# Patient Record
Sex: MALE | Race: WHITE | Employment: UNEMPLOYED | URBAN - NONMETROPOLITAN AREA
[De-identification: names, ages, dates, MRNs, and addresses within clinical notes are randomized per-mention and may not be internally consistent; named-entity substitution may affect disease eponyms.]

---

## 2019-05-23 ENCOUNTER — OFFICE VISIT (OUTPATIENT)
Dept: FAMILY MEDICINE CLINIC | Age: 74
End: 2019-05-23
Payer: COMMERCIAL

## 2019-05-23 VITALS
HEART RATE: 66 BPM | OXYGEN SATURATION: 98 % | HEIGHT: 70 IN | BODY MASS INDEX: 24.77 KG/M2 | TEMPERATURE: 97.9 F | DIASTOLIC BLOOD PRESSURE: 70 MMHG | WEIGHT: 173 LBS | SYSTOLIC BLOOD PRESSURE: 140 MMHG

## 2019-05-23 DIAGNOSIS — M06.4 INFLAMMATORY POLYARTHROPATHY (HCC): Primary | ICD-10-CM

## 2019-05-23 DIAGNOSIS — E11.9 TYPE 2 DIABETES MELLITUS WITHOUT COMPLICATION, WITHOUT LONG-TERM CURRENT USE OF INSULIN (HCC): ICD-10-CM

## 2019-05-23 DIAGNOSIS — E78.5 HYPERLIPIDEMIA, UNSPECIFIED HYPERLIPIDEMIA TYPE: ICD-10-CM

## 2019-05-23 DIAGNOSIS — I10 ESSENTIAL HYPERTENSION: ICD-10-CM

## 2019-05-23 DIAGNOSIS — C61 PROSTATE CA (HCC): ICD-10-CM

## 2019-05-23 PROCEDURE — 99214 OFFICE O/P EST MOD 30 MIN: CPT | Performed by: INTERNAL MEDICINE

## 2019-05-23 RX ORDER — BIMATOPROST 0.01 %
1 DROPS OPHTHALMIC (EYE) NIGHTLY
COMMUNITY
Start: 2019-05-18

## 2019-05-23 RX ORDER — CHLORTHALIDONE 25 MG/1
25 TABLET ORAL DAILY
COMMUNITY
Start: 2019-05-13 | End: 2019-05-23 | Stop reason: SDUPTHER

## 2019-05-23 RX ORDER — CHLORTHALIDONE 25 MG/1
25 TABLET ORAL DAILY
Qty: 90 TABLET | Refills: 1 | Status: SHIPPED | OUTPATIENT
Start: 2019-05-23 | End: 2019-11-07 | Stop reason: SDUPTHER

## 2019-05-23 RX ORDER — OMEPRAZOLE 20 MG/1
TABLET, DELAYED RELEASE ORAL
COMMUNITY
End: 2020-05-06 | Stop reason: ALTCHOICE

## 2019-05-23 RX ORDER — LISINOPRIL 40 MG/1
40 TABLET ORAL DAILY
Refills: 1 | COMMUNITY
Start: 2019-03-27 | End: 2019-09-19 | Stop reason: SDUPTHER

## 2019-05-23 RX ORDER — FOLIC ACID 1 MG/1
1 TABLET ORAL DAILY
Refills: 12 | COMMUNITY
Start: 2019-04-25

## 2019-05-23 RX ORDER — ASPIRIN 81 MG/1
81 TABLET ORAL DAILY
COMMUNITY

## 2019-05-23 RX ORDER — VITAMIN E 268 MG
400 CAPSULE ORAL DAILY
COMMUNITY

## 2019-05-23 RX ORDER — PREDNISONE 10 MG/1
5 TABLET ORAL DAILY
Refills: 1 | COMMUNITY
Start: 2019-05-04 | End: 2019-11-07 | Stop reason: ALTCHOICE

## 2019-05-23 RX ORDER — FLUTICASONE PROPIONATE 50 MCG
2 SPRAY, SUSPENSION (ML) NASAL DAILY
Qty: 1 BOTTLE | Refills: 5 | Status: SHIPPED
Start: 2019-05-23 | End: 2020-05-06 | Stop reason: SDUPTHER

## 2019-05-23 RX ORDER — PRAVASTATIN SODIUM 80 MG/1
80 TABLET ORAL DAILY
COMMUNITY
Start: 2019-02-25 | End: 2019-09-19 | Stop reason: SDUPTHER

## 2019-05-23 RX ORDER — BRIMONIDINE TARTRATE/TIMOLOL 0.2%-0.5%
DROPS OPHTHALMIC (EYE)
Refills: 6 | Status: ON HOLD | COMMUNITY
Start: 2019-04-10 | End: 2020-10-11 | Stop reason: ALTCHOICE

## 2019-05-23 RX ORDER — ALLOPURINOL 300 MG/1
300 TABLET ORAL DAILY
Refills: 1 | COMMUNITY
Start: 2019-04-30 | End: 2019-09-19 | Stop reason: SDUPTHER

## 2019-05-23 RX ORDER — METHOTREXATE 2.5 MG/1
15 TABLET ORAL WEEKLY
Refills: 2 | COMMUNITY
Start: 2019-04-25 | End: 2019-06-21 | Stop reason: SDUPTHER

## 2019-05-23 ASSESSMENT — PATIENT HEALTH QUESTIONNAIRE - PHQ9
1. LITTLE INTEREST OR PLEASURE IN DOING THINGS: 0
SUM OF ALL RESPONSES TO PHQ9 QUESTIONS 1 & 2: 0
2. FEELING DOWN, DEPRESSED OR HOPELESS: 0
SUM OF ALL RESPONSES TO PHQ QUESTIONS 1-9: 0
SUM OF ALL RESPONSES TO PHQ QUESTIONS 1-9: 0

## 2019-05-24 ASSESSMENT — ENCOUNTER SYMPTOMS
DIARRHEA: 0
RHINORRHEA: 0
SHORTNESS OF BREATH: 0
SINUS PAIN: 0
WHEEZING: 0
BLOOD IN STOOL: 0
BACK PAIN: 0
CONSTIPATION: 0
NAUSEA: 0
ABDOMINAL PAIN: 0
VOMITING: 0
COUGH: 0

## 2019-05-24 NOTE — PROGRESS NOTES
[facility]     19  Steve Gordon : 1945 Sex: male  Age: 74 y.o. Chief Complaint   Patient presents with    Hypertension    Hyperlipidemia   Multiple medical problems follow-up    HPI: Patient presents today accompanied by his wife for follow-up visit issues. 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 Markedly elevated sedimentation rate. Joint effusion which was drained. All his symptoms have improved on prednisone and methotrexate. He is up-to-date with ophthalmology related to his diabetes. States his sugars have been a little bit on the higher side with prednisone blood pressures have been good. No further weight loss. Is actually up 5 pounds currently. Review of Systems   Constitutional: Negative for activity change, appetite change, chills, diaphoresis, fatigue, fever and unexpected weight change. HENT: Negative for congestion, ear pain, hearing loss, postnasal drip, rhinorrhea and sinus pain. Respiratory: Negative for cough, shortness of breath and wheezing. Cardiovascular: Negative for chest pain, palpitations and leg swelling. Gastrointestinal: Negative for abdominal pain, blood in stool, constipation, diarrhea, nausea and vomiting. Endocrine: Negative. Genitourinary: Negative for difficulty urinating, dysuria, frequency, hematuria and urgency. Musculoskeletal: Negative for arthralgias, back pain, gait problem and myalgias. Skin: Negative. Allergic/Immunologic: Negative for environmental allergies and immunocompromised state. Neurological: Negative for dizziness, weakness, light-headedness, numbness and headaches. Hematological: Negative. Psychiatric/Behavioral: Negative for sleep disturbance. The patient is not nervous/anxious. REST OF PERTINENT ROS GONE OVER AND WAS NEGATIVE.    PMH:  Problem List: Essential hypertension  Osteoarthritis  Malignant tumor of Relationships    Social connections:     Talks on phone: Not on file     Gets together: Not on file     Attends Yazidism service: Not on file     Active member of club or organization: Not on file     Attends meetings of clubs or organizations: Not on file     Relationship status: Not on file    Intimate partner violence:     Fear of current or ex partner: Not on file     Emotionally abused: Not on file     Physically abused: Not on file     Forced sexual activity: Not on file   Other Topics Concern    Not on file   Social History Narrative    Not on file       Vitals:    05/23/19 0846 05/23/19 0856   BP: (!) 140/68 (!) 140/70   Site:  Left Upper Arm   Pulse: 66    Temp: 97.9 °F (36.6 °C)    TempSrc: Temporal    SpO2: 98%    Weight: 173 lb (78.5 kg)    Height: 5' 10\" (1.778 m)        Physical Exam  Exam:  Const: Appears well developed and well nourished. No signs of acute distress present. ENMT: . (External Ears) Tympanic membranes are intact. External nose WNL. Moistness and  normal color of the nasal mucosae. Nasal septum. (Septum) . (Teeth) Gums appear healthy. Posterior pharynx shows no exudate, irritation or redness. Neck: Supple and symmetric. Palpation reveals no adenopathy. No masses appreciated. Thyroid exhibits no nodule or thyromegaly. No JVD. Carotids: 2+ and equal bilaterally, without  bruits. Resp: No rales, rhonchi or wheezes appreciated over the lungs bilaterally. CV: Rate is regular. Rhythm is regular. S1 is normal. S2 is normal. No gallop or rubs. No  heart murmur appreciated. Extremities: No clubbing, cyanosis or edema. Abdomen: Bowel sounds are normoactive. Palpation reveals softness, with no distension,  organomegaly or tenderness. No abdominal masses palpable. No palpable hepatosplenomegaly  He does have a large diastasis rectus. Musculo: Walks with a normal gait. Upper Extremities: Full ROM bilaterally. Lower Extremities:  Full ROM bilaterally.   Neurological: Grossly intact without focal deficits noted. Assessment and Plan:  Naida Seth was seen today for hypertension and hyperlipidemia. Diagnoses and all orders for this visit:    Inflammatory polyarthropathy ([address]  -     fluticasone (FLONASE) 50 MCG/ACT nasal spray; 2 sprays by Each Nostril route daily    Essential hypertension  -     chlorthalidone (HYGROTON) 25 MG tablet; Take 1 tablet by mouth daily    Hyperlipidemia, unspecified hyperlipidemia type  -     Lipid Panel; Future    Prostate CA (HCC)  -     mupirocin (BACTROBAN) 2 % ointment; Apply 3 times daily.  -     PSA, DIAGNOSTIC; Future    Type 2 diabetes mellitus without complication, without long-term current use of insulin (HCC)  -     Hemoglobin A1C; Future  -     Microalbumin / Creatinine Urine Ratio; Future    Plan: Blood work to be done with Rheumatology labs. To include hemoglobin A1c and urine microalbumin to creatinine ratio lipids and PSA. He declines prostate exam. I did also give him fluticasone nasal spray for eustachian tube dysfunction type symptoms continue to monitor blood pressure blood sugar closely. We will watch weight. Follow with rheumatology and ophthalmology. Advised problems in the interim. I'll see him back in 3 months and when necessary. Return in about 4 months (around 9/23/2019). Seen By:  Magda Ferris MD      *Document was created using voice recognition software. Note was reviewed however may contain grammatical errors.

## 2019-06-17 ENCOUNTER — TELEPHONE (OUTPATIENT)
Dept: PRIMARY CARE CLINIC | Age: 74
End: 2019-06-17

## 2019-06-17 DIAGNOSIS — M06.4 INFLAMMATORY POLYARTHROPATHY (HCC): Primary | ICD-10-CM

## 2019-06-19 LAB
ALBUMIN SERPL-MCNC: 3.8 G/DL
ALP BLD-CCNC: 52 U/L
ALT SERPL-CCNC: 13 U/L
ANION GAP SERPL CALCULATED.3IONS-SCNC: 1.2 MMOL/L
AST SERPL-CCNC: 17 U/L
BASOPHILS ABSOLUTE: NORMAL /ΜL
BASOPHILS RELATIVE PERCENT: NORMAL %
BILIRUB SERPL-MCNC: 1.2 MG/DL (ref 0.1–1.4)
BUN BLDV-MCNC: 34 MG/DL
CALCIUM SERPL-MCNC: 8.9 MG/DL
CHLORIDE BLD-SCNC: 102 MMOL/L
CO2: 24 MMOL/L
CREAT SERPL-MCNC: 1.4 MG/DL
EOSINOPHILS ABSOLUTE: NORMAL /ΜL
EOSINOPHILS RELATIVE PERCENT: NORMAL %
GFR CALCULATED: 49
GLUCOSE BLD-MCNC: 220 MG/DL
HCT VFR BLD CALC: NORMAL % (ref 41–53)
HEMOGLOBIN: NORMAL G/DL (ref 13.5–17.5)
LYMPHOCYTES ABSOLUTE: NORMAL /ΜL
LYMPHOCYTES RELATIVE PERCENT: NORMAL %
MCH RBC QN AUTO: NORMAL PG
MCHC RBC AUTO-ENTMCNC: NORMAL G/DL
MCV RBC AUTO: NORMAL FL
MONOCYTES ABSOLUTE: NORMAL /ΜL
MONOCYTES RELATIVE PERCENT: NORMAL %
NEUTROPHILS ABSOLUTE: NORMAL /ΜL
NEUTROPHILS RELATIVE PERCENT: NORMAL %
PDW BLD-RTO: NORMAL %
PLATELET # BLD: NORMAL K/ΜL
PMV BLD AUTO: NORMAL FL
POTASSIUM SERPL-SCNC: 3.8 MMOL/L
RBC # BLD: NORMAL 10^6/ΜL
SODIUM BLD-SCNC: 136 MMOL/L
TOTAL PROTEIN: 7
WBC # BLD: NORMAL 10^3/ML

## 2019-06-21 ENCOUNTER — TELEPHONE (OUTPATIENT)
Dept: RHEUMATOLOGY | Age: 74
End: 2019-06-21

## 2019-06-24 ENCOUNTER — OFFICE VISIT (OUTPATIENT)
Dept: RHEUMATOLOGY | Age: 74
End: 2019-06-24
Payer: COMMERCIAL

## 2019-06-24 VITALS
HEIGHT: 68 IN | OXYGEN SATURATION: 99 % | HEART RATE: 95 BPM | DIASTOLIC BLOOD PRESSURE: 72 MMHG | SYSTOLIC BLOOD PRESSURE: 142 MMHG | BODY MASS INDEX: 26.89 KG/M2 | TEMPERATURE: 98.3 F | WEIGHT: 177.4 LBS

## 2019-06-24 DIAGNOSIS — M06.4 INFLAMMATORY POLYARTHROPATHY (HCC): ICD-10-CM

## 2019-06-24 DIAGNOSIS — M06.4 INFLAMMATORY POLYARTHROPATHY (HCC): Primary | ICD-10-CM

## 2019-06-24 PROCEDURE — 99214 OFFICE O/P EST MOD 30 MIN: CPT | Performed by: INTERNAL MEDICINE

## 2019-06-24 ASSESSMENT — ENCOUNTER SYMPTOMS
VOMITING: 0
SHORTNESS OF BREATH: 0
COLOR CHANGE: 0
SORE THROAT: 0
EYE DISCHARGE: 0
EYE ITCHING: 0
EYE REDNESS: 0
ABDOMINAL DISTENTION: 0
BLOOD IN STOOL: 0
SINUS PRESSURE: 0
COUGH: 0
ABDOMINAL PAIN: 0
PHOTOPHOBIA: 0
SINUS PAIN: 0
EYE PAIN: 0
DIARRHEA: 0
CHEST TIGHTNESS: 0
WHEEZING: 0
CONSTIPATION: 0
TROUBLE SWALLOWING: 0

## 2019-06-24 NOTE — PROGRESS NOTES
fatigue, fever and unexpected weight change. HENT: Negative for congestion, ear discharge, ear pain, hearing loss, mouth sores, nosebleeds, sinus pressure, sinus pain, sore throat and trouble swallowing. Eyes: Negative for photophobia, pain, discharge, redness and itching. Respiratory: Negative for cough, chest tightness, shortness of breath and wheezing. Cardiovascular: Negative for chest pain, palpitations and leg swelling. Gastrointestinal: Negative for abdominal distention, abdominal pain, blood in stool, constipation, diarrhea and vomiting. Endocrine: Negative for cold intolerance, heat intolerance, polydipsia, polyphagia and polyuria. Genitourinary: Negative for dysuria, flank pain, genital sores and hematuria. Musculoskeletal: Negative for arthralgias, myalgias and neck pain. Skin: Negative for color change, pallor, rash and wound. Allergic/Immunologic: Negative for environmental allergies and food allergies. Neurological: Negative for dizziness, seizures, syncope, weakness, light-headedness, numbness and headaches. Hematological: Negative for adenopathy. Does not bruise/bleed easily. Psychiatric/Behavioral: Negative for confusion. The patient is not nervous/anxious.            Current Outpatient Medications:     methotrexate (RHEUMATREX) 2.5 MG chemo tablet, Take 4 tabs once weekly, Disp: 4 tablet, Rfl: 0    aspirin 81 MG EC tablet, Take by mouth, Disp: , Rfl:     blood glucose test strips (ONE TOUCH ULTRA TEST) strip, , Disp: , Rfl:     vitamin E 100 units capsule, Take by mouth, Disp: , Rfl:     allopurinol (ZYLOPRIM) 300 MG tablet, 300 mg daily , Disp: , Rfl: 1    LUMIGAN 0.01 % SOLN ophthalmic drops, , Disp: , Rfl:     COMBIGAN 0.2-0.5 % ophthalmic solution, , Disp: , Rfl: 6    vitamin D (CHOLECALCIFEROL) 1000 UNIT TABS tablet, Take by mouth, Disp: , Rfl:     folic acid (FOLVITE) 1 MG tablet, Take 1 mg by mouth daily , Disp: , Rfl: 12    lisinopril (PRINIVIL;ZESTRIL) 40 MG tablet, Take 40 mg by mouth daily , Disp: , Rfl: 1    metFORMIN (GLUCOPHAGE) 500 MG tablet, Take 500 mg by mouth three times daily , Disp: , Rfl:     methotrexate (RHEUMATREX) 2.5 MG chemo tablet, Take 15 mg by mouth once a week , Disp: , Rfl: 2    omeprazole (PRILOSEC OTC) 20 MG tablet, Take by mouth, Disp: , Rfl:     pravastatin (PRAVACHOL) 80 MG tablet, Take 80 mg by mouth daily , Disp: , Rfl:     predniSONE (DELTASONE) 10 MG tablet, , Disp: , Rfl: 1    chlorthalidone (HYGROTON) 25 MG tablet, Take 1 tablet by mouth daily, Disp: 90 tablet, Rfl: 1    fluticasone (FLONASE) 50 MCG/ACT nasal spray, 2 sprays by Each Nostril route daily, Disp: 1 Bottle, Rfl: 5  No Known Allergies    HEALTH MAINTENANCE:       Past Medical History:   Diagnosis Date    Essential hypertension 5/23/2019    Hyperlipidemia 5/23/2019    [facility] 5/23/2019    Prostate CA ([facility] 5/23/2019    Type 2 diabetes mellitus without complication, without long-term current use of insulin ([facility] 5/23/2019     No family history on file. No past surgical history on file.    Social History     Socioeconomic History    Marital status:      Spouse name: Not on file    Number of children: Not on file    Years of education: Not on file    Highest education level: Not on file   Occupational History     Employer: RETIRED   Social Needs    Financial resource strain: Not on file    Food insecurity:     Worry: Not on file     Inability: Not on file    Transportation needs:     Medical: Not on file     Non-medical: Not on file   Tobacco Use    Smoking status: Never Smoker    Smokeless tobacco: Never Used   Substance and Sexual Activity    Alcohol use: Not Currently    Drug use: Never    Sexual activity: Yes     Partners: Female   Lifestyle    Physical activity:     Days per week: Not on file     Minutes per session: Not on file    Stress: Not on file   Relationships    Social connections:     Talks on phone: Not on file     Gets together: Not on file     Attends Amish service: Not on file     Active member of club or organization: Not on file     Attends meetings of clubs or organizations: Not on file     Relationship status: Not on file    Intimate partner violence:     Fear of current or ex partner: Not on file     Emotionally abused: Not on file     Physically abused: Not on file     Forced sexual activity: Not on file   Other Topics Concern    Not on file   Social History Narrative    Not on file      Social History     Social History Narrative    Not on file        Vitals:    06/24/19 0926 06/24/19 0931   BP: (!) 144/70 (!) 142/72   Site: Right Upper Arm Left Upper Arm   Position: Sitting Sitting   Pulse: 95    Temp: 98.3 °F (36.8 °C)    TempSrc: Temporal    SpO2: 99%    Weight: 177 lb 6.4 oz (80.5 kg)    Height: 5' 8\" (1.727 m)         Physical Exam   Musculoskeletal:   No active synovitis         Mimi Ellis was seen today for joint pain and joint swelling. 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 Significant resolution of pain swelling and stiffness after prednisone taper. Able to tolerate Methotrexate with out side effects. Reports significant improvement in joints pain. No active synovitis. 1- Taper Prednisone to 2.5 mg and continue it for 4 weeks . 2- Continue 6 tabs of MTX   3- Repeat labs in 4 weeks   Return in about 6 weeks (around 8/5/2019). Giles Gipson MD     *This document was created using voice recognition software. Note was reviewed, however may contain grammatical errors.

## 2019-06-26 RX ORDER — METHOTREXATE 2.5 MG/1
15 TABLET ORAL WEEKLY
Qty: 24 TABLET | Refills: 2 | Status: SHIPPED | OUTPATIENT
Start: 2019-06-26 | End: 2019-06-27

## 2019-07-19 LAB
ALBUMIN SERPL-MCNC: NORMAL G/DL
ALP BLD-CCNC: NORMAL U/L
ALT SERPL-CCNC: NORMAL U/L
ANION GAP SERPL CALCULATED.3IONS-SCNC: NORMAL MMOL/L
AST SERPL-CCNC: NORMAL U/L
BASOPHILS ABSOLUTE: NORMAL /ΜL
BASOPHILS RELATIVE PERCENT: NORMAL %
BILIRUB SERPL-MCNC: NORMAL MG/DL (ref 0.1–1.4)
BUN BLDV-MCNC: 23 MG/DL
CALCIUM SERPL-MCNC: NORMAL MG/DL
CHLORIDE BLD-SCNC: NORMAL MMOL/L
CHOLESTEROL, TOTAL: 155 MG/DL
CHOLESTEROL/HDL RATIO: 2.2
CO2: NORMAL MMOL/L
CREAT SERPL-MCNC: 1.3 MG/DL
CREATININE, URINE: 200.6
EOSINOPHILS ABSOLUTE: NORMAL /ΜL
EOSINOPHILS RELATIVE PERCENT: NORMAL %
GFR CALCULATED: NORMAL
GLUCOSE BLD-MCNC: 142 MG/DL
HCT VFR BLD CALC: NORMAL % (ref 41–53)
HDLC SERPL-MCNC: 43 MG/DL (ref 35–70)
HEMOGLOBIN: NORMAL G/DL (ref 13.5–17.5)
LDL CHOLESTEROL CALCULATED: 93 MG/DL (ref 0–160)
LYMPHOCYTES ABSOLUTE: NORMAL /ΜL
LYMPHOCYTES RELATIVE PERCENT: NORMAL %
MCH RBC QN AUTO: NORMAL PG
MCHC RBC AUTO-ENTMCNC: NORMAL G/DL
MCV RBC AUTO: NORMAL FL
MICROALBUMIN/CREAT 24H UR: 594.2 MG/G{CREAT}
MICROALBUMIN/CREAT UR-RTO: 296.2
MONOCYTES ABSOLUTE: NORMAL /ΜL
MONOCYTES RELATIVE PERCENT: NORMAL %
NEUTROPHILS ABSOLUTE: NORMAL /ΜL
NEUTROPHILS RELATIVE PERCENT: NORMAL %
PLATELET # BLD: NORMAL K/ΜL
PMV BLD AUTO: NORMAL FL
POTASSIUM SERPL-SCNC: 3.7 MMOL/L
RBC # BLD: NORMAL 10^6/ΜL
SODIUM BLD-SCNC: NORMAL MMOL/L
TOTAL PROTEIN: NORMAL
TRIGL SERPL-MCNC: 163 MG/DL
VLDLC SERPL CALC-MCNC: NORMAL MG/DL
WBC # BLD: NORMAL 10^3/ML

## 2019-07-25 DIAGNOSIS — M06.4 INFLAMMATORY POLYARTHROPATHY (HCC): ICD-10-CM

## 2019-08-05 ENCOUNTER — OFFICE VISIT (OUTPATIENT)
Dept: RHEUMATOLOGY | Age: 74
End: 2019-08-05
Payer: COMMERCIAL

## 2019-08-05 VITALS
OXYGEN SATURATION: 93 % | DIASTOLIC BLOOD PRESSURE: 72 MMHG | BODY MASS INDEX: 27.34 KG/M2 | HEIGHT: 68 IN | WEIGHT: 180.4 LBS | SYSTOLIC BLOOD PRESSURE: 146 MMHG | TEMPERATURE: 98.2 F | HEART RATE: 99 BPM

## 2019-08-05 DIAGNOSIS — M06.4 INFLAMMATORY POLYARTHROPATHY (HCC): Primary | ICD-10-CM

## 2019-08-05 PROCEDURE — 99214 OFFICE O/P EST MOD 30 MIN: CPT | Performed by: INTERNAL MEDICINE

## 2019-08-05 ASSESSMENT — ENCOUNTER SYMPTOMS
ABDOMINAL PAIN: 0
EYE ITCHING: 0
COUGH: 0
CONSTIPATION: 0
COLOR CHANGE: 0
ABDOMINAL DISTENTION: 0
CHEST TIGHTNESS: 0
VOMITING: 0
TROUBLE SWALLOWING: 0
EYE PAIN: 0
SINUS PRESSURE: 0
BLOOD IN STOOL: 0
SORE THROAT: 0
EYE REDNESS: 0
SHORTNESS OF BREATH: 0
EYE DISCHARGE: 0
WHEEZING: 0
PHOTOPHOBIA: 0
DIARRHEA: 0
SINUS PAIN: 0

## 2019-08-05 NOTE — PROGRESS NOTES
19    Name: Emily Levi  : 1945  Age: 66 y.o. Sex: male    Patient is seen at the request of Jonn Haywood MD    Patient presents for a rheumatology consultation regarding     Chief Complaint   Patient presents with    Joint Pain    Joint Swelling       1- Migratory pain affecting shoulders , MCP, left knee, last week he has swelling of right 3rd MCP, symptoms improves its own, takes Tylenol arthritis, took Naproxen for knee pain after right knee effusion however lately he cannot continue it due to low GFR, reports morning stiffness which lasts whole day, he states pain stays all day along , this pattern of joint pain has been going since many years however lately he has been experiencing swelling in joints, denies history tick bite,denies silver scaly patches, denies recent travel history , denies IBD, deoesnot know about family history of autoimmune  disease as he was adopted,  2- Right knee Effusion after Thanksgiving , sudden onset, synovial fluid was drained by Dr Enio Hurley which was consistent  with inflammation but no infection or crystals,  3- Gout -podagra , well controlled since he is on Allopurinol, last flare was couple of years ago  4- Pink eye left eye , intermittent many years, follows with Dr Lori Holman  5- Rash Intergluteal region , recent flare one week ago  6- dry scaly patch over left foot    19:  Doing well . Reports improvement in joint pain. Started 6 tabs of MTX this week. Compliant with 5 mg of Prednisone. Recent labs showed improvement of ESR. Now it is 39. RR is 0-20. Rest of labs are in acceptable range for Methotrexate use. 19 :  Went off prednisone completely in first 2 weeks of July , noticed recurrence of red eye worse in left , he resumed 2.5 mg of prednisone and it has helped. Labs from 19 showed normal LFT , GFR ,CRP wnl , ESR elevated at 47. He has not increased MTX to 6 tabs yet due to cost issue.          Vitals:    19 0825 19 LUMIGAN 0.01 % SOLN ophthalmic drops, , Disp: , Rfl:     COMBIGAN 0.2-0.5 % ophthalmic solution, , Disp: , Rfl: 6    vitamin D (CHOLECALCIFEROL) 1000 UNIT TABS tablet, Take by mouth, Disp: , Rfl:     folic acid (FOLVITE) 1 MG tablet, Take 1 mg by mouth daily , Disp: , Rfl: 12    lisinopril (PRINIVIL;ZESTRIL) 40 MG tablet, Take 40 mg by mouth daily , Disp: , Rfl: 1    metFORMIN (GLUCOPHAGE) 500 MG tablet, Take 500 mg by mouth three times daily , Disp: , Rfl:     omeprazole (PRILOSEC OTC) 20 MG tablet, Take by mouth, Disp: , Rfl:     pravastatin (PRAVACHOL) 80 MG tablet, Take 80 mg by mouth daily , Disp: , Rfl:     predniSONE (DELTASONE) 10 MG tablet, , Disp: , Rfl: 1    chlorthalidone (HYGROTON) 25 MG tablet, Take 1 tablet by mouth daily, Disp: 90 tablet, Rfl: 1    fluticasone (FLONASE) 50 MCG/ACT nasal spray, 2 sprays by Each Nostril route daily, Disp: 1 Bottle, Rfl: 5  No Known Allergies    HEALTH MAINTENANCE:       Past Medical History:   Diagnosis Date    Essential hypertension 5/23/2019    Hyperlipidemia 5/23/2019    Infl[facility] 5/23/2019    Prostate CA ([facility] 5/23/2019    Type 2 diabetes mellitus without complication, without long-term current use of insulin [facility] 5/23/2019     No family history on file. No past surgical history on file.    Social History     Socioeconomic History    Marital status:      Spouse name: Not on file    Number of children: Not on file    Years of education: Not on file    Highest education level: Not on file   Occupational History     Employer: RETIRED   Social Needs    Financial resource strain: Not on file    Food insecurity:     Worry: Not on file     Inability: Not on file    Transportation needs:     Medical: Not on file     Non-medical: Not on file   Tobacco Use    Smoking status: Never Smoker    Smokeless tobacco: Never Used   Substance and Sexual Activity    Alcohol use: Not Currently    Drug use: Never    Sexual activity: Yes     Partners: Female   Lifestyle    Physical activity:     Days per week: Not on file     Minutes per session: Not on file    Stress: Not on file   Relationships    Social connections:     Talks on phone: Not on file     Gets together: Not on file     Attends Islam service: Not on file     Active member of club or organization: Not on file     Attends meetings of clubs or organizations: Not on file     Relationship status: Not on file    Intimate partner violence:     Fear of current or ex partner: Not on file     Emotionally abused: Not on file     Physically abused: Not on file     Forced sexual activity: Not on file   Other Topics Concern    Not on file   Social History Narrative    Not on file      Social History     Social History Narrative    Not on file        Vitals:    08/05/19 0825 08/05/19 0829   BP: (!) 160/76 (!) 146/72   Site: Left Upper Arm Left Upper Arm   Position: Sitting Sitting   Pulse: 99    Temp: 98.2 °F (36.8 °C)    TempSrc: Temporal    SpO2: 93%    Weight: 180 lb 6.4 oz (81.8 kg)    Height: 5' 8\" (1.727 m)         Physical Exam   Constitutional: He is oriented to person, place, and time. He appears well-developed and well-nourished. HENT:   Head: Normocephalic. Right Ear: External ear normal.   Left Ear: External ear normal.   Mouth/Throat: Oropharynx is clear and moist.   Eyes: Pupils are equal, round, and reactive to light. Conjunctivae and EOM are normal.   Neck: Normal range of motion. No thyromegaly present. Cardiovascular: Normal rate, regular rhythm and intact distal pulses. Pulmonary/Chest: Effort normal and breath sounds normal. He has no wheezes. He has no rales. He exhibits no tenderness. Abdominal: Soft. Bowel sounds are normal. He exhibits no distension and no mass. There is no tenderness. There is no rebound and no guarding. Musculoskeletal: Normal range of motion.    Chronic synovial thickening affecting most of MCP , PIP with out active

## 2019-09-19 ENCOUNTER — OFFICE VISIT (OUTPATIENT)
Dept: FAMILY MEDICINE CLINIC | Age: 74
End: 2019-09-19
Payer: COMMERCIAL

## 2019-09-19 VITALS
BODY MASS INDEX: 27.22 KG/M2 | WEIGHT: 179 LBS | DIASTOLIC BLOOD PRESSURE: 78 MMHG | SYSTOLIC BLOOD PRESSURE: 144 MMHG | OXYGEN SATURATION: 95 % | HEART RATE: 86 BPM

## 2019-09-19 DIAGNOSIS — C61 PROSTATE CA (HCC): ICD-10-CM

## 2019-09-19 DIAGNOSIS — I10 ESSENTIAL HYPERTENSION: ICD-10-CM

## 2019-09-19 DIAGNOSIS — E78.5 HYPERLIPIDEMIA, UNSPECIFIED HYPERLIPIDEMIA TYPE: ICD-10-CM

## 2019-09-19 DIAGNOSIS — E11.9 TYPE 2 DIABETES MELLITUS WITHOUT COMPLICATION, WITHOUT LONG-TERM CURRENT USE OF INSULIN (HCC): ICD-10-CM

## 2019-09-19 DIAGNOSIS — M06.4 INFLAMMATORY POLYARTHROPATHY (HCC): Primary | ICD-10-CM

## 2019-09-19 PROCEDURE — 99214 OFFICE O/P EST MOD 30 MIN: CPT | Performed by: INTERNAL MEDICINE

## 2019-09-19 RX ORDER — PRAVASTATIN SODIUM 80 MG/1
80 TABLET ORAL DAILY
Qty: 90 TABLET | Refills: 1 | Status: SHIPPED | OUTPATIENT
Start: 2019-09-19 | End: 2019-11-07 | Stop reason: SDUPTHER

## 2019-09-19 RX ORDER — ALLOPURINOL 300 MG/1
300 TABLET ORAL DAILY
Qty: 90 TABLET | Refills: 1 | Status: SHIPPED | OUTPATIENT
Start: 2019-09-19 | End: 2019-11-07 | Stop reason: SDUPTHER

## 2019-09-19 RX ORDER — LISINOPRIL 40 MG/1
40 TABLET ORAL DAILY
Qty: 90 TABLET | Refills: 1 | Status: SHIPPED | OUTPATIENT
Start: 2019-09-19 | End: 2019-11-07 | Stop reason: SDUPTHER

## 2019-09-22 NOTE — PROGRESS NOTES
[facility]     19  Marisa Alberto : 1945 Sex: male  Age: 66 y.o. Chief Complaint   Patient presents with    Hypertension       HPI  Presents today for follow-up visit on his multiple medical problems. He is accompanied by his wife. He had been following with rheumatology for inflammatory polyarthropathy probable psoriatic arthritis. His rheumatologist has left the area and he has not chosen another one yet. I told him he is going to need to do so for the management of this at his methotrexate. Currently on 15 mg of methotrexate weekly. Goal is to get him up to 18 mg weekly. Still on low-dose prednisone at 5 mg daily. Goal is to get him off the prednisone. Arthritis appears to be under good control at this time. Tells me blood pressures have been in good range. Blood sugars have been somewhat fluctuant on the prednisone. Review of Systems     Constitutional: Negative for activity change, appetite change, chills, diaphoresis, fatigue, fever and unexpected weight change. HENT: Negative for congestion, ear pain, hearing loss, postnasal drip, rhinorrhea and sinus pain. Respiratory: Negative for cough, shortness of breath and wheezing. Cardiovascular: Negative for chest pain, palpitations and leg swelling. Gastrointestinal: Negative for abdominal pain, blood in stool, constipation, diarrhea, nausea and vomiting. Endocrine: Negative. Genitourinary: Negative for difficulty urinating, dysuria, frequency, hematuria and urgency. Musculoskeletal: Negative for arthralgias (controlled with current regimen) back pain, gait problem and myalgias. Skin: Negative. Allergic/Immunologic: Negative for environmental allergies   Neurological: Negative for dizziness, weakness, light-headedness, numbness and headaches. Hematological: Negative. Psychiatric/Behavioral: Negative for sleep disturbance. The patient is not nervous/anxious.     REST OF PERTINENT ROS GONE resource strain: Not on file    Food insecurity:     Worry: Not on file     Inability: Not on file    Transportation needs:     Medical: Not on file     Non-medical: Not on file   Tobacco Use    Smoking status: Never Smoker    Smokeless tobacco: Never Used   Substance and Sexual Activity    Alcohol use: Not Currently    Drug use: Never    Sexual activity: Yes     Partners: Female   Lifestyle    Physical activity:     Days per week: Not on file     Minutes per session: Not on file    Stress: Not on file   Relationships    Social connections:     Talks on phone: Not on file     Gets together: Not on file     Attends Alevism service: Not on file     Active member of club or organization: Not on file     Attends meetings of clubs or organizations: Not on file     Relationship status: Not on file    Intimate partner violence:     Fear of current or ex partner: Not on file     Emotionally abused: Not on file     Physically abused: Not on file     Forced sexual activity: Not on file   Other Topics Concern    Not on file   Social History Narrative    Not on file       Vitals:    09/19/19 0901   BP: (!) 144/78   Pulse: 86   SpO2: 95%   Weight: 179 lb (81.2 kg)       Physical Exam    Const: Appears well developed and well nourished. No signs of acute distress present. ENMT: . (External Ears) Tympanic membranes are intact. External nose WNL. Moistness and  normal color of the nasal mucosae. Nasal septum. (Septum) . (Teeth) Gums appear healthy. Posterior pharynx shows no exudate, irritation or redness. Neck: Supple and symmetric. Palpation reveals no adenopathy. No masses appreciated. Thyroid exhibits no nodule or thyromegaly. No JVD. Carotids: 2+ and equal bilaterally, without  bruits. Resp: No rales, rhonchi or wheezes appreciated over the lungs bilaterally. CV: Rate is regular. Rhythm is regular. S1 is normal. S2 is normal. No gallop or rubs. No  heart murmur appreciated.  Extremities: No clubbing,

## 2019-10-08 ENCOUNTER — OFFICE VISIT (OUTPATIENT)
Dept: FAMILY MEDICINE CLINIC | Age: 74
End: 2019-10-08
Payer: COMMERCIAL

## 2019-10-08 VITALS
SYSTOLIC BLOOD PRESSURE: 152 MMHG | WEIGHT: 177 LBS | TEMPERATURE: 97.8 F | HEIGHT: 68 IN | DIASTOLIC BLOOD PRESSURE: 90 MMHG | HEART RATE: 91 BPM | BODY MASS INDEX: 26.83 KG/M2 | OXYGEN SATURATION: 96 %

## 2019-10-08 DIAGNOSIS — R21 RASH: Primary | ICD-10-CM

## 2019-10-08 PROCEDURE — 99213 OFFICE O/P EST LOW 20 MIN: CPT | Performed by: PHYSICIAN ASSISTANT

## 2019-10-08 RX ORDER — PREDNISONE 20 MG/1
TABLET ORAL
Qty: 18 TABLET | Refills: 0 | Status: SHIPPED | OUTPATIENT
Start: 2019-10-08 | End: 2019-11-07 | Stop reason: ALTCHOICE

## 2019-10-08 ASSESSMENT — ENCOUNTER SYMPTOMS
SHORTNESS OF BREATH: 0
SORE THROAT: 0
PHOTOPHOBIA: 0
ABDOMINAL PAIN: 0
COUGH: 0
NAUSEA: 0
BACK PAIN: 0
DIARRHEA: 0
VOMITING: 0

## 2019-11-01 LAB
A/G RATIO: 1.2 RATIO (ref 1.1–2.2)
ALBUMIN SERPL-MCNC: 4 G/DL (ref 3.4–4.8)
ALP BLD-CCNC: 61 U/L (ref 42–121)
ALT SERPL-CCNC: 10 U/L (ref 10–63)
ANION GAP SERPL CALCULATED.3IONS-SCNC: 9 MEQ/L (ref 3–11)
AST SERPL-CCNC: 16 U/L (ref 10–41)
BASOPHILS ABSOLUTE: 0.1 K/UL (ref 0–0.2)
BASOPHILS RELATIVE PERCENT: 0.9 % (ref 0–1.5)
BILIRUB SERPL-MCNC: 1.1 MG/DL (ref 0.3–1.5)
BUN BLDV-MCNC: 19 MG/DL (ref 6–20)
C-REACTIVE PROTEIN: 39.2 MG/L
CALCIUM SERPL-MCNC: 8.4 MG/DL (ref 8.5–10.5)
CHLORIDE BLD-SCNC: 101 MEQ/L (ref 98–107)
CO2: 27 MEQ/L (ref 21–31)
CREAT SERPL-MCNC: 1.3 MG/DL (ref 0.6–1.3)
CREATININE URINE: 219.5 MG/DL (ref 22–328)
DIAGNOSTIC PSA: 0.01 NG/ML (ref 0–4)
DIFFERENTIAL, MANUAL: NO
EOSINOPHILS ABSOLUTE: 0.2 K/UL (ref 0–0.33)
EOSINOPHILS RELATIVE PERCENT: 2.7 % (ref 0–3)
ESTIMATED AVERAGE GLUCOSE: 148 MG/DL
GFR AFRICAN AMERICAN: 65 ML/MIN
GFR NON-AFRICAN AMERICAN: 54 ML/MIN
GLOBULIN: 3.4 G/DL (ref 1.9–3.9)
GLUCOSE BLD-MCNC: 164 MG/DL (ref 70–99)
HBA1C MFR BLD: 6.8 % (ref 4–6)
HCT VFR BLD CALC: 36.4 % (ref 41–50)
HEMOGLOBIN: 12.2 G/DL (ref 13.5–16.5)
LYMPHOCYTES # BLD: 14.1 % (ref 24–44)
LYMPHOCYTES ABSOLUTE: 1.2 K/UL (ref 1.1–4.8)
MCH RBC QN AUTO: 35.2 PG (ref 28–34)
MCHC RBC AUTO-ENTMCNC: 33.4 G/DL (ref 33–37)
MCV RBC AUTO: 105.5 FL (ref 80–100)
MICROALBUMIN UR-MCNC: 609.2 UG/ML
MICROALBUMIN/CREAT UR-RTO: 277.5 MG/G
MONOCYTES ABSOLUTE: 0.9 K/UL (ref 0.2–0.7)
MONOCYTES RELATIVE PERCENT: 10.8 % (ref 3.4–9)
NEUTROPHILS ABSOLUTE: 5.8 K/UL (ref 1.83–8.7)
PDW BLD-RTO: 15.4 % (ref 10.9–14.3)
PLATELET # BLD: 309 K/UL (ref 150–450)
PMV BLD AUTO: 8 FL (ref 7.4–10.4)
POTASSIUM SERPL-SCNC: 3.7 MEQ/L (ref 3.6–5)
RBC # BLD: 3.46 M/UL (ref 4.5–5.5)
SEDIMENTATION RATE, ERYTHROCYTE: 89 MM/HR (ref 0–20)
SEGMENTED NEUTROPHILS RELATIVE PERCENT: 71.5 % (ref 40–74)
SODIUM BLD-SCNC: 137 MEQ/L (ref 135–145)
TOTAL PROTEIN: 7.4 G/DL (ref 5.9–7.8)
URATE: 5.9 MG/DL (ref 4.4–7.6)
WBC # BLD: 8.2 K/UL (ref 4.5–11)

## 2019-11-02 ENCOUNTER — TELEPHONE (OUTPATIENT)
Dept: FAMILY MEDICINE CLINIC | Age: 74
End: 2019-11-02

## 2019-11-07 ENCOUNTER — OFFICE VISIT (OUTPATIENT)
Dept: FAMILY MEDICINE CLINIC | Age: 74
End: 2019-11-07
Payer: COMMERCIAL

## 2019-11-07 VITALS
DIASTOLIC BLOOD PRESSURE: 74 MMHG | HEART RATE: 110 BPM | BODY MASS INDEX: 26.91 KG/M2 | OXYGEN SATURATION: 97 % | WEIGHT: 177 LBS | SYSTOLIC BLOOD PRESSURE: 152 MMHG

## 2019-11-07 DIAGNOSIS — I10 ESSENTIAL HYPERTENSION: ICD-10-CM

## 2019-11-07 DIAGNOSIS — M06.4 INFLAMMATORY POLYARTHROPATHY (HCC): ICD-10-CM

## 2019-11-07 DIAGNOSIS — E11.21 DIABETIC NEPHROPATHY WITH PROTEINURIA (HCC): ICD-10-CM

## 2019-11-07 DIAGNOSIS — E78.5 HYPERLIPIDEMIA, UNSPECIFIED HYPERLIPIDEMIA TYPE: Primary | ICD-10-CM

## 2019-11-07 DIAGNOSIS — C61 PROSTATE CA (HCC): ICD-10-CM

## 2019-11-07 PROCEDURE — 99214 OFFICE O/P EST MOD 30 MIN: CPT | Performed by: INTERNAL MEDICINE

## 2019-11-07 RX ORDER — VIT C/B6/B5/MAGNESIUM/HERB 173 50-5-6-5MG
CAPSULE ORAL DAILY
COMMUNITY
End: 2020-05-06 | Stop reason: ALTCHOICE

## 2019-11-07 RX ORDER — AMOXICILLIN 500 MG
CAPSULE ORAL DAILY
COMMUNITY
End: 2020-11-30 | Stop reason: ALTCHOICE

## 2019-11-08 RX ORDER — CHLORTHALIDONE 25 MG/1
25 TABLET ORAL DAILY
Qty: 90 TABLET | Refills: 1 | Status: SHIPPED
Start: 2019-11-08 | End: 2020-05-06 | Stop reason: SDUPTHER

## 2019-11-08 RX ORDER — LISINOPRIL 40 MG/1
40 TABLET ORAL DAILY
Qty: 90 TABLET | Refills: 1 | Status: SHIPPED
Start: 2019-11-08 | End: 2020-05-06 | Stop reason: SDUPTHER

## 2019-11-08 RX ORDER — PRAVASTATIN SODIUM 80 MG/1
80 TABLET ORAL DAILY
Qty: 90 TABLET | Refills: 1 | Status: SHIPPED | OUTPATIENT
Start: 2019-11-08 | End: 2020-01-02 | Stop reason: SDUPTHER

## 2019-11-08 RX ORDER — ALLOPURINOL 300 MG/1
300 TABLET ORAL DAILY
Qty: 90 TABLET | Refills: 1 | Status: SHIPPED
Start: 2019-11-08 | End: 2020-05-06 | Stop reason: SDUPTHER

## 2019-11-10 PROBLEM — E11.21 DIABETIC NEPHROPATHY WITH PROTEINURIA (HCC): Status: ACTIVE | Noted: 2019-11-10

## 2019-11-10 PROBLEM — E11.21 TYPE 2 DIABETES MELLITUS WITH DIABETIC NEPHROPATHY (HCC): Status: ACTIVE | Noted: 2019-05-23

## 2019-11-10 PROBLEM — E11.21 TYPE 2 DIABETES MELLITUS WITH DIABETIC NEPHROPATHY (HCC): Status: RESOLVED | Noted: 2019-05-23 | Resolved: 2019-11-10

## 2020-01-02 ENCOUNTER — OFFICE VISIT (OUTPATIENT)
Dept: FAMILY MEDICINE CLINIC | Age: 75
End: 2020-01-02
Payer: MEDICARE

## 2020-01-02 VITALS
SYSTOLIC BLOOD PRESSURE: 148 MMHG | BODY MASS INDEX: 26.46 KG/M2 | HEART RATE: 100 BPM | WEIGHT: 174 LBS | DIASTOLIC BLOOD PRESSURE: 72 MMHG | OXYGEN SATURATION: 97 %

## 2020-01-02 PROCEDURE — 99214 OFFICE O/P EST MOD 30 MIN: CPT | Performed by: INTERNAL MEDICINE

## 2020-01-02 RX ORDER — PRAVASTATIN SODIUM 80 MG/1
80 TABLET ORAL DAILY
Qty: 90 TABLET | Refills: 1 | Status: SHIPPED | OUTPATIENT
Start: 2020-01-02 | End: 2020-05-06 | Stop reason: SDUPTHER

## 2020-01-02 NOTE — PROGRESS NOTES
[facility]     20  Tanya Duran : 1945 Sex: male  Age: 66 y.o. Chief Complaint   Patient presents with    Hypertension       HPI    Patient presents today for follow-up visit on his multiple medical problems. He is accompanied by his wife today. He states he is been doing very well not having any further joint discomfort that he is complained of previously I reviewed my last note he had seen rheumatology and diagnosed as inflammatory polyarthropathy and had been on prednisone and methotrexate which he had stopped in the past.  He states he is feeling better since off the medication. His last CRP and sed rate were both elevated and I told him I would need to repeat these numbers again. Also describes some intermittent mild right sided abdominal discomfort that he states feels similar to what he had prior to having his gallbladder out. States it can be after meals or at any time. States the last 2 or 3 minutes and resolves. States also compress on the area and it helps. States blood sugar has been a little bit fluctuant 130-150 range. Blood pressure at home is been good. It was elevated here in the office today. I did review his labs from last visit including his PSA. It was 0.1. He does have remote history of prostate cancer.     Review of Systems     PMH:  Problem List: Essential hypertension  Osteoarthritis  Malignant tumor of prostate  Gout  Type 2 diabetes mellitus  Benign essential hypertension  Hyperlipidemia  Health Maintenance:  Colonoscopy - (2014)  Colonoscopy - (2017)  Colonoscopy Screening - (2014)  Colonoscopy Screening - (2017)  Influenza Vaccination - (2016)  Pneumonia Vaccination - (2016)  Colonoscopy - 08-dr miguel,,-due 20  Psa Test - dr jiménez-we will do, ,, -0.1  Rectal Exam - dr Vince Schenider, declined  Prostate Exam - dr Vince Schneider, declined  Ercp - 3/11  Hemmocult Cards - hemocult-neg x 3--,-neg x 3  EGD - 8/14,1/17  MRCP - 1/17  Fit test - 4/19-neg  Medical Problems:  Hypertension  Hyperlipidemia - intolerant of statins and zetia  Gout, Prostate Cancer, hx left knee arthroscopy, Type 2 Diabetes, Diverticulosis, Osteoarthritis, Colon  Polyps, ventral hernia, Hiatal Hernia, Pancreatitis  Cholelithiasis - cholecystitis/choledocholithiasis-lap pratibha  Ercp  Carotid Artery Stenosis - declines further fu  Pulmonary Emphysema - Noted on CT chest  Pulmonary nodules - Followed on CT-done  Abnormal vascular study - Refuses vascular consult  Laser surgery 3/26/14, Dr. Master Solitario for glaucoma  Saw rheumatology in 2019 diagnosis inflammatory polyarthropathy-took self off of methotrexate and prednisone. Refuses further evaluation. FH:  unknown--adopted  Father:  . (Hx)  Mother:  . (Hx)  SH:  . (Marital) retire   Personal Habits: Cigarette Use: Former Cigarette Smoker. Alcohol: , Has consumed alcohol in the past - 12 years ago   [employer].            Current Outpatient Medications:     metFORMIN (GLUCOPHAGE) 500 MG tablet, 1 po q am, 2 po q pm Hold until pt is ready, Disp: 270 tablet, Rfl: 1    pravastatin (PRAVACHOL) 80 MG tablet, Take 1 tablet by mouth daily Hold until pt is ready, Disp: 90 tablet, Rfl: 1    lisinopril (PRINIVIL;ZESTRIL) 40 MG tablet, Take 1 tablet by mouth daily Hold until pt is ready, Disp: 90 tablet, Rfl: 1    chlorthalidone (HYGROTON) 25 MG tablet, Take 1 tablet by mouth daily Hold until pt is ready, Disp: 90 tablet, Rfl: 1    allopurinol (ZYLOPRIM) 300 MG tablet, Take 1 tablet by mouth daily Hold until pt is ready, Disp: 90 tablet, Rfl: 1    folic acid (FOLVITE) 1 MG tablet, Take 1 mg by mouth daily , Disp: , Rfl: 12    Omega-3 Fatty Acids (FISH OIL) 1200 MG CAPS, Take by mouth daily, Disp: , Rfl:     Turmeric 500 MG CAPS, Take by mouth daily, Disp: , Rfl:     aspirin 81 MG EC tablet, Take by mouth, Disp: , Rfl:     blood glucose test strips (ONE TOUCH ULTRA TEST) strip, , Disp: , Rfl:     vitamin E 100 units capsule, Take by mouth, Disp: , Rfl:     LUMIGAN 0.01 % SOLN ophthalmic drops, , Disp: , Rfl:     COMBIGAN 0.2-0.5 % ophthalmic solution, , Disp: , Rfl: 6    vitamin D (CHOLECALCIFEROL) 1000 UNIT TABS tablet, Take by mouth, Disp: , Rfl:     omeprazole (PRILOSEC OTC) 20 MG tablet, Take by mouth, Disp: , Rfl:     fluticasone (FLONASE) 50 MCG/ACT nasal spray, 2 sprays by Each Nostril route daily, Disp: 1 Bottle, Rfl: 5  Allergies   Allergen Reactions    Methotrexate Derivatives      Caused Blisters       Past Medical History:   Diagnosis Date    Carotid artery stenosis     Cholecystitis     Choledocholithiasis     Cholelithiasis     Colon polyps     Essential hypertension 5/23/2019    Gout     Hiatal hernia     History of laparoscopic cholecystectomy     Hyperlipidemia 5/23/2019    Inflammatory polyarthropathy ([address] 5/23/2019    Malignant tumor of prostate ([address]     Osteoarthritis     Pancreatitis     Prostate CA ([address] 5/23/2019    Pulmonary emphysema (HCC)     Pulmonary nodules     Type 2 diabetes mellitus without complication, without long-term current use of insulin ([address] 5/23/2019    Ventral hernia      No past surgical history on file. No family history on file.   Social History     Socioeconomic History    Marital status:      Spouse name: Not on file    Number of children: Not on file    Years of education: Not on file    Highest education level: Not on file   Occupational History     Employer: RETIRED   Social Needs    Financial resource strain: Not on file    Food insecurity:     Worry: Not on file     Inability: Not on file    Transportation needs:     Medical: Not on file     Non-medical: Not on file   Tobacco Use    Smoking status: Never Smoker    Smokeless tobacco: Never Used   Substance and Sexual Activity    Alcohol use: Not Currently    Drug use: Never    Sexual activity: Yes     Partners: Female Lifestyle    Physical activity:     Days per week: Not on file     Minutes per session: Not on file    Stress: Not on file   Relationships    Social connections:     Talks on phone: Not on file     Gets together: Not on file     Attends Restorationism service: Not on file     Active member of club or organization: Not on file     Attends meetings of clubs or organizations: Not on file     Relationship status: Not on file    Intimate partner violence:     Fear of current or ex partner: Not on file     Emotionally abused: Not on file     Physically abused: Not on file     Forced sexual activity: Not on file   Other Topics Concern    Not on file   Social History Narrative    Not on file       Vitals:    01/02/20 1051   BP: (!) 148/72   Pulse: 100   SpO2: 97%   Weight: 174 lb (78.9 kg)       Physical Exam    Const: Appears well developed and well nourished. No signs of acute distress present. ENMT: . (External Ears) Tympanic membranes are intact. External nose WNL. Moistness and  normal color of the nasal mucosae. Nasal septum. (Septum) . (Teeth) Gums appear healthy. Posterior pharynx shows no exudate, irritation or redness. Neck: Supple and symmetric. Palpation reveals no adenopathy. No masses appreciated. Thyroid exhibits no nodule or thyromegaly. No JVD. Carotids: 2+ and equal bilaterally, without  bruits. Resp: No rales, rhonchi or wheezes appreciated over the lungs bilaterally. CV: Rate is regular. Rhythm is regular. S1 is normal. S2 is normal. No gallop or rubs. No  heart murmur appreciated. Extremities: No clubbing, cyanosis or edema. Abdomen: Bowel sounds are normoactive. Palpation reveals softness, with no distension,  organomegaly or tenderness. No abdominal masses palpable. No palpable hepatosplenomegaly  He does have a large diastasis rectus. Musculo: Walks with a normal gait. Upper Extremities: Full ROM bilaterally. Lower Extremities:  Full ROM bilaterally.   Neurological: Grossly intact without

## 2020-01-30 ENCOUNTER — TELEPHONE (OUTPATIENT)
Dept: FAMILY MEDICINE CLINIC | Age: 75
End: 2020-01-30

## 2020-04-01 ENCOUNTER — TELEPHONE (OUTPATIENT)
Dept: FAMILY MEDICINE CLINIC | Age: 75
End: 2020-04-01

## 2020-04-01 RX ORDER — COLCHICINE 0.6 MG/1
TABLET ORAL
Qty: 4 TABLET | Refills: 0 | Status: SHIPPED
Start: 2020-04-01 | End: 2020-05-06 | Stop reason: ALTCHOICE

## 2020-05-01 LAB
A/G RATIO: 1.2 RATIO (ref 1.1–2.2)
ALBUMIN SERPL-MCNC: 4.2 G/DL (ref 3.4–4.8)
ALP BLD-CCNC: 63 U/L (ref 42–121)
ALT SERPL-CCNC: 11 U/L (ref 10–63)
AMYLASE: 125 U/L (ref 27–131)
ANION GAP SERPL CALCULATED.3IONS-SCNC: 9 MEQ/L (ref 3–11)
AST SERPL-CCNC: 13 U/L (ref 10–41)
BASOPHILS ABSOLUTE: 0.1 K/UL (ref 0–0.2)
BASOPHILS RELATIVE PERCENT: 0.7 % (ref 0–1.5)
BILIRUB SERPL-MCNC: 1.2 MG/DL (ref 0.3–1.5)
BUN BLDV-MCNC: 28 MG/DL (ref 6–20)
C-REACTIVE PROTEIN: 43.5 MG/L
CALCIUM SERPL-MCNC: 8.8 MG/DL (ref 8.5–10.5)
CHLORIDE BLD-SCNC: 102 MEQ/L (ref 98–107)
CHOLESTEROL: 144 MG/DL (ref 140–200)
CO2: 25 MEQ/L (ref 21–31)
CREAT SERPL-MCNC: 1.3 MG/DL (ref 0.6–1.3)
CREATININE + EGFR PANEL: 65 ML/MIN
CREATININE URINE: 324.7 MG/DL (ref 22–328)
DIFFERENTIAL, MANUAL: NO
EOSINOPHILS ABSOLUTE: 0.3 K/UL (ref 0–0.33)
EOSINOPHILS RELATIVE PERCENT: 2.6 % (ref 0–3)
ESTIMATED AVERAGE GLUCOSE: 146 MG/DL
GFR NON-AFRICAN AMERICAN: 54 ML/MIN
GLOBULIN: 3.6 G/DL (ref 1.9–3.9)
GLUCOSE BLD-MCNC: 174 MG/DL (ref 70–99)
HBA1C MFR BLD: 6.7 % (ref 4–6)
HCT VFR BLD CALC: 36 % (ref 41–50)
HDLC SERPL-MCNC: 44 MG/DL (ref 29–71)
HEMOGLOBIN: 11.8 G/DL (ref 13.5–16.5)
LDL CHOLESTEROL: 74 MG/DL
LDL/HDL RATIO: 1.7 RATIO
LYMPHOCYTES ABSOLUTE: 1.2 K/UL (ref 1.1–4.8)
LYMPHOCYTES RELATIVE PERCENT: 10.3 % (ref 24–44)
MCH RBC QN AUTO: 33.7 PG (ref 28–34)
MCHC RBC AUTO-ENTMCNC: 32.8 G/DL (ref 33–37)
MCV RBC AUTO: 102.6 FL (ref 80–100)
MICROALBUMIN UR-MCNC: 756.9 UG/ML
MICROALBUMIN/CREAT UR-RTO: 233.1 MG/G
MONOCYTES ABSOLUTE: 1.2 K/UL (ref 0.2–0.7)
MONOCYTES RELATIVE PERCENT: 10.4 % (ref 3.4–9)
NEUTROPHILS ABSOLUTE: 8.9 K/UL (ref 1.83–8.7)
PDW BLD-RTO: 17 % (ref 10.9–14.3)
PLATELET # BLD: 294 K/UL (ref 150–450)
PMV BLD AUTO: 8.6 FL (ref 7.4–10.4)
POTASSIUM SERPL-SCNC: 4.1 MEQ/L (ref 3.6–5)
RBC # BLD: 3.51 M/UL (ref 4.5–5.5)
SEDIMENTATION RATE, ERYTHROCYTE: 79 MM/HR (ref 0–20)
SEGMENTED NEUTROPHILS RELATIVE PERCENT: 76 % (ref 40–74)
SODIUM BLD-SCNC: 136 MEQ/L (ref 135–145)
TOTAL PROTEIN: 7.8 G/DL (ref 5.9–7.8)
TRIGL SERPL-MCNC: 125 MG/DL (ref 41–189)
WBC # BLD: 11.8 K/UL (ref 4.5–11)

## 2020-05-06 ENCOUNTER — OFFICE VISIT (OUTPATIENT)
Dept: PRIMARY CARE CLINIC | Age: 75
End: 2020-05-06
Payer: MEDICARE

## 2020-05-06 VITALS
DIASTOLIC BLOOD PRESSURE: 56 MMHG | WEIGHT: 168.8 LBS | HEART RATE: 91 BPM | OXYGEN SATURATION: 97 % | SYSTOLIC BLOOD PRESSURE: 122 MMHG | BODY MASS INDEX: 25.58 KG/M2 | TEMPERATURE: 98 F | HEIGHT: 68 IN

## 2020-05-06 PROCEDURE — 99214 OFFICE O/P EST MOD 30 MIN: CPT | Performed by: INTERNAL MEDICINE

## 2020-05-06 RX ORDER — ALLOPURINOL 300 MG/1
300 TABLET ORAL DAILY
Qty: 90 TABLET | Refills: 1 | Status: SHIPPED | OUTPATIENT
Start: 2020-05-06 | End: 2020-09-02 | Stop reason: SDUPTHER

## 2020-05-06 RX ORDER — CHLORTHALIDONE 25 MG/1
25 TABLET ORAL DAILY
Qty: 90 TABLET | Refills: 1 | Status: SHIPPED | OUTPATIENT
Start: 2020-05-06 | End: 2020-09-02 | Stop reason: SDUPTHER

## 2020-05-06 RX ORDER — PRAVASTATIN SODIUM 80 MG/1
80 TABLET ORAL DAILY
Qty: 90 TABLET | Refills: 1 | Status: SHIPPED | OUTPATIENT
Start: 2020-05-06 | End: 2020-09-02 | Stop reason: SDUPTHER

## 2020-05-06 RX ORDER — FLUTICASONE PROPIONATE 50 MCG
2 SPRAY, SUSPENSION (ML) NASAL DAILY
Qty: 1 BOTTLE | Refills: 5 | Status: SHIPPED | OUTPATIENT
Start: 2020-05-06 | End: 2020-09-02 | Stop reason: SDUPTHER

## 2020-05-06 RX ORDER — LISINOPRIL 40 MG/1
40 TABLET ORAL DAILY
Qty: 90 TABLET | Refills: 1 | Status: SHIPPED | OUTPATIENT
Start: 2020-05-06 | End: 2020-09-02 | Stop reason: SDUPTHER

## 2020-05-06 ASSESSMENT — PATIENT HEALTH QUESTIONNAIRE - PHQ9
SUM OF ALL RESPONSES TO PHQ QUESTIONS 1-9: 0
SUM OF ALL RESPONSES TO PHQ QUESTIONS 1-9: 0
2. FEELING DOWN, DEPRESSED OR HOPELESS: 0
SUM OF ALL RESPONSES TO PHQ9 QUESTIONS 1 & 2: 0
1. LITTLE INTEREST OR PLEASURE IN DOING THINGS: 0

## 2020-05-07 NOTE — PROGRESS NOTES
[facility]     20  Cris Shepard : 1945 Sex: male  Age: 66 y.o. Chief Complaint   Patient presents with    Hyperlipidemia    Hypertension       HPI  Today for follow-up visit on his multiple medical problems. He is accompanied by his wife. I reviewed my last note. I told him to call me if his abdominal discomfort persisted and he never did. He is still complaining of intermittent right-sided abdominal pain starting low in the abdomen and going up to high and into the right chest.  This is very intermittent nothing specific seems to bring it on or make it better. Last for short period of time when it does come on. States it feels like it did before he had his gallbladder taken out couple years ago. Review his most recent labs and of significance is noted anemia as well as continuing elevation of his inflammatory markers/sedimentation rate/CRP I told him I cannot explain this. He did have elevated markers for some time now and actually saw rheumatology last year with an inflammatory polyarthropathy diagnosed and started on prednisone and subsequent methotrexate both of which she stopped and states he is feeling much better at this point. He is not complaining of any joint or muscle pain. He denies any other significant review of systems findings outside of the abdominal discomfort. [place] fever or chills denies unintentional weight loss. He is down about 6 pounds however from last visit. I told him I was going to repeat some labs and urine as well as get CAT scan of the abdomen and pelvis and chest x-ray to work-up this abdominal discomfort and increased inflammatory markers. He is due for colonoscopy with Dr. Anjana Spencer but this is been postponed because of COVID-19. They are going to call and attempt to reschedule now. Review of Systems   Constitutional: Negative for activity change, appetite change, chills, diaphoresis, fatigue, fever and unexpected weight change.    HENT: declines further fu  Pulmonary Emphysema - Noted on CT chest  Pulmonary nodules - Followed on CT-done  Abnormal vascular study - Refuses vascular consult  Laser surgery 3/26/14, Dr. Debbie Patterson for glaucoma  Saw rheumatology in 2019 diagnosis inflammatory polyarthropathy-took self off of methotrexate and prednisone. Refuses further evaluation. FH:  unknown--adopted  Father:  . (Hx)  Mother:  . (Hx)  SH:  . (Marital) retire   Personal Habits: Cigarette Use: Former Cigarette Smoker. Alcohol: , Has consumed alcohol in the past - 12 years ago         Current Outpatient Medications:     fluticasone (FLONASE) 50 MCG/ACT nasal spray, 2 sprays by Each Nostril route daily, Disp: 1 Bottle, Rfl: 5    metFORMIN (GLUCOPHAGE) 500 MG tablet, 1 po q am, 2 po q pm Hold until pt is ready, Disp: 270 tablet, Rfl: 1    lisinopril (PRINIVIL;ZESTRIL) 40 MG tablet, Take 1 tablet by mouth daily Hold until pt is ready, Disp: 90 tablet, Rfl: 1    chlorthalidone (HYGROTON) 25 MG tablet, Take 1 tablet by mouth daily Hold until pt is ready, Disp: 90 tablet, Rfl: 1    pravastatin (PRAVACHOL) 80 MG tablet, Take 1 tablet by mouth daily Hold until pt is ready, Disp: 90 tablet, Rfl: 1    allopurinol (ZYLOPRIM) 300 MG tablet, Take 1 tablet by mouth daily Hold until pt is ready, Disp: 90 tablet, Rfl: 1    Omega-3 Fatty Acids (FISH OIL) 1200 MG CAPS, Take by mouth daily, Disp: , Rfl:     aspirin 81 MG EC tablet, Take by mouth, Disp: , Rfl:     blood glucose test strips (ONE TOUCH ULTRA TEST) strip, , Disp: , Rfl:     vitamin E 100 units capsule, Take by mouth, Disp: , Rfl:     LUMIGAN 0.01 % SOLN ophthalmic drops, , Disp: , Rfl:     COMBIGAN 0.2-0.5 % ophthalmic solution, , Disp: , Rfl: 6    vitamin D (CHOLECALCIFEROL) 1000 UNIT TABS tablet, Take by mouth, Disp: , Rfl:     folic acid (FOLVITE) 1 MG tablet, Take 1 mg by mouth daily , Disp: , Rfl: 12  Allergies   Allergen Reactions    Methotrexate Derivatives      Caused Blisters complication, without long-term current use of insulin (HCC)  -     metFORMIN (GLUCOPHAGE) 500 MG tablet; 1 po q am, 2 po q pm Hold until pt is ready  -     Microalbumin / Creatinine Urine Ratio; Future    Essential hypertension  -     lisinopril (PRINIVIL;ZESTRIL) 40 MG tablet; Take 1 tablet by mouth daily Hold until pt is ready  -     chlorthalidone (HYGROTON) 25 MG tablet; Take 1 tablet by mouth daily Hold until pt is ready    Hyperlipidemia, unspecified hyperlipidemia type  -     pravastatin (PRAVACHOL) 80 MG tablet; Take 1 tablet by mouth daily Hold until pt is ready    Chest pain, unspecified type  -     XR CHEST STANDARD (2 VW); Future    Elevated sed rate  -     C-REACTIVE PROTEIN; Future  -     SEDIMENTATION RATE; Future  -     Urinalysis; Future    Anemia, unspecified type  -     CBC Auto Differential; Future    Other orders  -     allopurinol (ZYLOPRIM) 300 MG tablet; Take 1 tablet by mouth daily Hold until pt is ready      Plan: CT abdomen and pelvis, chest x-ray, labs and urine to be done at [facility] in the next couple weeks. I will follow-up in 2 months and as needed. They will call GI and reschedule his colonoscopy. I told him I was uncertain as to the reason his inflammatory markers remain elevated but again he demonstrates no symptoms related to this diagnosis given to him last year by rheumatology of inflammatory polyarthropathy at this time. Return in about 2 months (around 7/6/2020). Seen By:  Ida Goldstein MD      *Document was created using voice recognition software. Note was reviewed however may contain grammatical errors.

## 2020-05-18 ENCOUNTER — TELEPHONE (OUTPATIENT)
Dept: FAMILY MEDICINE CLINIC | Age: 75
End: 2020-05-18

## 2020-05-18 LAB
APPEARANCE, BODY FLUID: CLEAR
BASOPHILS ABSOLUTE: 0.1 K/UL (ref 0–0.2)
BASOPHILS RELATIVE PERCENT: 1.2 % (ref 0–1.5)
BILIRUBIN URINE: NEGATIVE
C-REACTIVE PROTEIN: 12.8 MG/L
COLOR, URINE: YELLOW
CREATININE URINE: 208.3 MG/DL (ref 22–328)
DIFFERENTIAL, MANUAL: NO
EOSINOPHILS ABSOLUTE: 0.4 K/UL (ref 0–0.33)
EOSINOPHILS RELATIVE PERCENT: 4.4 % (ref 0–3)
ERYTHROCYTES URINE: NORMAL /HPF
GLUCOSE URINE: NEGATIVE MG/DL
HCT VFR BLD CALC: 33.8 % (ref 41–50)
HEMOGLOBIN: 11.2 G/DL (ref 13.5–16.5)
HYALINE CASTS: NORMAL /HPF
KETONES, URINE: NEGATIVE MG/DL
LYMPHOCYTES ABSOLUTE: 1.2 K/UL (ref 1.1–4.8)
LYMPHOCYTES RELATIVE PERCENT: 14.3 % (ref 24–44)
MCH RBC QN AUTO: 33.8 PG (ref 28–34)
MCHC RBC AUTO-ENTMCNC: 33 G/DL (ref 33–37)
MCV RBC AUTO: 102.5 FL (ref 80–100)
MICROALBUMIN UR-MCNC: 352.3 UG/ML
MICROALBUMIN/CREAT UR-RTO: 169.1 MG/G
MICROSCOPIC URINE: YES
MONOCYTES ABSOLUTE: 0.9 K/UL (ref 0.2–0.7)
MONOCYTES RELATIVE PERCENT: 10.4 % (ref 3.4–9)
NEUTROPHILS ABSOLUTE: 5.9 K/UL (ref 1.83–8.7)
NITRATE, UA: NEGATIVE
PDW BLD-RTO: 17 % (ref 10.9–14.3)
PH UA: 5 (ref 4.6–8)
PLATELET # BLD: 367 K/UL (ref 150–450)
PMV BLD AUTO: 8 FL (ref 7.4–10.4)
PROTEIN UA: 30 MG/DL
RBC # BLD: 3.3 M/UL (ref 4.5–5.5)
RBC URINE: NEGATIVE
SEDIMENTATION RATE, ERYTHROCYTE: 86 MM/HR (ref 0–20)
SEGMENTED NEUTROPHILS RELATIVE PERCENT: 69.7 % (ref 40–74)
SPECIFIC GRAVITY, URINE: 1.02 (ref 1–1.03)
TRANSITIONAL EPITHELIAL: NORMAL /HPF
UROBILINOGEN, URINE: 2 MG/DL
WBC # BLD: 8.4 K/UL (ref 4.5–11)
WBC COUNT, UR AUTO: NORMAL /HPF
WBC URINE: NEGATIVE

## 2020-06-12 ENCOUNTER — OFFICE VISIT (OUTPATIENT)
Dept: FAMILY MEDICINE CLINIC | Age: 75
End: 2020-06-12
Payer: MEDICARE

## 2020-06-12 VITALS
HEIGHT: 68 IN | SYSTOLIC BLOOD PRESSURE: 144 MMHG | HEART RATE: 104 BPM | DIASTOLIC BLOOD PRESSURE: 72 MMHG | BODY MASS INDEX: 25.34 KG/M2 | TEMPERATURE: 97.8 F | OXYGEN SATURATION: 98 % | WEIGHT: 167.2 LBS

## 2020-06-12 PROCEDURE — 99215 OFFICE O/P EST HI 40 MIN: CPT | Performed by: INTERNAL MEDICINE

## 2020-06-12 PROCEDURE — 93000 ELECTROCARDIOGRAM COMPLETE: CPT | Performed by: INTERNAL MEDICINE

## 2020-06-14 NOTE — PROGRESS NOTES
ULTRA TEST) strip, , Disp: , Rfl:     vitamin E 100 units capsule, Take by mouth, Disp: , Rfl:     LUMIGAN 0.01 % SOLN ophthalmic drops, , Disp: , Rfl:     COMBIGAN 0.2-0.5 % ophthalmic solution, , Disp: , Rfl: 6    vitamin D (CHOLECALCIFEROL) 1000 UNIT TABS tablet, Take by mouth, Disp: , Rfl:     folic acid (FOLVITE) 1 MG tablet, Take 1 mg by mouth daily , Disp: , Rfl: 12  Allergies   Allergen Reactions    Methotrexate Derivatives      Caused Blisters       Past Medical History:   Diagnosis Date    Carotid artery stenosis     Cholecystitis     Choledocholithiasis     Cholelithiasis     Colon polyps     Essential hypertension 5/23/2019    Gout     Hiatal hernia     History of laparoscopic cholecystectomy     Hyperlipidemia 5/23/2019    Inflammatory polyarthropathy ([address] 5/23/2019    Malignant tumor of prostate ([address]     Osteoarthritis     Pancreatitis     [facility] CA [facility] 5/23/2019    Pulmonary emphysema (HCC)     Pulmonary nodules     Type 2 diabetes mellitus without complication, without long-term current use of insulin ([facility] 5/23/2019    Ventral hernia      No past surgical history on file. No family history on file.   Social History     Socioeconomic History    Marital status:      Spouse name: Not on file    Number of children: Not on file    Years of education: Not on file    Highest education level: Not on file   Occupational History     Employer: RETIRED   Social Needs    Financial resource strain: Not on file    Food insecurity     Worry: Not on file     Inability: Not on file   [employer] needs     Medical: Not on file     Non-medical: Not on file   Tobacco Use    Smoking status: Never Smoker    Smokeless tobacco: Never Used   Substance and Sexual Activity    Alcohol use: Not Currently    Drug use: Never    Sexual activity: Yes     Partners: Female   Lifestyle    Physical activity     Days per week: Not on file     Minutes per session: Not on file    Stress: Not on file   Relationships    Social connections     Talks on phone: Not on file     Gets together: Not on file     Attends Synagogue service: Not on file     Active member of club or organization: Not on file     Attends meetings of clubs or organizations: Not on file     Relationship status: Not on file    Intimate partner violence     Fear of current or ex partner: Not on file     Emotionally abused: Not on file     Physically abused: Not on file     Forced sexual activity: Not on file   Other Topics Concern    Not on file   Social History Narrative    Not on file       Vitals:    06/12/20 1407   BP: (!) 144/72   Pulse: 104   Temp: 97.8 °F (36.6 °C)   TempSrc: Temporal   SpO2: 98%   Weight: 167 lb 3.2 oz (75.8 kg)   Height: 5' 8\" (1.727 m)       Physical Exam    Const: Appears well developed and well nourished. No signs of acute distress present. ENMT: . (External Ears) Tympanic membranes are intact. External nose WNL. Moistness and  normal color of the nasal mucosae. Nasal septum. (Septum) . (Teeth) Gums appear healthy. Posterior pharynx shows no exudate, irritation or redness. Neck: Supple and symmetric. Palpation reveals no adenopathy. No masses appreciated. Thyroid exhibits no nodule or thyromegaly. No JVD. Carotids: 2+ and equal bilaterally, questionable faint  bruits. Resp: No rales, rhonchi or wheezes appreciated over the lungs bilaterally. CV: Rate is regular. Rhythm is regular. S1 is normal. S2 is normal. No gallop or rubs. No  heart murmur appreciated. Extremities: No clubbing, cyanosis or edema. I note diminished peripheral pulses to palpation right lower extremity and faint to absent pulses popliteal area on down on the left  Abdomen: Bowel sounds are normoactive. Palpation reveals softness, with no distension,  organomegaly or tenderness. No abdominal masses palpable. No palpable hepatosplenomegaly  He does have a large diastasis rectus. Musculo: Walks with a normal gait.  Upper

## 2020-06-15 ENCOUNTER — TELEPHONE (OUTPATIENT)
Dept: FAMILY MEDICINE CLINIC | Age: 75
End: 2020-06-15

## 2020-07-24 ENCOUNTER — OFFICE VISIT (OUTPATIENT)
Dept: FAMILY MEDICINE CLINIC | Age: 75
End: 2020-07-24
Payer: MEDICARE

## 2020-07-24 VITALS
TEMPERATURE: 97.5 F | DIASTOLIC BLOOD PRESSURE: 62 MMHG | HEIGHT: 68 IN | OXYGEN SATURATION: 98 % | HEART RATE: 88 BPM | BODY MASS INDEX: 24.61 KG/M2 | WEIGHT: 162.4 LBS | SYSTOLIC BLOOD PRESSURE: 132 MMHG

## 2020-07-24 PROCEDURE — 99214 OFFICE O/P EST MOD 30 MIN: CPT | Performed by: INTERNAL MEDICINE

## 2020-07-24 NOTE — PROGRESS NOTES
[address] [facility]     20  Abraham Holbrook : 1945 Sex: male  Age: 66 y.o. Chief Complaint   Patient presents with    Hypertension       HPI  Patient presents today for follow-up visit on multiple medical problems. Reviewed last couple notes. Rather complicated situation and several referrals were made at last visit. He has seen GI and has had colonoscopy demonstrating a polyp and is good for 5 years. He has seen vascular regarding abnormal CT of the abdomen pelvis for vascular occlusion and apparently felt not to be of major significance at this time. Has also seen cardiology and is scheduled for stress test and echocardiogram upcoming. He has had an elevated sedimentation rate now for some time. As well as CRP. He is mildly anemic. He has lost 17 pounds in the past year. He states that he is changed his eating and eating less but does not sound like he is actively trying to lose weight. He is going to see hematology/oncology on August 3 and I am hoping they can sort some of this out. Patient states in general he has been feeling well. No real specific complaints outside of his ongoing right sided abdominal discomfort which is very intermittent and usually short-lived. Tells me blood pressures and blood sugars have been in a good range. I did review his studies with him again. Also labs. Review of Systems     Constitutional: Negative for activity change, appetite change, chills, diaphoresis, fatigue, fever and unexpected weight change. HENT: Negative for congestion, ear pain, hearing loss, postnasal drip, rhinorrhea and sinus pain.    Respiratory: Negative for cough, shortness of breath and wheezing.    Cardiovascular: Negative for chest pain, palpitations and leg swelling. Gastrointestinal: Positive for abdominal pain-see above. Negative for blood in stool, constipation, diarrhea, nausea and vomiting.    Endocrine: Negative.    Genitourinary: Negative for difficulty urinating, dysuria, frequency, hematuria and urgency. Musculoskeletal: Negative for arthralgias back pain, gait problem and myalgias. Skin: Negative.    Allergic/Immunologic: Negative for environmental allergies   Neurological: Negative for dizziness, weakness, light-headedness, numbness and headaches. Hematological: Anemia  Psychiatric/Behavioral: Negative for sleep disturbance. The patient is not nervous/anxious.    Endocrine: Type 2 diabetes  Hematology: Anemia      REST OF PERTINENT ROS GONE OVER AND WAS NEGATIVE.      PMH:  Problem List: Essential hypertension  Osteoarthritis  Malignant tumor of prostate  Gout  Type 2 diabetes mellitus  Benign essential hypertension  Hyperlipidemia  Health Maintenance:  Colonoscopy - (8/13/2014)  Colonoscopy - (1/16/2017)  Colonoscopy Screening - (8/13/2014)  Colonoscopy Screening - (1/16/2017)  Influenza Vaccination - (11/16/2016)  Pneumonia Vaccination - (11/16/2016)  Colonoscopy - 08-dr miguel,8/14,1/17-due 20  Psa Test - dr jiménez-we will do, 4/17,4/18, 7/19-0.1, 11/19 - 0.01  Rectal Exam - dr Natalia Chen, declined  Prostate Exam - dr Natalia Chen, declined  Ercp - 3/11  Hemmocult Cards - hemocult-neg x 3--9/11,5/13-neg x 3  EGD - 8/14,1/17  MRCP - 1/17  Fit test - 4/19-neg  Medical Problems:  Hypertension  Hyperlipidemia - intolerant of statins and zetia  Gout, Prostate Cancer, hx left knee arthroscopy, Type 2 Diabetes, Diverticulosis, Osteoarthritis, Colon  Polyps, ventral hernia, Hiatal Hernia, Pancreatitis  Cholelithiasis - cholecystitis/choledocholithiasis-lap pratibha  Ercp  Carotid Artery Stenosis - declines further fu  Pulmonary Emphysema - Noted on CT chest  Pulmonary nodules - Followed on CT-done  Abnormal vascular study - Refuses vascular consult  Laser surgery 3/26/14, Dr. 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  Refuses further evaluation. FH:  unknown--adopted  Father:  . (Hx)  Mother:  . (Hx)  SH:  . (Marital) retire   Personal Habits: Cigarette Use: Former Cigarette Smoker. Alcohol: , Has consumed alcohol in the past - 12 years ago              Current Outpatient Medications:     fluticasone (FLONASE) 50 MCG/ACT nasal spray, 2 sprays by Each Nostril route daily, Disp: 1 Bottle, Rfl: 5    metFORMIN (GLUCOPHAGE) 500 MG tablet, 1 po q am, 2 po q pm Hold until pt is ready, Disp: 270 tablet, Rfl: 1    lisinopril (PRINIVIL;ZESTRIL) 40 MG tablet, Take 1 tablet by mouth daily Hold until pt is ready, Disp: 90 tablet, Rfl: 1    chlorthalidone (HYGROTON) 25 MG tablet, Take 1 tablet by mouth daily Hold until pt is ready, Disp: 90 tablet, Rfl: 1    pravastatin (PRAVACHOL) 80 MG tablet, Take 1 tablet by mouth daily Hold until pt is ready, Disp: 90 tablet, Rfl: 1    allopurinol (ZYLOPRIM) 300 MG tablet, Take 1 tablet by mouth daily Hold until pt is ready, Disp: 90 tablet, Rfl: 1    Omega-3 Fatty Acids (FISH OIL) 1200 MG CAPS, Take by mouth daily, Disp: , Rfl:     aspirin 81 MG EC tablet, Take by mouth, Disp: , Rfl:     blood glucose test strips (ONE TOUCH ULTRA TEST) strip, , Disp: , Rfl:     vitamin E 100 units capsule, Take by mouth, Disp: , Rfl:     LUMIGAN 0.01 % SOLN ophthalmic drops, , Disp: , Rfl:     COMBIGAN 0.2-0.5 % ophthalmic solution, , Disp: , Rfl: 6    vitamin D (CHOLECALCIFEROL) 1000 UNIT TABS tablet, Take by mouth, Disp: , Rfl:     folic acid (FOLVITE) 1 MG tablet, Take 1 mg by mouth daily , Disp: , Rfl: 12  Allergies   Allergen Reactions    Methotrexate Derivatives      Caused Blisters       Past Medical History:   Diagnosis Date    Carotid artery stenosis     Cholecystitis     Choledocholithiasis     Cholelithiasis     Colon polyps     Essential hypertension 5/23/2019    Gout     Hiatal hernia     History of laparoscopic cholecystectomy     Hyperlipidemia 5/23/2019    [facility]arthropathy ([facility] 5/23/2019    [facility]     Osteoarthritis     Pancreatitis     Prostate CA ([facility] 5/23/2019    Pulmonary emphysema (HCC)     Pulmonary nodules     Type 2 diabetes mellitus without complication, without long-term current use of insulin ([facility] 5/23/2019    Ventral hernia      No past surgical history on file. No family history on file.   Social History     Socioeconomic History    Marital status:      Spouse name: Not on file    Number of children: Not on file    Years of education: Not on file    Highest education level: Not on file   Occupational History     Employer: RETIRED   Social Needs    Financial resource strain: Not on file    Food insecurity     Worry: Not on file     Inability: Not on file   [employer] needs     Medical: Not on file     Non-medical: Not on file   Tobacco Use    Smoking status: Never Smoker    Smokeless tobacco: Never Used   Substance and Sexual Activity    Alcohol use: Not Currently    Drug use: Never    Sexual activity: Yes     Partners: Female   Lifestyle    Physical activity     Days per week: Not on file     Minutes per session: Not on file    Stress: Not on file   Relationships    Social connections     Talks on phone: Not on file     Gets together: Not on file     Attends [employer] service: Not on file     Active member of club or organization: Not on file     Attends meetings of clubs or organizations: Not on file     Relationship status: Not on file    Intimate partner violence     Fear of current or ex partner: Not on file     Emotionally abused: Not on file     Physically abused: Not on file     Forced sexual activity: Not on file   Other Topics Concern    Not on file   Social History Narrative    Not on file       Vitals:    07/24/20 0928 07/24/20 1010   BP: (!) 158/56 132/62   Pulse: 88    Temp: 97.5 °F (36.4 °C)    TempSrc: Temporal    SpO2: 98%    Weight: 162 lb 6.4 oz (73.7 kg)    Height: 5' 8\" (1.727 m) MD Veda      *Document was created using voice recognition software. Note was reviewed however may contain grammatical errors.

## 2020-09-02 ENCOUNTER — OFFICE VISIT (OUTPATIENT)
Dept: FAMILY MEDICINE CLINIC | Age: 75
End: 2020-09-02
Payer: MEDICARE

## 2020-09-02 VITALS
SYSTOLIC BLOOD PRESSURE: 124 MMHG | HEART RATE: 93 BPM | BODY MASS INDEX: 24.22 KG/M2 | DIASTOLIC BLOOD PRESSURE: 62 MMHG | OXYGEN SATURATION: 97 % | HEIGHT: 68 IN | TEMPERATURE: 97.8 F | WEIGHT: 159.8 LBS

## 2020-09-02 PROCEDURE — 99214 OFFICE O/P EST MOD 30 MIN: CPT | Performed by: INTERNAL MEDICINE

## 2020-09-02 RX ORDER — ISOSORBIDE DINITRATE 30 MG/1
30 TABLET ORAL DAILY
Status: ON HOLD | COMMUNITY
End: 2020-10-28 | Stop reason: HOSPADM

## 2020-09-02 RX ORDER — ALLOPURINOL 300 MG/1
300 TABLET ORAL DAILY
Qty: 90 TABLET | Refills: 1 | Status: ON HOLD | OUTPATIENT
Start: 2020-09-02 | End: 2020-10-28 | Stop reason: HOSPADM

## 2020-09-02 RX ORDER — CHLORTHALIDONE 25 MG/1
25 TABLET ORAL DAILY
Qty: 90 TABLET | Refills: 1 | Status: ON HOLD | OUTPATIENT
Start: 2020-09-02 | End: 2020-10-28 | Stop reason: HOSPADM

## 2020-09-02 RX ORDER — FLUTICASONE PROPIONATE 50 MCG
2 SPRAY, SUSPENSION (ML) NASAL DAILY
Qty: 1 BOTTLE | Refills: 5 | Status: SHIPPED | OUTPATIENT
Start: 2020-09-02

## 2020-09-02 RX ORDER — LANOLIN ALCOHOL/MO/W.PET/CERES
1000 CREAM (GRAM) TOPICAL 2 TIMES DAILY
COMMUNITY

## 2020-09-02 RX ORDER — LISINOPRIL 40 MG/1
40 TABLET ORAL DAILY
Qty: 90 TABLET | Refills: 1 | Status: ON HOLD | OUTPATIENT
Start: 2020-09-02 | End: 2020-10-28 | Stop reason: HOSPADM

## 2020-09-02 RX ORDER — PRAVASTATIN SODIUM 80 MG/1
80 TABLET ORAL DAILY
Qty: 90 TABLET | Refills: 1 | Status: ON HOLD | OUTPATIENT
Start: 2020-09-02 | End: 2020-10-28 | Stop reason: HOSPADM

## 2020-09-02 NOTE — PROGRESS NOTES
[facility]     20  Mansi Lamb : 1945 Sex: male  Age: 66 y.o. Chief Complaint   Patient presents with    Other     ongoing pain; lower back right side and goes into the front; feels better when sitting or standing; doesnt get any pain throughout the day, only night time       HPI  Patient presents for early visit accompanied by wife complaining of his right sided abdominal pain which is been ongoing for a while now. He has had a perplexing case for the past year plus now. Went back to the records. Was seen by rheumatology last year and felt to have a inflammatory polyarthropathy seronegative. Ended up being placed on methotrexate which she could not tolerate and stopped the medication. All of his pain went away and he has had no residual for months. He continues to have an elevated sedimentation rate and CRP. He has no fevers or chills and no signs of infection. His only complaint at this time is this abdominal discomfort right mid to lower quadrant  . He has had his gallbladder out still has his appendix. Relatively recent CAT scan of the abdomen did not demonstrate any appendiceal inflammation. Rest of his CAT scan looked okay except for vascular and this was addressed with referral.. He continues to run a markedly elevated sedimentation rate most recently checked by hematology/oncology of 118 with a CRP close to 30. He has been seen by cardiology recently as well as vascular and now hematology/oncology. States the pain only occurs at nighttime when he is laying down. Does not have pain throughout the day denies any other abdominal symptoms as far as nausea vomiting diarrhea or constipation. He said no blood in the stool or black stool. He has had colonoscopy relatively recently without significant findings. His weight is down about 9 pounds in the last 16 months. Patient states his blood sugars and blood pressures have been in a good range.   Review of Systems   Constitutional: Negative for activity change, appetite change, chills, diaphoresis, fatigue, fever and unexpected weight change. HENT: Negative for congestion, ear pain, hearing loss, postnasal drip, rhinorrhea and sinus pain.    Respiratory: Negative for cough, shortness of breath and wheezing.    Cardiovascular: Negative for chest pain, palpitations and leg swelling. Gastrointestinal: Positive for abdominal pain-see above.  Negative for blood in stool, constipation, diarrhea, nausea and vomiting. Endocrine: Negative.    Genitourinary: Negative for difficulty urinating, dysuria, frequency, hematuria and urgency. Musculoskeletal: Negative for arthralgias back pain, gait problem and myalgias. Skin: Negative.    Allergic/Immunologic: Negative for environmental allergies   Neurological: Negative for dizziness, weakness, light-headedness, numbness and headaches. Hematological: Anemia  Psychiatric/Behavioral: Negative for sleep disturbance. The patient is not nervous/anxious.    Endocrine: Type 2 diabetes  Hematology: Anemia           REST OF PERTINENT ROS GONE OVER AND WAS NEGATIVE.      PMH:  Problem List: Essential hypertension  Osteoarthritis  Malignant tumor of prostate  Gout  Type 2 diabetes mellitus  Benign essential hypertension  Hyperlipidemia  Health Maintenance:  Colonoscopy - (8/13/2014)  Colonoscopy - (1/16/2017)  Colonoscopy Screening - (8/13/2014)  Colonoscopy Screening - (1/16/2017)  Influenza Vaccination - (11/16/2016)  Pneumonia Vaccination - (11/16/2016)  Colonoscopy - 08-dr miguel,8/14,1/17, 6/20-due 25  Psa Test - dr jiménez-we will do, 4/17,4/18, 7/19-0.1, 11/19 - 0.01  Rectal Exam - dr Bart Rios, declined  Prostate Exam - dr Bart Rios, declined  Ercp - 3/11  Hemmocult Cards - hemocult-neg x 3--9/11,5/13-neg x 3  EGD - 8/14,1/17  MRCP - 1/17, 7/20  Fit test - 4/19-neg  Stress test-8/20-mild ischemia  2D echo-8/20  Medical Problems:  Hypertension  Hyperlipidemia - intolerant of statins and zetia  Gout, Prostate Cancer, hx left knee arthroscopy, Type 2 Diabetes, Diverticulosis, Osteoarthritis, Colon  Polyps, ventral hernia, Hiatal Hernia, Pancreatitis  Cholelithiasis - cholecystitis/choledocholithiasis-lap pratibha  Ercp  Carotid Artery Stenosis - declines further fu, vascular now following  Pulmonary Emphysema - Noted on CT chest  Pulmonary nodules - Followed on CT-done  Abnormal vascular study - Refuses vascular consult  Laser surgery 3/26/14, Dr. 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  Refuses further evaluation. FH:  unknown--adopted  Father:  . (Hx)  Mother:  . (Hx)  SH:  . (Marital) retire   Personal Habits: Cigarette Use: Former Cigarette Smoker. Alcohol: , Has consumed alcohol in the past - 12 years ago                    Current Outpatient Medications:     vitamin B-12 (CYANOCOBALAMIN) 1000 MCG tablet, Take 1,000 mcg by mouth 2 times daily, Disp: , Rfl:     isosorbide dinitrate (ISORDIL) 30 MG tablet, Take 30 mg by mouth daily, Disp: , Rfl:     allopurinol (ZYLOPRIM) 300 MG tablet, Take 1 tablet by mouth daily Hold until pt is ready, Disp: 90 tablet, Rfl: 1    chlorthalidone (HYGROTON) 25 MG tablet, Take 1 tablet by mouth daily Hold until pt is ready, Disp: 90 tablet, Rfl: 1    lisinopril (PRINIVIL;ZESTRIL) 40 MG tablet, Take 1 tablet by mouth daily Hold until pt is ready, Disp: 90 tablet, Rfl: 1    metFORMIN (GLUCOPHAGE) 500 MG tablet, 1 po q am, 2 po q pm Hold until pt is ready, Disp: 270 tablet, Rfl: 1    pravastatin (PRAVACHOL) 80 MG tablet, Take 1 tablet by mouth daily Hold until pt is ready, Disp: 90 tablet, Rfl: 1    fluticasone (FLONASE) 50 MCG/ACT nasal spray, 2 sprays by Each Nostril route daily, Disp: 1 Bottle, Rfl: 5    Omega-3 Fatty Acids (FISH OIL) 1200 MG CAPS, Take by mouth daily, Disp: , Rfl:     aspirin 81 MG EC tablet, Take by mouth, Disp: , Rfl:     blood glucose test strips (ONE TOUCH ULTRA TEST) strip, , Disp: , Rfl:     vitamin E 100 units capsule, Take by mouth, Disp: , Rfl:     LUMIGAN 0.01 % SOLN ophthalmic drops, , Disp: , Rfl:     COMBIGAN 0.2-0.5 % ophthalmic solution, , Disp: , Rfl: 6    vitamin D (CHOLECALCIFEROL) 1000 UNIT TABS tablet, Take by mouth, Disp: , Rfl:     folic acid (FOLVITE) 1 MG tablet, Take 1 mg by mouth daily , Disp: , Rfl: 12  Allergies   Allergen Reactions    Methotrexate Derivatives      Caused Blisters       Past Medical History:   Diagnosis Date    Carotid artery stenosis     Cholecystitis     Choledocholithiasis     Cholelithiasis     Colon polyps     Essential hypertension 5/23/2019    Gout     Hiatal hernia     History of laparoscopic cholecystectomy     Hyperlipidemia 5/23/2019    Inflammatory polyarthropathy ([address] 5/23/2019    Malignant tumor of prostate ([address]     Osteoarthritis     Pancreatitis     Prostate CA ([facility] 5/23/2019    Pulmonary emphysema (HCC)     Pulmonary nodules     Type 2 diabetes mellitus without complication, without long-term current use of insulin ([address] 5/23/2019    Ventral hernia      No past surgical history on file. No family history on file.   Social History     Socioeconomic History    Marital status:      Spouse name: Not on file    Number of children: Not on file    Years of education: Not on file    Highest education level: Not on file   Occupational History     Employer: RETIRED   Social Needs    Financial resource strain: Not on file    Food insecurity     Worry: Not on file     Inability: Not on file   [employer] needs     Medical: Not on file     Non-medical: Not on file   Tobacco Use    Smoking status: Never Smoker    Smokeless tobacco: Never Used   Substance and Sexual Activity    Alcohol use: Not Currently    Drug use: Never    Sexual activity: Yes     Partners: Female   Lifestyle    Physical activity     Days per week: Not on file     Minutes per session: Not on file    Stress: Not on file   Relationships    Social connections     Talks on phone: Not on file     Gets together: Not on file     Attends [employer] service: Not on file     Active member of club or organization: Not on file     Attends meetings of clubs or organizations: Not on file     Relationship status: Not on file    Intimate partner violence     Fear of current or ex partner: Not on file     Emotionally abused: Not on file     Physically abused: Not on file     Forced sexual activity: Not on file   Other Topics Concern    Not on file   Social History Narrative    Not on file       Vitals:    09/02/20 1516   BP: 124/62   Pulse: 93   Temp: 97.8 °F (36.6 °C)   TempSrc: Temporal   SpO2: 97%   Weight: 159 lb 12.8 oz (72.5 kg)   Height: 5' 8\" (1.727 m)       Physical Exam    Const: Appears well developed and well nourished. No signs of acute distress present. ENMT: . (External Ears) Tympanic membranes are intact. External nose WNL. Moistness and  normal color of the nasal mucosae. Nasal septum. (Septum) . (Teeth) Gums appear healthy. Posterior pharynx shows no exudate, irritation or redness. Neck: Supple and symmetric. Palpation reveals no adenopathy. No masses appreciated. Thyroid exhibits no nodule or thyromegaly. No JVD. Carotids: 2+ and equal bilaterally, without  bruits. Resp: No rales, rhonchi or wheezes appreciated over the lungs bilaterally. CV: Rate is regular. Rhythm is regular. S1 is normal. S2 is normal. No gallop or rubs. No  heart murmur appreciated. Extremities: No clubbing, cyanosis or edema. Abdomen: Bowel sounds are normoactive. Palpation reveals softness, with no distension,  organomegaly or tenderness. No abdominal masses palpable. No palpable hepatosplenomegaly  He does have a large diastasis rectus. Musculo: Walks with a normal gait. Upper Extremities: Full ROM bilaterally. Lower Extremities:  Full ROM bilaterally.   Neurological: Grossly intact without focal deficits noted. Lymph nodes: None palpable        Assessment and Plan:  Deb Garcia was seen today for other. Diagnoses and all orders for this visit:    Abdominal pain, unspecified abdominal location  -     External Referral To General Surgery    Essential hypertension  -     chlorthalidone (HYGROTON) 25 MG tablet; Take 1 tablet by mouth daily Hold until pt is ready  -     lisinopril (PRINIVIL;ZESTRIL) 40 MG tablet; Take 1 tablet by mouth daily Hold until pt is ready  Stable on medication    Type 2 diabetes mellitus without complication, without long-term current use of insulin (HCC)  -     metFORMIN (GLUCOPHAGE) 500 MG tablet; 1 po q am, 2 po q pm Hold until pt is ready  Stable on medication    Hyperlipidemia, unspecified hyperlipidemia type  -     pravastatin (PRAVACHOL) 80 MG tablet; Take 1 tablet by mouth daily Hold until pt is ready  Stable on medication    Inflammatory polyarthropathy (HCC)  -     fluticasone (FLONASE) 50 MCG/ACT nasal spray; 2 sprays by Each Nostril route daily  Quiescedsent   ???  Anemia, unspecified type  Being evaluated by hematology/oncology    Prostate CA ([facility]  Stable  Weight loss  -     External Referral To General Surgery    Other orders  -     allopurinol (ZYLOPRIM) 300 MG tablet; Take 1 tablet by mouth daily Hold until pt is ready    Plan: I will see him back at upcoming regularly scheduled visit. He is to follow with above consultants especially hematology/oncology. I did set him up with surgery Dr. Nunu Goyal for evaluation of his abdominal pain to make sure there is not a smoldering/chronic appendicitis causing issue. No blood work today. We will check things again when I see him in a couple months. Will attempt to speak with Dr. Bernardo Ibarra regarding patient in the high sed rates last labs. Check temperatures. Prescription management performed. Will be due for another PSA when I see him next. Notify me with problems in the interim. Return keep appt.     Seen By:  Peri Calles MD      *Document was created using voice recognition software. Note was reviewed however may contain grammatical errors.

## 2020-09-30 ENCOUNTER — TELEPHONE (OUTPATIENT)
Dept: FAMILY MEDICINE CLINIC | Age: 75
End: 2020-09-30

## 2020-10-10 ENCOUNTER — HOSPITAL ENCOUNTER (INPATIENT)
Age: 75
LOS: 18 days | Discharge: HOME | DRG: 233 | End: 2020-10-28
Attending: FAMILY MEDICINE | Admitting: THORACIC SURGERY (CARDIOTHORACIC VASCULAR SURGERY)
Payer: MEDICARE

## 2020-10-10 PROBLEM — R74.01 TRANSAMINITIS: Status: ACTIVE | Noted: 2020-10-10

## 2020-10-10 LAB
ALBUMIN SERPL-MCNC: 3.2 G/DL (ref 3.5–5.2)
ALP BLD-CCNC: 64 U/L (ref 40–129)
ALT SERPL-CCNC: 483 U/L (ref 0–40)
ANION GAP SERPL CALCULATED.3IONS-SCNC: 13 MMOL/L (ref 7–16)
ANISOCYTOSIS: ABNORMAL
AST SERPL-CCNC: 354 U/L (ref 0–39)
BASOPHILS ABSOLUTE: 0.03 E9/L (ref 0–0.2)
BASOPHILS RELATIVE PERCENT: 0.2 % (ref 0–2)
BILIRUB SERPL-MCNC: 0.8 MG/DL (ref 0–1.2)
BUN BLDV-MCNC: 37 MG/DL (ref 8–23)
CALCIUM SERPL-MCNC: 8 MG/DL (ref 8.6–10.2)
CHLORIDE BLD-SCNC: 105 MMOL/L (ref 98–107)
CO2: 20 MMOL/L (ref 22–29)
CREAT SERPL-MCNC: 1.5 MG/DL (ref 0.7–1.2)
EOSINOPHILS ABSOLUTE: 0 E9/L (ref 0.05–0.5)
EOSINOPHILS RELATIVE PERCENT: 0 % (ref 0–6)
GFR AFRICAN AMERICAN: 55
GFR NON-AFRICAN AMERICAN: 45 ML/MIN/1.73
GLUCOSE BLD-MCNC: 105 MG/DL (ref 74–99)
HCT VFR BLD CALC: 26.6 % (ref 37–54)
HEMOGLOBIN: 8.4 G/DL (ref 12.5–16.5)
IMMATURE GRANULOCYTES #: 0.11 E9/L
IMMATURE GRANULOCYTES %: 0.7 % (ref 0–5)
LACTIC ACID: 1.3 MMOL/L (ref 0.5–2.2)
LYMPHOCYTES ABSOLUTE: 1.67 E9/L (ref 1.5–4)
LYMPHOCYTES RELATIVE PERCENT: 10.1 % (ref 20–42)
MAGNESIUM: 2.5 MG/DL (ref 1.6–2.6)
MCH RBC QN AUTO: 34 PG (ref 26–35)
MCHC RBC AUTO-ENTMCNC: 31.6 % (ref 32–34.5)
MCV RBC AUTO: 107.7 FL (ref 80–99.9)
MONOCYTES ABSOLUTE: 1.98 E9/L (ref 0.1–0.95)
MONOCYTES RELATIVE PERCENT: 12 % (ref 2–12)
NEUTROPHILS ABSOLUTE: 12.68 E9/L (ref 1.8–7.3)
NEUTROPHILS RELATIVE PERCENT: 77 % (ref 43–80)
OVALOCYTES: ABNORMAL
PDW BLD-RTO: 15.7 FL (ref 11.5–15)
PLATELET # BLD: 231 E9/L (ref 130–450)
PMV BLD AUTO: 10.9 FL (ref 7–12)
POIKILOCYTES: ABNORMAL
POLYCHROMASIA: ABNORMAL
POTASSIUM SERPL-SCNC: 4.3 MMOL/L (ref 3.5–5)
PROCALCITONIN: 0.53 NG/ML (ref 0–0.08)
RBC # BLD: 2.47 E12/L (ref 3.8–5.8)
SCHISTOCYTES: ABNORMAL
SODIUM BLD-SCNC: 138 MMOL/L (ref 132–146)
TEAR DROP CELLS: ABNORMAL
TOTAL PROTEIN: 6.2 G/DL (ref 6.4–8.3)
WBC # BLD: 16.5 E9/L (ref 4.5–11.5)

## 2020-10-10 PROCEDURE — 1200000000 HC SEMI PRIVATE

## 2020-10-10 PROCEDURE — 83605 ASSAY OF LACTIC ACID: CPT

## 2020-10-10 PROCEDURE — 83735 ASSAY OF MAGNESIUM: CPT

## 2020-10-10 PROCEDURE — 85025 COMPLETE CBC W/AUTO DIFF WBC: CPT

## 2020-10-10 PROCEDURE — 2580000003 HC RX 258: Performed by: FAMILY MEDICINE

## 2020-10-10 PROCEDURE — 87040 BLOOD CULTURE FOR BACTERIA: CPT

## 2020-10-10 PROCEDURE — 80053 COMPREHEN METABOLIC PANEL: CPT

## 2020-10-10 PROCEDURE — 6360000002 HC RX W HCPCS

## 2020-10-10 PROCEDURE — 84145 PROCALCITONIN (PCT): CPT

## 2020-10-10 PROCEDURE — 36415 COLL VENOUS BLD VENIPUNCTURE: CPT

## 2020-10-10 PROCEDURE — 2500000003 HC RX 250 WO HCPCS

## 2020-10-10 PROCEDURE — 2580000003 HC RX 258

## 2020-10-10 PROCEDURE — 2700000000 HC OXYGEN THERAPY PER DAY

## 2020-10-10 RX ORDER — ACETAMINOPHEN 325 MG/1
650 TABLET ORAL EVERY 6 HOURS PRN
Status: DISCONTINUED | OUTPATIENT
Start: 2020-10-10 | End: 2020-10-11

## 2020-10-10 RX ORDER — SODIUM CHLORIDE 0.9 % (FLUSH) 0.9 %
10 SYRINGE (ML) INJECTION EVERY 12 HOURS SCHEDULED
Status: DISCONTINUED | OUTPATIENT
Start: 2020-10-10 | End: 2020-10-19

## 2020-10-10 RX ORDER — POLYETHYLENE GLYCOL 3350 17 G/17G
17 POWDER, FOR SOLUTION ORAL DAILY PRN
Status: DISCONTINUED | OUTPATIENT
Start: 2020-10-10 | End: 2020-10-19

## 2020-10-10 RX ORDER — ONDANSETRON 2 MG/ML
4 INJECTION INTRAMUSCULAR; INTRAVENOUS EVERY 6 HOURS PRN
Status: DISCONTINUED | OUTPATIENT
Start: 2020-10-10 | End: 2020-10-19

## 2020-10-10 RX ORDER — ACETAMINOPHEN 650 MG/1
650 SUPPOSITORY RECTAL EVERY 6 HOURS PRN
Status: DISCONTINUED | OUTPATIENT
Start: 2020-10-10 | End: 2020-10-11

## 2020-10-10 RX ORDER — MORPHINE SULFATE 4 MG/ML
4 INJECTION, SOLUTION INTRAMUSCULAR; INTRAVENOUS EVERY 4 HOURS PRN
Status: DISCONTINUED | OUTPATIENT
Start: 2020-10-10 | End: 2020-10-13

## 2020-10-10 RX ORDER — POTASSIUM CHLORIDE 7.45 MG/ML
10 INJECTION INTRAVENOUS PRN
Status: DISCONTINUED | OUTPATIENT
Start: 2020-10-10 | End: 2020-10-16

## 2020-10-10 RX ORDER — FERROUS SULFATE 325(65) MG
325 TABLET ORAL EVERY OTHER DAY
COMMUNITY

## 2020-10-10 RX ORDER — PROMETHAZINE HYDROCHLORIDE 25 MG/1
12.5 TABLET ORAL EVERY 6 HOURS PRN
Status: DISCONTINUED | OUTPATIENT
Start: 2020-10-10 | End: 2020-10-19

## 2020-10-10 RX ORDER — POTASSIUM CHLORIDE 20 MEQ/1
40 TABLET, EXTENDED RELEASE ORAL PRN
Status: DISCONTINUED | OUTPATIENT
Start: 2020-10-10 | End: 2020-10-16

## 2020-10-10 RX ORDER — LANOLIN ALCOHOL/MO/W.PET/CERES
3 CREAM (GRAM) TOPICAL NIGHTLY PRN
Status: DISCONTINUED | OUTPATIENT
Start: 2020-10-10 | End: 2020-10-19

## 2020-10-10 RX ORDER — MAGNESIUM SULFATE IN WATER 40 MG/ML
2 INJECTION, SOLUTION INTRAVENOUS PRN
Status: DISCONTINUED | OUTPATIENT
Start: 2020-10-10 | End: 2020-10-19

## 2020-10-10 RX ORDER — SODIUM CHLORIDE 9 MG/ML
INJECTION, SOLUTION INTRAVENOUS CONTINUOUS
Status: DISCONTINUED | OUTPATIENT
Start: 2020-10-10 | End: 2020-10-11

## 2020-10-10 RX ORDER — SODIUM CHLORIDE 0.9 % (FLUSH) 0.9 %
10 SYRINGE (ML) INJECTION PRN
Status: DISCONTINUED | OUTPATIENT
Start: 2020-10-10 | End: 2020-10-19

## 2020-10-10 RX ADMIN — SODIUM CHLORIDE: 9 INJECTION, SOLUTION INTRAVENOUS at 20:15

## 2020-10-10 ASSESSMENT — PAIN SCALES - GENERAL: PAINLEVEL_OUTOF10: 0

## 2020-10-10 NOTE — H&P
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 Medications Prior to Admission:      Prior to Admission medications    Medication Sig Start Date End Date Taking?  Authorizing Provider   vitamin B-12 (CYANOCOBALAMIN) 1000 MCG tablet Take 1,000 mcg by mouth 2 times daily    Historical Provider, MD   isosorbide dinitrate (ISORDIL) 30 MG tablet Take 30 mg by mouth daily    Historical Provider, MD   allopurinol (ZYLOPRIM) 300 MG tablet Take 1 tablet by mouth daily Hold until pt is ready 9/2/20   Fernie Perez MD   chlorthalidone (HYGROTON) 25 MG tablet Take 1 tablet by mouth daily Hold until pt is ready 9/2/20   Fernie Perez MD   lisinopril (PRINIVIL;ZESTRIL) 40 MG tablet Take 1 tablet by mouth daily Hold until pt is ready 9/2/20   Fernie Perez MD   metFORMIN (GLUCOPHAGE) 500 MG tablet 1 po q am, 2 po q pm Hold until pt is ready 9/2/20   Fernie Perez MD   pravastatin (PRAVACHOL) 80 MG tablet Take 1 tablet by mouth daily Hold until pt is ready 9/2/20   Fernie Perez MD   fluticasone [facility]) 50 MCG/ACT nasal spray 2 sprays by Each Nostril route daily 9/2/20   Fernie Perez MD   Omega-3 Fatty Acids (FISH OIL) 1200 MG CAPS Take by mouth daily    Historical Provider, MD   aspirin 81 MG EC tablet Take by mouth    Historical Provider, MD   blood glucose test strips (ONE TOUCH ULTRA TEST) strip  12/14/15   Historical Provider, MD   vitamin E 100 units capsule Take by mouth    Historical Provider, MD [place] 0.01 % SOLN ophthalmic drops  5/18/19   Historical Provider, MD   COMBIGAN 0.2-0.5 % ophthalmic solution  4/10/19   Historical Provider, MD   vitamin D (CHOLECALCIFEROL) 1000 UNIT TABS tablet Take by mouth Historical Provider, MD   folic acid (FOLVITE) 1 MG tablet Take 1 mg by mouth daily  4/25/19   Historical Provider, MD       Allergies:  Methotrexate derivatives    Social History:    TOBACCO:   reports that he has never smoked. He has never used smokeless tobacco.  ETOH:   reports previous alcohol use. Family History:    Reviewed in detail and negative for DM, CAD, Cancer, CVA. Positive as follows\"  No family history on file. REVIEW OF SYSTEMS:   Pertinent positives as noted in the HPI. All other systems reviewed and negative. PHYSICAL EXAM:  BP (!) 88/56   Pulse 99   Temp 98.2 °F (36.8 °C) (Temporal)   Resp 20   SpO2 93%   General appearance: No apparent distress, appears stated age and cooperative. HEENT: Normal cephalic, atraumatic without obvious deformity. Pupils equal, round, and reactive to light. Extra ocular muscles intact. Conjunctivae/corneas clear. Neck: Supple, with full range of motion. No jugular venous distention. Trachea midline. Respiratory: There to auscultation bilaterally  Cardiovascular: Giller rate and rhythm  Abdomen: Soft, nontender, nondistended  Musculoskeletal: No clubbing, cyanosis Brisk capillary refill. Skin: Normal skin color. No rashes or lesions. Neurologic:  Neurovascularly intact without any focal sensory/motor deficits.  Cranial nerves: II-XII intact, grossly non-focal.    CBC:   Recent Labs     10/09/20  0125 10/10/20  0606   WBC 11.4* 18.8*   RBC 3.04* 2.75*   HGB 10.0* 9.2*   HCT 31.6* 28.6*   .8* 103.9*   RDW 16.8* 17.2*    282     BMP:   Recent Labs     10/09/20  0125 10/10/20  0606 10/10/20  0620    140  --    K 4.8 4.4  --     106  --    CO2 22 20*  --    BUN 38* 41*  --    CREATININE 1.9* 1.4*  --    MG  --   --  1.0*     LFT:  Recent Labs     10/09/20  0125 10/10/20  0606 10/10/20  0607   PROT 8.2* 6.8  --    ALKPHOS 75 69  --    ALT 10 598*  --    AST 14 657*  --    BILITOT 0.9 1.0  --    AMYLASE  --   --  38   LIPASE 39 --  18*     CE:  Recent Labs     10/09/20  0125   TROPONINI < 0.03     PT/INR: No results for input(s): INR, APTT in the last 72 hours.   BNP:   Recent Labs     10/10/20  0607   BNP 1,461*     ESR:   Lab Results   Component Value Date    SEDRATE 118 (H) 2020     CRP:   Lab Results   Component Value Date    CRP 28.3 (H) 2020     D Dimer: No results found for: DDIMER   Folate and B12:   Lab Results   Component Value Date    ZXUWMKDB48 378 2020   ,   Lab Results   Component Value Date    FOLATE > 24.8 2020     Lactic Acid: No results found for: LACTA  Thyroid Studies:   Lab Results   Component Value Date    TSH 1.26 2020       Oupatient labs:  Lab Results   Component Value Date    CHOL 144 2020    TRIG 125 2020    HDL 44 2020    LDLCALC 93 2019    TSH 1.26 2020    LABA1C 6.7 (H) 2020       Urinalysis:    Lab Results   Component Value Date    WBCUA NEGATIVE 10/09/2020    RBCUA NEGATIVE 10/09/2020    GLUCOSEU NEGATIVE 10/09/2020       Imaging:  Ct Abdomen Pelvis Wo Contrast    Result Date: 10/9/2020                                      [address]                                              [address]                                              [phone]                                               CT Scan Report                                                  Signed    Patient: Deana Love                                                                MR#  : [id number]          : 1945                                                                [de-identified]            Age/Sex: 66 / [place] Dural Date: 10/09/20            Loc: 2T             2029                                                                  Attending Dr: Tiffanie Marshall DO    Ordering Physician: Domingo Koenig MD Date of Service: 10/09/20  Procedure(s): CT abd pelvis stone protocol  Accession Number(s): [id number]    cc:       STAT REPOR  Preliminary report was sent to the Emergency Department at [address], 10/09/20 by [address] REPORT by Richard Almanza MD (10/09/20  0813)     HISTORY:  Right flank pain. Diffuse abdominal pain. Symptomatology times 6 months. History of   cholecystectomy and prostatectomy. PHYSICIAN INDICATIONS:  Kidney Stone       TECHNIQUE:  Multiple axial images of the abdomen and pelvis were obtained from above the level of   the diaphragm through the level of the symphysis pubis without intravenous contrast.  CT Dose   Index: 6.0 mGy. DLP: 274 mGy-cm. COMPARISON:  05/18/2020 enhanced abdominal/pelvic CT. FINDINGS: Pleural parenchymal scarring is again identified in right lower lobe, slightly more   pronounced than on prior examination. Subtle interstitial opacification newly identified   bilaterally in lung bases may relate to subtle tracking of interstitial fluid. No   pneumoperitoneum. Small sliding hiatal hernia is identified. Stomach is mildly distended with   liquid food residue. Duodenal diverticulum is noted. No overt gaseous distension of small bowel. Appendix is within normal limits. Moderate colonic diverticulosis is identified (sigmoid   predominance) without acute diverticulitis. Nondistended sigmoid colon extends into left inguinal   hernia defect. Liver and spleen are within normal limits. Gallbladder surgically absent. No peripancreatic   inflammation. Asymmetrical prominence of the left adrenal gland is again noted. Mild adrenal   gland lobulation is noted bilaterally. A 2.5 cm lateral exophytic left renal cyst is again identified. 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 Mild bilateral perinephric stranding is again identified   (without hydronephrosis upon bilateral assessment).   Mild urinary bladder wall thickening is   identified. Surgical absence of prostate gland. Punctate sclerotic focus is again identified   laterally at the right tenth rib. Significant vascular calcifications are identified (including   greater than 50% degree of stenosis bilaterally in renal arteries, duplicated bilaterally). IMPRESSION:   1. Mild bilateral perinephric stranding is again identified (without hydronephrosis upon   bilateral assessment). 2.  Mild urinary bladder wall thickening is identified. Surgical absence of prostate gland. Note:  Please correlate for potential urinary tract infection. 3.   Moderate colonic diverticulosis is identified (sigmoid predominance) without acute   diverticulitis. Nondistended sigmoid colon extends into left inguinal hernia defect. 4. Pleural parenchymal scarring is again identified in right lower lobe, slightly more pronounced   than on prior examination. 5. Significant vascular calcifications are identified (including greater than 50% degree of   stenosis bilaterally in renal arteries, duplicated bilaterally).                Dictated By: Leticia Foote MD   DD/DT: 10/09/20 0813               Signed Miguel Tovar MD 10/09/20 0882             :    Xr Abdomen (kub) (single Ap View)    Result Date: 10/9/2020                                      [address] [place]                                             [facility], [address] [place], [place] [address]                                              [phone]                                                XRay Report                                                  Signed    Patient: Lacho Hoover                                                                MR#  : [id number]          : 1945                                                                [de-identified]            Age/Sex: 66 / M pain, flank pain, prostate cancer. TECHNIQUE:  3T Multiplanar multi sequence imaging of the thoracic, and lumbar spine was performed   with T1 and T2-weighted imaging. Post contrast T1-weighted images were also obtained. FINDINGS THORACIC SPINE:   Alignment is relatively anatomic. There is preservation of disc height and signal intensity. Bone   marrow signal is normal. No abnormal signal is seen in the thoracic spinal cord. No enhancing   spinal column lesion seen. Additional findings and levels as follows:       T7-8:   Right disc herniation. Mild right ventrolateral canal stenosis. No cord compression. No   abnormal signal in the cord. T8-9:   Bilateral ventrolateral canal herniation's. Mild central canal stenosis. Mild bilateral   ventrolateral canal stenosis. No abnormal signal in the cord at this level. No cord compression. T9-10: Mild right left paracentral and ventrolateral canal disc bulge without significant stenosis. FINDINGS LUMBAR SPINE:   Alignment is near anatomic. No enhancing spinal column lesions. Disc height and signal intensity   are preserved. Marrow signal intensity is normal.  The conus is identified at the level of L1-2. Additional findings levels are as follows:       L1-2: No significant stenosis. L2-3: No significant stenosis. L3-4: No significant stenosis. L4-5: Minimal broad-based disc bulge. Some facet arthropathy. Some mild bilateral neural   foraminal stenosis. L5-S1: Minimal mild broad-based disc bulge. Some facet arthropathy. Some mild right worse than   left neural foraminal stenosis. IMPRESSION 3T MRI of the Thoracic, and Lumbar Spine without and with intravenous contrast:   1. Multilevel disc disease and degenerative changes as described above. Clinical correlation with   dermatome mapping.                                Dictated By: Iraida Whipple DO    DD/DT: 09/24/20 0957               Signed Keyur Schwab DO 20 1658              : [facility]    Mri Lumbar Spine W Wo Contrast    Result Date: 2020                                      [address] [place]                                             [facility], [address] [place], [place] [address]                                              [phone]                                         Magnetic Resonance Report                                                  Signed    Patient: Dina Ibanez                                                                MR#  : [id number]          : 1945                                                                [de-identified]            Age/Sex: 66 / M                                                                Admit Date: 20            Loc: ONC                                                                                    Attending Dr: Vita Baldwin MD    Ordering Physician: Oneil Blackwood DO   Date of Service: 20  Procedure(s): MR lumbar spine wo/w con; MR thoracic spine wo/w con  Accession Number(s): [id number]; [id number]    cc:       HISTORY:  Back pain, flank pain, prostate cancer. TECHNIQUE:  3T Multiplanar multi sequence imaging of the thoracic, and lumbar spine was performed   with T1 and T2-weighted imaging. Post contrast T1-weighted images were also obtained. FINDINGS THORACIC SPINE:   Alignment is relatively anatomic. There is preservation of disc height and signal intensity. Bone   marrow signal is normal. No abnormal signal is seen in the thoracic spinal cord. No enhancing   spinal column lesion seen. Additional findings and levels as follows:       T7-8:   Right disc herniation. Mild right ventrolateral canal stenosis. No cord compression. No   abnormal signal in the cord. T8-9:   Bilateral ventrolateral canal herniation's. Mild central canal stenosis.   Mild bilateral ventrolateral canal stenosis. No abnormal signal in the cord at this level. No cord compression. T9-10: Mild right left paracentral and ventrolateral canal disc bulge without significant stenosis. FINDINGS LUMBAR SPINE:   Alignment is near anatomic. No enhancing spinal column lesions. Disc height and signal intensity   are preserved. Marrow signal intensity is normal.  The conus is identified at the level of L1-2. Additional findings levels are as follows:       L1-2: No significant stenosis. L2-3: No significant stenosis. L3-4: No significant stenosis. L4-5: Minimal broad-based disc bulge. Some facet arthropathy. Some mild bilateral neural   foraminal stenosis. L5-S1: Minimal mild broad-based disc bulge. Some facet arthropathy. Some mild right worse than   left neural foraminal stenosis. IMPRESSION 3T MRI of the Thoracic, and Lumbar Spine without and with intravenous contrast:   1. Multilevel disc disease and degenerative changes as described above. Clinical correlation with   dermatome mapping.                                Dictated By: Reuben Carpenter DO    DD/DT: 20 0957               Signed By: Reuben Carpenter DO 20 2118              : [facility]    Cta Pulmonary W Contrast    Result Date: 10/10/2020                                      [address]                                              [phone]                                               CT Scan Report                                                  Signed    Patient: Brigid Chan                                                                MR#  : [id number]          : 1945                                                                [de-identified]            Age/Sex: 66 / M Admit Date: 10/09/20            Loc: 2T             2029-1                                                                  Attending Dr: Kimmie Suárez Physician: Molly Felix NP   Date of Service: 10/10/20  Procedure(s): CT angio chest PE protocol  Accession Number(s): [id number]    cc:               PHYSICIAN INDICATIONS:  Short Of Breath       TECHNIQUE: High resolution axial CT images of the chest with three-dimensional reconstruction on   separate workstation. Study was performed with intravenous contrast for CT Angiogram. CT Dose   Index: 3.7 - 11.2 mGy. DLP: 245 mGy-cm. Administered 59.0 ml of ISOVUE 300.00 mg/ml. AEC. k=0.014   mSv/(mGy-cm)       FINDINGS:       The pulmonary arteries appear normal in caliber without filling defects to suggest pulmonary   emboli. The aorta is normal in caliber without aneurysm, dissection or rupture. The heart size is mildly   enlarged . Small bilateral pleural effusions with dependent atelectasis. No area of consolidation. Emphysematous changes with upper lobe predominance. No lung nodules are identified. The mediastinum is unremarkable with no significant adenopathy   identified. The hilar regions are unremarkable. In the upper abdomen, visualized portions of the liver, adrenal glands, kidneys and spleen are   unremarkable. IMPRESSION:       CT Angiogram Chest and Pulmonary Arteries with contrast and high resolution 2-D and 3-D images:       1. No evidence of pulmonary emboli by CT technique. 2. Small bilateral pleural effusions with dependent atelectasis.                Dictated BySarah Mary MD   DD/DT: 10/10/20 1022               Signed By: Mindy Newberry MD 10/10/20 1022            : OLVIN      ASSESSMENT:  Intractable abdominal pain  Elevated liver enzymes/transaminitis  Hypomagnesemia  Acute on chronic renal failure  Leukocytosis  Elevated CRP and sed rate  Hypotension asymptomatic  Type 2 diabetes  Hypertension      PLAN:  Admit to medicine  Consult gastroenterology  Consult nephrology  N.p.o.  Normal saline at 75 mL's per hour  Pain control  Monitor renal function avoid nephrotoxic medication  Monitor serum electrolytes replete as needed      Diet: Diet NPO Effective Now  Code Status: Full Code    Surrogate decision maker confirmed with patient:  Primary Emergency Contact: Rosales Knutson, Home Phone: [phone]    DVT Prophylaxis: []Lovenox []Heparin []PCD [] [address] []Encouraged ambulation    Disposition: []Med/Surg [] Intermediate [] ICU/CCU  Admit status: [] Observation [] Inpatient     NOTE: This report was transcribed using voice recognition software. Every effort was made to ensure accuracy; however, inadvertent computerized transcription errors may be present.

## 2020-10-11 ENCOUNTER — APPOINTMENT (OUTPATIENT)
Dept: ULTRASOUND IMAGING | Age: 75
DRG: 233 | End: 2020-10-11
Attending: FAMILY MEDICINE
Payer: MEDICARE

## 2020-10-11 LAB
ALBUMIN SERPL-MCNC: 3.1 G/DL (ref 3.5–5.2)
ALP BLD-CCNC: 76 U/L (ref 40–129)
ALT SERPL-CCNC: 548 U/L (ref 0–40)
ANION GAP SERPL CALCULATED.3IONS-SCNC: 16 MMOL/L (ref 7–16)
ANISOCYTOSIS: ABNORMAL
AST SERPL-CCNC: 305 U/L (ref 0–39)
BASOPHILS ABSOLUTE: 0.04 E9/L (ref 0–0.2)
BASOPHILS RELATIVE PERCENT: 0.3 % (ref 0–2)
BILIRUB SERPL-MCNC: 1 MG/DL (ref 0–1.2)
BUN BLDV-MCNC: 40 MG/DL (ref 8–23)
CALCIUM SERPL-MCNC: 8.2 MG/DL (ref 8.6–10.2)
CHLORIDE BLD-SCNC: 109 MMOL/L (ref 98–107)
CHLORIDE URINE RANDOM: 68 MMOL/L
CO2: 17 MMOL/L (ref 22–29)
CREAT SERPL-MCNC: 1.5 MG/DL (ref 0.7–1.2)
CREATININE URINE: 118 MG/DL (ref 40–278)
EOSINOPHILS ABSOLUTE: 0.04 E9/L (ref 0.05–0.5)
EOSINOPHILS RELATIVE PERCENT: 0.3 % (ref 0–6)
GFR AFRICAN AMERICAN: 55
GFR NON-AFRICAN AMERICAN: 45 ML/MIN/1.73
GLUCOSE BLD-MCNC: 105 MG/DL (ref 74–99)
HCT VFR BLD CALC: 30.8 % (ref 37–54)
HEMOGLOBIN: 9.6 G/DL (ref 12.5–16.5)
IMMATURE GRANULOCYTES #: 0.08 E9/L
IMMATURE GRANULOCYTES %: 0.6 % (ref 0–5)
LYMPHOCYTES ABSOLUTE: 1.45 E9/L (ref 1.5–4)
LYMPHOCYTES RELATIVE PERCENT: 10.7 % (ref 20–42)
MCH RBC QN AUTO: 33.8 PG (ref 26–35)
MCHC RBC AUTO-ENTMCNC: 31.2 % (ref 32–34.5)
MCV RBC AUTO: 108.5 FL (ref 80–99.9)
METER GLUCOSE: 134 MG/DL (ref 74–99)
METER GLUCOSE: 143 MG/DL (ref 74–99)
METER GLUCOSE: 163 MG/DL (ref 74–99)
MICROALBUMIN UR-MCNC: 216.8 MG/L
MICROALBUMIN/CREAT UR-RTO: 183.7 (ref 0–30)
MONOCYTES ABSOLUTE: 1.52 E9/L (ref 0.1–0.95)
MONOCYTES RELATIVE PERCENT: 11.2 % (ref 2–12)
NEUTROPHILS ABSOLUTE: 10.41 E9/L (ref 1.8–7.3)
NEUTROPHILS RELATIVE PERCENT: 76.9 % (ref 43–80)
OSMOLALITY URINE: 671 MOSM/KG (ref 300–900)
OVALOCYTES: ABNORMAL
PDW BLD-RTO: 15.8 FL (ref 11.5–15)
PLATELET # BLD: 247 E9/L (ref 130–450)
PMV BLD AUTO: 11.1 FL (ref 7–12)
POIKILOCYTES: ABNORMAL
POLYCHROMASIA: ABNORMAL
POTASSIUM REFLEX MAGNESIUM: 4.5 MMOL/L (ref 3.5–5)
POTASSIUM, UR: 51.9 MMOL/L
PROTEIN PROTEIN: 58 MG/DL (ref 0–12)
PROTEIN/CREAT RATIO: 0.5
PROTEIN/CREAT RATIO: 0.5 (ref 0–0.2)
RBC # BLD: 2.84 E12/L (ref 3.8–5.8)
SCHISTOCYTES: ABNORMAL
SODIUM BLD-SCNC: 142 MMOL/L (ref 132–146)
SODIUM URINE: 71 MMOL/L
TOTAL PROTEIN: 6.7 G/DL (ref 6.4–8.3)
WBC # BLD: 13.5 E9/L (ref 4.5–11.5)

## 2020-10-11 PROCEDURE — 80053 COMPREHEN METABOLIC PANEL: CPT

## 2020-10-11 PROCEDURE — 6360000002 HC RX W HCPCS: Performed by: FAMILY MEDICINE

## 2020-10-11 PROCEDURE — 82570 ASSAY OF URINE CREATININE: CPT

## 2020-10-11 PROCEDURE — 85025 COMPLETE CBC W/AUTO DIFF WBC: CPT

## 2020-10-11 PROCEDURE — 84133 ASSAY OF URINE POTASSIUM: CPT

## 2020-10-11 PROCEDURE — 84156 ASSAY OF PROTEIN URINE: CPT

## 2020-10-11 PROCEDURE — 84300 ASSAY OF URINE SODIUM: CPT

## 2020-10-11 PROCEDURE — 2500000003 HC RX 250 WO HCPCS: Performed by: INTERNAL MEDICINE

## 2020-10-11 PROCEDURE — 6370000000 HC RX 637 (ALT 250 FOR IP): Performed by: INTERNAL MEDICINE

## 2020-10-11 PROCEDURE — 2580000003 HC RX 258: Performed by: INTERNAL MEDICINE

## 2020-10-11 PROCEDURE — 82962 GLUCOSE BLOOD TEST: CPT

## 2020-10-11 PROCEDURE — 83935 ASSAY OF URINE OSMOLALITY: CPT

## 2020-10-11 PROCEDURE — 82044 UR ALBUMIN SEMIQUANTITATIVE: CPT

## 2020-10-11 PROCEDURE — 76770 US EXAM ABDO BACK WALL COMP: CPT

## 2020-10-11 PROCEDURE — 1200000000 HC SEMI PRIVATE

## 2020-10-11 PROCEDURE — 36415 COLL VENOUS BLD VENIPUNCTURE: CPT

## 2020-10-11 PROCEDURE — 82436 ASSAY OF URINE CHLORIDE: CPT

## 2020-10-11 RX ORDER — DEXTROSE MONOHYDRATE 50 MG/ML
100 INJECTION, SOLUTION INTRAVENOUS PRN
Status: DISCONTINUED | OUTPATIENT
Start: 2020-10-11 | End: 2020-10-19

## 2020-10-11 RX ORDER — ISOSORBIDE MONONITRATE 30 MG/1
30 TABLET, EXTENDED RELEASE ORAL DAILY
Status: DISCONTINUED | OUTPATIENT
Start: 2020-10-11 | End: 2020-10-19

## 2020-10-11 RX ORDER — DEXTROSE MONOHYDRATE 25 G/50ML
12.5 INJECTION, SOLUTION INTRAVENOUS PRN
Status: DISCONTINUED | OUTPATIENT
Start: 2020-10-11 | End: 2020-10-19

## 2020-10-11 RX ORDER — ASPIRIN 81 MG/1
81 TABLET ORAL DAILY
Status: DISCONTINUED | OUTPATIENT
Start: 2020-10-11 | End: 2020-10-19

## 2020-10-11 RX ORDER — SODIUM CHLORIDE 450 MG/100ML
INJECTION, SOLUTION INTRAVENOUS CONTINUOUS
Status: DISCONTINUED | OUTPATIENT
Start: 2020-10-11 | End: 2020-10-11

## 2020-10-11 RX ORDER — NICOTINE POLACRILEX 4 MG
15 LOZENGE BUCCAL PRN
Status: DISCONTINUED | OUTPATIENT
Start: 2020-10-11 | End: 2020-10-19

## 2020-10-11 RX ORDER — DEXTROSE AND SODIUM CHLORIDE 5; .45 G/100ML; G/100ML
INJECTION, SOLUTION INTRAVENOUS CONTINUOUS
Status: DISCONTINUED | OUTPATIENT
Start: 2020-10-11 | End: 2020-10-11

## 2020-10-11 RX ORDER — LISINOPRIL 20 MG/1
20 TABLET ORAL DAILY
Status: DISCONTINUED | OUTPATIENT
Start: 2020-10-11 | End: 2020-10-13

## 2020-10-11 RX ORDER — PRAVASTATIN SODIUM 20 MG
80 TABLET ORAL NIGHTLY
Status: DISCONTINUED | OUTPATIENT
Start: 2020-10-11 | End: 2020-10-19

## 2020-10-11 RX ORDER — TIMOLOL MALEATE 5 MG/ML
1 SOLUTION/ DROPS OPHTHALMIC 2 TIMES DAILY
COMMUNITY

## 2020-10-11 RX ORDER — FLUTICASONE PROPIONATE 50 MCG
1 SPRAY, SUSPENSION (ML) NASAL DAILY
Status: DISCONTINUED | OUTPATIENT
Start: 2020-10-12 | End: 2020-10-19

## 2020-10-11 RX ADMIN — SODIUM BICARBONATE: 84 INJECTION, SOLUTION INTRAVENOUS at 12:35

## 2020-10-11 RX ADMIN — INSULIN LISPRO 1 UNITS: 100 INJECTION, SOLUTION INTRAVENOUS; SUBCUTANEOUS at 21:20

## 2020-10-11 RX ADMIN — ASPIRIN 81 MG: 81 TABLET, COATED ORAL at 18:03

## 2020-10-11 RX ADMIN — METFORMIN HYDROCHLORIDE 500 MG: 500 TABLET ORAL at 18:03

## 2020-10-11 RX ADMIN — ENOXAPARIN SODIUM 40 MG: 40 INJECTION SUBCUTANEOUS at 09:37

## 2020-10-11 ASSESSMENT — PAIN SCALES - GENERAL
PAINLEVEL_OUTOF10: 0
PAINLEVEL_OUTOF10: 0

## 2020-10-11 NOTE — CONSULTS
[facility]     Osteoarthritis     Pancreatitis     [facility] 5/23/[facility]    Pulmonary emphysema (HCC)     Pulmonary nodules     Type 2 diabetes mellitus without complication, without long-term current use of insulin ([facility] 5/23/2019    Ventral hernia      Past Surgical History:    History reviewed. No pertinent surgical history. Current Medications:    Current Facility-Administered Medications: sodium chloride flush 0.9 % injection 10 mL, 10 mL, Intravenous, 2 times per day  sodium chloride flush 0.9 % injection 10 mL, 10 mL, Intravenous, PRN  acetaminophen (TYLENOL) tablet 650 mg, 650 mg, Oral, Q6H PRN **OR** acetaminophen (TYLENOL) suppository 650 mg, 650 mg, Rectal, Q6H PRN  polyethylene glycol (GLYCOLAX) packet 17 g, 17 g, Oral, Daily PRN  promethazine (PHENERGAN) tablet 12.5 mg, 12.5 mg, Oral, Q6H PRN **OR** ondansetron (ZOFRAN) injection 4 mg, 4 mg, Intravenous, Q6H PRN  enoxaparin (LOVENOX) injection 40 mg, 40 mg, Subcutaneous, Daily  0.9 % sodium chloride infusion, , Intravenous, Continuous  potassium chloride (KLOR-CON M) extended release tablet 40 mEq, 40 mEq, Oral, PRN **OR** potassium bicarb-citric acid (EFFER-K) effervescent tablet 40 mEq, 40 mEq, Oral, PRN **OR** potassium chloride 10 mEq/100 mL IVPB (Peripheral Line), 10 mEq, Intravenous, PRN  magnesium sulfate 2 g in 50 mL IVPB premix, 2 g, Intravenous, PRN  potassium chloride 10 mEq/100 mL IVPB (Peripheral Line), 10 mEq, Intravenous, PRN  morphine sulfate (PF) injection 4 mg, 4 mg, Intravenous, Q4H PRN  melatonin tablet 3 mg, 3 mg, Oral, Nightly PRN  Allergies:  Methotrexate derivatives    Social History:    TOBACCO:   reports that he has never smoked. He has never used smokeless tobacco.  ETOH:   reports previous alcohol use. DRUGS:   reports no history of drug use. Family History:   History reviewed. No pertinent family history. REVIEW OF SYSTEMS:    Pertinent positives as noted in the HPI.  All other systems reviewed and negative. PHYSICAL EXAM:      Vitals:    VITALS:  /67   Pulse 97   Temp 97.3 °F (36.3 °C)   Resp 17   Ht 5' 8\" (1.727 m)   Wt 162 lb (73.5 kg)   SpO2 99%   BMI 24.63 kg/m²   24HR INTAKE/OUTPUT:    Intake/Output Summary (Last 24 hours) at 10/11/2020 1115  Last data filed at 10/11/2020 0647  Gross per 24 hour   Intake 1195 ml   Output --   Net 1195 ml       Constitutional: No apparent distress, appears stated age and cooperative. HEENT: Normal cephalic, atraumatic, PERRL  Respiratory:  CTA bilaterally   Cardiovascular/Edema:   Gastrointestinal:  Soft, nontender, nondistended  Neurologic:    Neurovascularly intact without any focal sensory/motor deficits  Skin:  Normal skin color.   No rashes or lesions  Other:      DATA:    CBC with Differential:    Lab Results   Component Value Date    WBC 13.5 10/11/2020    RBC 2.84 10/11/2020    HGB 9.6 10/11/2020    HCT 30.8 10/11/2020     10/11/2020    .5 10/11/2020    MCH 33.8 10/11/2020    MCHC 31.2 10/11/2020    RDW 15.8 10/11/2020    SEGSPCT 87.6 10/10/2020    BANDSPCT 12.0 08/03/2020    LYMPHOPCT 10.7 10/11/2020    MONOPCT 11.2 10/11/2020    BASOPCT 0.3 10/11/2020    MONOSABS 1.52 10/11/2020    LYMPHSABS 1.45 10/11/2020    EOSABS 0.04 10/11/2020    BASOSABS 0.04 10/11/2020     CMP:    Lab Results   Component Value Date     10/11/2020    K 4.5 10/11/2020     10/11/2020    CO2 17 10/11/2020    BUN 40 10/11/2020    CREATININE 1.5 10/11/2020    GFRAA 55 10/11/2020    AGRATIO 0.9 10/10/2020    LABGLOM 45 10/11/2020    GLUCOSE 105 10/11/2020    PROT 6.7 10/11/2020    LABALBU 3.1 10/11/2020    CALCIUM 8.2 10/11/2020    BILITOT 1.0 10/11/2020    ALKPHOS 76 10/11/2020     10/11/2020     10/11/2020     Magnesium:    Lab Results   Component Value Date    MG 2.5 10/10/2020     Phosphorus:  No results found for: PHOS    Radiology Review:      CTA Pulmonary 10/10/20    IMPRESSION:               CT Angiogram Chest including abdominal CTs and MRIs. To this point nothing has been found to explain his discomfort. Patient had been admitted to [facility] where another work-up was obtained. Initially things were looking pretty unremarkable until yesterday. LFTs were initially normal and increased into the 500-600 range. Also noted to have magnesium 1.0. Creatinine increased from 1.4 to 1.9, reason for this consultation. WBC evaded at 18,000. patient was transferred to [facility] on 10/10/20 for further work-up. 1. MARIAH on CKD stage II: with proteinuria. Likely 2/2 profound hypotension vs pre-renal azotemia with continued use of Lisinopril and Isosorbide  2. Hypotension: at [facility] BP: 79/50. 3. Transaminitis- GI consulted  4. Hypomagnesemia: resolved after supplementation  5. Renal Stenosis: > 50% stenosis in bilateral renal arteries. 6. Type 2 diabetes: Hgb A1C 6.7   7. Diet: NPO      Plan    · Change IVF to 0.45%Ns with 50 meq sodium bicarb at 100ml/hr. · Obtain Renal US and nuclear renal perfusion scan  · Obtain Urine electrolytes, osmolality, protein/creat ratio. · Hold Antihypertensive medications at this time   · Avoid Hypotension  · Monitor Renal Function  · Stop tylenol in view of elevated LFTs    Discussed with family and RN    Thank you very much Dr. Destinee Bailey DO for allowing us to participate in the care of Mr. Farhan Montero.      Agree as annotated   Fariha Mac MD

## 2020-10-11 NOTE — PROGRESS NOTES
Internal Medicine Progress Note      Synopsis: Patient admitted on 10/10/2020   Admitted for abdominal pain on the right side and now radiating up to his right chest wall  Also significant abnormal LFTs necessitating inpatient stay  Subjective    He is n.p.o. complains of being hungry  He is able to ambulate he still has an IV in place and nasal oxygen in place  Exam:  /67   Pulse 97   Temp 97.3 °F (36.3 °C)   Resp 17   Ht 5' 8\" (1.727 m)   Wt 162 lb (73.5 kg)   SpO2 98%   BMI 24.63 kg/m²   General appearance: No apparent distress, appears stated age and cooperative. HEENT: Pupils equal, round, and reactive to light. Conjunctivae/corneas clear. Neck: Supple. No jugular venous distention. Trachea midline. Respiratory:  Normal respiratory effort. Clear to auscultation, bilaterally without Rales/Wheezes/Rhonchi. Cardiovascular: Decreased more on right side than on left   abdomen: Soft, non-tender, non-distended with normal bowel sounds. Musculoskeletal: No clubbing, cyanosis or edema bilaterally. Brisk capillary refill. 2+ lower extremity pulses (dorsalis pedis).    Skin:  No rashes    Neurologic: awake, alert and following commands     Medications:  Reviewed    Infusion Medications    dextrose 5 % and 0.45 % NaCl       Scheduled Medications    sodium chloride flush  10 mL Intravenous 2 times per day    enoxaparin  40 mg Subcutaneous Daily     PRN Meds: sodium chloride flush, polyethylene glycol, promethazine **OR** ondansetron, potassium chloride **OR** potassium alternative oral replacement **OR** potassium chloride, magnesium sulfate, potassium chloride, morphine, melatonin    I/O    Intake/Output Summary (Last 24 hours) at 10/11/2020 1202  Last data filed at 10/11/2020 0647  Gross per 24 hour   Intake 1195 ml   Output --   Net 1195 ml       Labs:   Recent Labs     10/10/20  0606 10/10/20  1827 10/11/20  0607   WBC 18.8* 16.5* 13.5*   HGB 9.2* 8.4* 9.6*   HCT 28.6* 26.6* 30.8*    231 247       Recent Labs     10/10/20  0606 10/10/20  1827 10/11/20  0607    138 142   K 4.4 4.3 4.5    105 109*   CO2 20* 20* 17*   BUN 41* 37* 40*   CREATININE 1.4* 1.5* 1.5*   CALCIUM 8.0* 8.0* 8.2*       Recent Labs     10/09/20  0125 10/10/20  0606 10/10/20  0607 10/10/20  1827 10/11/20  0607   PROT 8.2* 6.8  --  6.2* 6.7   ALKPHOS 75 69  --  64 76   ALT 10 598*  --  483* 548*   AST 14 657*  --  354* 305*   BILITOT 0.9 1.0  --  0.8 1.0   AMYLASE  --   --  38  --   --    LIPASE 39  --  18*  --   --        No results for input(s): INR in the last 72 hours.     Recent Labs     10/09/20  0125   TROPONINI < 0.03       Chronic labs:  Lab Results   Component Value Date    CHOL 144 2020    TRIG 125 2020    HDL 44 2020    LDLCALC 93 2019    TSH 1.26 2020    LABA1C 6.7 (H) 2020       Radiology:  Ct Abdomen Pelvis Wo Contrast    Result Date: 10/9/2020                                      [address]                                             [facility], [address] [place], [place] [address]y                                              [phone]                                               CT Scan Report                                                  Signed    Patient: Lauren Lezama                                                                MR#  : [id number]          : 1945                                                                [de-identified]            Age/Sex: 66 / Jonelle Adjutant Date: 10/09/20            Loc: 2T             2029                                                                  Attending Dr: Cristina Mares DO    Ordering Physician: Estefany Flores MD   Date of Service: 10/09/20  Procedure(s): CT abd pelvis stone protocol  Accession Number(s): [id number]    cc:      STAT REPORT   Preliminary report was sent to the Emergency Department at [address], 10/09/20 by [address]. REPORT by Violeta Leavitt MD (10/09/20  5429)       HISTORY:  Right flank pain. Diffuse abdominal pain. Symptomatology times 6 months. History of   cholecystectomy and prostatectomy. PHYSICIAN INDICATIONS:  Kidney Stone       TECHNIQUE:  Multiple axial images of the abdomen and pelvis were obtained from above the level of   the diaphragm through the level of the symphysis pubis without intravenous contrast.  CT Dose   Index: 6.0 mGy. DLP: 274 mGy-cm. COMPARISON:  05/18/2020 enhanced abdominal/pelvic CT. FINDINGS: Pleural parenchymal scarring is again identified in right lower lobe, slightly more   pronounced than on prior examination. Subtle interstitial opacification newly identified   bilaterally in lung bases may relate to subtle tracking of interstitial fluid. No   pneumoperitoneum. Small sliding hiatal hernia is identified. Stomach is mildly distended with   liquid food residue. Duodenal diverticulum is noted. No overt gaseous distension of small bowel. Appendix is within normal limits. Moderate colonic diverticulosis is identified (sigmoid   predominance) without acute diverticulitis. Nondistended sigmoid colon extends into left inguinal   hernia defect. Liver and spleen are within normal limits. Gallbladder surgically absent. No peripancreatic   inflammation. Asymmetrical prominence of the left adrenal gland is again noted. Mild adrenal   gland lobulation is noted bilaterally. A 2.5 cm lateral exophytic left renal cyst is again identified. 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 Mild bilateral perinephric stranding is again identified   (without hydronephrosis upon bilateral assessment). Mild urinary bladder wall thickening is   identified. Surgical absence of prostate gland. Punctate sclerotic focus is again identified   laterally at the right tenth rib.   Significant vascular calcifications are identified (including   greater than 50% degree of stenosis bilaterally in renal arteries, duplicated bilaterally). IMPRESSION:   1. Mild bilateral perinephric stranding is again identified (without hydronephrosis upon   bilateral assessment). 2.  Mild urinary bladder wall thickening is identified. Surgical absence of prostate gland. Note:  Please correlate for potential urinary tract infection. 3.   Moderate colonic diverticulosis is identified (sigmoid predominance) without acute   diverticulitis. Nondistended sigmoid colon extends into left inguinal hernia defect. 4. Pleural parenchymal scarring is again identified in right lower lobe, slightly more pronounced   than on prior examination. 5. Significant vascular calcifications are identified (including greater than 50% degree of   stenosis bilaterally in renal arteries, duplicated bilaterally).                Dictated By: Maite Ho MD   DD/DT: 10/09/20 0813               Signed ByLiat Chandler MD 10/09/20 0842             :    Xr Abdomen (kub) (single Ap View)    Result Date: 10/9/2020                                      [address]                                              [address]                                              [phone]                                                XRay Report                                                  Signed    Patient: Rj Rubio                                                                MR#  : [id number]          : 1945                                                                [de-identified]            Age/Sex: 66 / Toña Mckeon Date: 10/09/20            Loc: 2T             - Attending Dr: Lance Locke Physician: Andi Johnson MD   Date of Service: 10/09/20  Procedure(s): XR abdomen 1V  Accession Number(s): [id number]    cc:               PHYSICIAN INDICATIONS:  Pain       TECHNIQUE: AP view of abdomen. FINDINGS:       There is stool in the colon with no obstruction or free air identified. No abnormal calcifications are seen. Surgical clips right upper quadrant and in the pelvis. IMPRESSION:       Abdomen, single view:   1. Unremarkable. Dictated ByJolly Nash MD   DD/DT: 10/09/20 0609               Signed By: Maite Pierce MD 10/09/20 0609            : [facility]    Mri Thoracic Spine W Wo Contrast    Result Date: 2020                                      [address]                                              [address]                                              [phone]                                         Magnetic Resonance Report                                                  Signed    Patient: Jeovanny Brandt                                                                MR#  : [id number]          : 1945                                                                [de-identified]            Age/Sex: 66 / M                                                                Admit Date: 20            Loc: ONC                                                                                    Attending Dr: Mary Cantor MD    Ordering Physician: Oneil Blackwood DO   Date of Service: 20  Procedure(s): MR lumbar spine wo/w con; MR thoracic spine wo/w con  Accession Number(s): [id number]; [id number]    cc:       HISTORY:  Back pain, flank pain, prostate cancer.        TECHNIQUE:  3T Multiplanar multi sequence imaging of the thoracic, and lumbar spine was performed   with T1 and T2-weighted imaging. Post contrast T1-weighted images were also obtained. FINDINGS THORACIC SPINE:   Alignment is relatively anatomic. There is preservation of disc height and signal intensity. Bone   marrow signal is normal. No abnormal signal is seen in the thoracic spinal cord. No enhancing   spinal column lesion seen. Additional findings and levels as follows:       T7-8:   Right disc herniation. Mild right ventrolateral canal stenosis. No cord compression. No   abnormal signal in the cord. T8-9:   Bilateral ventrolateral canal herniation's. Mild central canal stenosis. Mild bilateral   ventrolateral canal stenosis. No abnormal signal in the cord at this level. No cord compression. T9-10: Mild right left paracentral and ventrolateral canal disc bulge without significant stenosis. FINDINGS LUMBAR SPINE:   Alignment is near anatomic. No enhancing spinal column lesions. Disc height and signal intensity   are preserved. Marrow signal intensity is normal.  The conus is identified at the level of L1-2. Additional findings levels are as follows:       L1-2: No significant stenosis. L2-3: No significant stenosis. L3-4: No significant stenosis. L4-5: Minimal broad-based disc bulge. Some facet arthropathy. Some mild bilateral neural   foraminal stenosis. L5-S1: Minimal mild broad-based disc bulge. Some facet arthropathy. Some mild right worse than   left neural foraminal stenosis. IMPRESSION 3T MRI of the Thoracic, and Lumbar Spine without and with intravenous contrast:   1. Multilevel disc disease and degenerative changes as described above. Clinical correlation with   dermatome mapping.                                Dictated By: Kate Camacho DO    DD/DT: 09/24/20 0957               Signed By: Kate Camacho DO 09/24/20 1658              [facility]    Mri Lumbar Spine W Wo Contrast    Result Date: 9/24/2020 [address] [place], [place] [address]                                              [phone]                                         Magnetic Resonance Report                                                  Signed    Patient: Breanne Delgado                                                                MR#  : [id number]          : 1945                                                                [de-identified]            Age/Sex: 66 / M                                                                Admit Date: 20            Loc: ONC                                                                                    Attending Dr: Araceli Hitchcock MD    Ordering Physician: Oneil Blackwood DO   Date of Service: 20  Procedure(s): MR lumbar spine wo/w con; MR thoracic spine wo/w con  Accession Number(s): [id number]; [id number]    cc:       HISTORY:  Back pain, flank pain, prostate cancer. TECHNIQUE:  3T Multiplanar multi sequence imaging of the thoracic, and lumbar spine was performed   with T1 and T2-weighted imaging. Post contrast T1-weighted images were also obtained. FINDINGS THORACIC SPINE:   Alignment is relatively anatomic. There is preservation of disc height and signal intensity. Bone   marrow signal is normal. No abnormal signal is seen in the thoracic spinal cord. No enhancing   spinal column lesion seen. Additional findings and levels as follows:       T7-8:   Right disc herniation. Mild right ventrolateral canal stenosis. No cord compression. No   abnormal signal in the cord. T8-9:   Bilateral ventrolateral canal herniation's. Mild central canal stenosis. Mild bilateral   ventrolateral canal stenosis. No abnormal signal in the cord at this level. No cord compression.        T9-10: Mild right left paracentral and ventrolateral canal disc bulge without significant stenosis. FINDINGS LUMBAR SPINE:   Alignment is near anatomic. No enhancing spinal column lesions. Disc height and signal intensity   are preserved. Marrow signal intensity is normal.  The conus is identified at the level of L1-2. Additional findings levels are as follows:       L1-2: No significant stenosis. L2-3: No significant stenosis. L3-4: No significant stenosis. L4-5: Minimal broad-based disc bulge. Some facet arthropathy. Some mild bilateral neural   foraminal stenosis. L5-S1: Minimal mild broad-based disc bulge. Some facet arthropathy. Some mild right worse than   left neural foraminal stenosis. IMPRESSION 3T MRI of the Thoracic, and Lumbar Spine without and with intravenous contrast:   1. Multilevel disc disease and degenerative changes as described above. Clinical correlation with   dermatome mapping.                                Dictated By: Kostas Nicolas DO    DD/DT: 20 0957               Signed By: Kostas Nicolas DO 20 1658              : [facility] [place]    Cta Pulmonary W Contrast    Result Date: 10/10/2020                                      [address]                                              [phone]                                               CT Scan Report                                                  Signed    Patient: Brittany Rivera                                                                MR#  : [id number]          : 1945                                                                [de-identified]            Age/Sex: 66 / Haroon Kee Date: 10/09/20            Loc: 2T             2029 Attending Dr: Kimmie Suárez Physician: Molly Felix NP   Date of Service: 10/10/20  Procedure(s): CT angio chest PE protocol  Accession Number(s): [id number]    cc:               PHYSICIAN INDICATIONS:  Short Of Breath       TECHNIQUE: High resolution axial CT images of the chest with three-dimensional reconstruction on   separate workstation. Study was performed with intravenous contrast for CT Angiogram. CT Dose   Index: 3.7 - 11.2 mGy. DLP: 245 mGy-cm. Administered 59.0 ml of ISOVUE 300.00 mg/ml. AEC. k=0.014   mSv/(mGy-cm)       FINDINGS:       The pulmonary arteries appear normal in caliber without filling defects to suggest pulmonary   emboli. The aorta is normal in caliber without aneurysm, dissection or rupture. The heart size is mildly   enlarged . Small bilateral pleural effusions with dependent atelectasis. No area of consolidation. Emphysematous changes with upper lobe predominance. No lung nodules are identified. The mediastinum is unremarkable with no significant adenopathy   identified. The hilar regions are unremarkable. In the upper abdomen, visualized portions of the liver, adrenal glands, kidneys and spleen are   unremarkable. IMPRESSION:       CT Angiogram Chest and Pulmonary Arteries with contrast and high resolution 2-D and 3-D images:       1. No evidence of pulmonary emboli by CT technique. 2. Small bilateral pleural effusions with dependent atelectasis. Dictated BySarah Mary MD   DD/DT: 10/10/20 1022               Signed By: Mindy Newberry MD 10/10/20 1022            : [facility]    ASSESSMENT:    Active Problems:    Transaminitis  Resolved Problems:    * No resolved hospital problems. *  Acute kidney insufficiency  Elevated WBCs  Previous history of pancreatitis and cholecystectomy  Hypomagnesemia     PLAN:  1.  WBC is better just with hydration  2.   No fever noticed and no obvious jaundice  3. Likely with effusions due to LFTs and decline in his albumin  4. Nasal O2 to support his oxygenation needs so that is not a lot of O2 he needs yet  5. Amylase lipase not indicated for pancreatitis so we can probably start him on a clear liquid diet tolerated and advance once GI services cleared him further      Diet: Diet NPO Effective Now  Code Status: Full Code  PT/OT Eval Status:   Independent for ambulation  DVT Prophylaxis:   Lovenox  Recommended disposition at discharge:   Likely home    +++++++++++++++++++++++++++++++++++++++++++++++++  Mercy Valerio MD   [facility].  +++++++++++++++++++++++++++++++++++++++++++++++++  NOTE: This report was transcribed using voice recognition software. Every effort was made to ensure accuracy; however, inadvertent computerized transcription errors may be present.

## 2020-10-11 NOTE — PROGRESS NOTES
Dr. Ricardo Acevedo said okay for patient to stay here for now if anything changes can transfer if neccessary

## 2020-10-12 ENCOUNTER — APPOINTMENT (OUTPATIENT)
Dept: NUCLEAR MEDICINE | Age: 75
DRG: 233 | End: 2020-10-12
Attending: FAMILY MEDICINE
Payer: MEDICARE

## 2020-10-12 LAB
ALBUMIN SERPL-MCNC: 2.8 G/DL (ref 3.5–5.2)
ALP BLD-CCNC: 74 U/L (ref 40–129)
ALT SERPL-CCNC: 386 U/L (ref 0–40)
ANION GAP SERPL CALCULATED.3IONS-SCNC: 13 MMOL/L (ref 7–16)
ANISOCYTOSIS: ABNORMAL
AST SERPL-CCNC: 131 U/L (ref 0–39)
BASOPHILS ABSOLUTE: 0 E9/L (ref 0–0.2)
BASOPHILS RELATIVE PERCENT: 0.3 % (ref 0–2)
BILIRUB SERPL-MCNC: 0.9 MG/DL (ref 0–1.2)
BUN BLDV-MCNC: 34 MG/DL (ref 8–23)
CALCIUM SERPL-MCNC: 8 MG/DL (ref 8.6–10.2)
CHLORIDE BLD-SCNC: 105 MMOL/L (ref 98–107)
CO2: 20 MMOL/L (ref 22–29)
CREAT SERPL-MCNC: 1.3 MG/DL (ref 0.7–1.2)
EOSINOPHILS ABSOLUTE: 0.1 E9/L (ref 0.05–0.5)
EOSINOPHILS RELATIVE PERCENT: 0.9 % (ref 0–6)
GFR AFRICAN AMERICAN: >60
GFR NON-AFRICAN AMERICAN: 53 ML/MIN/1.73
GLUCOSE BLD-MCNC: 161 MG/DL (ref 74–99)
HBA1C MFR BLD: 6.2 % (ref 4–5.6)
HCT VFR BLD CALC: 30.4 % (ref 37–54)
HEMOGLOBIN: 9.4 G/DL (ref 12.5–16.5)
LYMPHOCYTES ABSOLUTE: 1.32 E9/L (ref 1.5–4)
LYMPHOCYTES RELATIVE PERCENT: 12.2 % (ref 20–42)
MCH RBC QN AUTO: 34.1 PG (ref 26–35)
MCHC RBC AUTO-ENTMCNC: 30.9 % (ref 32–34.5)
MCV RBC AUTO: 110.1 FL (ref 80–99.9)
METER GLUCOSE: 106 MG/DL (ref 74–99)
METER GLUCOSE: 121 MG/DL (ref 74–99)
METER GLUCOSE: 129 MG/DL (ref 74–99)
METER GLUCOSE: 153 MG/DL (ref 74–99)
MONOCYTES ABSOLUTE: 0.88 E9/L (ref 0.1–0.95)
MONOCYTES RELATIVE PERCENT: 7.8 % (ref 2–12)
MYELOCYTE PERCENT: 0.9 % (ref 0–0)
NEUTROPHILS ABSOLUTE: 8.69 E9/L (ref 1.8–7.3)
NEUTROPHILS RELATIVE PERCENT: 78.3 % (ref 43–80)
OVALOCYTES: ABNORMAL
PDW BLD-RTO: 15.8 FL (ref 11.5–15)
PLATELET # BLD: 234 E9/L (ref 130–450)
PMV BLD AUTO: 11.5 FL (ref 7–12)
POIKILOCYTES: ABNORMAL
POLYCHROMASIA: ABNORMAL
POTASSIUM REFLEX MAGNESIUM: 3.7 MMOL/L (ref 3.5–5)
PRO-BNP: ABNORMAL PG/ML (ref 0–450)
RBC # BLD: 2.76 E12/L (ref 3.8–5.8)
SODIUM BLD-SCNC: 138 MMOL/L (ref 132–146)
TOTAL PROTEIN: 6.1 G/DL (ref 6.4–8.3)
WBC # BLD: 11 E9/L (ref 4.5–11.5)

## 2020-10-12 PROCEDURE — 85025 COMPLETE CBC W/AUTO DIFF WBC: CPT

## 2020-10-12 PROCEDURE — 6370000000 HC RX 637 (ALT 250 FOR IP): Performed by: INTERNAL MEDICINE

## 2020-10-12 PROCEDURE — 6360000002 HC RX W HCPCS: Performed by: FAMILY MEDICINE

## 2020-10-12 PROCEDURE — 83880 ASSAY OF NATRIURETIC PEPTIDE: CPT

## 2020-10-12 PROCEDURE — 78707 K FLOW/FUNCT IMAGE W/O DRUG: CPT

## 2020-10-12 PROCEDURE — 87040 BLOOD CULTURE FOR BACTERIA: CPT

## 2020-10-12 PROCEDURE — 2500000003 HC RX 250 WO HCPCS: Performed by: INTERNAL MEDICINE

## 2020-10-12 PROCEDURE — 3430000000 HC RX DIAGNOSTIC RADIOPHARMACEUTICAL: Performed by: RADIOLOGY

## 2020-10-12 PROCEDURE — 1200000000 HC SEMI PRIVATE

## 2020-10-12 PROCEDURE — 2580000003 HC RX 258: Performed by: FAMILY MEDICINE

## 2020-10-12 PROCEDURE — 82962 GLUCOSE BLOOD TEST: CPT

## 2020-10-12 PROCEDURE — 80053 COMPREHEN METABOLIC PANEL: CPT

## 2020-10-12 PROCEDURE — 2580000003 HC RX 258: Performed by: INTERNAL MEDICINE

## 2020-10-12 PROCEDURE — 6370000000 HC RX 637 (ALT 250 FOR IP): Performed by: HOSPITALIST

## 2020-10-12 PROCEDURE — 78707 K FLOW/FUNCT IMAGE W/O DRUG: CPT | Performed by: RADIOLOGY

## 2020-10-12 PROCEDURE — A9562 TC99M MERTIATIDE: HCPCS | Performed by: RADIOLOGY

## 2020-10-12 PROCEDURE — 36415 COLL VENOUS BLD VENIPUNCTURE: CPT

## 2020-10-12 PROCEDURE — 83036 HEMOGLOBIN GLYCOSYLATED A1C: CPT

## 2020-10-12 RX ORDER — LATANOPROST 50 UG/ML
1 SOLUTION/ DROPS OPHTHALMIC NIGHTLY
Status: DISCONTINUED | OUTPATIENT
Start: 2020-10-12 | End: 2020-10-28 | Stop reason: HOSPADM

## 2020-10-12 RX ORDER — TIMOLOL MALEATE 5 MG/ML
1 SOLUTION/ DROPS OPHTHALMIC 2 TIMES DAILY
Status: DISCONTINUED | OUTPATIENT
Start: 2020-10-12 | End: 2020-10-19

## 2020-10-12 RX ADMIN — SODIUM BICARBONATE: 84 INJECTION, SOLUTION INTRAVENOUS at 15:31

## 2020-10-12 RX ADMIN — SODIUM BICARBONATE: 84 INJECTION, SOLUTION INTRAVENOUS at 00:02

## 2020-10-12 RX ADMIN — FLUTICASONE PROPIONATE 1 SPRAY: 50 SPRAY, METERED NASAL at 08:20

## 2020-10-12 RX ADMIN — LATANOPROST 1 DROP: 50 SOLUTION OPHTHALMIC at 22:30

## 2020-10-12 RX ADMIN — INSULIN LISPRO 1 UNITS: 100 INJECTION, SOLUTION INTRAVENOUS; SUBCUTANEOUS at 16:43

## 2020-10-12 RX ADMIN — METFORMIN HYDROCHLORIDE 500 MG: 500 TABLET ORAL at 08:20

## 2020-10-12 RX ADMIN — TIMOLOL MALEATE 1 DROP: 5 SOLUTION OPHTHALMIC at 13:57

## 2020-10-12 RX ADMIN — ENOXAPARIN SODIUM 40 MG: 40 INJECTION SUBCUTANEOUS at 08:20

## 2020-10-12 RX ADMIN — METFORMIN HYDROCHLORIDE 500 MG: 500 TABLET ORAL at 16:44

## 2020-10-12 RX ADMIN — ASPIRIN 81 MG: 81 TABLET, COATED ORAL at 08:20

## 2020-10-12 RX ADMIN — Medication 10 MILLICURIE: at 11:10

## 2020-10-12 RX ADMIN — Medication 10 ML: at 22:34

## 2020-10-12 ASSESSMENT — PAIN SCALES - GENERAL
PAINLEVEL_OUTOF10: 0

## 2020-10-12 NOTE — CARE COORDINATION
Social Work Discharge/ Planning:    SW attempted to meet with pt. Pt is currently off the floor and located at [facility] for testing. SW/CM to follow for general assessment and final discharge plan.        GAYATHRI Cano  [phone]

## 2020-10-12 NOTE — PROGRESS NOTES
Department of Internal Medicine  Nephrology Attending Progress Note    Events reviewed. SUBJECTIVE:  We are following Mr. [address] for MARIAH on CKD. Reports no major complaints. Physical Exam:    VITALS:  BP (!) 113/59   Pulse 95   Temp 97.6 °F (36.4 °C) (Temporal)   Resp 18   Ht 5' 8\" (1.727 m)   Wt 162 lb (73.5 kg)   SpO2 100%   BMI 24.63 kg/m²   24HR INTAKE/OUTPUT:    Intake/Output Summary (Last 24 hours) at 10/12/2020 1745  Last data filed at 10/12/2020 1400  Gross per 24 hour   Intake 480 ml   Output 200 ml   Net 280 ml       Constitutional: No apparent distress, appears stated age and cooperative.   HEENT: Normal cephalic, atraumatic, PERRL  Respiratory:  CTA bilaterally   Cardiovascular/Edema:   Gastrointestinal:  Soft, nontender, nondistended  Neurologic:    Neurovascularly intact without any focal sensory/motor deficits  Skin:  Normal skin color.  No rashes or lesions  Other:      Scheduled Meds:   timolol  1 drop Left Eye BID    latanoprost  1 drop Both Eyes Nightly    aspirin  81 mg Oral Daily    isosorbide mononitrate  30 mg Oral Daily    metFORMIN  500 mg Oral BID WC    lisinopril  20 mg Oral Daily    insulin lispro  0-6 Units Subcutaneous TID WC    insulin lispro  0-3 Units Subcutaneous Nightly    fluticasone  1 spray Each Nostril Daily    [Held by provider] pravastatin  80 mg Oral Nightly    sodium chloride flush  10 mL Intravenous 2 times per day    enoxaparin  40 mg Subcutaneous Daily     Continuous Infusions:   IV infusion builder 100 mL/hr at 10/12/20 1531    dextrose       PRN Meds:.glucose, dextrose, glucagon (rDNA), dextrose, sodium chloride flush, polyethylene glycol, promethazine **OR** ondansetron, potassium chloride **OR** potassium alternative oral replacement **OR** potassium chloride, magnesium sulfate, potassium chloride, morphine, melatonin    DATA:    CBC:   Lab Results   Component Value Date    WBC 11.0 10/12/2020    RBC 2.76 10/12/2020    HGB 9.4 evidence of pulmonary emboli by CT technique.               2. Small bilateral pleural effusions with dependent atelectasis.            Dictated ByTonia Ward MD   DD/DT: 10/10/20 1022                  Signed Marily Ward MD 10/10/20 1022                    : [facility]      Narrative                IMPRESSION:       1.   Mild bilateral perinephric stranding is again identified (without hydronephrosis upon       bilateral assessment).               2.  Mild urinary bladder wall thickening is identified.  Surgical absence of prostate gland.               3.   Moderate colonic diverticulosis is identified (sigmoid predominance) without acute       diverticulitis.  Nondistended sigmoid colon extends into left inguinal hernia defect.               4. Pleural parenchymal scarring is again identified in right lower lobe, slightly more pronounced       than on prior examination.               5. Significant vascular calcifications are identified (including greater than 50% degree of       stenosis bilaterally in renal arteries, duplicated bilaterally).            Dictated By: Elva Holman MD   DD/DT: 10/09/20 0813                  Signed Sylvie Omer MD 10/09/20 0847                    :        BRIEF SUMMARY OF INITIAL CONSULT:    Briefly Prashanth Larry 89, E 67 y. o. year old male with history of CKD stage III, with mild proteinuria, baseline creatinine 1.3 to 1.5 mg/dL, HTN, type II DM, hyperlipidemia, gout, prostate cancer status post resection, emphysema, seronegative inflammatory arthritis possibly psoriatic arthritis on methotrexate, who was admitted on October 10, 2020 transferred from [facility] where he initially was admitted with abdominal pain. Probably he was hypotensive with a blood pressures loss 79/50 and he was fluid resuscitated, he was transferred for a higher level of care.   His creatinine level on admission was 1.9 mg/dL reason for this consultation. His medications prior to admission included lisinopril and chlorthalidone. IMPRESSION/RECOMMENDATIONS:      1. MARIAH on CKD, volume responsive prerenal MARIAH, due to poor intake?, thiazide diuretic, in the setting of ACE inhibition. Renal function improved with IV fluids administration. 2. CKD stage III, with mild proteinuria, secondary to early diabetic nephropathy, baseline creatinine 1.3 to 1.5 mg/dl  3. HTN, on lisinopril  4. HFpEF 60%  5. Bilateral renal stenosis, by CT abdomen, we will discontinue lisinopril  6. Elevated LFTs, likely shock liver  7. History of prostate cancer status post surgery  8.  Macrocytic anemia,, history of B12 deficiency    Plan:    · Discontinue IV fluids  · Discontinue lisinopril  · Continue to monitor renal function  · Avoid hypotension

## 2020-10-12 NOTE — PROGRESS NOTES
PRN    Or  potassium alternative oral replacement, 40 mEq, PRN    Or  potassium chloride, 10 mEq, PRN  magnesium sulfate, 2 g, PRN  potassium chloride, 10 mEq, PRN  morphine, 4 mg, Q4H PRN  melatonin, 3 mg, Nightly PRN        Data/Labs:     Recent Labs     10/10/20  1827 10/11/20  0607 10/12/20  0723   WBC 16.5* 13.5* 11.0   HGB 8.4* 9.6* 9.4*   HCT 26.6* 30.8* 30.4*    247 234      Recent Labs     10/10/20  1827 10/11/20  0607 10/12/20  0723    142 138   K 4.3 4.5 3.7    109* 105   CO2 20* 17* 20*   BUN 37* 40* 34*   CREATININE 1.5* 1.5* 1.3*     Recent Labs     10/10/20  1827 10/11/20  0607 10/12/20  0723   * 305* 131*   * 548* 386*   BILITOT 0.8 1.0 0.9   ALKPHOS 64 76 74     No results for input(s): INR in the last 72 hours. No results for input(s): CKTOTAL, CKMB, CKMBINDEX, TROPONINT in the last 72 hours. I/O last 3 completed shifts: In: 240 [P.O.:240]  Out: 200 [Urine:200]    Intake/Output Summary (Last 24 hours) at 10/12/2020 1517  Last data filed at 10/12/2020 1400  Gross per 24 hour   Intake 240 ml   Output 200 ml   Net 40 ml        Assessment/Plan:   1. Intractable abdominal pain with elevated liver enzymes and transaminases-patient was at [facility] and had a work-up and found to have elevated transaminases and acute kidney injury was sent here to [facility] for further work-up by GI nephrology-patient is n.p.o. at this time  2. Acute kidney injury-nephrology has been consulted for evaluation and treatment, maintain hydration monitor lab studies  3. Non-insulin-dependent diabetes mellitus- sliding scale, monitor blood glucose and diabetic diet when eating  4. Hypertension- isosorbide, lisinopril, monitor blood pressure      DVT Prophylaxis: Lovenox  Diet: DIET CLEAR LIQUID;  Code Status: Full Code    Dispo:  When stable    Electronically signed by Cornelia Alford MD on 10/12/2020 at 3:17 PM  [facility]

## 2020-10-12 NOTE — PLAN OF CARE
Problem: Pain:  Goal: Pain level will decrease  Description: Pain level will decrease  10/12/2020 0929 by Alfonso Contreras RN  Outcome: Ongoing     Problem: Pain:  Goal: Control of acute pain  Description: Control of acute pain  10/12/2020 0929 by Alfonso Contreras RN  Outcome: Ongoing     Problem: Pain:  Goal: Control of chronic pain  Description: Control of chronic pain  10/12/2020 0929 by Alfonso Contreras RN  Outcome: Ongoing     Problem: Falls - Risk of:  Goal: Will remain free from falls  Description: Will remain free from falls  10/12/2020 0929 by Alfonso Contreras RN  Outcome: Ongoing     Problem: Falls - Risk of:  Goal: Absence of physical injury  Description: Absence of physical injury  10/12/2020 0929 by Alfonso Contreras RN  Outcome: Ongoing     Problem: Skin Integrity:  Goal: Will show no infection signs and symptoms  Description: Will show no infection signs and symptoms  10/12/2020 0929 by Alfonso Contreras RN  Outcome: Ongoing     Problem: Skin Integrity:  Goal: Absence of new skin breakdown  Description: Absence of new skin breakdown  10/12/2020 0929 by Alfonso Contreras RN  Outcome: Ongoing

## 2020-10-13 ENCOUNTER — APPOINTMENT (OUTPATIENT)
Dept: GENERAL RADIOLOGY | Age: 75
DRG: 233 | End: 2020-10-13
Attending: FAMILY MEDICINE
Payer: MEDICARE

## 2020-10-13 ENCOUNTER — APPOINTMENT (OUTPATIENT)
Dept: CT IMAGING | Age: 75
DRG: 233 | End: 2020-10-13
Attending: FAMILY MEDICINE
Payer: MEDICARE

## 2020-10-13 LAB
ALBUMIN SERPL-MCNC: 2.5 G/DL (ref 3.5–5.2)
ALP BLD-CCNC: 76 U/L (ref 40–129)
ALT SERPL-CCNC: 301 U/L (ref 0–40)
ANION GAP SERPL CALCULATED.3IONS-SCNC: 11 MMOL/L (ref 7–16)
AST SERPL-CCNC: 82 U/L (ref 0–39)
BASOPHILS ABSOLUTE: 0.03 E9/L (ref 0–0.2)
BASOPHILS RELATIVE PERCENT: 0.3 % (ref 0–2)
BILIRUB SERPL-MCNC: 1 MG/DL (ref 0–1.2)
BUN BLDV-MCNC: 25 MG/DL (ref 8–23)
C-REACTIVE PROTEIN: 6.9 MG/DL (ref 0–0.4)
CALCIUM SERPL-MCNC: 7.9 MG/DL (ref 8.6–10.2)
CHLORIDE BLD-SCNC: 104 MMOL/L (ref 98–107)
CK MB: 4.2 NG/ML (ref 0–7.7)
CO2: 23 MMOL/L (ref 22–29)
CREAT SERPL-MCNC: 1.2 MG/DL (ref 0.7–1.2)
EKG ATRIAL RATE: 174 BPM
EKG ATRIAL RATE: 95 BPM
EKG P AXIS: 22 DEGREES
EKG P-R INTERVAL: 94 MS
EKG Q-T INTERVAL: 248 MS
EKG Q-T INTERVAL: 364 MS
EKG QRS DURATION: 94 MS
EKG QRS DURATION: 96 MS
EKG QTC CALCULATION (BAZETT): 399 MS
EKG QTC CALCULATION (BAZETT): 457 MS
EKG R AXIS: 24 DEGREES
EKG R AXIS: 32 DEGREES
EKG T AXIS: -106 DEGREES
EKG T AXIS: -145 DEGREES
EKG VENTRICULAR RATE: 156 BPM
EKG VENTRICULAR RATE: 95 BPM
EOSINOPHILS ABSOLUTE: 0.24 E9/L (ref 0.05–0.5)
EOSINOPHILS RELATIVE PERCENT: 2.4 % (ref 0–6)
FOLATE: >20 NG/ML (ref 4.8–24.2)
GFR AFRICAN AMERICAN: >60
GFR NON-AFRICAN AMERICAN: 59 ML/MIN/1.73
GLUCOSE BLD-MCNC: 107 MG/DL (ref 74–99)
HCT VFR BLD CALC: 29.9 % (ref 37–54)
HEMOGLOBIN: 9.8 G/DL (ref 12.5–16.5)
IMMATURE GRANULOCYTES #: 0.09 E9/L
IMMATURE GRANULOCYTES %: 0.9 % (ref 0–5)
LACTIC ACID: 1.9 MMOL/L (ref 0.5–2.2)
LACTIC ACID: 4.4 MMOL/L (ref 0.5–2.2)
LYMPHOCYTES ABSOLUTE: 1.36 E9/L (ref 1.5–4)
LYMPHOCYTES RELATIVE PERCENT: 13.7 % (ref 20–42)
MAGNESIUM: 1.8 MG/DL (ref 1.6–2.6)
MCH RBC QN AUTO: 34.9 PG (ref 26–35)
MCHC RBC AUTO-ENTMCNC: 32.8 % (ref 32–34.5)
MCV RBC AUTO: 106.4 FL (ref 80–99.9)
METER GLUCOSE: 107 MG/DL (ref 74–99)
METER GLUCOSE: 147 MG/DL (ref 74–99)
MONOCYTES ABSOLUTE: 1.19 E9/L (ref 0.1–0.95)
MONOCYTES RELATIVE PERCENT: 11.9 % (ref 2–12)
NEUTROPHILS ABSOLUTE: 7.05 E9/L (ref 1.8–7.3)
NEUTROPHILS RELATIVE PERCENT: 70.8 % (ref 43–80)
PDW BLD-RTO: 15.5 FL (ref 11.5–15)
PLATELET # BLD: 282 E9/L (ref 130–450)
PMV BLD AUTO: 10.9 FL (ref 7–12)
POTASSIUM REFLEX MAGNESIUM: 3.5 MMOL/L (ref 3.5–5)
RBC # BLD: 2.81 E12/L (ref 3.8–5.8)
SEDIMENTATION RATE, ERYTHROCYTE: 104 MM/HR (ref 0–15)
SODIUM BLD-SCNC: 138 MMOL/L (ref 132–146)
TOTAL CK: 67 U/L (ref 20–200)
TOTAL PROTEIN: 5.9 G/DL (ref 6.4–8.3)
TROPONIN: 1.68 NG/ML (ref 0–0.03)
TROPONIN: 2.23 NG/ML (ref 0–0.03)
VITAMIN B-12: 1003 PG/ML (ref 211–946)
WBC # BLD: 10 E9/L (ref 4.5–11.5)

## 2020-10-13 PROCEDURE — 71045 X-RAY EXAM CHEST 1 VIEW: CPT

## 2020-10-13 PROCEDURE — 6360000002 HC RX W HCPCS: Performed by: INTERNAL MEDICINE

## 2020-10-13 PROCEDURE — 2580000003 HC RX 258: Performed by: INTERNAL MEDICINE

## 2020-10-13 PROCEDURE — P9047 ALBUMIN (HUMAN), 25%, 50ML: HCPCS | Performed by: INTERNAL MEDICINE

## 2020-10-13 PROCEDURE — 6370000000 HC RX 637 (ALT 250 FOR IP): Performed by: INTERNAL MEDICINE

## 2020-10-13 PROCEDURE — 83735 ASSAY OF MAGNESIUM: CPT

## 2020-10-13 PROCEDURE — 6360000002 HC RX W HCPCS: Performed by: FAMILY MEDICINE

## 2020-10-13 PROCEDURE — 71250 CT THORAX DX C-: CPT

## 2020-10-13 PROCEDURE — 83605 ASSAY OF LACTIC ACID: CPT

## 2020-10-13 PROCEDURE — 82746 ASSAY OF FOLIC ACID SERUM: CPT

## 2020-10-13 PROCEDURE — 84484 ASSAY OF TROPONIN QUANT: CPT

## 2020-10-13 PROCEDURE — 82962 GLUCOSE BLOOD TEST: CPT

## 2020-10-13 PROCEDURE — 2580000003 HC RX 258: Performed by: FAMILY MEDICINE

## 2020-10-13 PROCEDURE — 87040 BLOOD CULTURE FOR BACTERIA: CPT

## 2020-10-13 PROCEDURE — 93010 ELECTROCARDIOGRAM REPORT: CPT | Performed by: INTERNAL MEDICINE

## 2020-10-13 PROCEDURE — 82607 VITAMIN B-12: CPT

## 2020-10-13 PROCEDURE — 93005 ELECTROCARDIOGRAM TRACING: CPT | Performed by: INTERNAL MEDICINE

## 2020-10-13 PROCEDURE — 82550 ASSAY OF CK (CPK): CPT

## 2020-10-13 PROCEDURE — 80053 COMPREHEN METABOLIC PANEL: CPT

## 2020-10-13 PROCEDURE — 84165 PROTEIN E-PHORESIS SERUM: CPT

## 2020-10-13 PROCEDURE — 82553 CREATINE MB FRACTION: CPT

## 2020-10-13 PROCEDURE — 86140 C-REACTIVE PROTEIN: CPT

## 2020-10-13 PROCEDURE — 85651 RBC SED RATE NONAUTOMATED: CPT

## 2020-10-13 PROCEDURE — 36415 COLL VENOUS BLD VENIPUNCTURE: CPT

## 2020-10-13 PROCEDURE — 85025 COMPLETE CBC W/AUTO DIFF WBC: CPT

## 2020-10-13 PROCEDURE — 2140000000 HC CCU INTERMEDIATE R&B

## 2020-10-13 PROCEDURE — 2500000003 HC RX 250 WO HCPCS: Performed by: INTERNAL MEDICINE

## 2020-10-13 RX ORDER — 0.9 % SODIUM CHLORIDE 0.9 %
500 INTRAVENOUS SOLUTION INTRAVENOUS ONCE
Status: COMPLETED | OUTPATIENT
Start: 2020-10-13 | End: 2020-10-13

## 2020-10-13 RX ORDER — ALBUMIN (HUMAN) 12.5 G/50ML
25 SOLUTION INTRAVENOUS EVERY 8 HOURS
Status: COMPLETED | OUTPATIENT
Start: 2020-10-13 | End: 2020-10-15

## 2020-10-13 RX ORDER — FENTANYL CITRATE 50 UG/ML
25 INJECTION, SOLUTION INTRAMUSCULAR; INTRAVENOUS
Status: DISCONTINUED | OUTPATIENT
Start: 2020-10-13 | End: 2020-10-19

## 2020-10-13 RX ORDER — DILTIAZEM HYDROCHLORIDE 5 MG/ML
10 INJECTION INTRAVENOUS ONCE
Status: COMPLETED | OUTPATIENT
Start: 2020-10-13 | End: 2020-10-13

## 2020-10-13 RX ORDER — IPRATROPIUM BROMIDE AND ALBUTEROL SULFATE 2.5; .5 MG/3ML; MG/3ML
1 SOLUTION RESPIRATORY (INHALATION) EVERY 4 HOURS PRN
Status: DISCONTINUED | OUTPATIENT
Start: 2020-10-13 | End: 2020-10-19

## 2020-10-13 RX ADMIN — ENOXAPARIN SODIUM 40 MG: 40 INJECTION SUBCUTANEOUS at 09:02

## 2020-10-13 RX ADMIN — METFORMIN HYDROCHLORIDE 500 MG: 500 TABLET ORAL at 09:02

## 2020-10-13 RX ADMIN — Medication 10 ML: at 20:12

## 2020-10-13 RX ADMIN — ALBUMIN (HUMAN) 25 G: 0.25 INJECTION, SOLUTION INTRAVENOUS at 19:51

## 2020-10-13 RX ADMIN — DILTIAZEM HYDROCHLORIDE 10 MG: 5 INJECTION INTRAVENOUS at 13:59

## 2020-10-13 RX ADMIN — TIMOLOL MALEATE 1 DROP: 5 SOLUTION OPHTHALMIC at 09:02

## 2020-10-13 RX ADMIN — Medication 10 ML: at 09:02

## 2020-10-13 RX ADMIN — SODIUM CHLORIDE 500 ML: 9 INJECTION, SOLUTION INTRAVENOUS at 10:20

## 2020-10-13 RX ADMIN — ALBUMIN (HUMAN) 25 G: 0.25 INJECTION, SOLUTION INTRAVENOUS at 11:43

## 2020-10-13 RX ADMIN — ENOXAPARIN SODIUM 70 MG: 80 INJECTION SUBCUTANEOUS at 21:19

## 2020-10-13 RX ADMIN — ASPIRIN 81 MG: 81 TABLET, COATED ORAL at 09:02

## 2020-10-13 RX ADMIN — METOPROLOL TARTRATE 25 MG: 25 TABLET, FILM COATED ORAL at 16:11

## 2020-10-13 ASSESSMENT — PAIN DESCRIPTION - LOCATION: LOCATION: ABDOMEN

## 2020-10-13 ASSESSMENT — PAIN SCALES - GENERAL
PAINLEVEL_OUTOF10: 6
PAINLEVEL_OUTOF10: 0
PAINLEVEL_OUTOF10: 0

## 2020-10-13 ASSESSMENT — PAIN DESCRIPTION - ORIENTATION: ORIENTATION: RIGHT

## 2020-10-13 ASSESSMENT — PAIN DESCRIPTION - PAIN TYPE: TYPE: ACUTE PAIN

## 2020-10-13 NOTE — PROGRESS NOTES
Dr. Poli Zendejas notified of pts abnormal EKG, TROPONIN, AND LACTIC ACID How Severe Is Your Condition?: mild Year Removed: [address]

## 2020-10-13 NOTE — SIGNIFICANT EVENT
10/10/2020    LABGLOM 45 10/14/2020    GLUCOSE 114 10/14/2020    PROT 6.2 10/14/2020    LABALBU 3.7 10/14/2020    CALCIUM 8.5 10/14/2020    BILITOT 1.1 10/14/2020    ALKPHOS 63 10/14/2020    AST 34 10/14/2020     10/14/2020         Teams Assessment and Plan:  1. ??stemi   2. afib rvr   ? · Obtain troponin   · Dr. Davy Story called cardiology, no stat PCI needed. Will place him on cardizem drip. Reassessment:  Pt is asymptomatic all the time.    Discussed with Dr. Davy Story     Disposition:  [x] No transfer   [] Transfer to monitor floor  [] Transfer to: [] MICU [] NICU [] CCU [] SICU        Patients family updated: [x] Yes  [] No   Discussed with:  [] Critical Care Intensivist:         [x] Primary Care Provider: Po Marcos, DO      [] Other: ?    Adrien Verdugo MD - PGY-3    1:33 PM  Attending Physician: Dr. Davy Story Island Pedicle Flap Text: The defect edges were debeveled with a #15 scalpel blade.  Given the location of the defect, shape of the defect and the proximity to free margins an island pedicle advancement flap was deemed most appropriate.  Using a sterile surgical marker, an appropriate advancement flap was drawn incorporating the defect, outlining the appropriate donor tissue and placing the expected incisions within the relaxed skin tension lines where possible.    The area thus outlined was incised deep to adipose tissue with a #15 scalpel blade.  The skin margins were undermined to an appropriate distance in all directions around the primary defect and laterally outward around the island pedicle utilizing iris scissors.  There was minimal undermining beneath the pedicle flap.

## 2020-10-13 NOTE — CARE COORDINATION
Social Work Discharge/ Planning:    SW met with pt and pt's wife Cresencio Cordero) at bedside to discuss transition of care. Pt reports being independent and able to drive prior to admission. Pt lives with wife in a ranch style home that has 7 steps up and then 7 steps to enter into the house with handrails. Pt's bed and bath located on the main floor of the house. Pt has no DME. Pt does not wear oxygen at home. Pt has no TERESA/SNF/HHC hx. Pt's PCP is Dr. Giles Modi. Pt's pharmacy is [facility] located in [place]. Pt and pt's wife report they have two daughters who are RNs. Pt's plan is to return home once medically clear upon d/c. Pt reports pt's wife is able to transport pt home upon discharge. KYLER/LEO to follow for any needs that may arise upon discharge.      [employer], [employer]  [phone]

## 2020-10-13 NOTE — PLAN OF CARE
Problem: Pain:  Goal: Pain level will decrease  Description: Pain level will decrease  10/13/2020 0953 by Armen Redd RN  Outcome: Met This Shift

## 2020-10-13 NOTE — PROGRESS NOTES
wheezing, orthopnea. Gastrointestinal:  Nausea, vomiting, diarrhea, heartburn, constipation, abdominal pain, hematemesis, hematochezia, melena, acholic stools  Genito-Urinary:  Dysuria, urgency, frequency, hematuria  Musculoskeletal:  Joint pain, joint stiffness, joint swelling, muscle pain  Neurology:  Headache, focal neurological deficits, weakness, numbness, paresthesia  Derm:  Rashes, ulcers, excoriations, bruising  Extremities:  Decreased ROM, peripheral edema, mottling      OBJECTIVE:    /76   Pulse 146   Temp 97 °F (36.1 °C) (Temporal)   Resp 20   Ht 5' 8\" (1.727 m)   Wt 162 lb (73.5 kg)   SpO2 97%   BMI 24.63 kg/m²     General appearance:  awake, alert, and oriented to person, place, time, and purpose; appears stated age and cooperative; no apparent distress no labored breathing  HEENT:  Conjunctivae/corneas clear. Neck: Supple. No jugular venous distention. Respiratory: symmetrical; clear to auscultation bilaterally; no wheezes; no rhonchi; no rales  Cardiovascular: rhythm regular; rate controlled; no murmurs  Abdomen: Soft, nontender, nondistended  Extremities:  peripheral pulses present; no peripheral edema; no ulcers  Musculoskeletal: No clubbing, cyanosis, no bilateral lower extremity edema. Brisk capillary refill. Skin:  No rashes  on visible skin  Neurologic: awake, alert and following commands     ASSESSMENT and PLAN:  · Intractable abdominal pain- Transferred from [place] for GI evaluation. · Elevated liver enzymes/transaminitis- improving. Shock liver from hypotension? · Acute on chronic renal failure- Resolved. Volume responsive prerenal MARIAH in the setting of poor intake, thiazide diuretic, and ACE inhibitor. Baseline Cr 1.3-1.5. Renal scan showing diminished perfusion and extraction and excretion, compatible with bilateral artery stenosis versus ATN versus chronic renal failure. · NSTEMI- Newly elevated troponin 1.68 this am. Cardio following now.  Continue to trend troponin  · New onset atrial fibrillation with RVR- start cardizem bolus and drip. Spoke with cardiology.    · Bilateral renal stenosis by CT abdomen- discontinue lisinopril  · Elevated proBNP- Echocardiogram 8/4/20 showing EF 60%, mild TR and AZ  · Leukocytosis- resolved  · Elevated CRP and sed rate  · Hypotension  · Hypomagnesemia- resolved  · Type 2 diabetes-  sliding scale, monitor blood glucose and diabetic diet when eating      DISPOSITION: Continue current plan of care    Medications:  REVIEWED DAILY    Infusion Medications    dilTIAZem      dextrose       Scheduled Medications    albumin human  25 g Intravenous Q8H    dilTIAZem  10 mg Intravenous Once    timolol  1 drop Left Eye BID    latanoprost  1 drop Both Eyes Nightly    aspirin  81 mg Oral Daily    isosorbide mononitrate  30 mg Oral Daily    metFORMIN  500 mg Oral BID WC    fluticasone  1 spray Each Nostril Daily    [Held by provider] pravastatin  80 mg Oral Nightly    sodium chloride flush  10 mL Intravenous 2 times per day    enoxaparin  40 mg Subcutaneous Daily     PRN Meds: fentanNYL, ipratropium-albuterol, perflutren lipid microspheres, glucose, dextrose, glucagon (rDNA), dextrose, sodium chloride flush, polyethylene glycol, promethazine **OR** ondansetron, potassium chloride **OR** potassium alternative oral replacement **OR** potassium chloride, magnesium sulfate, potassium chloride, melatonin    Labs:     Recent Labs     10/11/20  0607 10/12/20  0723 10/13/20  0602   WBC 13.5* 11.0 10.0   HGB 9.6* 9.4* 9.8*   HCT 30.8* 30.4* 29.9*    234 282       Recent Labs     10/11/20  0607 10/12/20  0723 10/13/20  0602    138 138   K 4.5 3.7 3.5   * 105 104   CO2 17* 20* 23   BUN 40* 34* 25*   CREATININE 1.5* 1.3* 1.2   CALCIUM 8.2* 8.0* 7.9*       Recent Labs     10/11/20  0607 10/12/20  0723 10/13/20  0602   PROT 6.7 6.1* 5.9*   ALKPHOS 76 74 76   * 386* 301*   * 131* 82*   BILITOT 1.0 0.9 1.0       No results for input(s): INR in the last 72 hours. Recent Labs     10/13/20  1144   CKTOTAL 67   TROPONINI 1.68*       Chronic labs:    Lab Results   Component Value Date    CHOL 144 05/01/2020    TRIG 125 05/01/2020    HDL 44 05/01/2020    LDLCALC 93 07/19/2019    TSH 1.26 08/03/2020    LABA1C 6.2 (H) 10/12/2020       Radiology: REVIEWED DAILY    +++++++++++++++++++++++++++++++++++++++++++++++++  Shayy Katz Physician - Hospitalist  [address] [place], [place]  +++++++++++++++++++++++++++++++++++++++++++++++++  NOTE: This report was transcribed using voice recognition software. Every effort was made to ensure accuracy; however, inadvertent computerized transcription errors may be present.

## 2020-10-13 NOTE — CONSULTS
not run much higher than that routinely from what I can tell. He  had a CAT scan of the chest without evidence of pulmonary emboli with  small pleural effusion. The upper abdomen was within normal limits. He does have a history of cholecystectomy and apparently ERCP in 2008. Common duct stone. He was found to have an almost gangraneous  gallbladder but was not having pain. Seen by Dr. Roxana Barrera  in the past for carotid endarterectomy on the right. He has been seen  by Cardiology though it is not clear what conclusions had been made and  whether he actually has underlying heart disease His BNP on this  admission was 29,000. His chest x-ray was clear. He did have a rise in  his serum creatinine from baseline. It was 1.4, normally up to 1.9. He  has been seen by Nephrology. He has been seen by Dr. Sukhwinder Sanchez in [place]. He had a colonoscopy several  months ago. When he mentioned he was having recurrent right-sided pain,  he underwent an MRCP which was apparently normal without evidence of  ductal dilatation or stones. I cannot comment on the quality of the  exam.  He has a history of a markedly elevated sedimentation and  C-reactive protein and has been felt in the past to have a seronegative  inflammatory arthritis and was briefly on methotrexate, though it was  withdrawn because of intolerance. He is currently pain free and tolerating liquid diet. Even though he is  lying supine,.he is not in pain. PAST MEDICAL HISTORY:  Includes type 2 diabetes, COPD, an apparent  remote history of pancreatitis, prostate cancer, hyperlipidemia, hiatal  hernia, hypertension, choledocholithiasis and carotid artery disease. PAST SURGICAL HISTORY:  He has been evaluated by ERCP in the past.  He  has had colonoscopy. Apparently EGD. Right carotid endarterectomy.     MEDICATIONS:  Medications prior to admission included ferrous sulfate,  vitamin B12, Isordil, Hygroton, lisinopril, metformin, pravastatin,  aspirin and folic acid. OBJECTIVE:  GENERAL:  He is lying comfortably in bed. Sheet pulled over his head  but he wakens and is alert and oriented. VITAL SIGNS:  He is afebrile. He has a blood pressure currently 113/60. It has been running about 95 to 113 since admission and he has been  afebrile. NECK:  Veins are flat. LUNGS:  Fields seem clear. I do not hear murmur or gallop. ABDOMEN:  I did not reproduce any abdominal tenderness whatsoever. No  organomegaly. No dependent edema. At this point, it is not clear why he is anemic and why he is  macrocytic. It is not clear why he has had a high sedimentation rate  and C-reactive protein in the past. It is certainly not clear as to  why he has the pain that he has. Usually pain and an acute rise in  liver function studies think choledocholithiasis, but his pain very  atypical.  I reviewed the CAT scan of the chest that was done in [place],  which is the only imaging that came with him and that is unremarkable. I wonder about the significance of a BNP of 29,000 and the fact that his  pain occurs only in a supine position and it is associated with  shortness of breath. The bottom line is none of this makes sense. Maybe his liver function  studies have nothing to do with his pain and maybe he was actually  hypotensive. This is a pattern of liver injury that would be consistent  with that as his liver function studies are now falling. Will need to  consider another MRCP based on the unavailability to review the other  one. I do not think this is choledocholithiasis. The source of his  pain is uncertain, but if it was not cardiac, maybe it is of radicular  origin arising from his back. There may have been an MRI done of that  as well and we will look into it. Advance his diet.         Mat Brewer MD    D: 10/12/2020 88:92:93       T: 10/12/2020 20:51:44     [email or web address]  Job#: [id number]     Doc#: [id number]    CC:

## 2020-10-13 NOTE — PROGRESS NOTES
Department of Internal Medicine  Nephrology Attending Progress Note    Events reviewed. Hypotensive earlier today, given bolus of normal saline. SUBJECTIVE:  We are following Mr. [address] for MARIAH on CKD. Reports no major complaints. Physical Exam:    VITALS:  /76   Pulse 146   Temp 97 °F (36.1 °C) (Temporal)   Resp 20   Ht 5' 8\" (1.727 m)   Wt 162 lb (73.5 kg)   SpO2 97%   BMI 24.63 kg/m²   24HR INTAKE/OUTPUT:      Intake/Output Summary (Last 24 hours) at 10/13/2020 1315  Last data filed at 10/13/2020 0758  Gross per 24 hour   Intake 240 ml   Output 475 ml   Net -235 ml       Constitutional: No apparent distress, appears stated age and cooperative.   HEENT: Normal cephalic, atraumatic, PERRL  Respiratory:  CTA bilaterally   Cardiovascular/Edema: Heart sounds regular   Gastrointestinal:  Soft, nontender, nondistended  Neurologic:    Neurovascularly intact without any focal sensory/motor deficits  Skin:  Normal skin color.  No rashes or lesions  Other:   + edema of lower extremities, + sacral edema     Scheduled Meds:   albumin human  25 g Intravenous Q8H    timolol  1 drop Left Eye BID    latanoprost  1 drop Both Eyes Nightly    aspirin  81 mg Oral Daily    isosorbide mononitrate  30 mg Oral Daily    metFORMIN  500 mg Oral BID WC    fluticasone  1 spray Each Nostril Daily    [Held by provider] pravastatin  80 mg Oral Nightly    sodium chloride flush  10 mL Intravenous 2 times per day    enoxaparin  40 mg Subcutaneous Daily     Continuous Infusions:   dextrose       PRN Meds:.fentanNYL, ipratropium-albuterol, perflutren lipid microspheres, glucose, dextrose, glucagon (rDNA), dextrose, sodium chloride flush, polyethylene glycol, promethazine **OR** ondansetron, potassium chloride **OR** potassium alternative oral replacement **OR** potassium chloride, magnesium sulfate, potassium chloride, melatonin    DATA:    CBC:   Lab Results   Component Value Date    WBC 10.0 10/13/2020    RBC 2.81 10/13/2020    HGB 9.8 10/13/2020    HCT 29.9 10/13/2020    .4 10/13/2020    MCH 34.9 10/13/2020    MCHC 32.8 10/13/2020    RDW 15.5 10/13/2020     10/13/2020    MPV 10.9 10/13/2020     CMP:    Lab Results   Component Value Date     10/13/2020    K 3.5 10/13/2020     10/13/2020    CO2 23 10/13/2020    BUN 25 10/13/2020    CREATININE 1.2 10/13/2020    GFRAA >60 10/13/2020    AGRATIO 0.9 10/10/2020    LABGLOM 59 10/13/2020    GLUCOSE 107 10/13/2020    PROT 5.9 10/13/2020    LABALBU 2.5 10/13/2020    CALCIUM 7.9 10/13/2020    BILITOT 1.0 10/13/2020    ALKPHOS 76 10/13/2020    AST 82 10/13/2020     10/13/2020     Magnesium:    Lab Results   Component Value Date    MG 1.8 10/13/2020     Phosphorus:  No results found for: PHOS     Urine sodium: 71  Urine potassium: 51.9  Urine chloride: 68  Urine creatinine: 118  Urine albumin/creatinine: 184 mg/grams  Urine protein/creatinine: 0.5 mg/grams    proBNP: 29,713      Radiology Review:      Nuclear medicine clinic with flow and function without pharmacological intervention October 12, 2020   Findings:         The patient was injected with 10 mCi of technetium MAG3 intravenously              Split uptake on the left was 47.8  percent, time to peak was 4.5    minutes, one half clearance time was 18.9         Split uptake on the right was 52.2 percent, time to peak was 0.5    minutes, one half clearance time was 23.4 minutes         Renal curves demonstrate perfusion to be delayed. Extraction was delayed. Excretion was delayed.     Lasix was not administered         Impression:  Diminished perfusion and extraction and excretion, the findings are    compatible with bilateral renal artery stenosis versus ATN versus    chronic renal failure                CTA pulmonary with IV contrast October 10, 2020                    IMPRESSION:               CT Angiogram Chest and Pulmonary Arteries with contrast and high resolution 2-D and 3-D images:            1. No evidence of pulmonary emboli by CT technique.               2. Small bilateral pleural effusions with dependent atelectasis.            Dictated Alberto Yates MD   DD/DT: 10/10/20 1022                  Signed Alberto Yates MD 10/10/20 1022                    : OLVIN      Narrative                IMPRESSION:       1.   Mild bilateral perinephric stranding is again identified (without hydronephrosis upon       bilateral assessment).               2.  Mild urinary bladder wall thickening is identified.  Surgical absence of prostate gland.               3.   Moderate colonic diverticulosis is identified (sigmoid predominance) without acute       diverticulitis.  Nondistended sigmoid colon extends into left inguinal hernia defect.               4. Pleural parenchymal scarring is again identified in right lower lobe, slightly more pronounced       than on prior examination.               5. Significant vascular calcifications are identified (including greater than 50% degree of       stenosis bilaterally in renal arteries, duplicated bilaterally).            Dictated By: Mukul Moore MD   DD/DT: 10/09/20 0813                  Signed ByRosalita Siemens MD 10/09/20 0847                    :        BRIEF SUMMARY OF INITIAL CONSULT:    Briefly Prashanth Juarez 89, I 5 y. o. year old male with history of CKD stage III, with mild proteinuria, baseline creatinine 1.3 to 1.5 mg/dL, HTN, type II DM, hyperlipidemia, gout, prostate cancer status post resection, emphysema, seronegative inflammatory arthritis possibly psoriatic arthritis on methotrexate, who was admitted on October 10, 2020 transferred from [facility] where he initially was admitted with abdominal pain. Probably he was hypotensive with a blood pressures loss 79/50 and he was fluid resuscitated, he was transferred for a higher level of care.   His creatinine level on admission was 1.9 mg/dL

## 2020-10-14 PROBLEM — I48.0 PAROXYSMAL ATRIAL FIBRILLATION (HCC): Status: ACTIVE | Noted: 2020-10-14

## 2020-10-14 PROBLEM — I95.9 HYPOTENSION: Status: ACTIVE | Noted: 2020-10-14

## 2020-10-14 PROBLEM — I25.10 CORONARY ARTERY DISEASE: Status: ACTIVE | Noted: 2020-10-14

## 2020-10-14 PROBLEM — Z98.890 HISTORY OF RIGHT-SIDED CAROTID ENDARTERECTOMY: Status: ACTIVE | Noted: 2020-10-14

## 2020-10-14 PROBLEM — I21.4 NON-STEMI (NON-ST ELEVATED MYOCARDIAL INFARCTION) (HCC): Status: ACTIVE | Noted: 2020-10-14

## 2020-10-14 PROBLEM — D64.9 ANEMIA: Status: ACTIVE | Noted: 2020-10-14

## 2020-10-14 PROBLEM — N17.9 ACUTE KIDNEY INJURY SUPERIMPOSED ON CHRONIC KIDNEY DISEASE (HCC): Status: ACTIVE | Noted: 2020-10-14

## 2020-10-14 PROBLEM — N18.9 ACUTE KIDNEY INJURY SUPERIMPOSED ON CHRONIC KIDNEY DISEASE (HCC): Status: ACTIVE | Noted: 2020-10-14

## 2020-10-14 LAB
ABO/RH: NORMAL
ALBUMIN SERPL-MCNC: 3.7 G/DL (ref 3.5–5.2)
ALP BLD-CCNC: 63 U/L (ref 40–129)
ALT SERPL-CCNC: 168 U/L (ref 0–40)
ANION GAP SERPL CALCULATED.3IONS-SCNC: 15 MMOL/L (ref 7–16)
ANTIBODY SCREEN: NORMAL
AST SERPL-CCNC: 34 U/L (ref 0–39)
BASOPHILS ABSOLUTE: 0.04 E9/L (ref 0–0.2)
BASOPHILS RELATIVE PERCENT: 0.5 % (ref 0–2)
BILIRUB SERPL-MCNC: 1.1 MG/DL (ref 0–1.2)
BUN BLDV-MCNC: 30 MG/DL (ref 8–23)
CALCIUM SERPL-MCNC: 8.5 MG/DL (ref 8.6–10.2)
CHLORIDE BLD-SCNC: 105 MMOL/L (ref 98–107)
CO2: 22 MMOL/L (ref 22–29)
CREAT SERPL-MCNC: 1.5 MG/DL (ref 0.7–1.2)
EKG ATRIAL RATE: 86 BPM
EKG P AXIS: 38 DEGREES
EKG P-R INTERVAL: 118 MS
EKG Q-T INTERVAL: 356 MS
EKG QRS DURATION: 96 MS
EKG QTC CALCULATION (BAZETT): 426 MS
EKG R AXIS: 26 DEGREES
EKG T AXIS: -102 DEGREES
EKG VENTRICULAR RATE: 86 BPM
EOSINOPHILS ABSOLUTE: 0.1 E9/L (ref 0.05–0.5)
EOSINOPHILS RELATIVE PERCENT: 1.2 % (ref 0–6)
GFR AFRICAN AMERICAN: 55
GFR NON-AFRICAN AMERICAN: 45 ML/MIN/1.73
GLUCOSE BLD-MCNC: 114 MG/DL (ref 74–99)
HCT VFR BLD CALC: 24.7 % (ref 37–54)
HCT VFR BLD CALC: 24.9 % (ref 37–54)
HEMOGLOBIN: 8 G/DL (ref 12.5–16.5)
HEMOGLOBIN: 8.2 G/DL (ref 12.5–16.5)
IMMATURE GRANULOCYTES #: 0.05 E9/L
IMMATURE GRANULOCYTES %: 0.6 % (ref 0–5)
LACTIC ACID: 1.1 MMOL/L (ref 0.5–2.2)
LACTIC ACID: 1.6 MMOL/L (ref 0.5–2.2)
LACTIC ACID: 2.4 MMOL/L (ref 0.5–2.2)
LACTIC ACID: 2.5 MMOL/L (ref 0.5–2.2)
LV EF: 48 %
LVEF MODALITY: NORMAL
LYMPHOCYTES ABSOLUTE: 1.3 E9/L (ref 1.5–4)
LYMPHOCYTES RELATIVE PERCENT: 16.1 % (ref 20–42)
MCH RBC QN AUTO: 34.3 PG (ref 26–35)
MCHC RBC AUTO-ENTMCNC: 32.1 % (ref 32–34.5)
MCV RBC AUTO: 106.9 FL (ref 80–99.9)
MONOCYTES ABSOLUTE: 1.12 E9/L (ref 0.1–0.95)
MONOCYTES RELATIVE PERCENT: 13.9 % (ref 2–12)
NEUTROPHILS ABSOLUTE: 5.46 E9/L (ref 1.8–7.3)
NEUTROPHILS RELATIVE PERCENT: 67.7 % (ref 43–80)
PDW BLD-RTO: 15.7 FL (ref 11.5–15)
PLATELET # BLD: 232 E9/L (ref 130–450)
PMV BLD AUTO: 11.2 FL (ref 7–12)
POTASSIUM REFLEX MAGNESIUM: 3.8 MMOL/L (ref 3.5–5)
RBC # BLD: 2.33 E12/L (ref 3.8–5.8)
SODIUM BLD-SCNC: 142 MMOL/L (ref 132–146)
TOTAL PROTEIN: 6.2 G/DL (ref 6.4–8.3)
TROPONIN: 2.41 NG/ML (ref 0–0.03)
WBC # BLD: 8.1 E9/L (ref 4.5–11.5)

## 2020-10-14 PROCEDURE — 6360000002 HC RX W HCPCS: Performed by: INTERNAL MEDICINE

## 2020-10-14 PROCEDURE — 36415 COLL VENOUS BLD VENIPUNCTURE: CPT

## 2020-10-14 PROCEDURE — 83605 ASSAY OF LACTIC ACID: CPT

## 2020-10-14 PROCEDURE — 2580000003 HC RX 258: Performed by: FAMILY MEDICINE

## 2020-10-14 PROCEDURE — 86901 BLOOD TYPING SEROLOGIC RH(D): CPT

## 2020-10-14 PROCEDURE — 6370000000 HC RX 637 (ALT 250 FOR IP): Performed by: FAMILY MEDICINE

## 2020-10-14 PROCEDURE — 80053 COMPREHEN METABOLIC PANEL: CPT

## 2020-10-14 PROCEDURE — 85014 HEMATOCRIT: CPT

## 2020-10-14 PROCEDURE — 6370000000 HC RX 637 (ALT 250 FOR IP): Performed by: INTERNAL MEDICINE

## 2020-10-14 PROCEDURE — 86923 COMPATIBILITY TEST ELECTRIC: CPT

## 2020-10-14 PROCEDURE — P9047 ALBUMIN (HUMAN), 25%, 50ML: HCPCS | Performed by: INTERNAL MEDICINE

## 2020-10-14 PROCEDURE — 93306 TTE W/DOPPLER COMPLETE: CPT

## 2020-10-14 PROCEDURE — 86900 BLOOD TYPING SEROLOGIC ABO: CPT

## 2020-10-14 PROCEDURE — 2140000000 HC CCU INTERMEDIATE R&B

## 2020-10-14 PROCEDURE — P9040 RBC LEUKOREDUCED IRRADIATED: HCPCS

## 2020-10-14 PROCEDURE — 86850 RBC ANTIBODY SCREEN: CPT

## 2020-10-14 PROCEDURE — 6360000004 HC RX CONTRAST MEDICATION: Performed by: INTERNAL MEDICINE

## 2020-10-14 PROCEDURE — 85025 COMPLETE CBC W/AUTO DIFF WBC: CPT

## 2020-10-14 PROCEDURE — 85018 HEMOGLOBIN: CPT

## 2020-10-14 PROCEDURE — 6360000002 HC RX W HCPCS: Performed by: FAMILY MEDICINE

## 2020-10-14 RX ORDER — PANTOPRAZOLE SODIUM 40 MG/1
40 TABLET, DELAYED RELEASE ORAL
Status: DISCONTINUED | OUTPATIENT
Start: 2020-10-15 | End: 2020-10-15

## 2020-10-14 RX ADMIN — Medication 10 ML: at 09:23

## 2020-10-14 RX ADMIN — ENOXAPARIN SODIUM 40 MG: 40 INJECTION SUBCUTANEOUS at 09:23

## 2020-10-14 RX ADMIN — LATANOPROST 1 DROP: 50 SOLUTION OPHTHALMIC at 22:22

## 2020-10-14 RX ADMIN — ISOSORBIDE MONONITRATE 30 MG: 30 TABLET ORAL at 09:22

## 2020-10-14 RX ADMIN — METOPROLOL TARTRATE 25 MG: 25 TABLET, FILM COATED ORAL at 22:21

## 2020-10-14 RX ADMIN — METFORMIN HYDROCHLORIDE 500 MG: 500 TABLET ORAL at 17:47

## 2020-10-14 RX ADMIN — PERFLUTREN 1.65 MG: 6.52 INJECTION, SUSPENSION INTRAVENOUS at 14:28

## 2020-10-14 RX ADMIN — ALBUMIN (HUMAN) 25 G: 0.25 INJECTION, SOLUTION INTRAVENOUS at 19:44

## 2020-10-14 RX ADMIN — TIMOLOL MALEATE 1 DROP: 5 SOLUTION OPHTHALMIC at 22:22

## 2020-10-14 RX ADMIN — ALBUMIN (HUMAN) 25 G: 0.25 INJECTION, SOLUTION INTRAVENOUS at 11:52

## 2020-10-14 RX ADMIN — Medication 10 ML: at 19:43

## 2020-10-14 RX ADMIN — ALBUMIN (HUMAN) 25 G: 0.25 INJECTION, SOLUTION INTRAVENOUS at 03:32

## 2020-10-14 RX ADMIN — FLUTICASONE PROPIONATE 1 SPRAY: 50 SPRAY, METERED NASAL at 09:23

## 2020-10-14 RX ADMIN — ASPIRIN 81 MG: 81 TABLET, COATED ORAL at 09:23

## 2020-10-14 RX ADMIN — METOPROLOL TARTRATE 25 MG: 25 TABLET, FILM COATED ORAL at 09:22

## 2020-10-14 RX ADMIN — Medication 3 MG: at 23:06

## 2020-10-14 RX ADMIN — TIMOLOL MALEATE 1 DROP: 5 SOLUTION OPHTHALMIC at 09:23

## 2020-10-14 ASSESSMENT — PAIN SCALES - GENERAL
PAINLEVEL_OUTOF10: 0
PAINLEVEL_OUTOF10: 0

## 2020-10-14 NOTE — CONSULTS
Historical Provider, MD   allopurinol (ZYLOPRIM) 300 MG tablet Take 1 tablet by mouth daily Hold until pt is ready 9/2/20  Yes Sourav Hobbs MD   chlorthalidone (HYGROTON) 25 MG tablet Take 1 tablet by mouth daily Hold until pt is ready 9/2/20  Yes Sourav Hobbs MD   lisinopril (PRINIVIL;ZESTRIL) 40 MG tablet Take 1 tablet by mouth daily Hold until pt is ready 9/2/20  Yes Sourav Hobbs MD   metFORMIN (GLUCOPHAGE) 500 MG tablet 1 po q am, 2 po q pm Hold until pt is ready 9/2/20  Yes Sourav Hobbs MD   pravastatin (PRAVACHOL) 80 MG tablet Take 1 tablet by mouth daily Hold until pt is ready 9/2/20  Yes Sourav Hobbs MD   fluticasone [place] [facility]) 50 MCG/ACT nasal spray 2 sprays by Each Nostril route daily 9/2/20  Yes Sourav Hobbs MD   Omega-3 Fatty Acids (FISH OIL) 1200 MG CAPS Take by mouth daily   Yes Historical Provider, MD   aspirin 81 MG EC tablet Take by mouth   Yes Historical Provider, MD   blood glucose test strips (ONE TOUCH ULTRA TEST) strip  12/14/15  Yes Historical Provider, MD   vitamin E 100 units capsule Take by mouth   Yes Historical Provider, MD [place] 0.01 % SOLN ophthalmic drops  5/18/19  Yes Historical Provider, MD   vitamin D (CHOLECALCIFEROL) 1000 UNIT TABS tablet Take by mouth   Yes Historical Provider, MD   folic acid (FOLVITE) 1 MG tablet Take 1 mg by mouth daily  4/25/19  Yes Historical Provider, MD       Allergies   Allergen Reactions    Methotrexate Derivatives      Caused Blisters       History reviewed. No pertinent family history.     Social History     Tobacco Use    Smoking status: Never Smoker    Smokeless tobacco: Never Used   Substance Use Topics    Alcohol use: Not Currently    Drug use: Never         Review of Systems   General ROS: negative  Hematological and Lymphatic ROS: negative  Respiratory ROS: negative  Cardiovascular ROS: had afib RVR was moved to cardiac floor, currently RR   Gastrointestinal ROS: several months of flank/ abdominal pain   Genito-Urinary ROS: negative  Musculoskeletal ROS: negative      PHYSICAL EXAM:    Vitals:    10/14/20 1537   BP: 125/60   Pulse: 88   Resp: 18   Temp: 98.8 °F (37.1 °C)   SpO2: 97%       General Appearance:  awake, alert, oriented, in no acute distress, stats he wants to get out of here  Skin:  Skin color, texture, turgor normal. No rashes or lesions. Head/face:  NCAT  Eyes:  No gross abnormalities. Lungs:  No chest wall tenderness. Heart:  Heart regular rate and rhythm  Abdomen:  No tenderness on exam currently. He states that when he gets the flank pain it does radiate to the epigastric area  Extremities: pulses present in all extremities  Male Rectal:  Normal male: no hemorrhoids, normal rectal tone, no masses, prostate not enlarged, no nodularity. FOBT-      LABS:    CBC  Recent Labs     10/14/20  0614 10/14/20  1204   WBC 8.1  --    HGB 8.0* 8.2*   HCT 24.9* 24.7*     --      BMP  Recent Labs     10/14/20  0614      K 3.8      CO2 22   BUN 30*   CREATININE 1.5*   CALCIUM 8.5*     Liver Function  Recent Labs     10/14/20  0614   BILITOT 1.1   AST 34   *   ALKPHOS 63   PROT 6.2*   LABALBU 3.7     No results for input(s): LACTATE in the last 72 hours. No results for input(s): INR, PTT in the last 72 hours.     Invalid input(s): PT    RADIOLOGY    Cta Pulmonary W Contrast    Result Date: 10/10/2020                                      [address], [address] [place], [place] [address]                                              [phone]                                               CT Scan Report                                                  Signed    Patient: Lorin Snowden                                                                MR#  : [id number]          : 1945                                                                [de-identified]            Age/Sex: 66 / M Admit Date: 10/09/20            Loc: 2T             2029-1                                                                  Attending Dr: Bo Bernabe Physician: Elizabeth Martinez NP   Date of Service: 10/10/20  Procedure(s): CT angio chest PE protocol  Accession Number(s): [id number]    cc:               PHYSICIAN INDICATIONS:  Short Of Breath       TECHNIQUE: High resolution axial CT images of the chest with three-dimensional reconstruction on   separate workstation. Study was performed with intravenous contrast for CT Angiogram. CT Dose   Index: 3.7 - 11.2 mGy. DLP: 245 mGy-cm. Administered 59.0 ml of ISOVUE 300.00 mg/ml. AEC. k=0.014   mSv/(mGy-cm)       FINDINGS:       The pulmonary arteries appear normal in caliber without filling defects to suggest pulmonary   emboli. The aorta is normal in caliber without aneurysm, dissection or rupture. The heart size is mildly   enlarged . Small bilateral pleural effusions with dependent atelectasis. No area of consolidation. Emphysematous changes with upper lobe predominance. No lung nodules are identified. The mediastinum is unremarkable with no significant adenopathy   identified. The hilar regions are unremarkable. In the upper abdomen, visualized portions of the liver, adrenal glands, kidneys and spleen are   unremarkable. IMPRESSION:       CT Angiogram Chest and Pulmonary Arteries with contrast and high resolution 2-D and 3-D images:       1. No evidence of pulmonary emboli by CT technique. 2. Small bilateral pleural effusions with dependent atelectasis.                Dictated ByDexpatricia Harkins MD   DD/DT: 10/10/20 1022               Signed By: Sabrina Lloyd MD 10/10/20 1022            : OLVIN    Us Retroperitoneal Complete    Result Date: 10/11/2020  EXAMINATION: RETROPERITONEAL ULTRASOUND OF THE KIDNEYS AND URINARY BLADDER 10/11/2020 COMPARISON: None HISTORY: ORDERING SYSTEM PROVIDED HISTORY: MARIAH TECHNOLOGIST PROVIDED HISTORY: Reason for exam:->MARIAH What reading provider will be dictating this exam?->CRC FINDINGS: Kidneys: The right kidney measures 11 cm in length and the left kidney measures 11.4 cm in length. Kidneys demonstrate normal cortical echogenicity. No evidence of hydronephrosis or intrarenal stones. No suspicious renal mass is seen. There is an anechoic benign-appearing cyst in the mid left kidney measuring 2.7 x 2.3 x 2.5 cm. Bladder: The bladder is not well distended. No obvious intraluminal mass is seen. Prevoid bladder volume is estimated at at only 11 mL. Ureteral jets are not visualized on color flow imaging. Postvoid imaging was not done. 1. 2.7 cm anechoic left renal cyst. 2. Otherwise, negative renal ultrasound. 3. Poorly distended bladder showing no gross abnormality.          ASSESSMENT:  66 y.o. male with abdominal/ flank pain and anemia    PLAN:  Doubt any GI issue for anemia  Right flank pain does not correlate to a GI issue  Would recommend starting PO PPI BID for ppx  No acute surgical intervention needed        Electronically signed by Deborah Saez DO on 10/14/20 at 5:56 PM EDT

## 2020-10-14 NOTE — PROGRESS NOTES
Events noted  He seems to say that the events were w/o sx. He was unaware of tachycardia and if MI, had no sx of same either. Reading the CT report from [place] it did not mention the issue in the Right midlung field but on review of the films, it was there but no mention on CXR earlier this year in May  His Hgb dropped but not bleeding and his anemia was evaluated by hematology in [place] but no conclusions. Don't believe this decline indicative of bleeding. Colonoscopy July , EGD 3 yrs ago  LFTs in decline and don't believe they are related to his pain complaints and incidental to events ? Hypotension. Maybe ask pulmonary re: lung findings.

## 2020-10-14 NOTE — CONSULTS
[facility]    Name: Preston Saravia    Age: 66 y.o. Date of Admission: 10/10/2020  5:46 PM    Date of Service: 10/14/2020    Reason for Consultation: Elevated troponin, atrial fibrillation    Chief Complaint: Abdominal pain    Referring Physician: Dr. Mary Ellen Loaiza    History of Present Illness: The patient is a 66y.o. year old male who was transferred from [place] due to right-sided abdominal pain, elevated LFTs and leukocytosis. Describes right abdominal pain that can radiate to the right chest and shoulder, occurring at rest and not associated with nausea, vomiting or diaphoresis. RRT yesterday with hypotension and paroxysmal atrial fibrillation, now in sinus rhythm. Creatinine increased from 1.2-1.9 and is now 1.5. Nuclear scan of kidney suggests bilateral renal artery stenosis. LFTs improving, and WBC normal.  Troponin has increased to 2.4. Hemoglobin on admission 10 and has decreased to 8 this morning. He is currently pain-free and denies shortness of breath. No PND/orthopnea. Blood pressure stable and SPO2 normal on room air    Dr. Bina Gant office notes reviewed and patient underwent a Lexiscan stress test August 2020 that showed LVEF 68% with mild inferoapical ischemia, and was treated conservatively. An echocardiogram confirmed LVEF 60% with mild TR and mild pulmonary hypertension.   History of right carotid endarterectomy, hypertension, hyperlipidemia, diabetes and COPD    Past Medical History:   Past Medical History:   Diagnosis Date    Carotid artery stenosis     Cholecystitis     Choledocholithiasis     Cholelithiasis     Colon polyps     Essential hypertension 5/23/2019    Gout     Hiatal hernia     History of laparoscopic cholecystectomy     Hyperlipidemia 5/23/2019    Inflammatory polyarthropathy ([address] 5/23/2019    Malignant tumor of prostate ([address]     Osteoarthritis     Pancreatitis     Prostate CA ([address] 5/23/2019    Pulmonary emphysema (HCC)     Pulmonary nodules     Type 2 diabetes mellitus without complication, without long-term current use of insulin ([facility] 5/23/2019    Ventral hernia        Past Surgical History:  History reviewed. No pertinent surgical history. Family History:  History reviewed. No pertinent family history. Social History:  Social History     Socioeconomic History    Marital status:      Spouse name: Not on file    Number of children: Not on file    Years of education: Not on file    Highest education level: Not on file   Occupational History     Employer: RETIRED   Social Needs    Financial resource strain: Not on file    Food insecurity     Worry: Not on file     Inability: Not on file   [employer] needs     Medical: Not on file     Non-medical: Not on file   Tobacco Use    Smoking status: Never Smoker    Smokeless tobacco: Never Used   Substance and Sexual Activity    Alcohol use: Not Currently    Drug use: Never    Sexual activity: Yes     Partners: Female   Lifestyle    Physical activity     Days per week: Not on file     Minutes per session: Not on file    Stress: Not on file   Relationships    Social connections     Talks on phone: Not on file     Gets together: Not on file     Attends [employer] service: Not on file     Active member of club or organization: Not on file     Attends meetings of clubs or organizations: Not on file     Relationship status: Not on file    Intimate partner violence     Fear of current or ex partner: Not on file     Emotionally abused: Not on file     Physically abused: Not on file     Forced sexual activity: Not on file   Other Topics Concern    Not on file   Social History Narrative    Not on file       Allergies: Allergies   Allergen Reactions    Methotrexate Derivatives      Caused Blisters       Home Meds:  Prior to Admission medications    Medication Sig Start Date End Date Taking?  Authorizing Provider   timolol (TIMOPTIC) 0.5 % ophthalmic solution Place 1 drop into the left eye 2 times daily   Yes Historical Provider, MD   ferrous sulfate (IRON 325) 325 (65 Fe) MG tablet Take 325 mg by mouth every other day   Yes Historical Provider, MD   vitamin B-12 (CYANOCOBALAMIN) 1000 MCG tablet Take 1,000 mcg by mouth 2 times daily   Yes Historical Provider, MD   isosorbide dinitrate (ISORDIL) 30 MG tablet Take 30 mg by mouth daily   Yes Historical Provider, MD   allopurinol (ZYLOPRIM) 300 MG tablet Take 1 tablet by mouth daily Hold until pt is ready 9/2/20  Yes Terris Canavan, MD   chlorthalidone (HYGROTON) 25 MG tablet Take 1 tablet by mouth daily Hold until pt is ready 9/2/20  Yes Terris Canavan, MD   lisinopril (PRINIVIL;ZESTRIL) 40 MG tablet Take 1 tablet by mouth daily Hold until pt is ready 9/2/20  Yes Terris Canavan, MD   metFORMIN (GLUCOPHAGE) 500 MG tablet 1 po q am, 2 po q pm Hold until pt is ready 9/2/20  Yes Terris Canavan, MD   pravastatin (PRAVACHOL) 80 MG tablet Take 1 tablet by mouth daily Hold until pt is ready 9/2/20  Yes Terris Canavan, MD   fluticasone [facility]) 50 MCG/ACT nasal spray 2 sprays by Each Nostril route daily 9/2/20  Yes Terris Canavan, MD   Omega-3 Fatty Acids (FISH OIL) 1200 MG CAPS Take by mouth daily   Yes Historical Provider, MD   aspirin 81 MG EC tablet Take by mouth   Yes Historical Provider, MD   blood glucose test strips (ONE TOUCH ULTRA TEST) strip  12/14/15  Yes Historical Provider, MD   vitamin E 100 units capsule Take by mouth   Yes Historical Provider, MD [place] 0.01 % SOLN ophthalmic drops  5/18/19  Yes Historical Provider, MD   vitamin D (CHOLECALCIFEROL) 1000 UNIT TABS tablet Take by mouth   Yes Historical Provider, MD   folic acid (FOLVITE) 1 MG tablet Take 1 mg by mouth daily  4/25/19  Yes Historical Provider, MD       Current Medications:  Current Facility-Administered Medications   Medication Dose Route Frequency Provider Last Rate Last Dose    fentaNYL (SUBLIMAZE) injection 25 mcg  25 mcg Intravenous Q2H PRN Arlene Ceron DO        ipratropium-albuterol (DUONEB) nebulizer solution 1 ampule  1 ampule Inhalation Q4H PRN Arlene Ceron DO        albumin human 25 % IV solution 25 g  25 g Intravenous Q8H Kaylee Jalloh MD   Stopped at 10/14/20 0453    perflutren lipid microspheres (DEFINITY) injection 1.65 mg  1.5 mL Intravenous ONCE PRN Arlene Ceron DO        dilTIAZem 100 mg in dextrose 5 % 100 mL infusion ([facility])  5 mg/hr Intravenous Continuous [facility] DO Sabiha        metoprolol tartrate (LOPRESSOR) tablet 25 mg  25 mg Oral BID Carmel Martinez MD   25 mg at 10/14/20 9550    timolol (TIMOPTIC) 0.5 % ophthalmic solution 1 drop  1 drop Left Eye BID Brenda Cadena MD   1 drop at 10/14/20 0923    latanoprost (XALATAN) 0.005 % ophthalmic solution 1 drop  1 drop Both Eyes Nightly Brenda Cadena MD   1 drop at 10/12/20 2230    aspirin EC tablet 81 mg  81 mg Oral Daily Tiesha Cortes MD   81 mg at 10/14/20 8838    isosorbide mononitrate (IMDUR) extended release tablet 30 mg  30 mg Oral Daily [facility] DO Sabiha   30 mg at 10/14/20 6804    metFORMIN (GLUCOPHAGE) tablet 500 mg  500 mg Oral BID  Tiesha Cortes MD   500 mg at 10/13/20 0902    glucose (GLUTOSE) 40 % oral gel 15 g  15 g Oral PRN Tiesha Cortes MD        dextrose 50 % IV solution  12.5 g Intravenous PRN Tiesha Cortes MD        glucagon (rDNA) injection 1 mg  1 mg Intramuscular PRN Tiesha Cortes MD        dextrose 5 % solution  100 mL/hr Intravenous PRN Tiesha Cortes MD        fluticasone (FLONASE) 50 MCG/ACT nasal spray 1 spray  1 spray Each Nostril Daily Tiesha Cortes MD   1 spray at 10/14/20 0923    [Held by provider] pravastatin (PRAVACHOL) tablet 80 mg  80 mg Oral Nightly Tiesha Cortes MD        sodium chloride flush 0.9 % injection 10 mL  10 mL Intravenous 2 times per day [facility] June DO   10 mL at 10/14/20 0923    sodium chloride flush 0.9 % injection 10 mL  10 mL Intravenous PRN [facility],  round.  Sclera nonicteric and oral mucosa moist.  Tongue and trachea midline. Neck: No JVD or bruits. No thyroid enlargement or adenopathy    Cardiac: Regular rate and rhythm, normal S1 and S2, no S3. Apical impulse is normal.  No murmurs, rubs or clicks. No carotid bruits and no abdominal bruits. No peripheral edema, cyanosis or clubbing. Normal pulses in the upper and lower extremities bilaterally. Resp: Unlabored respirations at rest.  No wheezes, rales or rhonchi. Abdomen: soft, nontender, nondistended, no gross organomegaly or mass    Skin: Warm and dry, no cyanosis. Musculoskeletal: normal tone and strength in the upper and lower extremities bilaterally    Neuro: Moves all extremities appropriately to command. Normal sensation to light touch in the upper and lower extremities bilaterally    Psych: Cooperative, and normal affect    Intake/Output:    Intake/Output Summary (Last 24 hours) at 10/14/2020 0946  Last data filed at 10/13/2020 2012  Gross per 24 hour   Intake 250 ml   Output --   Net 250 ml     No intake/output data recorded.     Laboratory Tests:  Lab Results   Component Value Date    CREATININE 1.5 (H) 10/14/2020    BUN 30 (H) 10/14/2020     10/14/2020    K 3.8 10/14/2020     10/14/2020    CO2 22 10/14/2020     Recent Labs     10/13/20  1144 10/13/20  1832 10/13/20  2328   CKTOTAL 67  --   --    CKMB 4.2  --   --    TROPONINI 1.68* 2.23* 2.41*     Lab Results   Component Value Date    PROBNP 29,713 (H) 10/12/2020     Lab Results   Component Value Date    WBC 8.1 10/14/2020    RBC 2.33 10/14/2020    HGB 8.0 10/14/2020    HCT 24.9 10/14/2020    .9 10/14/2020    MCH 34.3 10/14/2020    MCHC 32.1 10/14/2020    RDW 15.7 10/14/2020     10/14/2020    MPV 11.2 10/14/2020     Recent Labs     10/12/20  0723 10/13/20  0602 10/14/20  0614   ALKPHOS 74 76 63   * 301* 168*   * 82* 34   PROT 6.1* 5.9* 6.2*   BILITOT 0.9 1.0 1.1   LABALBU 2.8* 2.5* 3.7     Lab Results   Component Value Date    MG 1.8 10/13/2020     No results found for: PROTIME, INR  Lab Results   Component Value Date    TSH 1.26 08/03/2020     No components found for: CHLPL  Lab Results   Component Value Date    TRIG 125 05/01/2020    TRIG 163 07/19/2019     Lab Results   Component Value Date    HDL 44 05/01/2020    HDL 43 07/19/2019     Lab Results   Component Value Date    LDLCALC 93 07/19/2019     CTA of chest, CT of chest, chest x-ray, EKGs and telemetry independently reviewed:  CTA: Negative for PE with no pleural or pericardial effusions and unremarkable thoracic aorta. EKG yesterday, sinus/PAF. EKG #2: Sinus rhythm with subtle lateral ST depression. Telemetry: Sinus rhythm this morning      Active Hospital Problems    Diagnosis    Non-STEM[facility] (non-ST elevated [facility] infarction) [facility] [I21.4]    Paroxysmal atrial fibrillation (HCC) [I48.0]    Anemia [D64.9]    History of right-sided carotid endarterectomy [Z98.890]    Coronary artery disease [I25.10]    Acute kidney injury superimposed on chronic kidney disease ([facility] [N17.9, N18.9]    Transaminitis [R74.01]             ASSESSMENT / PLAN:   #1.  Non-STEMI and is currently stable with no postinfarct angina, heart failure or recurrent atrial arrhythmias. Would recommend delayed heart catheterization until other issues addressed and resolved including MARIAH, new unexplained and worsening anemia. Check echocardiogram, continue baby aspirin, nitrate, Lovenox. #2.  Paroxysmal atrial fibrillation and remains in sinus rhythm. Chads vascular score is 5 but with avoid [address] for now and use Lovenox. Anemia needs to be explained    #3 hypertension controlled. #4. Hyperlipidemia: Hold statin as LFTs actually elevated and resolving    #5. Hypotension yesterday in setting of rapid A. fib.   Has resolved and blood pressure now stable    #6   acute kidney injury in setting of hypotension, ACE inhibitor, possible bilateral renal artery stenosis and dye exposure. Improving. ACE inhibitor on hold    #7 unexplained elevation in transaminase-improved    #8 unexplained anemia with hemoglobin decreasing. Type and cross.   Trend H&H and if hemoglobin drops below 8 would transfuse    #9 carotid artery disease/remote right carotid endarterectomy-stable      Electronically signed by Lachelle Jasso MD on 10/14/2020 at 9:46 AM

## 2020-10-14 NOTE — PROGRESS NOTES
General Surgery consult placed to Dr. Lorrie Fonseca on call for Dr. Mcdonald Serve via perfect serve

## 2020-10-14 NOTE — PROGRESS NOTES
Hospitalist Progress Note      SYNOPSIS: Patient admitted on 10/10/2020 for abdominal pain. Pain is located in the right upper quadrant and seems to radiate from the back. States the pain is usually worse at about 9:00 at night. Usually resolves on its own. Patient has had multiple work-ups including abdominal CTs and MRIs. To this point nothing has been found to explain his discomfort. Patient had been admitted to [facility] where another work-up was obtained. Initially things were looking pretty unremarkable until today. LFTs were initially normal and increased into the 500-600 range. Also noted to have magnesium 1.0. Creatinine increased from 1.4 to 1.9. WBC evaded at 18,000. patient was transferred to [facility] for further work-up and gastroenterological consultation. SUBJECTIVE:  Chest pain is better  Wife is by the bedside  No new complaints today    Temp (24hrs), Av.7 °F (36.5 °C), Min:97 °F (36.1 °C), Max:98.5 °F (36.9 °C)    DIET: DIET CARDIAC;  CODE: Full Code    Intake/Output Summary (Last 24 hours) at 10/14/2020 1238  Last data filed at 10/13/2020 2012  Gross per 24 hour   Intake 250 ml   Output --   Net 250 ml       Review of Systems  All bolded are positive; please see HPI  General:  Fever, chills, diaphoresis, fatigue, malaise, night sweats, weight loss  Psychological:  Anxiety, disorientation, hallucinations. ENT:  Epistaxis, headaches, vertigo, visual changes. Cardiovascular:  Chest pain, irregular heartbeats, palpitations, paroxysmal nocturnal dyspnea. Respiratory:  Shortness of breath, coughing, sputum production, hemoptysis, wheezing, orthopnea.   Gastrointestinal:  Nausea, vomiting, diarrhea, heartburn, constipation, abdominal pain, hematemesis, hematochezia, melena, acholic stools  Genito-Urinary:  Dysuria, urgency, frequency, hematuria  Musculoskeletal:  Joint pain, joint stiffness, joint swelling, muscle pain  Neurology:  Headache, focal neurological deficits, weakness, numbness, paresthesia  Derm:  Rashes, ulcers, excoriations, bruising  Extremities:  Decreased ROM, peripheral edema, mottling      OBJECTIVE:    BP (!) 107/59   Pulse 77   Temp 98 °F (36.7 °C)   Resp 18   Ht 5' 8\" (1.727 m)   Wt 162 lb (73.5 kg)   SpO2 97%   BMI 24.63 kg/m²     General appearance:  awake, alert, and oriented to person, place, time, and purpose; appears stated age and cooperative; no apparent distress no labored breathing  HEENT:  Conjunctivae/corneas clear. Neck: Supple. No jugular venous distention. Respiratory: symmetrical; clear to auscultation bilaterally; no wheezes; no rhonchi; no rales  Cardiovascular: rhythm regular; rate controlled; no murmurs  Abdomen: Soft, nontender, nondistended  Extremities:  peripheral pulses present; no peripheral edema; no ulcers  Musculoskeletal: No clubbing, cyanosis, no bilateral lower extremity edema. Brisk capillary refill. Skin:  No rashes  on visible skin  Neurologic: awake, alert and following commands     ASSESSMENT and PLAN:  · Intractable abdominal pain- Transferred from [facility] for GI evaluation. · Elevated liver enzymes/transaminitis- improving. Shock liver from hypotension? Improving  · Acute on chronic renal failure- Resolved. Volume responsive prerenal MARIAH in the setting of poor intake, thiazide diuretic, and ACE inhibitor. Baseline Cr 1.3-1.5. Renal scan showing diminished perfusion and extraction and excretion, compatible with bilateral artery stenosis versus ATN versus chronic renal failure. Creatinine is at baseline around 1.5. Nephrology following. In need for heart cath soon. · NSTEMI- Newly elevated troponin 1.68 this am. Cardio following now. Continue to trend troponin  · New onset atrial fibrillation with RVR- start cardizem bolus and drip. Spoke with cardiology.    · Bilateral renal stenosis by CT abdomen- discontinue lisinopril  · Elevated proBNP- Echocardiogram 8/4/20 showing EF 60%, mild TR and FL  · Leukocytosis- resolved  · Elevated CRP and sed rate  · Hypotension  · Hypomagnesemia- resolved  · Type 2 diabetes-  sliding scale, monitor blood glucose and diabetic diet when eating      DISPOSITION: Continue current plan of care    Medications:  REVIEWED DAILY    Infusion Medications    dilTIAZem      dextrose       Scheduled Medications    [START ON 10/15/2020] enoxaparin  1 mg/kg Subcutaneous Daily    [START ON 10/15/2020] pantoprazole  40 mg Oral QAM AC    albumin human  25 g Intravenous Q8H    metoprolol tartrate  25 mg Oral BID    timolol  1 drop Left Eye BID    latanoprost  1 drop Both Eyes Nightly    aspirin  81 mg Oral Daily    isosorbide mononitrate  30 mg Oral Daily    metFORMIN  500 mg Oral BID WC    fluticasone  1 spray Each Nostril Daily    [Held by provider] pravastatin  80 mg Oral Nightly    sodium chloride flush  10 mL Intravenous 2 times per day     PRN Meds: fentanNYL, ipratropium-albuterol, perflutren lipid microspheres, glucose, dextrose, glucagon (rDNA), dextrose, sodium chloride flush, polyethylene glycol, promethazine **OR** ondansetron, potassium chloride **OR** potassium alternative oral replacement **OR** potassium chloride, magnesium sulfate, potassium chloride, melatonin    Labs:     Recent Labs     10/12/20  0723 10/13/20  0602 10/14/20  0614 10/14/20  1204   WBC 11.0 10.0 8.1  --    HGB 9.4* 9.8* 8.0* 8.2*   HCT 30.4* 29.9* 24.9* 24.7*    282 232  --        Recent Labs     10/12/20  0723 10/13/20  0602 10/14/20  0614    138 142   K 3.7 3.5 3.8    104 105   CO2 20* 23 22   BUN 34* 25* 30*   CREATININE 1.3* 1.2 1.5*   CALCIUM 8.0* 7.9* 8.5*       Recent Labs     10/12/20  0723 10/13/20  0602 10/14/20  0614   PROT 6.1* 5.9* 6.2*   ALKPHOS 74 76 63   * 301* 168*   * 82* 34   BILITOT 0.9 1.0 1.1       No results for input(s): INR in the last 72 hours.     Recent Labs     10/13/20  1144 10/13/20  1832 10/13/20  3162   CKTOTAL 67  --

## 2020-10-14 NOTE — PROGRESS NOTES
Department of Internal Medicine  Nephrology Attending Progress Note    Events reviewed. Had episode of AF, he was transferred to telemetry. SUBJECTIVE:  We are following Mr. [address] for MARIAH on CKD. Reports no major complaints. Physical Exam:    VITALS:  BP (!) 107/59   Pulse 77   Temp 98 °F (36.7 °C)   Resp 18   Ht 5' 8\" (1.727 m)   Wt 162 lb (73.5 kg)   SpO2 97%   BMI 24.63 kg/m²   24HR INTAKE/OUTPUT:      Intake/Output Summary (Last 24 hours) at 10/14/2020 1310  Last data filed at 10/13/2020 2012  Gross per 24 hour   Intake 250 ml   Output --   Net 250 ml       Constitutional: No apparent distress, appears stated age and cooperative.   HEENT: Normal cephalic, atraumatic, PERRL  Respiratory:  CTA bilaterally   Cardiovascular/Edema: Heart sounds regular   Gastrointestinal:  Soft, nontender, nondistended  Neurologic:    Neurovascularly intact without any focal sensory/motor deficits  Skin:  Normal skin color.  No rashes or lesions  Other:   + edema of lower extremities, + sacral edema     Scheduled Meds:   [START ON 10/15/2020] enoxaparin  1 mg/kg Subcutaneous Daily    [START ON 10/15/2020] pantoprazole  40 mg Oral QAM AC    albumin human  25 g Intravenous Q8H    metoprolol tartrate  25 mg Oral BID    timolol  1 drop Left Eye BID    latanoprost  1 drop Both Eyes Nightly    aspirin  81 mg Oral Daily    isosorbide mononitrate  30 mg Oral Daily    metFORMIN  500 mg Oral BID WC    fluticasone  1 spray Each Nostril Daily    [Held by provider] pravastatin  80 mg Oral Nightly    sodium chloride flush  10 mL Intravenous 2 times per day     Continuous Infusions:   dilTIAZem      dextrose       PRN Meds:.fentanNYL, ipratropium-albuterol, perflutren lipid microspheres, glucose, dextrose, glucagon (rDNA), dextrose, sodium chloride flush, polyethylene glycol, promethazine **OR** ondansetron, potassium chloride **OR** potassium alternative oral replacement **OR** potassium chloride, magnesium  IMPRESSION:               CT Angiogram Chest and Pulmonary Arteries with contrast and high resolution 2-D and 3-D images:               1. No evidence of pulmonary emboli by CT technique.               2. Small bilateral pleural effusions with dependent atelectasis.            Dictated ByEileen Ibarra MD   DD/DT: 10/10/20 1022                  Signed Jay Ibarra MD 10/10/20 1022                    : OLVIN      CT Abdomen and pelvis  W/o contrast 10/9/2020               IMPRESSION:       1.   Mild bilateral perinephric stranding is again identified (without hydronephrosis upon       bilateral assessment).               2.  Mild urinary bladder wall thickening is identified.  Surgical absence of prostate gland.               3.   Moderate colonic diverticulosis is identified (sigmoid predominance) without acute       diverticulitis.  Nondistended sigmoid colon extends into left inguinal hernia defect.               4. Pleural parenchymal scarring is again identified in right lower lobe, slightly more pronounced       than on prior examination.               5. Significant vascular calcifications are identified (including greater than 50% degree of       stenosis bilaterally in renal arteries, duplicated bilaterally).            Dictated By: Pedro Estrada MD   DD/DT: 10/09/20 0813                  Signed Ginger Bunn MD 10/09/20 0847                    :      CT Chest w/o contrast 10/13/2020   1. Corresponding to the masslike lesion seen in the right thorax on the    earlier chest x-ray is area of trapped fluid within the oblique fissure on    the right. 2. Pleural thickening and pulmonary scarring is seen along the posterior    aspect of the right lung. 3. Small bilateral pleural effusions. 4. No pulmonary mass, lymphadenopathy or metastatic disease seen.     5. 2 mm right upper lobe nodule of doubtful clinical significance.  If the    patient is at a high risk for malignancy, per the recommendations of the    Fleischner society, follow-up CT of the chest may be obtained in 12 months. Otherwise, no future follow-up is needed. 6. Severe calcified coronary atherosclerosis. 1           CXR 10/13/2020   1. Abnormal chest x-ray.  There is masslike opacity seen within the right    perihilar region measuring 6 cm x 4.2 cm and there is a 2nd rounded opacity    within the right hilum measuring 2.4 cm.  Dedicated CT of the chest is    recommended for further evaluation.                 BRIEF SUMMARY OF INITIAL CONSULT:    Briefly Prashanth Juarez 89, Dusty Herrera 89 y. o. year old male with history of CKD stage III, with mild proteinuria, baseline creatinine 1.3 to 1.5 mg/dL, HTN, type II DM, hyperlipidemia, gout, prostate cancer status post resection, emphysema, seronegative inflammatory arthritis possibly psoriatic arthritis on methotrexate, who was admitted on October 10, 2020 transferred from [facility] where he initially was admitted with abdominal pain. Probably he was hypotensive with a blood pressures loss 79/50 and he was fluid resuscitated, he was transferred for a higher level of care. His creatinine level on admission was 1.9 mg/dL reason for this consultation. His medications prior to admission included lisinopril and chlorthalidone. IMPRESSION/RECOMMENDATIONS:      1. MARIAH on CKD, volume responsive prerenal MARIAH (hypotension, thiazide diuretic, in the setting of ACE inhibition), versus ischemic ATN? Heddy  Increase creatinine last 24 hours probably hemodynamically mediated MARIAH 2/2 to hypotension. 2. CKD stage III, with mild proteinuria, secondary to early diabetic nephropathy, baseline creatinine 1.3 to 1.5 mg/dl  3. Lactic acidosis, cardiogenic shock? Metformin? 4. NSTEMI, plans for cardiac cath when renal function stable/ improved   5. PAF, on sinus now   6. Hypotension,multifactroial, cardiogenic, 2/2 to MI, AF  7.  HFpEF 60%, proBNP 29,857  8. Bilateral renal stenosis, by CT abdomen  9. Elevated LFTs, likely shock liver  10. History of prostate cancer status post surgery  11.  Macrocytic anemia,, history of B12 deficiency    Plan:    · Continue Albumin 25 g IV every 8 hours x6  · Hold metformin   · Continue to monitor renal function

## 2020-10-15 LAB
ALBUMIN SERPL-MCNC: 2.3 G/DL (ref 3.5–4.7)
ALBUMIN SERPL-MCNC: 4.1 G/DL (ref 3.5–5.2)
ALP BLD-CCNC: 66 U/L (ref 40–129)
ALPHA-1-GLOBULIN: 0.2 G/DL (ref 0.2–0.4)
ALPHA-2-GLOBULIN: 0.9 G/FL (ref 0.5–1)
ALT SERPL-CCNC: 129 U/L (ref 0–40)
ANION GAP SERPL CALCULATED.3IONS-SCNC: 18 MMOL/L (ref 7–16)
AST SERPL-CCNC: 42 U/L (ref 0–39)
BASOPHILS ABSOLUTE: 0.03 E9/L (ref 0–0.2)
BASOPHILS RELATIVE PERCENT: 0.3 % (ref 0–2)
BETA GLOBULIN: 0.8 G/DL (ref 0.8–1.3)
BILIRUB SERPL-MCNC: 1.2 MG/DL (ref 0–1.2)
BLOOD BANK DISPENSE STATUS: NORMAL
BLOOD BANK PRODUCT CODE: NORMAL
BPU ID: NORMAL
BUN BLDV-MCNC: 33 MG/DL (ref 8–23)
CALCIUM SERPL-MCNC: 8.8 MG/DL (ref 8.6–10.2)
CHLORIDE BLD-SCNC: 103 MMOL/L (ref 98–107)
CO2: 18 MMOL/L (ref 22–29)
CREAT SERPL-MCNC: 1.3 MG/DL (ref 0.7–1.2)
DESCRIPTION BLOOD BANK: NORMAL
ELECTROPHORESIS: ABNORMAL
EOSINOPHILS ABSOLUTE: 0.14 E9/L (ref 0.05–0.5)
EOSINOPHILS RELATIVE PERCENT: 1.6 % (ref 0–6)
GAMMA GLOBULIN: 1.1 G/DL (ref 0.7–1.6)
GFR AFRICAN AMERICAN: >60
GFR NON-AFRICAN AMERICAN: 53 ML/MIN/1.73
GLUCOSE BLD-MCNC: 143 MG/DL (ref 74–99)
HCT VFR BLD CALC: 23.8 % (ref 37–54)
HEMOGLOBIN: 7.9 G/DL (ref 12.5–16.5)
IMMATURE GRANULOCYTES #: 0.07 E9/L
IMMATURE GRANULOCYTES %: 0.8 % (ref 0–5)
LACTIC ACID: 1.5 MMOL/L (ref 0.5–2.2)
LACTIC ACID: 1.8 MMOL/L (ref 0.5–2.2)
LACTIC ACID: 1.9 MMOL/L (ref 0.5–2.2)
LACTIC ACID: 2.1 MMOL/L (ref 0.5–2.2)
LYMPHOCYTES ABSOLUTE: 1.72 E9/L (ref 1.5–4)
LYMPHOCYTES RELATIVE PERCENT: 19.5 % (ref 20–42)
MAGNESIUM: 1.7 MG/DL (ref 1.6–2.6)
MCH RBC QN AUTO: 35 PG (ref 26–35)
MCHC RBC AUTO-ENTMCNC: 33.2 % (ref 32–34.5)
MCV RBC AUTO: 105.3 FL (ref 80–99.9)
MONOCYTES ABSOLUTE: 1.17 E9/L (ref 0.1–0.95)
MONOCYTES RELATIVE PERCENT: 13.3 % (ref 2–12)
NEUTROPHILS ABSOLUTE: 5.68 E9/L (ref 1.8–7.3)
NEUTROPHILS RELATIVE PERCENT: 64.5 % (ref 43–80)
PDW BLD-RTO: 16 FL (ref 11.5–15)
PLATELET # BLD: 223 E9/L (ref 130–450)
PMV BLD AUTO: 11 FL (ref 7–12)
POTASSIUM REFLEX MAGNESIUM: 3.5 MMOL/L (ref 3.5–5)
RBC # BLD: 2.26 E12/L (ref 3.8–5.8)
SODIUM BLD-SCNC: 139 MMOL/L (ref 132–146)
TOTAL PROTEIN: 6.4 G/DL (ref 6.4–8.3)
WBC # BLD: 8.8 E9/L (ref 4.5–11.5)

## 2020-10-15 PROCEDURE — 2140000000 HC CCU INTERMEDIATE R&B

## 2020-10-15 PROCEDURE — 6370000000 HC RX 637 (ALT 250 FOR IP): Performed by: INTERNAL MEDICINE

## 2020-10-15 PROCEDURE — 6360000002 HC RX W HCPCS: Performed by: INTERNAL MEDICINE

## 2020-10-15 PROCEDURE — 2580000003 HC RX 258: Performed by: INTERNAL MEDICINE

## 2020-10-15 PROCEDURE — 36430 TRANSFUSION BLD/BLD COMPNT: CPT

## 2020-10-15 PROCEDURE — 80053 COMPREHEN METABOLIC PANEL: CPT

## 2020-10-15 PROCEDURE — 85025 COMPLETE CBC W/AUTO DIFF WBC: CPT

## 2020-10-15 PROCEDURE — P9047 ALBUMIN (HUMAN), 25%, 50ML: HCPCS | Performed by: INTERNAL MEDICINE

## 2020-10-15 PROCEDURE — 6370000000 HC RX 637 (ALT 250 FOR IP): Performed by: STUDENT IN AN ORGANIZED HEALTH CARE EDUCATION/TRAINING PROGRAM

## 2020-10-15 PROCEDURE — 83735 ASSAY OF MAGNESIUM: CPT

## 2020-10-15 PROCEDURE — P9016 RBC LEUKOCYTES REDUCED: HCPCS

## 2020-10-15 PROCEDURE — 36415 COLL VENOUS BLD VENIPUNCTURE: CPT

## 2020-10-15 PROCEDURE — 83605 ASSAY OF LACTIC ACID: CPT

## 2020-10-15 PROCEDURE — 2580000003 HC RX 258: Performed by: FAMILY MEDICINE

## 2020-10-15 RX ORDER — MAGNESIUM SULFATE IN WATER 40 MG/ML
2 INJECTION, SOLUTION INTRAVENOUS ONCE
Status: COMPLETED | OUTPATIENT
Start: 2020-10-15 | End: 2020-10-15

## 2020-10-15 RX ORDER — SODIUM CHLORIDE 9 MG/ML
INJECTION, SOLUTION INTRAVENOUS CONTINUOUS
Status: ACTIVE | OUTPATIENT
Start: 2020-10-15 | End: 2020-10-16

## 2020-10-15 RX ORDER — PANTOPRAZOLE SODIUM 40 MG/1
40 TABLET, DELAYED RELEASE ORAL
Status: DISCONTINUED | OUTPATIENT
Start: 2020-10-15 | End: 2020-10-19

## 2020-10-15 RX ORDER — SUCRALFATE 1 G/1
1 TABLET ORAL
Status: DISCONTINUED | OUTPATIENT
Start: 2020-10-15 | End: 2020-10-19

## 2020-10-15 RX ORDER — 0.9 % SODIUM CHLORIDE 0.9 %
20 INTRAVENOUS SOLUTION INTRAVENOUS ONCE
Status: DISCONTINUED | OUTPATIENT
Start: 2020-10-15 | End: 2020-10-19

## 2020-10-15 RX ORDER — FUROSEMIDE 10 MG/ML
40 INJECTION INTRAMUSCULAR; INTRAVENOUS ONCE
Status: COMPLETED | OUTPATIENT
Start: 2020-10-15 | End: 2020-10-15

## 2020-10-15 RX ADMIN — SUCRALFATE 1 G: 1 TABLET ORAL at 21:27

## 2020-10-15 RX ADMIN — TIMOLOL MALEATE 1 DROP: 5 SOLUTION OPHTHALMIC at 11:18

## 2020-10-15 RX ADMIN — METOPROLOL TARTRATE 25 MG: 25 TABLET, FILM COATED ORAL at 21:28

## 2020-10-15 RX ADMIN — Medication 10 ML: at 11:13

## 2020-10-15 RX ADMIN — PANTOPRAZOLE SODIUM 40 MG: 40 TABLET, DELAYED RELEASE ORAL at 05:44

## 2020-10-15 RX ADMIN — Medication 10 ML: at 21:28

## 2020-10-15 RX ADMIN — PANTOPRAZOLE SODIUM 40 MG: 40 TABLET, DELAYED RELEASE ORAL at 16:59

## 2020-10-15 RX ADMIN — SODIUM CHLORIDE 25 MG: 9 INJECTION, SOLUTION INTRAVENOUS at 21:26

## 2020-10-15 RX ADMIN — SODIUM CHLORIDE 100 MG: 9 INJECTION, SOLUTION INTRAVENOUS at 22:31

## 2020-10-15 RX ADMIN — ALBUMIN (HUMAN) 25 G: 0.25 INJECTION, SOLUTION INTRAVENOUS at 03:46

## 2020-10-15 RX ADMIN — LATANOPROST 1 DROP: 50 SOLUTION OPHTHALMIC at 21:27

## 2020-10-15 RX ADMIN — METOPROLOL TARTRATE 25 MG: 25 TABLET, FILM COATED ORAL at 11:12

## 2020-10-15 RX ADMIN — MAGNESIUM SULFATE 2 G: 2 INJECTION INTRAVENOUS at 11:13

## 2020-10-15 RX ADMIN — SUCRALFATE 1 G: 1 TABLET ORAL at 16:59

## 2020-10-15 RX ADMIN — ENOXAPARIN SODIUM 70 MG: 80 INJECTION SUBCUTANEOUS at 11:11

## 2020-10-15 RX ADMIN — ISOSORBIDE MONONITRATE 30 MG: 30 TABLET ORAL at 11:12

## 2020-10-15 RX ADMIN — ASPIRIN 81 MG: 81 TABLET, COATED ORAL at 11:11

## 2020-10-15 RX ADMIN — SODIUM CHLORIDE: 9 INJECTION, SOLUTION INTRAVENOUS at 21:26

## 2020-10-15 RX ADMIN — TIMOLOL MALEATE 1 DROP: 5 SOLUTION OPHTHALMIC at 21:27

## 2020-10-15 RX ADMIN — FUROSEMIDE 40 MG: 10 INJECTION, SOLUTION INTRAVENOUS at 16:57

## 2020-10-15 ASSESSMENT — PAIN SCALES - GENERAL
PAINLEVEL_OUTOF10: 0

## 2020-10-15 NOTE — PROGRESS NOTES
Department of Internal Medicine  Nephrology Attending Progress Note    Events reviewed. SUBJECTIVE:  We are following [address] for MARIAH on CKD. Reports no major complaints. Physical Exam:    VITALS:  /63   Pulse 74   Temp 98.5 °F (36.9 °C) (Temporal)   Resp 16   Ht 5' 8\" (1.727 m)   Wt 162 lb (73.5 kg)   SpO2 97%   BMI 24.63 kg/m²   24HR INTAKE/OUTPUT:      Intake/Output Summary (Last 24 hours) at 10/15/2020 1315  Last data filed at 10/15/2020 0805  Gross per 24 hour   Intake 180 ml   Output 850 ml   Net -670 ml       Constitutional: No apparent distress, appears stated age and cooperative.   HEENT: Normal cephalic, atraumatic, PERRL  Respiratory:  CTA bilaterally   Cardiovascular/Edema: Heart sounds regular   Gastrointestinal:  Soft, nontender, nondistended  Neurologic:    Neurovascularly intact without any focal sensory/motor deficits  Skin:  Normal skin color.  No rashes or lesions  Other:   + edema of lower extremities, + sacral edema     Scheduled Meds:   pantoprazole  40 mg Oral BID AC    sodium chloride  20 mL Intravenous Once    enoxaparin  1 mg/kg Subcutaneous Daily    metoprolol tartrate  25 mg Oral BID    timolol  1 drop Left Eye BID    latanoprost  1 drop Both Eyes Nightly    aspirin  81 mg Oral Daily    isosorbide mononitrate  30 mg Oral Daily    fluticasone  1 spray Each Nostril Daily    [Held by provider] pravastatin  80 mg Oral Nightly    sodium chloride flush  10 mL Intravenous 2 times per day     Continuous Infusions:   sodium chloride      dilTIAZem      dextrose       PRN Meds:.fentanNYL, ipratropium-albuterol, glucose, dextrose, glucagon (rDNA), dextrose, sodium chloride flush, polyethylene glycol, promethazine **OR** ondansetron, potassium chloride **OR** potassium alternative oral replacement **OR** potassium chloride, magnesium sulfate, potassium chloride, melatonin    DATA:    CBC:   Lab Results   Component Value Date    WBC 8.8 10/15/2020    RBC 2.26 10/15/2020    HGB 7.9 10/15/2020    HCT 23.8 10/15/2020    .3 10/15/2020    MCH 35.0 10/15/2020    MCHC 33.2 10/15/2020    RDW 16.0 10/15/2020     10/15/2020    MPV 11.0 10/15/2020     CMP:    Lab Results   Component Value Date     10/15/2020    K 3.5 10/15/2020     10/15/2020    CO2 18 10/15/2020    BUN 33 10/15/2020    CREATININE 1.3 10/15/2020    GFRAA >60 10/15/2020    AGRATIO 0.9 10/10/2020    LABGLOM 53 10/15/2020    GLUCOSE 143 10/15/2020    PROT 6.4 10/15/2020    LABALBU 4.1 10/15/2020    CALCIUM 8.8 10/15/2020    BILITOT 1.2 10/15/2020    ALKPHOS 66 10/15/2020    AST 42 10/15/2020     10/15/2020     Magnesium:    Lab Results   Component Value Date    MG 1.7 10/15/2020     Phosphorus:  No results found for: PHOS     Urine sodium: 71  Urine potassium: 51.9  Urine chloride: 68  Urine creatinine: 118  Urine albumin/creatinine: 184 mg/grams  Urine protein/creatinine: 0.5 mg/grams    proBNP: 29,713      Radiology Review:      Nuclear medicine clinic with flow and function without pharmacological intervention October 12, 2020   Findings:         The patient was injected with 10 mCi of technetium MAG3 intravenously              Split uptake on the left was 47.8  percent, time to peak was 4.5    minutes, one half clearance time was 18.9         Split uptake on the right was 52.2 percent, time to peak was 0.5    minutes, one half clearance time was 23.4 minutes         Renal curves demonstrate perfusion to be delayed. Extraction was delayed. Excretion was delayed.     Lasix was not administered         Impression:  Diminished perfusion and extraction and excretion, the findings are    compatible with bilateral renal artery stenosis versus ATN versus    chronic renal failure                CTA pulmonary with IV contrast October 10, 2020                    IMPRESSION:               CT Angiogram Chest and Pulmonary Arteries with contrast and high resolution 2-D and 3-D images:            1. No evidence of pulmonary emboli by CT technique.               2. Small bilateral pleural effusions with dependent atelectasis.            Dictated BySebastian Vazquez MD   DD/DT: 10/10/20 1022                  Signed Zack Vazquez MD 10/10/20 1022                    : OLVIN      CT Abdomen and pelvis  W/o contrast 10/9/2020               IMPRESSION:       1.   Mild bilateral perinephric stranding is again identified (without hydronephrosis upon       bilateral assessment).               2.  Mild urinary bladder wall thickening is identified.  Surgical absence of prostate gland.               3.   Moderate colonic diverticulosis is identified (sigmoid predominance) without acute       diverticulitis.  Nondistended sigmoid colon extends into left inguinal hernia defect.               4. Pleural parenchymal scarring is again identified in right lower lobe, slightly more pronounced       than on prior examination.               5. Significant vascular calcifications are identified (including greater than 50% degree of       stenosis bilaterally in renal arteries, duplicated bilaterally).            Dictated By: Calin Vela MD   DD/DT: 10/09/20 0813                  Signed ByBelinda Skiff MD 10/09/20 0847                    :      CT Chest w/o contrast 10/13/2020   1. Corresponding to the masslike lesion seen in the right thorax on the    earlier chest x-ray is area of trapped fluid within the oblique fissure on    the right. 2. Pleural thickening and pulmonary scarring is seen along the posterior    aspect of the right lung. 3. Small bilateral pleural effusions. 4. No pulmonary mass, lymphadenopathy or metastatic disease seen. 5. 2 mm right upper lobe nodule of doubtful clinical significance.  If the    patient is at a high risk for malignancy, per the recommendations of the    Fleischner society, follow-up CT of the chest may be obtained in 12 months. Otherwise, no future follow-up is needed. 6. Severe calcified coronary atherosclerosis. 1           CXR 10/13/2020   1. Abnormal chest x-ray.  There is masslike opacity seen within the right    perihilar region measuring 6 cm x 4.2 cm and there is a 2nd rounded opacity    within the right hilum measuring 2.4 cm.  Dedicated CT of the chest is    recommended for further evaluation.                 BRIEF SUMMARY OF INITIAL CONSULT:    Briefly Prashanth Juarez 89, [id number] y. o. year old male with history of CKD stage III, with mild proteinuria, baseline creatinine 1.3 to 1.5 mg/dL, HTN, type II DM, hyperlipidemia, gout, prostate cancer status post resection, emphysema, seronegative inflammatory arthritis possibly psoriatic arthritis on methotrexate, who was admitted on October 10, 2020 transferred from [facility] where he initially was admitted with abdominal pain. Probably he was hypotensive with a blood pressures loss 79/50 and he was fluid resuscitated, he was transferred for a higher level of care. His creatinine level on admission was 1.9 mg/dL reason for this consultation. His medications prior to admission included lisinopril and chlorthalidone. IMPRESSION/RECOMMENDATIONS:      1. MARIAH on CKD, volume responsive prerenal MARIAH (hypotension, thiazide diuretic, in the setting of ACE inhibition), versus ischemic ATN? Dann Bullock Increase creatinine last 24 hours probably hemodynamically mediated MARIAH 2/2 to hypotension. Renal function improved, creatinine down to 1.3 mg/dL. Presently volume overload with large edema, will restart diuresis. 2. CKD stage III, with mild proteinuria, secondary to early diabetic nephropathy, baseline creatinine 1.3 to 1.5 mg/dl  3. HAGMA, lactic acidosis and probably ketoacidosis  4. Lactic acidosis, cardiogenic shock? Metformin?,  Levels improved. 5. NSTEMI, plans for cardiac cath when renal function stable/ improved   6.  PAF, on sinus now 7. Hypotension,multifactroial, cardiogenic, 2/2 to MI, AF  8. HFpEF 60%, proBNP 29,713, needs diuresis  9. Bilateral renal stenosis, by CT abdomen  10. Elevated LFTs, likely shock liver  11. History of prostate cancer status post surgery  12.  Macrocytic anemia, history of B12 deficiency and iron deficiency    Plan:    · Lasix 40 mg IV x1  · Continue Albumin 25 g IV every 8 hours x6  · Transfuse 1 unit of packed red blood cells  · Start IV iron  · Continue to monitor renal function

## 2020-10-15 NOTE — PROGRESS NOTES
[facility] Daily progress note        Name: Reji Slater    Age: 66 y.o. Date of Admission: 10/10/2020  5:46 PM    Date of Service: 10/15/2020    Reason for Consultation: Elevated troponin, atrial fibrillation    Chief Complaint: Abdominal pain    Referring Physician: Dr. Caryle Nanny    History of Present Illness: The patient is a 66y.o. year old male who was transferred from [place] due to right-sided abdominal pain, elevated LFTs and leukocytosis. Describes right abdominal pain that can radiate to the right chest and shoulder, occurring at rest and not associated with nausea, vomiting or diaphoresis. RRT yesterday with hypotension and paroxysmal atrial fibrillation, now in sinus rhythm. Creatinine increased from 1.2-1.9 and is now 1.5. Nuclear scan of kidney suggests bilateral renal artery stenosis. LFTs improving, and WBC normal.  Troponin has increased to 2.4. Hemoglobin on admission 10 and has decreased to 8 this morning. He is currently pain-free and denies shortness of breath. No PND/orthopnea. Blood pressure stable and SPO2 normal on room air    Dr. Kath Metzger office notes reviewed and patient underwent a Lexiscan stress test August 2020 that showed LVEF 68% with mild inferoapical ischemia, and was treated conservatively. An echocardiogram confirmed LVEF 60% with mild TR and mild pulmonary hypertension. History of right carotid endarterectomy, hypertension, hyperlipidemia, diabetes and COPD    10/15/2020 interim history  No overnight cardiac issues. Denies chest pain, palpitations. No PND/orthopnea.     Telemetry: Sinus rhythm  Creatinine 1.3 and at baseline  Hemoglobin 7.9    Past Medical History:   Past Medical History:   Diagnosis Date    Carotid artery stenosis     Cholecystitis     Choledocholithiasis     Cholelithiasis     Colon polyps     Essential hypertension 5/23/2019    Gout     Hiatal hernia     History of laparoscopic cholecystectomy     Hyperlipidemia 5/23/2019    Inflammatory polyarthropathy ([facility] 5/23/2019    Malignant tumor of prostate ([facility]     Osteoarthritis     Pancreatitis     Prostate CA ([facility] 5/23/2019    Pulmonary emphysema (HCC)     Pulmonary nodules     Type 2 diabetes mellitus without complication, without long-term current use of insulin ([facility] 5/23/2019    Ventral hernia        Past Surgical History:  History reviewed. No pertinent surgical history. Family History:  History reviewed. No pertinent family history. Social History:  Social History     Socioeconomic History    Marital status:      Spouse name: Not on file    Number of children: Not on file    Years of education: Not on file    Highest education level: Not on file   Occupational History     Employer: RETIRED   Social Needs    Financial resource strain: Not on file    Food insecurity     Worry: Not on file     Inability: Not on file   [employer] needs     Medical: Not on file     Non-medical: Not on file   Tobacco Use    Smoking status: Never Smoker    Smokeless tobacco: Never Used   Substance and Sexual Activity    Alcohol use: Not Currently    Drug use: Never    Sexual activity: Yes     Partners: Female   Lifestyle    Physical activity     Days per week: Not on file     Minutes per session: Not on file    Stress: Not on file   Relationships    Social connections     Talks on phone: Not on file     Gets together: Not on file     Attends Hinduism service: Not on file     Active member of club or organization: Not on file     Attends meetings of clubs or organizations: Not on file     Relationship status: Not on file    Intimate partner violence     Fear of current or ex partner: Not on file     Emotionally abused: Not on file     Physically abused: Not on file     Forced sexual activity: Not on file   Other Topics Concern    Not on file   Social History Narrative    Not on file       Allergies:   Allergies   Allergen Reactions    Methotrexate Derivatives      Caused Blisters       Home Meds:  Prior to Admission medications    Medication Sig Start Date End Date Taking?  Authorizing Provider   timolol (TIMOPTIC) 0.5 % ophthalmic solution Place 1 drop into the left eye 2 times daily   Yes Historical Provider, MD   ferrous sulfate (IRON 325) 325 (65 Fe) MG tablet Take 325 mg by mouth every other day   Yes Historical Provider, MD   vitamin B-12 (CYANOCOBALAMIN) 1000 MCG tablet Take 1,000 mcg by mouth 2 times daily   Yes Historical Provider, MD   isosorbide dinitrate (ISORDIL) 30 MG tablet Take 30 mg by mouth daily   Yes Historical Provider, MD   allopurinol (ZYLOPRIM) 300 MG tablet Take 1 tablet by mouth daily Hold until pt is ready 9/2/20  Yes Sourav Hobbs MD   chlorthalidone (HYGROTON) 25 MG tablet Take 1 tablet by mouth daily Hold until pt is ready 9/2/20  Yes Sourav Hobbs MD   lisinopril (PRINIVIL;ZESTRIL) 40 MG tablet Take 1 tablet by mouth daily Hold until pt is ready 9/2/20  Yes Sourav Hobbs MD   metFORMIN (GLUCOPHAGE) 500 MG tablet 1 po q am, 2 po q pm Hold until pt is ready 9/2/20  Yes Sourav Hobbs MD   pravastatin (PRAVACHOL) 80 MG tablet Take 1 tablet by mouth daily Hold until pt is ready 9/2/20  Yes Sourav Hobbs MD   fluticasone [facility]) 50 MCG/ACT nasal spray 2 sprays by Each Nostril route daily 9/2/20  Yes Sourav Hobbs MD   Omega-3 Fatty Acids (FISH OIL) 1200 MG CAPS Take by mouth daily   Yes Historical Provider, MD   aspirin 81 MG EC tablet Take by mouth   Yes Historical Provider, MD   blood glucose test strips (ONE TOUCH ULTRA TEST) strip  12/14/15  Yes Historical Provider, MD   vitamin E 100 units capsule Take by mouth   Yes Historical Provider, MD [place] 0.01 % SOLN ophthalmic drops  5/18/19  Yes Historical Provider, MD   vitamin D (CHOLECALCIFEROL) 1000 UNIT TABS tablet Take by mouth   Yes Historical Provider, MD   folic acid (FOLVITE) 1 MG tablet Take 1 mg by mouth daily  4/25/19  Yes Historical Provider, MD       Current per day Dean Hashimoto, DO   10 mL at 10/1945    sodium chloride flush 0.9 % injection 10 mL  10 mL Intravenous PRN Deanan Hashimoto, DO        polyethylene glycol (GLYCOLAX) packet 17 g  17 g Oral Daily PRN Dean Hashimoto, DO        promethazine (PHENERGAN) tablet 12.5 mg  12.5 mg Oral Q6H PRN Dean Hashimoto, DO        Or    ondansetron [facility]) injection 4 mg  4 mg Intravenous Q6H PRN Dean Hashimoto, DO        potassium chloride (KLOR-CON M) extended release tablet 40 mEq  40 mEq Oral PRN Dean Hashimoto, DO        Or    potassium bicarb-citric acid (EFFER-K) effervescent tablet 40 mEq  40 mEq Oral PRN Dean Hashimoto, DO        Or    potassium chloride 10 mEq/100 mL IVPB (Peripheral Line)  10 mEq Intravenous PRN Deanan Hashimoto, DO        magnesium sulfate 2 g in 50 mL IVPB premix  2 g Intravenous PRN Dean Hashimoto, DO        potassium chloride 10 mEq/100 mL IVPB (Peripheral Line)  10 mEq Intravenous PRN Deanan Hashimoto, DO        melatonin tablet 3 mg  3 mg Oral Nightly PRN Catracho Neil MD   3 mg at 10/14/20 2306      dilTIAZem      dextrose         Review of Systems:   Constitutional: No fever, chills, sweats  Cardiac: As per HPI  Pulmonary: No cough, wheeze, hemoptysis  HEENT: No visual disturbances or difficult swallowing  GI: No nausea, vomiting, diarrhea,   : No dysuria or hematuria  Endocrine: No excessive thirst, heat or cold intolerance. Musculoskeletal: No joint pain or muscle aches. No claudication  Skin: No skin breakdown or rashes  Neuro: No headache, confusion, or seizures  Psych: No depression, anxiety    Physical Exam:  /69   Pulse 75   Temp 98 °F (36.7 °C) (Infrared)   Resp 18   Ht 5' 8\" (1.727 m)   Wt 162 lb (73.5 kg)   SpO2 96%   BMI 24.63 kg/m²   Weight change:    Wt Readings from Last 3 Encounters:   10/10/20 162 lb (73.5 kg)   09/02/20 159 lb 12.8 oz (72.5 kg)   07/24/20 162 lb 6.4 oz (73.7 kg)       General: Awake, alert, oriented x3, no acute distress    HEENT: Normocephalic and atraumatic, pupils equal and round. Sclera nonicteric and oral mucosa moist.  Tongue and trachea midline. Neck: No JVD or bruits. No thyroid enlargement or adenopathy    Cardiac: Regular rate and rhythm, normal S1 and S2, no S3. Apical impulse is normal.  No murmurs, rubs or clicks. No carotid bruits and no abdominal bruits. No peripheral edema, cyanosis or clubbing. Normal pulses in the upper and lower extremities bilaterally. Resp: Unlabored respirations at rest.  No wheezes, rales or rhonchi. Abdomen: soft, nontender, nondistended, no gross organomegaly or mass    Skin: Warm and dry, no cyanosis. Musculoskeletal: normal tone and strength in the upper and lower extremities bilaterally    Neuro: Moves all extremities appropriately to command.   Normal sensation to light touch in the upper and lower extremities bilaterally    Psych: Cooperative, and normal affect    Intake/Output:    Intake/Output Summary (Last 24 hours) at 10/15/2020 0858  Last data filed at 10/15/2020 0805  Gross per 24 hour   Intake 180 ml   Output 850 ml   Net -670 ml     I/O this shift:  In: -   Out: 200 [Urine:200]    Laboratory Tests:  Lab Results   Component Value Date    CREATININE 1.3 (H) 10/15/2020    BUN 33 (H) 10/15/2020     10/15/2020    K 3.5 10/15/2020     10/15/2020    CO2 18 (L) 10/15/2020     Recent Labs     10/13/20  1144 10/13/20  1832 10/13/20  2328   CKTOTAL 67  --   --    CKMB 4.2  --   --    TROPONINI 1.68* 2.23* 2.41*     Lab Results   Component Value Date    PROBNP 29,713 (H) 10/12/2020     Lab Results   Component Value Date    WBC 8.8 10/15/2020    RBC 2.26 10/15/2020    HGB 7.9 10/15/2020    HCT 23.8 10/15/2020    .3 10/15/2020    MCH 35.0 10/15/2020    MCHC 33.2 10/15/2020    RDW 16.0 10/15/2020     10/15/2020    MPV 11.0 10/15/2020     Recent Labs     10/13/20  0602 10/14/20  0614 10/15/20  0736   ALKPHOS 76 63 66   * 168* 129*   AST 82* 34 42*   PROT 5. 9* 6.2* 6.4   BILITOT 1.0 1.1 1.2   LABALBU 2.5* 3.7 4.1     Lab Results   Component Value Date    MG 1.7 10/15/2020     No results found for: PROTIME, INR  Lab Results   Component Value Date    TSH 1.26 08/03/2020     No components found for: CHLPL  Lab Results   Component Value Date    TRIG 125 05/01/2020    TRIG 163 07/19/2019     Lab Results   Component Value Date    HDL 44 05/01/2020    HDL 43 07/19/2019     Lab Results   Component Value Date    LDLCALC 93 07/19/2019     CTA of chest, CT of chest, chest x-ray, EKGs and telemetry independently reviewed:  CTA: Negative for PE with no pleural or pericardial effusions and unremarkable thoracic aorta. EKG yesterday, sinus/PAF. EKG #2: Sinus rhythm with subtle lateral ST depression. Telemetry: Sinus rhythm   Noncontrast chest CT: \"Trapped fluid\" within the right oblique fissure       Echo summary   Ejection fraction is visually estimated at 45-50% with mild global   hypokinesia. Stage I diastolic dysfunction. Mild tricuspid regurgitation. Pulmonary hypertension is mild . Signature      ----------------------------------------------------------------   Electronically signed by Clyde Benjamin MD(Interpreting   physician) on 10/15/2020 09:09 AM   ----------------------------------------------------------------         Active Hospital Problems    Diagnosis    Non-STEMI (non-ST elevated myocardial infarction) ([facility] [I21.4]    Paroxysmal atrial fibrillation ([facility] [I48.0]    Anemia [D64.9]    History of right-sided carotid endarterectomy [Z98.890]    Coronary artery disease [I25.10]    Acute kidney injury superimposed on chronic kidney disease [facility] [N17.9, N18.9]    Hypotension [I95.9]    Transaminitis [R74.01]             ASSESSMENT / PLAN:   #1.  Non-STEMI and is currently stable with no postinfarct angina, heart failure or recurrent atrial arrhythmias. Mild LV dysfunction on echo. Would recommend delayed heart catheterization tomorrow.   With

## 2020-10-15 NOTE — PROGRESS NOTES
Hospitalist Progress Note      SYNOPSIS: Patient admitted on 10/10/2020 for abdominal pain in the right upper quadrant, radiate to the back.   Patient had been admitted to [facility] where another work-up was obtained. Siddharth Vance things were looking pretty unremarkable until today.  LFTs were initially normal and increased into the 500-600 range.  Also noted to have magnesium 1.0. Creatinine increased from 1.4 to 1.9.  WBC evaded at 18,000. patient was transferred to [facility] for further work-up and gastroenterological consultation. Patient had an episode of A. fib with RVR required RRT, was found to have positive troponin that are trending up. Surgical evaluation showed stool stability heme negative, patient will be receiving a blood transfusion today per request of cardiology and is scheduled for cardiac cath tomorrow. SUBJECTIVE: Patient denies any abdominal pain today, indicates he has hemorrhoids that have bled in the past but not recently, he has chronic irritation in the area of the hemorrhoids. Patient seen and examined  Records reviewed. Stable overnight. No other overnight issues reported. Temp (24hrs), Av.4 °F (36.9 °C), Min:97.5 °F (36.4 °C), Max:99.6 °F (37.6 °C)    DIET: DIET CARDIAC; Diet NPO, After Midnight Exceptions are: Sips with Meds  CODE: Full Code    Intake/Output Summary (Last 24 hours) at 10/15/2020 1305  Last data filed at 10/15/2020 0805  Gross per 24 hour   Intake 180 ml   Output 850 ml   Net -670 ml       OBJECTIVE:    /63   Pulse 74   Temp 98.5 °F (36.9 °C) (Temporal)   Resp 16   Ht 5' 8\" (1.727 m)   Wt 162 lb (73.5 kg)   SpO2 97%   BMI 24.63 kg/m²     General appearance: No apparent distress, appears stated age and cooperative. HEENT:  Conjunctivae/corneas clear. Neck: Supple. No jugular venous distention.    Respiratory: Clear to auscultation bilaterally, normal respiratory effort  Cardiovascular: Regular rate rhythm, normal S1-S2  Abdomen: Soft, nontender, nondistended  Musculoskeletal: No clubbing, cyanosis, no bilateral lower extremity edema. Brisk capillary refill. Skin:  No rashes  on visible skin  Neurologic: awake, alert and following commands     ASSESSMENT:  Elevated troponin with trending up levels  New onset atrial fibrillation with RVR  Chronic anemia with modest decline in H&H recently. History abdominal pain with extensive work-up in the past  History of hemorrhoids         PLAN:  Blood transfusion  Monitor H&H  Discussed with the patient and his family in the room with him. Patient will require further GI evaluation after completing cardiac work-up. Continue PPI  We will add sucralfate and patient to follow gastric diet. Would like to discontinue aspirin but with no evidence of active hemorrhage we will continue to monitor on aspirin.     Medications:  REVIEWED DAILY    Infusion Medications    sodium chloride      dilTIAZem      dextrose       Scheduled Medications    pantoprazole  40 mg Oral BID AC    sodium chloride  20 mL Intravenous Once    enoxaparin  1 mg/kg Subcutaneous Daily    metoprolol tartrate  25 mg Oral BID    timolol  1 drop Left Eye BID    latanoprost  1 drop Both Eyes Nightly    aspirin  81 mg Oral Daily    isosorbide mononitrate  30 mg Oral Daily    fluticasone  1 spray Each Nostril Daily    [Held by provider] pravastatin  80 mg Oral Nightly    sodium chloride flush  10 mL Intravenous 2 times per day     PRN Meds: fentanNYL, ipratropium-albuterol, glucose, dextrose, glucagon (rDNA), dextrose, sodium chloride flush, polyethylene glycol, promethazine **OR** ondansetron, potassium chloride **OR** potassium alternative oral replacement **OR** potassium chloride, magnesium sulfate, potassium chloride, melatonin    Labs:     Recent Labs     10/13/20  0602 10/14/20  0614 10/14/20  1204 10/15/20  0736   WBC 10.0 8.1  --  8.8   HGB 9.8* 8.0* 8.2* 7.9*   HCT 29.9* 24.9* 24.7* 23.8*   PLT 282 232  --  223       Recent Labs     10/13/20  0602 10/14/20  0614 10/15/20  0736    142 139   K 3.5 3.8 3.5    105 103   CO2 23 22 18*   BUN 25* 30* 33*   CREATININE 1.2 1.5* 1.3*   CALCIUM 7.9* 8.5* 8.8       Recent Labs     10/13/20  0602 10/14/20  0614 10/15/20  0736   PROT 5.9* 6.2* 6.4   ALKPHOS 76 63 66   * 168* 129*   AST 82* 34 42*   BILITOT 1.0 1.1 1.2       No results for input(s): INR in the last 72 hours. Recent Labs     10/13/20  1144 10/13/20  1832 10/13/20  2328   CKTOTAL 67  --   --    TROPONINI 1.68* 2.23* 2.41*       Chronic labs:    Lab Results   Component Value Date    CHOL 144 05/01/2020    TRIG 125 05/01/2020    HDL 44 05/01/2020    LDLCALC 93 07/19/2019    TSH 1.26 08/03/2020    LABA1C 6.2 (H) 10/12/2020       Radiology: REVIEWED DAILY    +++++++++++++++++++++++++++++++++++++++++++++++++  Johan Bojorquez Physician - Hospitalist  [address], [place]  +++++++++++++++++++++++++++++++++++++++++++++++++  NOTE: This report was transcribed using voice recognition software. Every effort was made to ensure accuracy; however, inadvertent computerized transcription errors may be present.

## 2020-10-15 NOTE — CARE COORDINATION
Tranisition of care . The plan is for the patient to return home once he is stable for discharge as he was independent pta and able to drive himself.  I will follow

## 2020-10-16 ENCOUNTER — APPOINTMENT (OUTPATIENT)
Dept: ULTRASOUND IMAGING | Age: 75
DRG: 233 | End: 2020-10-16
Attending: FAMILY MEDICINE
Payer: MEDICARE

## 2020-10-16 LAB
ALBUMIN SERPL-MCNC: 3.8 G/DL (ref 3.5–5.2)
ALP BLD-CCNC: 67 U/L (ref 40–129)
ALT SERPL-CCNC: 97 U/L (ref 0–40)
ANION GAP SERPL CALCULATED.3IONS-SCNC: 15 MMOL/L (ref 7–16)
AST SERPL-CCNC: 23 U/L (ref 0–39)
BACTERIA: NORMAL /HPF
BASOPHILS ABSOLUTE: 0.05 E9/L (ref 0–0.2)
BASOPHILS RELATIVE PERCENT: 0.5 % (ref 0–2)
BILIRUB SERPL-MCNC: 1.2 MG/DL (ref 0–1.2)
BILIRUBIN URINE: NEGATIVE
BLOOD CULTURE, ROUTINE: NORMAL
BLOOD, URINE: NORMAL
BUN BLDV-MCNC: 36 MG/DL (ref 8–23)
CALCIUM SERPL-MCNC: 8.7 MG/DL (ref 8.6–10.2)
CHLORIDE BLD-SCNC: 103 MMOL/L (ref 98–107)
CLARITY: CLEAR
CO2: 22 MMOL/L (ref 22–29)
COLOR: YELLOW
CREAT SERPL-MCNC: 1.4 MG/DL (ref 0.7–1.2)
EOSINOPHILS ABSOLUTE: 0.29 E9/L (ref 0.05–0.5)
EOSINOPHILS RELATIVE PERCENT: 2.8 % (ref 0–6)
GFR AFRICAN AMERICAN: 59
GFR NON-AFRICAN AMERICAN: 49 ML/MIN/1.73
GLUCOSE BLD-MCNC: 123 MG/DL (ref 74–99)
GLUCOSE URINE: NEGATIVE MG/DL
HCT VFR BLD CALC: 28.6 % (ref 37–54)
HEMOGLOBIN: 9.4 G/DL (ref 12.5–16.5)
IMMATURE GRANULOCYTES #: 0.12 E9/L
IMMATURE GRANULOCYTES %: 1.2 % (ref 0–5)
KETONES, URINE: NEGATIVE MG/DL
LACTIC ACID: 1 MMOL/L (ref 0.5–2.2)
LACTIC ACID: 1.2 MMOL/L (ref 0.5–2.2)
LACTIC ACID: 1.7 MMOL/L (ref 0.5–2.2)
LEUKOCYTE ESTERASE, URINE: NEGATIVE
LYMPHOCYTES ABSOLUTE: 1.63 E9/L (ref 1.5–4)
LYMPHOCYTES RELATIVE PERCENT: 16 % (ref 20–42)
MAGNESIUM: 1.7 MG/DL (ref 1.6–2.6)
MCH RBC QN AUTO: 33.2 PG (ref 26–35)
MCHC RBC AUTO-ENTMCNC: 32.9 % (ref 32–34.5)
MCV RBC AUTO: 101.1 FL (ref 80–99.9)
MONOCYTES ABSOLUTE: 1.19 E9/L (ref 0.1–0.95)
MONOCYTES RELATIVE PERCENT: 11.7 % (ref 2–12)
NEUTROPHILS ABSOLUTE: 6.92 E9/L (ref 1.8–7.3)
NEUTROPHILS RELATIVE PERCENT: 67.8 % (ref 43–80)
NITRITE, URINE: NEGATIVE
PDW BLD-RTO: 19.5 FL (ref 11.5–15)
PH UA: 5 (ref 5–9)
PLATELET # BLD: 234 E9/L (ref 130–450)
PMV BLD AUTO: 11.1 FL (ref 7–12)
POTASSIUM REFLEX MAGNESIUM: 3.4 MMOL/L (ref 3.5–5)
PROTEIN UA: NORMAL MG/DL
RBC # BLD: 2.83 E12/L (ref 3.8–5.8)
RBC UA: NORMAL /HPF (ref 0–2)
SODIUM BLD-SCNC: 140 MMOL/L (ref 132–146)
SPECIFIC GRAVITY UA: 1.02 (ref 1–1.03)
TOTAL PROTEIN: 6.4 G/DL (ref 6.4–8.3)
UROBILINOGEN, URINE: 0.2 E.U./DL
WBC # BLD: 10.2 E9/L (ref 4.5–11.5)
WBC UA: NORMAL /HPF (ref 0–5)

## 2020-10-16 PROCEDURE — 93458 L HRT ARTERY/VENTRICLE ANGIO: CPT | Performed by: INTERNAL MEDICINE

## 2020-10-16 PROCEDURE — 2580000003 HC RX 258: Performed by: INTERNAL MEDICINE

## 2020-10-16 PROCEDURE — 83605 ASSAY OF LACTIC ACID: CPT

## 2020-10-16 PROCEDURE — 36415 COLL VENOUS BLD VENIPUNCTURE: CPT

## 2020-10-16 PROCEDURE — 6370000000 HC RX 637 (ALT 250 FOR IP): Performed by: INTERNAL MEDICINE

## 2020-10-16 PROCEDURE — 6370000000 HC RX 637 (ALT 250 FOR IP): Performed by: STUDENT IN AN ORGANIZED HEALTH CARE EDUCATION/TRAINING PROGRAM

## 2020-10-16 PROCEDURE — 2140000000 HC CCU INTERMEDIATE R&B

## 2020-10-16 PROCEDURE — 2709999900 HC NON-CHARGEABLE SUPPLY

## 2020-10-16 PROCEDURE — 99222 1ST HOSP IP/OBS MODERATE 55: CPT | Performed by: THORACIC SURGERY (CARDIOTHORACIC VASCULAR SURGERY)

## 2020-10-16 PROCEDURE — C1769 GUIDE WIRE: HCPCS

## 2020-10-16 PROCEDURE — 93880 EXTRACRANIAL BILAT STUDY: CPT

## 2020-10-16 PROCEDURE — 87081 CULTURE SCREEN ONLY: CPT

## 2020-10-16 PROCEDURE — B2111ZZ FLUOROSCOPY OF MULTIPLE CORONARY ARTERIES USING LOW OSMOLAR CONTRAST: ICD-10-PCS | Performed by: INTERNAL MEDICINE

## 2020-10-16 PROCEDURE — 94375 RESPIRATORY FLOW VOLUME LOOP: CPT

## 2020-10-16 PROCEDURE — 4A023N7 MEASUREMENT OF CARDIAC SAMPLING AND PRESSURE, LEFT HEART, PERCUTANEOUS APPROACH: ICD-10-PCS | Performed by: INTERNAL MEDICINE

## 2020-10-16 PROCEDURE — 6370000000 HC RX 637 (ALT 250 FOR IP)

## 2020-10-16 PROCEDURE — 6360000002 HC RX W HCPCS

## 2020-10-16 PROCEDURE — 93970 EXTREMITY STUDY: CPT

## 2020-10-16 PROCEDURE — 2500000003 HC RX 250 WO HCPCS

## 2020-10-16 PROCEDURE — 93880 EXTRACRANIAL BILAT STUDY: CPT | Performed by: RADIOLOGY

## 2020-10-16 PROCEDURE — 94729 DIFFUSING CAPACITY: CPT | Performed by: INTERNAL MEDICINE

## 2020-10-16 PROCEDURE — 80053 COMPREHEN METABOLIC PANEL: CPT

## 2020-10-16 PROCEDURE — 85025 COMPLETE CBC W/AUTO DIFF WBC: CPT

## 2020-10-16 PROCEDURE — 87088 URINE BACTERIA CULTURE: CPT

## 2020-10-16 PROCEDURE — 94010 BREATHING CAPACITY TEST: CPT | Performed by: INTERNAL MEDICINE

## 2020-10-16 PROCEDURE — C1887 CATHETER, GUIDING: HCPCS

## 2020-10-16 PROCEDURE — 81001 URINALYSIS AUTO W/SCOPE: CPT

## 2020-10-16 PROCEDURE — 6360000002 HC RX W HCPCS: Performed by: INTERNAL MEDICINE

## 2020-10-16 PROCEDURE — 93970 EXTREMITY STUDY: CPT | Performed by: RADIOLOGY

## 2020-10-16 PROCEDURE — 94729 DIFFUSING CAPACITY: CPT

## 2020-10-16 PROCEDURE — C1894 INTRO/SHEATH, NON-LASER: HCPCS

## 2020-10-16 PROCEDURE — 83735 ASSAY OF MAGNESIUM: CPT

## 2020-10-16 RX ORDER — POTASSIUM CHLORIDE 20 MEQ/1
40 TABLET, EXTENDED RELEASE ORAL 2 TIMES DAILY WITH MEALS
Status: COMPLETED | OUTPATIENT
Start: 2020-10-16 | End: 2020-10-16

## 2020-10-16 RX ORDER — FUROSEMIDE 10 MG/ML
40 INJECTION INTRAMUSCULAR; INTRAVENOUS ONCE
Status: COMPLETED | OUTPATIENT
Start: 2020-10-16 | End: 2020-10-16

## 2020-10-16 RX ADMIN — PANTOPRAZOLE SODIUM 40 MG: 40 TABLET, DELAYED RELEASE ORAL at 06:23

## 2020-10-16 RX ADMIN — TIMOLOL MALEATE 1 DROP: 5 SOLUTION OPHTHALMIC at 10:08

## 2020-10-16 RX ADMIN — ASPIRIN 81 MG: 81 TABLET, COATED ORAL at 10:07

## 2020-10-16 RX ADMIN — METOPROLOL TARTRATE 25 MG: 25 TABLET, FILM COATED ORAL at 20:51

## 2020-10-16 RX ADMIN — Medication 10 ML: at 20:52

## 2020-10-16 RX ADMIN — Medication 10 ML: at 10:08

## 2020-10-16 RX ADMIN — SUCRALFATE 1 G: 1 TABLET ORAL at 06:23

## 2020-10-16 RX ADMIN — METOPROLOL TARTRATE 25 MG: 25 TABLET, FILM COATED ORAL at 10:07

## 2020-10-16 RX ADMIN — TIMOLOL MALEATE 1 DROP: 5 SOLUTION OPHTHALMIC at 20:52

## 2020-10-16 RX ADMIN — SUCRALFATE 1 G: 1 TABLET ORAL at 11:49

## 2020-10-16 RX ADMIN — LATANOPROST 1 DROP: 50 SOLUTION OPHTHALMIC at 20:52

## 2020-10-16 RX ADMIN — ISOSORBIDE MONONITRATE 30 MG: 30 TABLET ORAL at 10:07

## 2020-10-16 RX ADMIN — SUCRALFATE 1 G: 1 TABLET ORAL at 20:51

## 2020-10-16 RX ADMIN — FUROSEMIDE 40 MG: 10 INJECTION, SOLUTION INTRAMUSCULAR; INTRAVENOUS at 10:08

## 2020-10-16 RX ADMIN — POTASSIUM CHLORIDE 40 MEQ: 1500 TABLET, EXTENDED RELEASE ORAL at 18:16

## 2020-10-16 RX ADMIN — FLUTICASONE PROPIONATE 1 SPRAY: 50 SPRAY, METERED NASAL at 10:08

## 2020-10-16 RX ADMIN — PANTOPRAZOLE SODIUM 40 MG: 40 TABLET, DELAYED RELEASE ORAL at 18:16

## 2020-10-16 RX ADMIN — SUCRALFATE 1 G: 1 TABLET ORAL at 18:16

## 2020-10-16 RX ADMIN — POTASSIUM CHLORIDE 40 MEQ: 1500 TABLET, EXTENDED RELEASE ORAL at 10:07

## 2020-10-16 ASSESSMENT — PAIN SCALES - GENERAL
PAINLEVEL_OUTOF10: 0

## 2020-10-16 NOTE — PROGRESS NOTES
Hospitalist Progress Note      SYNOPSIS: Patient admitted on 10/10/2020 for abdominal pain in the right upper quadrant, radiate to the back.   Patient had been admitted to [facility] where another work-up was obtained. Abdirashid Class things were looking pretty unremarkable until today.  LFTs were initially normal and increased into the 500-600 range.  Also noted to have magnesium 1.0. Creatinine increased from 1.4 to 1.9.  WBC evaded at 18,000. patient was transferred to [facility] for further work-up and gastroenterological consultation. Patient had an episode of A. fib with RVR required RRT, was found to have positive troponin that are trending up. Surgical evaluation showed stool stability heme negative, patient will be receiving a blood transfusion today per request of cardiology and is scheduled for cardiac cath tomorrow. SUBJECTIVE:     Patient is status post heart cath  He is disappointed with the news  No chest pain or shortness of breath currently    Patient seen and examined  Records reviewed. Stable overnight. No other overnight issues reported. Temp (24hrs), Av.5 °F (36.4 °C), Min:97.1 °F (36.2 °C), Max:98.1 °F (36.7 °C)    DIET: DIET CARDIAC;  CODE: Full Code    Intake/Output Summary (Last 24 hours) at 10/16/2020 1707  Last data filed at 10/16/2020 0634  Gross per 24 hour   Intake 1840 ml   Output 2000 ml   Net -160 ml       OBJECTIVE:    BP (!) 119/56   Pulse 64   Temp 97.7 °F (36.5 °C) (Temporal)   Resp 18   Ht 5' 8\" (1.727 m)   Wt 162 lb (73.5 kg)   SpO2 95%   BMI 24.63 kg/m²     General appearance: No apparent distress, appears stated age and cooperative. HEENT:  Conjunctivae/corneas clear. Neck: Supple. No jugular venous distention.    Respiratory: Clear to auscultation bilaterally, normal respiratory effort  Cardiovascular: Regular rate rhythm, normal S1-S2  Abdomen: Soft, nontender, nondistended  Musculoskeletal: No clubbing, cyanosis, no bilateral lower extremity edema. Brisk capillary refill. Skin:  No rashes  on visible skin  Neurologic: awake, alert and following commands     ASSESSMENT:  Elevated troponin with trending up levels  New onset atrial fibrillation with RVR  Chronic anemia with modest decline in H&H recently.   History abdominal pain with extensive work-up in the past  History of hemorrhoids         PLAN:  Plan for CABG  All questions answered    Medications:  REVIEWED DAILY    Infusion Medications    dilTIAZem      dextrose       Scheduled Medications    potassium chloride  40 mEq Oral BID WC    [START ON 10/17/2020] enoxaparin  1 mg/kg Subcutaneous Daily    pantoprazole  40 mg Oral BID AC    sodium chloride  20 mL Intravenous Once    ferric gluconate (FERRLECIT) IVPB  125 mg Intravenous Once    sucralfate  1 g Oral 4x Daily AC & HS    metoprolol tartrate  25 mg Oral BID    timolol  1 drop Left Eye BID    latanoprost  1 drop Both Eyes Nightly    aspirin  81 mg Oral Daily    isosorbide mononitrate  30 mg Oral Daily    fluticasone  1 spray Each Nostril Daily    [Held by provider] pravastatin  80 mg Oral Nightly    sodium chloride flush  10 mL Intravenous 2 times per day     PRN Meds: fentanNYL, ipratropium-albuterol, glucose, dextrose, glucagon (rDNA), dextrose, sodium chloride flush, polyethylene glycol, promethazine **OR** ondansetron, magnesium sulfate, melatonin    Labs:     Recent Labs     10/14/20  0614 10/14/20  1204 10/15/20  0736 10/16/20  0549   WBC 8.1  --  8.8 10.2   HGB 8.0* 8.2* 7.9* 9.4*   HCT 24.9* 24.7* 23.8* 28.6*     --  223 234       Recent Labs     10/14/20  0614 10/15/20  0736 10/16/20  0549    139 140   K 3.8 3.5 3.4*    103 103   CO2 22 18* 22   BUN 30* 33* 36*   CREATININE 1.5* 1.3* 1.4*   CALCIUM 8.5* 8.8 8.7       Recent Labs     10/14/20  0614 10/15/20  0736 10/16/20  0549   PROT 6.2* 6.4 6.4   ALKPHOS 63 66 67   * 129* 97*   AST 34 42* 23   BILITOT 1.1 1.2 1.2 No results for input(s): INR in the last 72 hours. Recent Labs     10/13/20  1832 10/13/20  2328   TROPONINI 2.23* 2.41*       Chronic labs:    Lab Results   Component Value Date    CHOL 144 05/01/2020    TRIG 125 05/01/2020    HDL 44 05/01/2020    LDLCALC 93 07/19/2019    TSH 1.26 08/03/2020    LABA1C 6.2 (H) 10/12/2020       Radiology: REVIEWED DAILY    +++++++++++++++++++++++++++++++++++++++++++++++++  Gerard K [address] [place]  +++++++++++++++++++++++++++++++++++++++++++++++++  NOTE: This report was transcribed using voice recognition software. Every effort was made to ensure accuracy; however, inadvertent computerized transcription errors may be present.

## 2020-10-16 NOTE — PROGRESS NOTES
Comprehensive Nutrition Assessment    Type and Reason for Visit:  Initial(LOS)    Nutrition Recommendations/Plan: Advance diet when medically appropriate. No nutritional supplementation recommended at this time. Nutrition Assessment:  Pt currently NPO for procedure (cardiac cath) ; patient was averaging % of meals served since admission prior to NPO ; hx of prostate CA ; noted CKD ; no ONS recommended at this time    Malnutrition Assessment:  Malnutrition Status:  Insufficient data    Context:  Acute Illness     Findings of the 6 clinical characteristics of malnutrition:  Energy Intake:  No significant decrease in energy intake  Weight Loss:  Unable to assess(d/t possible fluid shifts)     Body Fat Loss:  Unable to assess(data not available to assess at this time)     Muscle Mass Loss:  Unable to assess(data not available to assess at this time)    Fluid Accumulation:  No significant fluid accumulation     Strength:  Not Performed    Estimated Daily Nutrient Needs:  Energy (kcal):  8207-1459 (REE 1436 x 1.1 SF); Weight Used for Energy Requirements:  Current     Protein (g):  75-85 (1.0-1.2g/kg CBW);  Weight Used for Protein Requirements:  Current        Fluid (ml/day):  1080-6464; Weight Used for Fluid Requirements:  Current      Nutrition Related Findings:  +I&Os (+2.2 L), no edema, active BS, A&O x 4      Wounds:  None       Current Nutrition Therapies:    Diet NPO, After Midnight Exceptions are: Sips with Meds    Anthropometric Measures:  · Height: 5' 8\" (172.7 cm)  · Current Body Weight: 162 lb (73.5 kg)(10/10/20, no method)   · Admission Body Weight: 162 lb (73.5 kg)(10/10/20, no method)    · Usual Body Weight: (UTO ; no actual weights available in EMR hx from previous encounters ; EMR shows past weights with no methods ranging between 159-177# within the past year)     · Ideal Body Weight: 154 lbs; % Ideal Body Weight 105.2 %   · BMI: 24.6  · BMI Categories: Normal Weight (BMI 22.0 to 24.9) age over 72       Nutrition Diagnosis:   No nutrition diagnosis at this time     Nutrition Interventions:   Food and/or Nutrient Delivery:  Continue NPO  Nutrition Education/Counseling:  Education not indicated   Coordination of Nutrition Care:  Continued Inpatient Monitoring    Goals:  Advance diet when medically appropriate       Nutrition Monitoring and Evaluation:   Behavioral-Environmental Outcomes:  Knowledge or Skill   Food/Nutrient Intake Outcomes:  Diet Advancement/Tolerance  Physical Signs/Symptoms Outcomes:  Biochemical Data, Chewing or Swallowing, GI Status, Fluid Status or Edema, Hemodynamic Status, Meal Time Behavior, Nutrition Focused Physical Findings, Skin, Weight     Discharge Planning:     Too soon to determine     Electronically signed by Aramis Booker RD, LD on 10/16/20 at 9:56 AM EDT    Contact: [phone]

## 2020-10-16 NOTE — PROGRESS NOTES
Department of Internal Medicine  Nephrology Attending Progress Note    Events reviewed. 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 SUBJECTIVE:  We are following Mr. [address] for MARIAH on CKD. Reports no major complaints. Physical Exam:    VITALS:  BP (!) 117/57   Pulse 62   Temp 97.7 °F (36.5 °C) (Temporal)   Resp 18   Ht 5' 8\" (1.727 m)   Wt 162 lb (73.5 kg)   SpO2 95%   BMI 24.63 kg/m²   24HR INTAKE/OUTPUT:      Intake/Output Summary (Last 24 hours) at 10/16/2020 1145  Last data filed at 10/16/2020 5780  Gross per 24 hour   Intake 2880 ml   Output 2300 ml   Net 580 ml       Constitutional: No apparent distress, appears stated age and cooperative.   HEENT: Normal cephalic, atraumatic, PERRL  Respiratory:  CTA bilaterally   Cardiovascular/Edema: Heart sounds regular   Gastrointestinal:  Soft, nontender, nondistended  Neurologic:    Neurovascularly intact without any focal sensory/motor deficits  Skin:  Normal skin color.  No rashes or lesions  Other:   + edema of lower extremities, + sacral edema     Scheduled Meds:   potassium chloride  40 mEq Oral BID WC    [START ON 10/17/2020] enoxaparin  1 mg/kg Subcutaneous Daily    pantoprazole  40 mg Oral BID AC    sodium chloride  20 mL Intravenous Once    ferric gluconate (FERRLECIT) IVPB  125 mg Intravenous Once    sucralfate  1 g Oral 4x Daily AC & HS    metoprolol tartrate  25 mg Oral BID    timolol  1 drop Left Eye BID    latanoprost  1 drop Both Eyes Nightly    aspirin  81 mg Oral Daily    isosorbide mononitrate  30 mg Oral Daily    fluticasone  1 spray Each Nostril Daily    [Held by provider] pravastatin  80 mg Oral Nightly    sodium chloride flush  10 mL Intravenous 2 times per day     Continuous Infusions:   sodium chloride 100 mL/hr at 10/15/20 2126    dilTIAZem      dextrose       PRN Meds:.fentanNYL, ipratropium-albuterol, glucose, dextrose, glucagon (rDNA), dextrose, sodium chloride flush, contrast October 10, 2020                    IMPRESSION:               CT Angiogram Chest and Pulmonary Arteries with contrast and high resolution 2-D and 3-D images:               1. No evidence of pulmonary emboli by CT technique.               2. Small bilateral pleural effusions with dependent atelectasis.            Dictated Hossein Lopez MD   DD/DT: 10/10/20 1022                  Signed Hossein Lopez MD 10/10/20 1022                    : OLVIN      CT Abdomen and pelvis  W/o contrast 10/9/2020               IMPRESSION:       1.   Mild bilateral perinephric stranding is again identified (without hydronephrosis upon       bilateral assessment).               2.  Mild urinary bladder wall thickening is identified.  Surgical absence of prostate gland.               3.   Moderate colonic diverticulosis is identified (sigmoid predominance) without acute       diverticulitis.  Nondistended sigmoid colon extends into left inguinal hernia defect.               4. Pleural parenchymal scarring is again identified in right lower lobe, slightly more pronounced       than on prior examination.               5. Significant vascular calcifications are identified (including greater than 50% degree of       stenosis bilaterally in renal arteries, duplicated bilaterally).            Dictated By: Louie Gitelman MD   DD/DT: 10/09/20 0813                  Signed Jacob Rivas MD 10/09/20 0847                    :      CT Chest w/o contrast 10/13/2020   1. Corresponding to the masslike lesion seen in the right thorax on the    earlier chest x-ray is area of trapped fluid within the oblique fissure on    the right. 2. Pleural thickening and pulmonary scarring is seen along the posterior    aspect of the right lung. 3. Small bilateral pleural effusions. 4. No pulmonary mass, lymphadenopathy or metastatic disease seen.     5. 2 mm right upper lobe nodule of doubtful clinical significance.  If the    patient is at a high risk for malignancy, per the recommendations of the    Fleischner society, follow-up CT of the chest may be obtained in 12 months. Otherwise, no future follow-up is needed. 6. Severe calcified coronary atherosclerosis. 1           CXR 10/13/2020   1. Abnormal chest x-ray.  There is masslike opacity seen within the right    perihilar region measuring 6 cm x 4.2 cm and there is a 2nd rounded opacity    within the right hilum measuring 2.4 cm.  Dedicated CT of the chest is    recommended for further evaluation.                 BRIEF SUMMARY OF INITIAL CONSULT:    Briefly Prashanth Juarez 89, [id number] y. o. year old male with history of CKD stage III, with mild proteinuria, baseline creatinine 1.3 to 1.5 mg/dL, HTN, type II DM, hyperlipidemia, gout, prostate cancer status post resection, emphysema, seronegative inflammatory arthritis possibly psoriatic arthritis on methotrexate, who was admitted on October 10, 2020 transferred from [facility] where he initially was admitted with abdominal pain. Probably he was hypotensive with a blood pressures loss 79/50 and he was fluid resuscitated, he was transferred for a higher level of care. His creatinine level on admission was 1.9 mg/dL reason for this consultation. His medications prior to admission included lisinopril and chlorthalidone. IMPRESSION/RECOMMENDATIONS:      1. MARIAH on CKD, volume responsive prerenal MARIAH (hypotension, thiazide diuretic, in the setting of ACE inhibition), versus ischemic ATN? Enriqueta Graven Renal function relatively stable, we will monitor after IV contrasted study. 2. CKD stage III, with mild proteinuria, secondary to early diabetic nephropathy, baseline creatinine 1.3 to 1.5 mg/dl  3. Hyperkalemia, secondary to diuretics  4. HAGMA, lactic acidosis and probably ketoacidosis, anion gap improved  5. Lactic acidosis, cardiogenic shock? Metformin?,  Levels improved.   6. CAD, NSTEMI, status post catheterization, has multivessel CAD, for CABG. 7. PAF, on sinus now   8. Hypotension,multifactroial, cardiogenic, 2/2 to MI, AF  9. HFpEF 60%, proBNP 29,713, needs diuresis  10. Bilateral renal stenosis, by CT abdomen  11. Elevated LFTs, likely shock liver  12. History of prostate cancer status post surgery  13.  Macrocytic anemia, history of B12 deficiency and iron deficiency, status post transfusion, on IV iron    Plan:    · Repeat Lasix 40 mg IV x1  · Discontinue IV fluids 6 hours after cardiac catheterization  · Replace K  · Continue IV iron  · Continue to monitor renal function

## 2020-10-16 NOTE — CONSULTS
CTS Consult    Patient name: Felicia Cortes    Reason for consult:  MVCAD    Primary Care Physician: Shirley Albrecht MD    Date of service: 10/16/2020    Chief Complaint:  Abdominal pain     HPI:  65 yo male with initially complaints of abdominal pain. transferred from [facility] due to right-sided abdominal pain, elevated LFTs and leukocytosis. Describes right abdominal pain that can radiate to the right chest and shoulder. He was an RRT several days ago for paroxsysmal afib which has resolved. His troponin was positive and he is now s/p LHC which revealed severe MVCAD including 75% left main disease and 100% occluded RCA. Allergies:    Allergies   Allergen Reactions    Methotrexate Derivatives      Caused Blisters       Home medications:    Current Facility-Administered Medications   Medication Dose Route Frequency Provider Last Rate Last Dose    potassium chloride (KLOR-CON M) extended release tablet 40 mEq  40 mEq Oral BID WC Stephany Plate, DO   40 mEq at 10/16/20 1007    [START ON 10/17/2020] enoxaparin (LOVENOX) injection 70 mg  1 mg/kg Subcutaneous Daily Stephany Plate, DO        pantoprazole (PROTONIX) tablet 40 mg  40 mg Oral BID AC Stephany Plate, DO   40 mg at 10/16/20 9295    0.9 % sodium chloride infusion   Intravenous Continuous Stephany Plate,  mL/hr at 10/15/20 2126      0.9 % sodium chloride bolus  20 mL Intravenous Once Stephany Plate, DO        ferric gluconate (FERRLECIT) 125 mg in sodium chloride 0.9 % 100 mL IVPB  125 mg Intravenous Once Stephany Plate, DO        sucralfate (CARAFATE) tablet 1 g  1 g Oral 4x Daily AC & HS Stephany Plate, DO   1 g at 10/16/20 7859    fentaNYL (SUBLIMAZE) injection 25 mcg  25 mcg Intravenous Q2H PRN Stephany Plate, DO        ipratropium-albuterol (DUONEB) nebulizer solution 1 ampule  1 ampule Inhalation Q4H PRN Stephany Plate, DO        dilTIAZem 100 mg in dextrose 5 % 100 mL infusion ([facility])  5 mg/hr Intravenous Continuous Eilleen Common, DO        metoprolol tartrate (LOPRESSOR) tablet 25 mg  25 mg Oral BID Eilleen Common, DO   25 mg at 10/16/20 1007    timolol (TIMOPTIC) 0.5 % ophthalmic solution 1 drop  1 drop Left Eye BID Eilleen Common, DO   1 drop at 10/16/20 1008    latanoprost (XALATAN) 0.005 % ophthalmic solution 1 drop  1 drop Both Eyes Nightly Eilleen Common, DO   1 drop at 10/15/20 2127    aspirin EC tablet 81 mg  81 mg Oral Daily Eilleen Common, DO   81 mg at 10/16/20 1007    isosorbide mononitrate (IMDUR) extended release tablet 30 mg  30 mg Oral Daily Eilleen Common, DO   30 mg at 10/16/20 1007    glucose (GLUTOSE) 40 % oral gel 15 g  15 g Oral PRN Eilleen Common, DO        dextrose 50 % IV solution  12.5 g Intravenous PRN Eilleen Common, DO        glucagon (rDNA) injection 1 mg  1 mg Intramuscular PRN Eilleen Common, DO        dextrose 5 % solution  100 mL/hr Intravenous PRN Eilleen Common, DO        fluticasone [facility]) 50 MCG/ACT nasal spray 1 spray  1 spray Each Nostril Daily Eilleen Common, DO   1 spray at 10/16/20 1008    [Held by provider] pravastatin (PRAVACHOL) tablet 80 mg  80 mg Oral Nightly Mateusz Oropeza MD        sodium chloride flush 0.9 % injection 10 mL  10 mL Intravenous 2 times per day Eilleen Common, DO   10 mL at 10/16/20 1008    sodium chloride flush 0.9 % injection 10 mL  10 mL Intravenous PRN Eilleen Common, DO        polyethylene glycol (GLYCOLAX) packet 17 g  17 g Oral Daily PRN Eilleen Common, DO        promethazine (PHENERGAN) tablet 12.5 mg  12.5 mg Oral Q6H PRN Eilleen Common, DO        Or    ondansetron [facility]) injection 4 mg  4 mg Intravenous Q6H PRN Eilleen Common, DO        magnesium sulfate 2 g in 50 mL IVPB premix  2 g Intravenous PRN Eilleen Common, DO        melatonin tablet 3 mg  3 mg Oral Nightly PRN Eilleen Common, DO   3 mg at 10/14/20 2306       Past Medical History:  Past Medical History:   Diagnosis Date    Carotid artery stenosis     Cholecystitis     Choledocholithiasis     Cholelithiasis     Colon polyps     Essential hypertension 5/23/2019    Gout     Hiatal hernia     History of laparoscopic cholecystectomy     Hyperlipidemia 5/23/2019    Inflammatory polyarthropathy ([facility] 5/23/2019    Malignant tumor of prostate ([facility]     Osteoarthritis     Pancreatitis     Prostate CA ([facility] 5/23/2019    Pulmonary emphysema (HCC)     Pulmonary nodules     Type 2 diabetes mellitus without complication, without long-term current use of insulin ([facility] [address] 5/23/2019    Ventral hernia        Past Surgical History:  Past Surgical History:   Procedure Laterality Date    CARDIAC CATHETERIZATION  10/16/2020    Dr. Tom Rae       Social History:  Social History     Socioeconomic History    Marital status:      Spouse name: Not on file    Number of children: Not on file    Years of education: Not on file    Highest education level: Not on file   Occupational History     Employer: RETIRED   Social Needs    Financial resource strain: Not on file    Food insecurity     Worry: Not on file     Inability: Not on file   Magiq needs     Medical: Not on file     Non-medical: Not on file   Tobacco Use    Smoking status: Never Smoker    Smokeless tobacco: Never Used   Substance and Sexual Activity    Alcohol use: Not Currently    Drug use: Never    Sexual activity: Yes     Partners: Female   Lifestyle    Physical activity     Days per week: Not on file     Minutes per session: Not on file    Stress: Not on file   Relationships    Social connections     Talks on phone: Not on file     Gets together: Not on file     Attends Restorationist service: Not on file     Active member of club or organization: Not on file     Attends meetings of clubs or organizations: Not on file     Relationship status: Not on file    Intimate partner violence     Fear of current or ex partner: Not on file     Emotionally abused: Not on file     Physically abused: Not on file     Forced sexual activity: Not on file   Other Topics Concern    Not on file   Social History Narrative    Not on file       Family History:  History reviewed. No pertinent family history. Review of Systems:  Constitutional: Denies fevers, chills, or weight loss. HEENT: Denies visual changes or hearing loss. Heart: As per HPI. Lungs: Denies shortness of breath, cough, or wheezing. Gastrointestinal: Denies nausea, vomiting, constipation, or diarrhea. Genitourinary: dysuria or hematuria. Psychiatric: Patient denies anxiety or depression. Neurologic: Patient denies weakness of the extremities, dizziness, or headaches. All other ROS checked and found to be negative. Labs:  Recent Labs     10/14/20  0614 10/14/20  1204 10/15/20  0736 10/16/20  0549   WBC 8.1  --  8.8 10.2   HGB 8.0* 8.2* 7.9* 9.4*   HCT 24.9* 24.7* 23.8* 28.6*     --  223 234      Recent Labs     10/14/20  0614 10/15/20  0736 10/16/20  0549   BUN 30* 33* 36*   CREATININE 1.5* 1.3* 1.4*       Objective:  Vitals BP (!) 117/57   Pulse 62   Temp 97.7 °F (36.5 °C) (Temporal)   Resp 18   Ht 5' 8\" (1.727 m)   Wt 162 lb (73.5 kg)   SpO2 95%   BMI 24.63 kg/m²   General Appearance: Pleasant 66y.o. year old male who appears stated age. Communicates well, no acute distress. HEENT: Head is normocephalic, atraumatic. EOMs intact, PERRL. Trachea midline. Lungs: Normal respiratory rate and normal effort. He is not in respiratory distress. Breath sounds clear to auscultation. No wheezes. Heart: Normal rate. Regular rhythm. S1 normal and S2 normal.   Chest: Symmetric chest wall expansion. Extremities: Normal range of motion. Neurological: Patient is alert and oriented to person, place and time. Patient has normal reflexes. Skin: Warm and dry. Abdomen: Abdomen is soft and non-distended. Bowel sounds are normal. There is no abdominal tenderness tenderness. There is no guarding. There is no mass. Pulses: Distal pulses are intact. Skin: Warm and dry without lesions. Findings      Left Ventricle   Left ventricle size is normal.   Normal left ventricle wall thickness. Ejection fraction is visually estimated at 45-50% with mild global   hypokinesia. Stage I diastolic dysfunction. Right Ventricle   Normal right ventricular size and function. Left Atrium   Normal sized left atrium. Right Atrium   Normal right atrium size. Mitral Valve   Physiologic and/or trace mitral regurgitation is present. No mitral valve stenosis present. Tricuspid Valve   Mild tricuspid regurgitation. Pulmonary hypertension is mild . Aortic Valve   The aortic valve appears severely sclerotic. No evidence of aortic valve regurgitation. No hemodynamically significant aortic stenosis is present. Pulmonic Valve   The pulmonic valve was not well visualized. Pericardial Effusion   No evidence of pericardial effusion. Aorta   Aortic root dimension within normal limits. Conclusions      Summary   Ejection fraction is visually estimated at 45-50% with mild global   hypokinesia. Stage I diastolic dysfunction. Mild tricuspid regurgitation. Pulmonary hypertension is mild . [facility]  Angiographic Results/findings:  Left Main: Calcified. Distal 75% stenosis at the bifurcation. LAD: Heavily calcified. Tortuous. Proximal mid diffuse 50%  D1: Small vessel. No angiographically significant stenosis. D2: Very small vessel. No angiographically significant stenosis. Cx: Ostial diffuse 85% stenosis. OM1: Large vessel. Proximal eccentric 20 to 30% stenosis. RCA: Dominant. Ostial 100% chronic total occlusion with left-to-right collaterals.       Assessment/Plan:  67 yo male admitted with abdominal pain (atypical angina?), NSTEMI, diminished EF, found to have severe MVCAD on [facility]. Surgery was recommended.   All risks, benefits, alternatives and potential complications explained thoroughly including, but not limited to, bleeding, infection, lung injury, kidney injury, stroke, heart attack, prolonged ventilation, wound complication, need for re-operation, and death, and the patient agrees to proceed. We will obtain preop studies, allow his labs to continue to improve, and plan for CABG this admission.         Electronically signed by Asif Portillo DO on 10/16/2020 at 10:39 AM

## 2020-10-16 NOTE — PROCEDURES
Procedure:  Left heart cath    Physician: Angeli Chino DO. Assistant: none    Indication: Non-ST elevation myocardial infarction  AUC: 8  AUC indication: 4    Complication: None. Sedation: Intravenous Versed. Anesthesia: Xylocaine, fentanyl     Sedation time: I was present for sedation and ministration at 08 44 and I ended sedation at 0903 for a total face-to-face sedation time of 19 minutes. Estimated blood loss: Minimal    Specimens: none    Contrast used: 36 cc    Hemodynamics:  Opening Aortic pressure: 590/52  LV systolic pressure: 929  LVEDP: 9  No significant gradient across the aortic valve    Angiographic Results/findings:  Left Main: Calcified. Distal 75% stenosis at the bifurcation. LAD: Heavily calcified. Tortuous. Proximal mid diffuse 50%  D1: Small vessel. No angiographically significant stenosis. D2: Very small vessel. No angiographically significant stenosis. Cx: Ostial diffuse 85% stenosis. OM1: Large vessel. Proximal eccentric 20 to 30% stenosis. RCA: Dominant. Ostial 100% chronic total occlusion with left-to-right collaterals. Procedure:   After obtaining informed consent the patient was taken to the cardiac Cath Lab where the area over the right radial artery was prepped and draped in a sterile fashion. Using ultrasound guidance and a micropuncture technique a 6 [place] slender glide sheath was placed in the right radial artery. This was aspirated & flushed several times throughout the procedure. This was medicated with verapamil and nitroglycerin. They were given heparin systemically. A 5 [place] TIG catheter was advanced over a wire to the root of the aorta. It was aspirated & flushed with saline. Pressures were obtained. It was filled with contrast.  This was then manipulated into the left main coronary artery. 4 orthogonal views were obtained. A TIG catheter was then manipulated into the right coronary artery and 1 orthogonal views were then obtained.

## 2020-10-16 NOTE — PROGRESS NOTES
[facility] Daily progress note        Name: Ridge Mayberry    Age: 66 y.o. Date of Admission: 10/10/2020  5:46 PM    Date of Service: 10/16/2020    Reason for Consultation: Elevated troponin, atrial fibrillation    Chief Complaint: Abdominal pain    Referring Physician: Dr. Naomi Betts    History of Present Illness: The patient is a 66y.o. year old male who was transferred from [place] due to right-sided abdominal pain, elevated LFTs and leukocytosis. Describes right abdominal pain that can radiate to the right chest and shoulder, occurring at rest and not associated with nausea, vomiting or diaphoresis. RRT yesterday with hypotension and paroxysmal atrial fibrillation, now in sinus rhythm. Creatinine increased from 1.2-1.9 and is now 1.5. Nuclear scan of kidney suggests bilateral renal artery stenosis. LFTs improving, and WBC normal.  Troponin has increased to 2.4. Hemoglobin on admission 10 and has decreased to 8 this morning. He is currently pain-free and denies shortness of breath. No PND/orthopnea. Blood pressure stable and SPO2 normal on room air    Dr. Caroline Rodrigues office notes reviewed and patient underwent a Lexiscan stress test August 2020 that showed LVEF 68% with mild inferoapical ischemia, and was treated conservatively. An echocardiogram confirmed LVEF 60% with mild TR and mild pulmonary hypertension. History of right carotid endarterectomy, hypertension, hyperlipidemia, diabetes and COPD    10/15/2020 interim history  No overnight cardiac issues. Denies chest pain, palpitations. No PND/orthopnea. Telemetry: Sinus rhythm  Creatinine 1.3 and at baseline  Hemoglobin 7.9    10/16/2020 interim history  No overnight cardiac issues. Creatinine 1.4, hemoglobin 9.4 after 1 unit transfusion. Heart catheterization images reviewed and discussed with Dr. Joseluis Machado.   Has severe surgical 3 vessel disease      Past Medical History:   Past Medical History:   Diagnosis Date    Carotid artery stenosis     Cholecystitis     Choledocholithiasis     Cholelithiasis     Colon polyps     Essential hypertension 5/23/2019    Gout     Hiatal hernia     History of laparoscopic cholecystectomy     Hyperlipidemia 5/23/2019    Inflammatory polyarthropathy ([facility] [address] 5/23/2019    Malignant tumor of prostate ([facility] [address]     Osteoarthritis     Pancreatitis     Prostate CA ([facility] [address] 5/23/2019    Pulmonary emphysema (HCC)     Pulmonary nodules     Type 2 diabetes mellitus without complication, without long-term current use of insulin ([facility] [address] 5/23/2019    Ventral hernia        Past Surgical History:  Past Surgical History:   Procedure Laterality Date    CARDIAC CATHETERIZATION  10/16/2020    Dr. Froylan García       Family History:  History reviewed. No pertinent family history.     Social History:  Social History     Socioeconomic History    Marital status:      Spouse name: Not on file    Number of children: Not on file    Years of education: Not on file    Highest education level: Not on file   Occupational History     Employer: RETIRED   Social Needs    Financial resource strain: Not on file    Food insecurity     Worry: Not on file     Inability: Not on file   [employer] needs     Medical: Not on file     Non-medical: Not on file   Tobacco Use    Smoking status: Never Smoker    Smokeless tobacco: Never Used   Substance and Sexual Activity    Alcohol use: Not Currently    Drug use: Never    Sexual activity: Yes     Partners: Female   Lifestyle    Physical activity     Days per week: Not on file     Minutes per session: Not on file    Stress: Not on file   Relationships    Social connections     Talks on phone: Not on file     Gets together: Not on file     Attends Mosque service: Not on file     Active member of club or organization: Not on file     Attends meetings of clubs or organizations: Not on file     Relationship status: Not on file    Intimate partner violence     Fear of current or ex partner: Not on file     Emotionally abused: Not on file     Physically abused: Not on file     Forced sexual activity: Not on file   Other Topics Concern    Not on file   Social History Narrative    Not on file       Allergies: Allergies   Allergen Reactions    Methotrexate Derivatives      Caused Blisters       Home Meds:  Prior to Admission medications    Medication Sig Start Date End Date Taking?  Authorizing Provider   timolol (TIMOPTIC) 0.5 % ophthalmic solution Place 1 drop into the left eye 2 times daily   Yes Historical Provider, MD   ferrous sulfate (IRON 325) 325 (65 Fe) MG tablet Take 325 mg by mouth every other day   Yes Historical Provider, MD   vitamin B-12 (CYANOCOBALAMIN) 1000 MCG tablet Take 1,000 mcg by mouth 2 times daily   Yes Historical Provider, MD   isosorbide dinitrate (ISORDIL) 30 MG tablet Take 30 mg by mouth daily   Yes Historical Provider, MD   allopurinol (ZYLOPRIM) 300 MG tablet Take 1 tablet by mouth daily Hold until pt is ready 9/2/20  Yes Bravo Ro MD   chlorthalidone (HYGROTON) 25 MG tablet Take 1 tablet by mouth daily Hold until pt is ready 9/2/20  Yes Bravo Ro MD   lisinopril (PRINIVIL;ZESTRIL) 40 MG tablet Take 1 tablet by mouth daily Hold until pt is ready 9/2/20  Yes Bravo Ro MD   metFORMIN (GLUCOPHAGE) 500 MG tablet 1 po q am, 2 po q pm Hold until pt is ready 9/2/20  Yes Bravo Ro MD   pravastatin (PRAVACHOL) 80 MG tablet Take 1 tablet by mouth daily Hold until pt is ready 9/2/20  Yes Bravo Ro MD   fluticasone (FLONASE) 50 MCG/ACT nasal spray 2 sprays by Each Nostril route daily 9/2/20  Yes Bravo Ro MD   Omega-3 Fatty Acids (FISH OIL) 1200 MG CAPS Take by mouth daily   Yes Historical Provider, MD   aspirin 81 MG EC tablet Take by mouth   Yes Historical Provider, MD   blood glucose test strips (ONE TOUCH ULTRA TEST) strip  12/14/15  Yes Historical Provider, MD   vitamin E 100 units capsule Take by mouth   Yes Historical Provider, MD LUMIGAN 0.01 % SOLN ophthalmic drops  5/18/19  Yes Historical Provider, MD   vitamin D (CHOLECALCIFEROL) 1000 UNIT TABS tablet Take by mouth   Yes Historical Provider, MD   folic acid (FOLVITE) 1 MG tablet Take 1 mg by mouth daily  4/25/19  Yes Historical Provider, MD       Current Medications:  Current Facility-Administered Medications   Medication Dose Route Frequency Provider Last Rate Last Dose    potassium chloride (KLOR-CON M) extended release tablet 40 mEq  40 mEq Oral BID  Staci Thakkar, DO   40 mEq at 10/16/20 1007    [START ON 10/17/2020] enoxaparin (LOVENOX) injection 70 mg  1 mg/kg Subcutaneous Daily Staci Thakkar, DO        pantoprazole (PROTONIX) tablet 40 mg  40 mg Oral BID AC Staci Thakkar, DO   40 mg at 10/16/20 3414    0.9 % sodium chloride infusion   Intravenous Continuous Staci Thakkar  mL/hr at 10/15/20 2126      0.9 % sodium chloride bolus  20 mL Intravenous Once Staci Thakkar, DO        ferric gluconate (FERRLECIT) 125 mg in sodium chloride 0.9 % 100 mL IVPB  125 mg Intravenous Once Staci Thakkar, DO        sucralfate (CARAFATE) tablet 1 g  1 g Oral 4x Daily AC & HS Staci Thakkar, DO   1 g at 10/16/20 6033    fentaNYL (SUBLIMAZE) injection 25 mcg  25 mcg Intravenous Q2H PRN Staci Thakkar, DO        ipratropium-albuterol (DUONEB) nebulizer solution 1 ampule  1 ampule Inhalation Q4H PRN Staci Thakkar, DO        dilTIAZem 100 mg in dextrose 5 % 100 mL infusion (ADD-[place])  5 mg/hr Intravenous Continuous Staci Thakkar, DO        metoprolol tartrate (LOPRESSOR) tablet 25 mg  25 mg Oral BID Staci Thakkar, DO   25 mg at 10/16/20 1007    timolol (TIMOPTIC) 0.5 % ophthalmic solution 1 drop  1 drop Left Eye BID Staci Thakkar, DO   1 drop at 10/16/20 1008    latanoprost (XALATAN) 0.005 % ophthalmic solution 1 drop  1 drop Both Eyes Nightly Staci Thakkar, DO   1 drop at 10/15/20 2127    aspirin EC tablet 81 mg  81 mg Oral Daily Prisca Card Lulú, DO   81 mg at 10/16/20 1007    isosorbide mononitrate (IMDUR) extended release tablet 30 mg  30 mg Oral Daily [place] And[place], DO   30 mg at 10/16/20 1007    glucose (GLUTOSE) 40 % oral gel 15 g  15 g Oral PRN [place] Ruddle, DO        dextrose 50 % IV solution  12.5 g Intravenous PRN [place] Ruddle, DO        glucagon (rDNA) injection 1 mg  1 mg Intramuscular PRN [place] Ruddle, DO        dextrose 5 % solution  100 mL/hr Intravenous PRN [place] Ruddle, DO        fluticasone [facility]) 50 MCG/ACT nasal spray 1 spray  1 spray Each Nostril Daily [place] Ruddle, DO   1 spray at 10/16/20 1008    [Held by provider] pravastatin (PRAVACHOL) tablet 80 mg  80 mg Oral Nightly More Marcial MD        sodium chloride flush 0.9 % injection 10 mL  10 mL Intravenous 2 times per day [place] Ruddle, DO   10 mL at 10/16/20 1008    sodium chloride flush 0.9 % injection 10 mL  10 mL Intravenous PRN [place] Ruddle, DO        polyethylene glycol (GLYCOLAX) packet 17 g  17 g Oral Daily PRN [place] Ruddle, DO        promethazine (PHENERGAN) tablet 12.5 mg  12.5 mg Oral Q6H PRN [place] Ruddle, DO        Or    ondansetron [facility]) injection 4 mg  4 mg Intravenous Q6H PRN [place] Ruddle, DO        magnesium sulfate 2 g in 50 mL IVPB premix  2 g Intravenous PRN [place] Ruddle, DO        melatonin tablet 3 mg  3 mg Oral Nightly PRN [place] Ruddle, DO   3 mg at 10/14/20 2306      sodium chloride 100 mL/hr at 10/15/20 2126    dilTIAZem      dextrose         Review of Systems:   No headache or confusion, no cough. Denies nausea or vomiting. No fever or chills    Physical Exam:  BP (!) 117/57   Pulse 62   Temp 97.7 °F (36.5 °C) (Temporal)   Resp 18   Ht 5' 8\" (1.727 m)   Wt 162 lb (73.5 kg)   SpO2 95%   BMI 24.63 kg/m²   Weight change:    Wt Readings from Last 3 Encounters:   10/10/20 162 lb (73.5 kg)   09/02/20 159 lb 12.8 oz (72.5 kg)   07/24/20 162 lb 6.4 oz (73.7 kg)       General: Awake, alert, oriented x3, no acute distress      Neck: No JVD or bruits. Cardiac: Regular rate and rhythm, normal S1 and S2, no S3. Apical impulse is normal.  No murmurs, rubs or clicks. No carotid bruits and no abdominal bruits. No peripheral edema, cyanosis or clubbing. Normal pulses in the upper and lower extremities bilaterally. Resp: Unlabored respirations at rest.  No wheezes, rales or rhonchi. Abdomen: soft, nontender, nondistended, no gross organomegaly or mass    Skin: Warm and dry, no cyanosis. Neuro: Moves all extremities appropriately to command. Normal sensation to light touch in the upper and lower extremities bilaterally    Psych: Cooperative, and normal affect    Intake/Output:    Intake/Output Summary (Last 24 hours) at 10/16/2020 1114  Last data filed at 10/16/2020 0634  Gross per 24 hour   Intake 2880 ml   Output 2300 ml   Net 580 ml     No intake/output data recorded.     Laboratory Tests:  Lab Results   Component Value Date    CREATININE 1.4 (H) 10/16/2020    BUN 36 (H) 10/16/2020     10/16/2020    K 3.4 (L) 10/16/2020     10/16/2020    CO2 22 10/16/2020     Recent Labs     10/13/20  1144 10/13/20  1832 10/13/20  2328   CKTOTAL 67  --   --    CKMB 4.2  --   --    TROPONINI 1.68* 2.23* 2.41*     Lab Results   Component Value Date    PROBNP 29,713 (H) 10/12/2020     Lab Results   Component Value Date    WBC 10.2 10/16/2020    RBC 2.83 10/16/2020    HGB 9.4 10/16/2020    HCT 28.6 10/16/2020    .1 10/16/2020    MCH 33.2 10/16/2020    MCHC 32.9 10/16/2020    RDW 19.5 10/16/2020     10/16/2020    MPV 11.1 10/16/2020     Recent Labs     10/14/20  0614 10/15/20  0736 10/16/20  0549   ALKPHOS 63 66 67   * 129* 97*   AST 34 42* 23   PROT 6.2* 6.4 6.4   BILITOT 1.1 1.2 1.2   LABALBU 3.7 4.1 3.8     Lab Results   Component Value Date    MG 1.7 10/16/2020     No results found for: PROTIME, INR  Lab Results   Component Value Date    TSH 1.26 08/03/2020 No components found for: CHLPL  Lab Results   Component Value Date    TRIG 125 05/01/2020    TRIG 163 07/19/2019     Lab Results   Component Value Date    HDL 44 05/01/2020    HDL 43 07/19/2019     Lab Results   Component Value Date    LDLCALC 93 07/19/2019     CTA of chest, CT of chest, chest x-ray, EKGs and telemetry independently reviewed:  CTA: Negative for PE with no pleural or pericardial effusions and unremarkable thoracic aorta. EKG yesterday, sinus/PAF. EKG #2: Sinus rhythm with subtle lateral ST depression. Telemetry: Sinus rhythm   Noncontrast chest CT: \"Trapped fluid\" within the right oblique fissure       Echo summary   Ejection fraction is visually estimated at 45-50% with mild global   hypokinesia. Stage I diastolic dysfunction. Mild tricuspid regurgitation. Pulmonary hypertension is mild . Signature      ----------------------------------------------------------------   Electronically signed by Kimi Vidal MD(Interpreting   physician) on 10/15/2020 09:09 AM   ----------------------------------------------------------------       [facility]  Angiographic Results/findings:  Left Main: Calcified. [facility] 75% stenosis at the bifurcation.    LAD: Heavily calcified.  Tortuous.  Proximal mid diffuse 50%  D1: Small vessel.  No angiographically significant stenosis. D2: Very small vessel.  No angiographically significant stenosis. Cx: Ostial diffuse 85% stenosis. OM1: Large vessel.  Proximal eccentric 20 to 30% stenosis.   RCA: Dominant.  Ostial 100% chronic total occlusion with left-to-right collaterals.         Active Hospital Problems    Diagnosis    Non-STEMI (non-ST elevated myocardial infarction) ([facility] [address] [I21.4]    Paroxysmal atrial fibrillation (HCC) [I48.0]    Anemia [D64.9]    History of right-sided carotid endarterectomy [Z98.890]    Coronary artery disease [I25.10]    Acute kidney injury superimposed on chronic kidney disease ([facility] [address] [N17.9, N18.9]    Hypotension [I95.9]    Transaminitis [R74.01]             ASSESSMENT / PLAN:   #1.  Non-STEMI and is currently stable with no postinfarct angina, heart failure or recurrent atrial arrhythmias. Mild LV dysfunction on echo. CTS consultation noted. Discussed results with patient and wife. For CABG this admission. Continue beta-blocker, aspirin, nitrate,     #2. Single episode of paroxysmal atrial fibrillation on 10/13 and remains in sinus rhythm. Chads vasc score is 5 but with avoid [facility] for now and use Lovenox. #3 hypertension controlled. #4. Hyperlipidemia: Hold statin as LFTs initially elevated and slowly normalizing. #5. Hypotension on 10/13  in setting of rapid A. fib. Has resolved and blood pressure now stable. No recurrence    #6   acute kidney injury in setting of hypotension, ACE inhibitor, possible bilateral renal artery stenosis and dye exposure. Improving. ACE inhibitor on hold. On IV hydration pre-/post cath. #7 unexplained elevation in transaminase-improved, and nearly normalized. #8 macrocytic anemia. Hemoglobin stable after 1 unit transfusion. IV iron started    #9 carotid artery disease/remote right carotid endarterectomy-stable.       Electronically signed by Lachelle Jasso MD on 10/16/2020 at 11:14 AM

## 2020-10-17 LAB
ALBUMIN SERPL-MCNC: 4 G/DL (ref 3.5–5.2)
ALP BLD-CCNC: 75 U/L (ref 40–129)
ALT SERPL-CCNC: 79 U/L (ref 0–40)
ANION GAP SERPL CALCULATED.3IONS-SCNC: 14 MMOL/L (ref 7–16)
AST SERPL-CCNC: 20 U/L (ref 0–39)
BASOPHILS ABSOLUTE: 0.03 E9/L (ref 0–0.2)
BASOPHILS RELATIVE PERCENT: 0.3 % (ref 0–2)
BILIRUB SERPL-MCNC: 1.2 MG/DL (ref 0–1.2)
BUN BLDV-MCNC: 31 MG/DL (ref 8–23)
CALCIUM SERPL-MCNC: 8.8 MG/DL (ref 8.6–10.2)
CHLORIDE BLD-SCNC: 105 MMOL/L (ref 98–107)
CO2: 22 MMOL/L (ref 22–29)
CREAT SERPL-MCNC: 1.4 MG/DL (ref 0.7–1.2)
CULTURE, BLOOD 2: NORMAL
EOSINOPHILS ABSOLUTE: 0.23 E9/L (ref 0.05–0.5)
EOSINOPHILS RELATIVE PERCENT: 2.4 % (ref 0–6)
GFR AFRICAN AMERICAN: 59
GFR NON-AFRICAN AMERICAN: 49 ML/MIN/1.73
GLUCOSE BLD-MCNC: 190 MG/DL (ref 74–99)
HBA1C MFR BLD: 6.2 % (ref 4–5.6)
HCT VFR BLD CALC: 30.8 % (ref 37–54)
HEMOGLOBIN: 10 G/DL (ref 12.5–16.5)
IMMATURE GRANULOCYTES #: 0.09 E9/L
IMMATURE GRANULOCYTES %: 0.9 % (ref 0–5)
LACTIC ACID: 1 MMOL/L (ref 0.5–2.2)
LACTIC ACID: 1.8 MMOL/L (ref 0.5–2.2)
LACTIC ACID: 1.9 MMOL/L (ref 0.5–2.2)
LYMPHOCYTES ABSOLUTE: 1.42 E9/L (ref 1.5–4)
LYMPHOCYTES RELATIVE PERCENT: 14.6 % (ref 20–42)
MCH RBC QN AUTO: 33.7 PG (ref 26–35)
MCHC RBC AUTO-ENTMCNC: 32.5 % (ref 32–34.5)
MCV RBC AUTO: 103.7 FL (ref 80–99.9)
MONOCYTES ABSOLUTE: 0.88 E9/L (ref 0.1–0.95)
MONOCYTES RELATIVE PERCENT: 9.1 % (ref 2–12)
MRSA CULTURE ONLY: NORMAL
NEUTROPHILS ABSOLUTE: 7.06 E9/L (ref 1.8–7.3)
NEUTROPHILS RELATIVE PERCENT: 72.7 % (ref 43–80)
PDW BLD-RTO: 19 FL (ref 11.5–15)
PLATELET # BLD: 270 E9/L (ref 130–450)
PMV BLD AUTO: 10.7 FL (ref 7–12)
POTASSIUM REFLEX MAGNESIUM: 4.1 MMOL/L (ref 3.5–5)
RBC # BLD: 2.97 E12/L (ref 3.8–5.8)
SODIUM BLD-SCNC: 141 MMOL/L (ref 132–146)
TOTAL PROTEIN: 6.8 G/DL (ref 6.4–8.3)
WBC # BLD: 9.7 E9/L (ref 4.5–11.5)

## 2020-10-17 PROCEDURE — 6370000000 HC RX 637 (ALT 250 FOR IP): Performed by: INTERNAL MEDICINE

## 2020-10-17 PROCEDURE — 2580000003 HC RX 258: Performed by: INTERNAL MEDICINE

## 2020-10-17 PROCEDURE — 85025 COMPLETE CBC W/AUTO DIFF WBC: CPT

## 2020-10-17 PROCEDURE — 36415 COLL VENOUS BLD VENIPUNCTURE: CPT

## 2020-10-17 PROCEDURE — 83605 ASSAY OF LACTIC ACID: CPT

## 2020-10-17 PROCEDURE — 83036 HEMOGLOBIN GLYCOSYLATED A1C: CPT

## 2020-10-17 PROCEDURE — 6360000002 HC RX W HCPCS: Performed by: INTERNAL MEDICINE

## 2020-10-17 PROCEDURE — 2140000000 HC CCU INTERMEDIATE R&B

## 2020-10-17 PROCEDURE — 80053 COMPREHEN METABOLIC PANEL: CPT

## 2020-10-17 RX ORDER — FUROSEMIDE 10 MG/ML
40 INJECTION INTRAMUSCULAR; INTRAVENOUS ONCE
Status: COMPLETED | OUTPATIENT
Start: 2020-10-17 | End: 2020-10-17

## 2020-10-17 RX ADMIN — PANTOPRAZOLE SODIUM 40 MG: 40 TABLET, DELAYED RELEASE ORAL at 06:43

## 2020-10-17 RX ADMIN — METOPROLOL TARTRATE 25 MG: 25 TABLET, FILM COATED ORAL at 09:05

## 2020-10-17 RX ADMIN — METOPROLOL TARTRATE 25 MG: 25 TABLET, FILM COATED ORAL at 22:16

## 2020-10-17 RX ADMIN — PANTOPRAZOLE SODIUM 40 MG: 40 TABLET, DELAYED RELEASE ORAL at 15:45

## 2020-10-17 RX ADMIN — SUCRALFATE 1 G: 1 TABLET ORAL at 15:45

## 2020-10-17 RX ADMIN — Medication 10 ML: at 09:07

## 2020-10-17 RX ADMIN — Medication 10 ML: at 22:16

## 2020-10-17 RX ADMIN — TIMOLOL MALEATE 1 DROP: 5 SOLUTION OPHTHALMIC at 22:17

## 2020-10-17 RX ADMIN — ASPIRIN 81 MG: 81 TABLET, COATED ORAL at 09:04

## 2020-10-17 RX ADMIN — LATANOPROST 1 DROP: 50 SOLUTION OPHTHALMIC at 22:17

## 2020-10-17 RX ADMIN — ENOXAPARIN SODIUM 70 MG: 80 INJECTION SUBCUTANEOUS at 09:04

## 2020-10-17 RX ADMIN — SODIUM CHLORIDE 125 MG: 9 INJECTION, SOLUTION INTRAVENOUS at 15:44

## 2020-10-17 RX ADMIN — SUCRALFATE 1 G: 1 TABLET ORAL at 06:43

## 2020-10-17 RX ADMIN — ISOSORBIDE MONONITRATE 30 MG: 30 TABLET ORAL at 09:05

## 2020-10-17 RX ADMIN — TIMOLOL MALEATE 1 DROP: 5 SOLUTION OPHTHALMIC at 09:05

## 2020-10-17 RX ADMIN — SUCRALFATE 1 G: 1 TABLET ORAL at 22:16

## 2020-10-17 RX ADMIN — FUROSEMIDE 40 MG: 10 INJECTION, SOLUTION INTRAVENOUS at 15:44

## 2020-10-17 ASSESSMENT — PAIN SCALES - GENERAL
PAINLEVEL_OUTOF10: 0
PAINLEVEL_OUTOF10: 0

## 2020-10-17 NOTE — PROGRESS NOTES
CC:abdominal pain    Brief HPI:  Patient seen with Dr. Jeremy Carcamo. Awake, alert. No complaints.       Past Medical History:   Diagnosis Date    Carotid artery stenosis     Cholecystitis     Choledocholithiasis     Cholelithiasis     Colon polyps     Essential hypertension 5/23/2019    Gout     Hiatal hernia     History of laparoscopic cholecystectomy     Hyperlipidemia 5/23/2019    Inflammatory polyarthropathy ([facility] [address] 5/23/2019    Malignant tumor of prostate ([facility] [address]     Osteoarthritis     Pancreatitis     Prostate CA ([facility] [address] 5/23/2019    Pulmonary emphysema (HCC)     Pulmonary nodules     Type 2 diabetes mellitus without complication, without long-term current use of insulin ([facility] [address] 5/23/2019    Ventral hernia      Past Surgical History:   Procedure Laterality Date    CARDIAC CATHETERIZATION  10/16/2020    Dr. Oliverio Duran History     Socioeconomic History    Marital status:      Spouse name: Not on file    Number of children: Not on file    Years of education: Not on file    Highest education level: Not on file   Occupational History     Employer: RETIRED   Social Needs    Financial resource strain: Not on file    Food insecurity     Worry: Not on file     Inability: Not on file   [employer] needs     Medical: Not on file     Non-medical: Not on file   Tobacco Use    Smoking status: Never Smoker    Smokeless tobacco: Never Used   Substance and Sexual Activity    Alcohol use: Not Currently    Drug use: Never    Sexual activity: Yes     Partners: Female   Lifestyle    Physical activity     Days per week: Not on file     Minutes per session: Not on file    Stress: Not on file   Relationships    Social connections     Talks on phone: Not on file     Gets together: Not on file     Attends Jewish service: Not on file     Active member of club or organization: Not on file     Attends meetings of clubs or organizations: Not on file     Relationship status: Not on file    Intimate partner violence     Fear of current or ex partner: Not on file     Emotionally abused: Not on file     Physically abused: Not on file     Forced sexual activity: Not on file   Other Topics Concern    Not on file   Social History Narrative    Not on file     History reviewed. No pertinent family history. Vitals:    10/16/20 1000 10/16/20 1300 10/16/20 2000 10/17/20 0815   BP: (!) 117/57 (!) 119/56 (!) 119/57 131/61   Pulse: 62 64 66 65   Resp: 18  18 16   Temp: 97.7 °F (36.5 °C)  98.3 °F (36.8 °C) 97.4 °F (36.3 °C)   TempSrc: Temporal  Temporal    SpO2: 95%  95% 96%   Weight:       Height:   5' 8\" (1.727 m)            Intake/Output Summary (Last 24 hours) at 10/17/2020 1240  Last data filed at 10/17/2020 0648  Gross per 24 hour   Intake --   Output 1600 ml   Net -1600 ml         Recent Labs     10/15/20  0736 10/16/20  0549 10/17/20  0917   WBC 8.8 10.2 9.7   HGB 7.9* 9.4* 10.0*   HCT 23.8* 28.6* 30.8*    234 270      Recent Labs     10/15/20  0736 10/16/20  0549 10/17/20  0917   BUN 33* 36* 31*   CREATININE 1.3* 1.4* 1.4*         ROS:   Negative for CP, palpitations, SOB at rest, dizziness/lightheadedness. Physical Exam   Constitutional: Oriented to person, place, and time. Appears well-developed. No distress. Cardiovascular: Normal rate, regular rhythm and normal heart sounds. Pulmonary/Chest: Effort normal. No respiratory distress. Abdominal: Soft. Bowel sounds are normal.   Musculoskeletal: Normal range of motion. Neurological: alert and oriented to person, place, and time. Skin: Skin is warm and dry. Psychiatric: normal mood and affect.          A/P:  1) NSTEMI, diminished EF, MVCAD  - Plan for OR on Monday; pre-op imaging obtained  - 1 unit PRBC transfused 10/15 - hgb 10 today     2) CKD  - Renal on board   - lasix yesterday  - Scr stable at 1.4       Note: 25 minutes was spent providing face-to-face patient care, including:  and coordinating care, reviewing the chart, labs,

## 2020-10-17 NOTE — PROGRESS NOTES
Department of Internal Medicine  Nephrology Attending Progress Note    Events reviewed. SUBJECTIVE:  We are following [address] for MARIAH on CKD. Reports no major complaints. Physical Exam:    VITALS:  /61   Pulse 65   Temp 97.4 °F (36.3 °C)   Resp 16   Ht 5' 8\" (1.727 m)   Wt 162 lb (73.5 kg)   SpO2 96%   BMI 24.63 kg/m²   24HR INTAKE/OUTPUT:      Intake/Output Summary (Last 24 hours) at 10/17/2020 1217  Last data filed at 10/17/2020 0648  Gross per 24 hour   Intake --   Output 1600 ml   Net -1600 ml       Constitutional: No apparent distress, appears stated age and cooperative.   HEENT: Normal cephalic, atraumatic, PERRL  Respiratory:  CTA bilaterally   Cardiovascular/Edema: Heart sounds regular   Gastrointestinal:  Soft, nontender, nondistended  Neurologic:    Neurovascularly intact without any focal sensory/motor deficits  Skin:  Normal skin color.  No rashes or lesions  Other:   + edema of lower extremities, + sacral edema     Scheduled Meds:   enoxaparin  1 mg/kg Subcutaneous Daily    pantoprazole  40 mg Oral BID AC    sodium chloride  20 mL Intravenous Once    ferric gluconate (FERRLECIT) IVPB  125 mg Intravenous Once    sucralfate  1 g Oral 4x Daily AC & HS    metoprolol tartrate  25 mg Oral BID    timolol  1 drop Left Eye BID    latanoprost  1 drop Both Eyes Nightly    aspirin  81 mg Oral Daily    isosorbide mononitrate  30 mg Oral Daily    fluticasone  1 spray Each Nostril Daily    [Held by provider] pravastatin  80 mg Oral Nightly    sodium chloride flush  10 mL Intravenous 2 times per day     Continuous Infusions:   dilTIAZem      dextrose       PRN Meds:.fentanNYL, ipratropium-albuterol, glucose, dextrose, glucagon (rDNA), dextrose, sodium chloride flush, polyethylene glycol, promethazine **OR** ondansetron, magnesium sulfate, melatonin    DATA:    CBC:   Lab Results   Component Value Date    WBC 9.7 10/17/2020    RBC 2.97 10/17/2020    HGB 10.0 10/17/2020    HCT 30.8 10/17/2020    .7 10/17/2020    MCH 33.7 10/17/2020    MCHC 32.5 10/17/2020    RDW 19.0 10/17/2020     10/17/2020    MPV 10.7 10/17/2020     CMP:    Lab Results   Component Value Date     10/17/2020    K 4.1 10/17/2020     10/17/2020    CO2 22 10/17/2020    BUN 31 10/17/2020    CREATININE 1.4 10/17/2020    GFRAA 59 10/17/2020    AGRATIO 0.9 10/10/2020    LABGLOM 49 10/17/2020    GLUCOSE 190 10/17/2020    PROT 6.8 10/17/2020    LABALBU 4.0 10/17/2020    CALCIUM 8.8 10/17/2020    BILITOT 1.2 10/17/2020    ALKPHOS 75 10/17/2020    AST 20 10/17/2020    ALT 79 10/17/2020     Magnesium:    Lab Results   Component Value Date    MG 1.7 10/16/2020     Phosphorus:  No results found for: PHOS     Urine sodium: 71  Urine potassium: 51.9  Urine chloride: 68  Urine creatinine: 118  Urine albumin/creatinine: 184 mg/grams  Urine protein/creatinine: 0.5 mg/grams    proBNP: 29,713      Radiology Review:      Nuclear medicine clinic with flow and function without pharmacological intervention October 12, 2020   Findings:         The patient was injected with 10 mCi of technetium MAG3 intravenously              Split uptake on the left was 47.8  percent, time to peak was 4.5    minutes, one half clearance time was 18.9         Split uptake on the right was 52.2 percent, time to peak was 0.5    minutes, one half clearance time was 23.4 minutes         Renal curves demonstrate perfusion to be delayed. Extraction was delayed. Excretion was delayed. Lasix was not administered         Impression:  Diminished perfusion and extraction and excretion, the findings are    compatible with bilateral renal artery stenosis versus ATN versus    chronic renal failure                CTA pulmonary with IV contrast October 10, 2020                    IMPRESSION:               CT Angiogram Chest and Pulmonary Arteries with contrast and high resolution 2-D and 3-D images:               1.  No evidence of pulmonary emboli by CT technique.               2. Small bilateral pleural effusions with dependent atelectasis.            Dictated ByGraciela Benz MD   DD/DT: 10/10/20 1022                  Signed Ruben Benz MD 10/10/20 1022                    : [facility]      CT Abdomen and pelvis  W/o contrast 10/9/2020               IMPRESSION:       1.   Mild bilateral perinephric stranding is again identified (without hydronephrosis upon       bilateral assessment).               2.  Mild urinary bladder wall thickening is identified.  Surgical absence of prostate gland.               3.   Moderate colonic diverticulosis is identified (sigmoid predominance) without acute       diverticulitis.  Nondistended sigmoid colon extends into left inguinal hernia defect.               4. Pleural parenchymal scarring is again identified in right lower lobe, slightly more pronounced       than on prior examination.               5. Significant vascular calcifications are identified (including greater than 50% degree of       stenosis bilaterally in renal arteries, duplicated bilaterally).            Dictated By: Bruce Peña MD   DD/DT: 10/09/20 0813                  Signed Margaret Coronel MD 10/09/20 0847                    :      CT Chest w/o contrast 10/13/2020   1. Corresponding to the masslike lesion seen in the right thorax on the    earlier chest x-ray is area of trapped fluid within the oblique fissure on    the right. 2. Pleural thickening and pulmonary scarring is seen along the posterior    aspect of the right lung. 3. Small bilateral pleural effusions. 4. No pulmonary mass, lymphadenopathy or metastatic disease seen. 5. 2 mm right upper lobe nodule of doubtful clinical significance.  If the    patient is at a high risk for malignancy, per the recommendations of the    [employer] society, follow-up CT of the chest may be obtained in 12 months.     Otherwise, no future follow-up is needed. 6. Severe calcified coronary atherosclerosis. 1           CXR 10/13/2020   1. Abnormal chest x-ray.  There is masslike opacity seen within the right    perihilar region measuring 6 cm x 4.2 cm and there is a 2nd rounded opacity    within the right hilum measuring 2.4 cm.  Dedicated CT of the chest is    recommended for further evaluation.                 BRIEF SUMMARY OF INITIAL CONSULT:    Briefly Prashanth Juarez 89, [id number] y. o. year old male with history of CKD stage III, with mild proteinuria, baseline creatinine 1.3 to 1.5 mg/dL, HTN, type II DM, hyperlipidemia, gout, prostate cancer status post resection, emphysema, seronegative inflammatory arthritis possibly psoriatic arthritis on methotrexate, who was admitted on October 10, 2020 transferred from [facility] where he initially was admitted with abdominal pain. Probably he was hypotensive with a blood pressures loss 79/50 and he was fluid resuscitated, he was transferred for a higher level of care. His creatinine level on admission was 1.9 mg/dL reason for this consultation. His medications prior to admission included lisinopril and chlorthalidone. Problems resolved:  · HAGMA, lactic acidosis and probably ketoacidosis, anion gap improved  · Hypotension,multifactroial, cardiogenic, 2/2 to MI, AF  · Hypokalemia, secondary to diuretics, potassium level improved  · Elevated LFTs, likely shock liver    IMPRESSION/RECOMMENDATIONS:      1. MARIAH on CKD, volume responsive prerenal MARIAH (hypotension, thiazide diuretic, in the setting of ACE inhibition), versus ischemic ATN? [place] [address] Renal function relatively stable after cardiac catheterization. Still with significant edema. 2. CKD stage III, with mild proteinuria, secondary to early diabetic nephropathy, baseline creatinine 1.3 to 1.5 mg/dl  3. CAD, NSTEMI, status post catheterization, has multivessel CAD, for CABG. 4. PAF, on sinus now   5.  HFpEF 60%, proBNP 29,713, needs diuresis  6. Bilateral renal stenosis, by CT abdomen  7. History of prostate cancer status post surgery  8.  Macrocytic anemia, history of B12 deficiency and iron deficiency, status post transfusion, on IV iron    Plan:    · Repeat Lasix 40 mg IV x1  · Continue IV iron  · Continue to monitor renal function

## 2020-10-17 NOTE — PROGRESS NOTES
DAILY PROGRESS NOTE - [facility]    SUBJECTIVE:    He is being followed for multivessel CAD and paroxysmal atrial fibrillation. Transferred here with severe abdominal pain, leukocytosis, elevated LFTs. Rapid atrial fibrillation with hypotension that resolved after conversion to sinus rhythm with elevated troponin and subsequent heart catheterization showing severe multivessel CAD requiring CABG. Currently ambulating around room and feeling well. No chest pain, worsening dyspnea, palpitations, syncope, presyncope. Hypertension, hyperlipidemia, diabetes, COPD, right CEA in the past.  Preserved left ventricular systolic function with mild pulmonary hypertension on recent echocardiogram.      OBJECTIVE:    His vital signs were reviewed today. Vitals:    10/16/20 2000   BP: (!) 119/57   Pulse: 66   Resp: 18   Temp: 98.3 °F (36.8 °C)   SpO2: 95%       Scheduled Meds:   enoxaparin  1 mg/kg Subcutaneous Daily    pantoprazole  40 mg Oral BID AC    sodium chloride  20 mL Intravenous Once    ferric gluconate (FERRLECIT) IVPB  125 mg Intravenous Once    sucralfate  1 g Oral 4x Daily AC & HS    metoprolol tartrate  25 mg Oral BID    timolol  1 drop Left Eye BID    latanoprost  1 drop Both Eyes Nightly    aspirin  81 mg Oral Daily    isosorbide mononitrate  30 mg Oral Daily    fluticasone  1 spray Each Nostril Daily    [Held by provider] pravastatin  80 mg Oral Nightly    sodium chloride flush  10 mL Intravenous 2 times per day     Continuous Infusions:   dilTIAZem      dextrose       PRN Meds:.fentanNYL, ipratropium-albuterol, glucose, dextrose, glucagon (rDNA), dextrose, sodium chloride flush, polyethylene glycol, promethazine **OR** ondansetron, magnesium sulfate, melatonin    REVIEW OF SYSTEMS:     No pruritus, rash, bruising. Cardiac symptoms per HPI. No nausea, vomiting, abdominal pain, GI bleeding, change in bowel habits.   No dysuria, urinary frequency, urgency, hematuria, flank pain.  Joint pain but no muscle soreness, stiffness, aching. No headache, speech disturbance, lateralizing neurologic deficit. No hemoptysis, epistaxis, easy bruising. No anxiety, depression. No symptoms of hypothyroidism, hyperthyroidism, diabetes, heat or cold intolerance. FAMILY HISTORY: Negative for CAD in first-degree relatives. SOCIAL HISTORY: Negative for alcohol, tobacco, or illicit drug use. PHYSICAL EXAM:    General Appearance:  awake, alert, oriented, in no acute distress  Neck:  no bruits  Lungs:  Normal expansion. Clear to auscultation. No rales, rhonchi, or wheezing. Heart:  Heart sounds are normal.  Regular rate and rhythm without murmur, gallop or rub. Abdomen:  Soft, non-tender. Extremities: Extremities warm to touch, pink, with no edema. Neuro/musculosketal:  Unremarkable. LABS:    Recent Labs     10/14/20  0614 10/15/20  0736 10/16/20  0549    139 140   CREATININE 1.5* 1.3* 1.4*       Recent Labs     10/14/20  1204 10/15/20  0736 10/16/20  0549   HGB 8.2* 7.9* 9.4*       No results for input(s): INR in the last 72 hours. IMPRESSION:    #1.  Non-STEMI-severe three-vessel CAD on heart catheterization and for coronary bypass surgery, potentially Monday 10/19. Continue aspirin. Continue beta-blocker. #2. Hypertension-controlled. No new antihypertensives added. #3. Hyperlipidemia-statin. #4.  Diabetes-per hospitalist.    #5.  Chronic kidney disease-appears stable. #6.  Continue to follow.

## 2020-10-17 NOTE — PROGRESS NOTES
Hospitalist Progress Note      Synopsis: Patient admitted on 10/10/2020       Subjective    Does not appear to have any acute issues currently  He has a history of multivessel coronary artery disease and paroxysmal atrial fibrillation. Initial admission was for abdominal pain and leukocytosis and increase in his liver enzymes. Rapid A. fib and hypotension has resolved and he is currently running in sinus rhythm. Now awaiting surgery for diminished ejection fraction and multivessel coronary artery disease. OR on Monday with cardiothoracic  Exam:  /61   Pulse 65   Temp 97.4 °F (36.3 °C)   Resp 16   Ht 5' 8\" (1.727 m)   Wt 162 lb (73.5 kg)   SpO2 96%   BMI 24.63 kg/m²   General appearance: No apparent distress, appears stated age and cooperative. HEENT: Pupils equal, round, and reactive to light. Conjunctivae/corneas clear. Neck: Supple. No jugular venous distention. Trachea midline. Respiratory:  Normal respiratory effort. Clear to auscultation, bilaterally without Rales/Wheezes/Rhonchi. Cardiovascular: Regular rate and rhythm with normal S1/S2 without murmurs, rubs or gallops. Abdomen: Soft, non-tender, non-distended with normal bowel sounds. Musculoskeletal: No clubbing, cyanosis or edema bilaterally. Brisk capillary refill. 2+ lower extremity pulses (dorsalis pedis).    Skin:  No rashes    Neurologic: awake, alert and following commands     Medications:  Reviewed    Infusion Medications    dilTIAZem      dextrose       Scheduled Medications    enoxaparin  1 mg/kg Subcutaneous Daily    pantoprazole  40 mg Oral BID AC    sodium chloride  20 mL Intravenous Once    ferric gluconate (FERRLECIT) IVPB  125 mg Intravenous Once    sucralfate  1 g Oral 4x Daily AC & HS    metoprolol tartrate  25 mg Oral BID    timolol  1 drop Left Eye BID    latanoprost  1 drop Both Eyes Nightly    aspirin  81 mg Oral Daily    isosorbide mononitrate  30 mg Oral Daily    fluticasone  1 spray Each Nostril Daily    [Held by provider] pravastatin  80 mg Oral Nightly    sodium chloride flush  10 mL Intravenous 2 times per day     PRN Meds: fentanNYL, ipratropium-albuterol, glucose, dextrose, glucagon (rDNA), dextrose, sodium chloride flush, polyethylene glycol, promethazine **OR** ondansetron, magnesium sulfate, melatonin    I/O    Intake/Output Summary (Last 24 hours) at 10/17/2020 1450  Last data filed at 10/17/2020 0648  Gross per 24 hour   Intake --   Output 1250 ml   Net -1250 ml       Labs:   Recent Labs     10/15/20  0736 10/16/20  0549 10/17/20  0917   WBC 8.8 10.2 9.7   HGB 7.9* 9.4* 10.0*   HCT 23.8* 28.6* 30.8*    234 270       Recent Labs     10/15/20  0736 10/16/20  0549 10/17/20  0917    140 141   K 3.5 3.4* 4.1    103 105   CO2 18* 22 22   BUN 33* 36* 31*   CREATININE 1.3* 1.4* 1.4*   CALCIUM 8.8 8.7 8.8       Recent Labs     10/15/20  0736 10/16/20  0549 10/17/20  0917   PROT 6.4 6.4 6.8   ALKPHOS 66 67 75   * 97* 79*   AST 42* 23 20   BILITOT 1.2 1.2 1.2       No results for input(s): INR in the last 72 hours. No results for input(s): Jake Clap in the last 72 hours. Chronic labs:  Lab Results   Component Value Date    CHOL 144 05/01/2020    TRIG 125 05/01/2020    HDL 44 05/01/2020    LDLCALC 93 07/19/2019    TSH 1.26 08/03/2020    LABA1C 6.2 (H) 10/17/2020       Radiology:  Imaging studies reviewed today. ASSESSMENT:  Non-STEMI  Active Problems:    Transaminitis    Non-STEMI (non-ST elevated myocardial infarction) (HCC)    Paroxysmal atrial fibrillation (HCC)    Anemia    History of right-sided carotid endarterectomy    Coronary artery disease    Acute kidney injury superimposed on chronic kidney disease (HCC)    Hypotension  Resolved Problems:    * No resolved hospital problems. *       PLAN:    1. Severe three-vessel disease for open heart surgery on Monday  2.   Maintain renal function and hypertension controlled and blood sugar control  3 mood seems to be appropriate for now. Diet: DIET CARDIAC;  Code Status: Full Code  PT/OT Eval Status:   After surgery  DVT Prophylaxis:    in place  Recommended disposition at discharge:   Home or rehab    +++++++++++++++++++++++++++++++++++++++++++++++++  Alfred Lopez MD   [facility].  +++++++++++++++++++++++++++++++++++++++++++++++++  NOTE: This report was transcribed using voice recognition software. Every effort was made to ensure accuracy; however, inadvertent computerized transcription errors may be present.

## 2020-10-18 ENCOUNTER — ANESTHESIA EVENT (OUTPATIENT)
Dept: OPERATING ROOM | Age: 75
DRG: 233 | End: 2020-10-18
Payer: MEDICARE

## 2020-10-18 LAB
ABO/RH: NORMAL
ALBUMIN SERPL-MCNC: 3.7 G/DL (ref 3.5–5.2)
ALP BLD-CCNC: 67 U/L (ref 40–129)
ALT SERPL-CCNC: 56 U/L (ref 0–40)
ANION GAP SERPL CALCULATED.3IONS-SCNC: 14 MMOL/L (ref 7–16)
ANTIBODY SCREEN: NORMAL
APTT: 40.8 SEC (ref 24.5–35.1)
AST SERPL-CCNC: 15 U/L (ref 0–39)
BASOPHILS ABSOLUTE: 0.04 E9/L (ref 0–0.2)
BASOPHILS RELATIVE PERCENT: 0.5 % (ref 0–2)
BILIRUB SERPL-MCNC: 1 MG/DL (ref 0–1.2)
BUN BLDV-MCNC: 29 MG/DL (ref 8–23)
CALCIUM SERPL-MCNC: 8.6 MG/DL (ref 8.6–10.2)
CHLORIDE BLD-SCNC: 106 MMOL/L (ref 98–107)
CO2: 24 MMOL/L (ref 22–29)
CREAT SERPL-MCNC: 1.5 MG/DL (ref 0.7–1.2)
CULTURE, BLOOD 2: NORMAL
EOSINOPHILS ABSOLUTE: 0.23 E9/L (ref 0.05–0.5)
EOSINOPHILS RELATIVE PERCENT: 2.9 % (ref 0–6)
GFR AFRICAN AMERICAN: 55
GFR NON-AFRICAN AMERICAN: 45 ML/MIN/1.73
GLUCOSE BLD-MCNC: 110 MG/DL (ref 74–99)
HCT VFR BLD CALC: 30.2 % (ref 37–54)
HEMOGLOBIN: 9.5 G/DL (ref 12.5–16.5)
IMMATURE GRANULOCYTES #: 0.07 E9/L
IMMATURE GRANULOCYTES %: 0.9 % (ref 0–5)
INR BLD: 1.3
LYMPHOCYTES ABSOLUTE: 1.87 E9/L (ref 1.5–4)
LYMPHOCYTES RELATIVE PERCENT: 23.6 % (ref 20–42)
MCH RBC QN AUTO: 33.6 PG (ref 26–35)
MCHC RBC AUTO-ENTMCNC: 31.5 % (ref 32–34.5)
MCV RBC AUTO: 106.7 FL (ref 80–99.9)
MONOCYTES ABSOLUTE: 1.02 E9/L (ref 0.1–0.95)
MONOCYTES RELATIVE PERCENT: 12.9 % (ref 2–12)
NEUTROPHILS ABSOLUTE: 4.7 E9/L (ref 1.8–7.3)
NEUTROPHILS RELATIVE PERCENT: 59.2 % (ref 43–80)
PDW BLD-RTO: 18.4 FL (ref 11.5–15)
PLATELET # BLD: 245 E9/L (ref 130–450)
PMV BLD AUTO: 11.3 FL (ref 7–12)
POTASSIUM REFLEX MAGNESIUM: 4.3 MMOL/L (ref 3.5–5)
PROTHROMBIN TIME: 15.2 SEC (ref 9.3–12.4)
RBC # BLD: 2.83 E12/L (ref 3.8–5.8)
SODIUM BLD-SCNC: 144 MMOL/L (ref 132–146)
TOTAL PROTEIN: 6.2 G/DL (ref 6.4–8.3)
WBC # BLD: 7.9 E9/L (ref 4.5–11.5)

## 2020-10-18 PROCEDURE — P9016 RBC LEUKOCYTES REDUCED: HCPCS

## 2020-10-18 PROCEDURE — 6370000000 HC RX 637 (ALT 250 FOR IP): Performed by: INTERNAL MEDICINE

## 2020-10-18 PROCEDURE — 86923 COMPATIBILITY TEST ELECTRIC: CPT

## 2020-10-18 PROCEDURE — 6360000002 HC RX W HCPCS: Performed by: INTERNAL MEDICINE

## 2020-10-18 PROCEDURE — 2140000000 HC CCU INTERMEDIATE R&B

## 2020-10-18 PROCEDURE — 86901 BLOOD TYPING SEROLOGIC RH(D): CPT

## 2020-10-18 PROCEDURE — 36415 COLL VENOUS BLD VENIPUNCTURE: CPT

## 2020-10-18 PROCEDURE — 85025 COMPLETE CBC W/AUTO DIFF WBC: CPT

## 2020-10-18 PROCEDURE — 2580000003 HC RX 258: Performed by: INTERNAL MEDICINE

## 2020-10-18 PROCEDURE — 85730 THROMBOPLASTIN TIME PARTIAL: CPT

## 2020-10-18 PROCEDURE — 6370000000 HC RX 637 (ALT 250 FOR IP): Performed by: PHYSICIAN ASSISTANT

## 2020-10-18 PROCEDURE — 80053 COMPREHEN METABOLIC PANEL: CPT

## 2020-10-18 PROCEDURE — 86900 BLOOD TYPING SEROLOGIC ABO: CPT

## 2020-10-18 PROCEDURE — 85610 PROTHROMBIN TIME: CPT

## 2020-10-18 PROCEDURE — 86850 RBC ANTIBODY SCREEN: CPT

## 2020-10-18 RX ORDER — FUROSEMIDE 10 MG/ML
40 INJECTION INTRAMUSCULAR; INTRAVENOUS ONCE
Status: COMPLETED | OUTPATIENT
Start: 2020-10-18 | End: 2020-10-18

## 2020-10-18 RX ORDER — SODIUM CHLORIDE 0.9 % (FLUSH) 0.9 %
10 SYRINGE (ML) INJECTION PRN
Status: DISCONTINUED | OUTPATIENT
Start: 2020-10-18 | End: 2020-10-18

## 2020-10-18 RX ORDER — CHLORHEXIDINE GLUCONATE 0.12 MG/ML
15 RINSE ORAL ONCE
Status: COMPLETED | OUTPATIENT
Start: 2020-10-19 | End: 2020-10-19

## 2020-10-18 RX ORDER — SODIUM CHLORIDE 0.9 % (FLUSH) 0.9 %
10 SYRINGE (ML) INJECTION EVERY 12 HOURS SCHEDULED
Status: DISCONTINUED | OUTPATIENT
Start: 2020-10-18 | End: 2020-10-18

## 2020-10-18 RX ORDER — CHLORHEXIDINE GLUCONATE 4 G/100ML
SOLUTION TOPICAL SEE ADMIN INSTRUCTIONS
Status: DISCONTINUED | OUTPATIENT
Start: 2020-10-18 | End: 2020-10-19 | Stop reason: HOSPADM

## 2020-10-18 RX ADMIN — Medication 10 ML: at 22:38

## 2020-10-18 RX ADMIN — METOPROLOL TARTRATE 25 MG: 25 TABLET, FILM COATED ORAL at 09:28

## 2020-10-18 RX ADMIN — MUPIROCIN: 20 OINTMENT TOPICAL at 23:17

## 2020-10-18 RX ADMIN — SUCRALFATE 1 G: 1 TABLET ORAL at 06:03

## 2020-10-18 RX ADMIN — METOPROLOL TARTRATE 25 MG: 25 TABLET, FILM COATED ORAL at 22:37

## 2020-10-18 RX ADMIN — TIMOLOL MALEATE 1 DROP: 5 SOLUTION OPHTHALMIC at 09:27

## 2020-10-18 RX ADMIN — FUROSEMIDE 40 MG: 10 INJECTION, SOLUTION INTRAMUSCULAR; INTRAVENOUS at 17:28

## 2020-10-18 RX ADMIN — SUCRALFATE 1 G: 1 TABLET ORAL at 22:37

## 2020-10-18 RX ADMIN — ENOXAPARIN SODIUM 70 MG: 80 INJECTION SUBCUTANEOUS at 09:28

## 2020-10-18 RX ADMIN — ASPIRIN 81 MG: 81 TABLET, COATED ORAL at 09:28

## 2020-10-18 RX ADMIN — ISOSORBIDE MONONITRATE 30 MG: 30 TABLET ORAL at 09:28

## 2020-10-18 RX ADMIN — SODIUM CHLORIDE 125 MG: 9 INJECTION, SOLUTION INTRAVENOUS at 12:44

## 2020-10-18 RX ADMIN — SUCRALFATE 1 G: 1 TABLET ORAL at 17:28

## 2020-10-18 RX ADMIN — LATANOPROST 1 DROP: 50 SOLUTION OPHTHALMIC at 22:37

## 2020-10-18 RX ADMIN — SUCRALFATE 1 G: 1 TABLET ORAL at 09:27

## 2020-10-18 RX ADMIN — PANTOPRAZOLE SODIUM 40 MG: 40 TABLET, DELAYED RELEASE ORAL at 17:28

## 2020-10-18 RX ADMIN — TIMOLOL MALEATE 1 DROP: 5 SOLUTION OPHTHALMIC at 22:37

## 2020-10-18 RX ADMIN — Medication 10 ML: at 09:28

## 2020-10-18 RX ADMIN — PANTOPRAZOLE SODIUM 40 MG: 40 TABLET, DELAYED RELEASE ORAL at 06:03

## 2020-10-18 ASSESSMENT — PAIN SCALES - GENERAL
PAINLEVEL_OUTOF10: 0

## 2020-10-18 ASSESSMENT — ENCOUNTER SYMPTOMS: SHORTNESS OF BREATH: 0

## 2020-10-18 ASSESSMENT — LIFESTYLE VARIABLES: SMOKING_STATUS: 0

## 2020-10-18 NOTE — PROGRESS NOTES
He has been free of this chronic abdominal pain since his infarct. His LFTs are normal  For bypass    Would have never considered his pain to be cardiac except for the associated dyspnea and BNP. However it appears he had therapeutic infarct with relief of pain. The elevated LFTs presumably secondary to hypotension, perhaps related to unrecognized atrial fibrillation  with RVR in [place]. This is the only scenario I can surmise. There is also the unexplained anemia and elevated ESR and CRP. That for another day.

## 2020-10-18 NOTE — PROGRESS NOTES
Hospitalist Progress Note      Synopsis: Patient admitted on 10/10/2020       Subjective    Does not appear to have any acute issues currently  He has a history of multivessel coronary artery disease and paroxysmal atrial fibrillation. Initial admission was for abdominal pain and leukocytosis and increase in his liver enzymes. Rapid A. fib and hypotension has resolved and he is currently running in sinus rhythm. Now awaiting surgery for diminished ejection fraction and multivessel coronary artery disease. OR on Monday with cardiothoracic  Denies any acute issues. GI services input noted as well  Exam:  /63   Pulse 73   Temp 96.4 °F (35.8 °C)   Resp 16   Ht 5' 8\" (1.727 m)   Wt 162 lb (73.5 kg)   SpO2 96%   BMI 24.63 kg/m²   General appearance: No apparent distress, appears stated age and cooperative. HEENT: Pupils equal, round, and reactive to light. Conjunctivae/corneas clear. Neck: Supple. No jugular venous distention. Trachea midline. Respiratory:  Normal respiratory effort. Clear to auscultation, bilaterally without Rales/Wheezes/Rhonchi. Cardiovascular: Regular rate and rhythm with normal S1/S2 without murmurs, rubs or gallops. Abdomen: Soft, non-tender, non-distended with normal bowel sounds. Musculoskeletal: No clubbing, cyanosis or edema bilaterally. Brisk capillary refill. 2+ lower extremity pulses (dorsalis pedis).    Skin:  No rashes    Neurologic: awake, alert and following commands     Medications:  Reviewed    Infusion Medications    dilTIAZem      dextrose       Scheduled Medications    sodium chloride flush  10 mL Intravenous 2 times per day    pantoprazole  40 mg Oral BID AC    sodium chloride  20 mL Intravenous Once    ferric gluconate (FERRLECIT) IVPB  125 mg Intravenous Once    sucralfate  1 g Oral 4x Daily AC & HS    metoprolol tartrate  25 mg Oral BID    timolol  1 drop Left Eye BID    latanoprost  1 drop Both Eyes Nightly    aspirin  81 mg Oral Daily    isosorbide mononitrate  30 mg Oral Daily    fluticasone  1 spray Each Nostril Daily    [Held by provider] pravastatin  80 mg Oral Nightly    sodium chloride flush  10 mL Intravenous 2 times per day     PRN Meds: sodium chloride flush, fentanNYL, ipratropium-albuterol, glucose, dextrose, glucagon (rDNA), dextrose, sodium chloride flush, polyethylene glycol, promethazine **OR** ondansetron, magnesium sulfate, melatonin    I/O    Intake/Output Summary (Last 24 hours) at 10/18/2020 1352  Last data filed at 10/18/2020 1111  Gross per 24 hour   Intake 700 ml   Output 1800 ml   Net -1100 ml       Labs:   Recent Labs     10/16/20  0549 10/17/20  0917 10/18/20  0557   WBC 10.2 9.7 7.9   HGB 9.4* 10.0* 9.5*   HCT 28.6* 30.8* 30.2*    270 245       Recent Labs     10/16/20  0549 10/17/20  0917 10/18/20  0557    141 144   K 3.4* 4.1 4.3    105 106   CO2 22 22 24   BUN 36* 31* 29*   CREATININE 1.4* 1.4* 1.5*   CALCIUM 8.7 8.8 8.6       Recent Labs     10/16/20  0549 10/17/20  0917 10/18/20  0557   PROT 6.4 6.8 6.2*   ALKPHOS 67 75 67   ALT 97* 79* 56*   AST 23 20 15   BILITOT 1.2 1.2 1.0       Recent Labs     10/18/20  1207   INR 1.3       No results for input(s): CKTOTAL, TROPONINI in the last 72 hours.     Chronic labs:  Lab Results   Component Value Date    CHOL 144 05/01/2020    TRIG 125 05/01/2020    HDL 44 05/01/2020    LDLCALC 93 07/19/2019    TSH 1.26 08/03/2020    INR 1.3 10/18/2020    LABA1C 6.2 (H) 10/17/2020       Radiology:  Ct Abdomen Pelvis Wo Contrast    Result Date: 10/9/2020                                      [address], [address] [place] [address]                                              [phone]                                               CT Scan Report                                                  Signed    Patient: Obielavonfransico Jaya [address]                                              [phone] [employer] [employer] Report                                                  Signed    Patient: Christopher Mitchell                                                                MR#  : [id number]          : 1945                                                                [de-identified]            Age/Sex: 66 / Gail Rivera Date: 10/09/20            Loc: 2T             2029                                                                  Attending Dr: Aruna Larsen Physician: Trav James MD   Date of Service: 10/09/20  Procedure(s): XR abdomen 1V  Accession Number(s): [id number]    cc:               PHYSICIAN INDICATIONS:  Pain       TECHNIQUE: AP view of abdomen. FINDINGS:       There is stool in the colon with no obstruction or free air identified. No abnormal calcifications are seen. Surgical clips right upper quadrant and in the pelvis. IMPRESSION:       Abdomen, single view:   1. Unremarkable. Dictated ByBiju Koch MD   DD/DT: 10/09/20 0609               Signed By: Shantal Owen MD 10/09/20 0609            : [facility]    Ct Chest Wo Contrast    Result Date: 10/13/2020  EXAMINATION: CT OF THE CHEST WITHOUT CONTRAST 10/13/2020 9:28 pm TECHNIQUE: CT of the chest was performed without the administration of intravenous contrast. Multiplanar reformatted images are provided for review. Dose modulation, iterative reconstruction, and/or weight based adjustment of the mA/kV was utilized to reduce the radiation dose to as low as reasonably achievable.  COMPARISON: Chest radiograph, 10/13/2020 HISTORY: ORDERING SYSTEM PROVIDED HISTORY: lung mass seen on cxr TECHNOLOGIST PROVIDED HISTORY: Reason for exam:->lung mass seen on cxr What reading provider will be dictating this exam?->[facility] FINDINGS: Mediastinum: The heart is normal in size. Severe calcified coronary atherosclerosis is seen. The main pulmonary artery is normal in caliber. Mild to moderate calcified atherosclerosis seen in the thoracic aorta. No aneurysm. No lymphadenopathy is seen in the chest.  Small hiatal hernia. Lungs/pleura: Corresponding to the masslike lesion in the right lung is a somewhat complex fluid collection trapped in the oblique fissure on the right. There is pleural thickening in the posterior aspect of the right lower lobe with overlying subpleural opacities likely representing scarring. Small left pleural effusion with overlying mild compressive atelectasis is also seen. Multiple small subpleural blebs are seen in the upper lungs. Tiny 2 mm nodule in the right upper lobe (series 3, image 51). 2 mm calcified granuloma is seen in the left upper lobe (image 20). Upper Abdomen:  Limited images of the upper abdomen demonstrate no acute abnormality. Cholecystectomy clips are seen. Diverticulosis is seen in the partially visualized colon. Nonspecific perinephric edema is seen which may or may not signify medical renal disease. Soft Tissues/Bones: The visualized bones are intact without fracture or focal lesion. 1. Corresponding to the masslike lesion seen in the right thorax on the earlier chest x-ray is area of trapped fluid within the oblique fissure on the right. 2. Pleural thickening and pulmonary scarring is seen along the posterior aspect of the right lung. 3. Small bilateral pleural effusions. 4. No pulmonary mass, lymphadenopathy or metastatic disease seen. 5. 2 mm right upper lobe nodule of doubtful clinical significance. If the patient is at a high risk for malignancy, per the recommendations of the Fleischner society, follow-up CT of the chest may be obtained in 12 months. Otherwise, no future follow-up is needed. 6. Severe calcified coronary atherosclerosis.  [address] Wo Contrast    Result Date: 2020                                      [address], [facility]                                              [phone]                                         Magnetic Resonance Report                                                  Signed    Patient: Paige Pa                                                                MR#  : [id number]          : 1945                                                                [de-identified]            Age/Sex: 66 / M                                                                Admit Date: 20            Loc: ONC                                                                                    Attending Dr: Sera Strickland MD    Ordering Physician: Oneil Blackwood DO   Date of Service: 20  Procedure(s): MR lumbar spine wo/w con; MR thoracic spine wo/w con  Accession Number(s): [id number]; [id number]    cc:       HISTORY:  Back pain, flank pain, prostate cancer. TECHNIQUE:  3T Multiplanar multi sequence imaging of the thoracic, and lumbar spine was performed   with T1 and T2-weighted imaging. Post contrast T1-weighted images were also obtained. FINDINGS THORACIC SPINE:   Alignment is relatively anatomic. There is preservation of disc height and signal intensity. Bone   marrow signal is normal. No abnormal signal is seen in the thoracic spinal cord. No enhancing   spinal column lesion seen. Additional findings and levels as follows:       T7-8:   Right disc herniation. Mild right ventrolateral canal stenosis. No cord compression. No   abnormal signal in the cord. T8-9:   Bilateral ventrolateral canal herniation's. Mild central canal stenosis. Mild bilateral   ventrolateral canal stenosis. No abnormal signal in the cord at this level.   No cord compression. T9-10: Mild right left paracentral and ventrolateral canal disc bulge without significant stenosis. FINDINGS LUMBAR SPINE:   Alignment is near anatomic. No enhancing spinal column lesions. Disc height and signal intensity   are preserved. Marrow signal intensity is normal.  The conus is identified at the level of L1-2. Additional findings levels are as follows:       L1-2: No significant stenosis. L2-3: No significant stenosis. L3-4: No significant stenosis. L4-5: Minimal broad-based disc bulge. Some facet arthropathy. Some mild bilateral neural   foraminal stenosis. L5-S1: Minimal mild broad-based disc bulge. Some facet arthropathy. Some mild right worse than   left neural foraminal stenosis. IMPRESSION 3T MRI of the Thoracic, and Lumbar Spine without and with intravenous contrast:   1. Multilevel disc disease and degenerative changes as described above. Clinical correlation with   dermatome mapping.                                Dictated By: Stanton Peña DO    DD/DT: 20 0957               Signed By: Stantno Peña DO 20 1659              : [facility]    Mri Lumbar Spine W Wo Contrast    Result Date: 2020                                      [address] [place]                                             [facility], [address]                                              [phone]                                         Magnetic Resonance Report                                                  Signed    Patient: Gerard Correia                                                                MR#  : [id number]          : 1945                                                                [de-identified]            Age/Sex: 66 / Rene Charles Date: 20            Loc: [facility] Attending Dr: Ulisses Marcial MD    Ordering Physician: Oneil Blackwood DO   Date of Service: 09/23/20  Procedure(s): MR lumbar spine wo/w con; MR thoracic spine wo/w con  Accession Number(s): [id number]; [id number]    cc:       HISTORY:  Back pain, flank pain, prostate cancer. TECHNIQUE:  3T Multiplanar multi sequence imaging of the thoracic, and lumbar spine was performed   with T1 and T2-weighted imaging. Post contrast T1-weighted images were also obtained. FINDINGS THORACIC SPINE:   Alignment is relatively anatomic. There is preservation of disc height and signal intensity. Bone   marrow signal is normal. No abnormal signal is seen in the thoracic spinal cord. No enhancing   spinal column lesion seen. Additional findings and levels as follows:       T7-8:   Right disc herniation. Mild right ventrolateral canal stenosis. No cord compression. No   abnormal signal in the cord. T8-9:   Bilateral ventrolateral canal herniation's. Mild central canal stenosis. Mild bilateral   ventrolateral canal stenosis. No abnormal signal in the cord at this level. No cord compression. T9-10: Mild right left paracentral and ventrolateral canal disc bulge without significant stenosis. FINDINGS LUMBAR SPINE:   Alignment is near anatomic. No enhancing spinal column lesions. Disc height and signal intensity   are preserved. Marrow signal intensity is normal.  The conus is identified at the level of L1-2. Additional findings levels are as follows:       L1-2: No significant stenosis. L2-3: No significant stenosis. L3-4: No significant stenosis. L4-5: Minimal broad-based disc bulge. Some facet arthropathy. Some mild bilateral neural   foraminal stenosis. L5-S1: Minimal mild broad-based disc bulge. Some facet arthropathy. Some mild right worse than   left neural foraminal stenosis. IMPRESSION 3T MRI of the Thoracic, and Lumbar Spine without and with intravenous contrast:   1. Multilevel disc disease and degenerative changes as described above. Clinical correlation with   dermatome mapping. Dictated By: Frederick Choi DO    DD/DT: 20 0957               Signed By: Frederick Choi DO 20 1658              : ABIGAIL [facility] Kidney W Flow And Function Wo Pharmacological Intervention    Result Date: 10/12/2020  Patient MRN: [id number] : 1945 Age:  66 years Gender: Male Order Date: 10/12/2020 10:58 AM Exam: NM KIDNEY W FLOW AND FUNCTION WO PHARMACOLOGICAL INTERVENTION Number of Images: 5 views Indication:   bilateral renal artery stenosis bilateral renal artery stenosis What reading provider will be dictating this exam?->[facility] Comparison: Ultrasound 10/11/2020 Findings: The patient was injected with 10 mCi of technetium MAG3 intravenously Split uptake on the left was 47.8  percent, time to peak was 4.5 minutes, one half clearance time was 18.9 Split uptake on the right was 52.2 percent, time to peak was 0.5 minutes, one half clearance time was 23.4 minutes Renal curves demonstrate perfusion to be delayed. Extraction was delayed. Excretion was delayed. Lasix was not administered     Diminished perfusion and extraction and excretion, the findings are compatible with bilateral renal artery stenosis versus ATN versus chronic renal failure     Xr Chest Portable    Result Date: 10/13/2020  EXAMINATION: ONE XRAY VIEW OF THE CHEST 10/13/2020 10:51 am COMPARISON: None. HISTORY: ORDERING SYSTEM PROVIDED HISTORY: SOB TECHNOLOGIST PROVIDED HISTORY: Reason for exam:->SOB FINDINGS: Within the right perihilar region there is a 6.0 x 4.2 cm rounded opacity. There is also a 2nd rounded opacity seen within the right hilum measuring 2.4 cm. There is no appreciable inflammatory process within left lung. The heart is at upper limits of normal in size.      1. Abnormal chest x-ray. There is masslike opacity seen within the right perihilar region measuring 6 cm x 4.2 cm and there is a 2nd rounded opacity within the right hilum measuring 2.4 cm. Dedicated CT of the chest is recommended for further evaluation. Cta Pulmonary W Contrast    Result Date: 10/10/2020                                      [address], [address] [place] [address] [address]kw[address]                                              [phone]                                               CT Scan Report                                                  Signed    Patient: Paige Pa                                                                MR#  : [id number]          : 1945                                                                [de-identified]            Age/Sex: 66 / Kwan Les Date: 10/09/20            Loc:              2029                                                                  Attending Dr: Annabella Fagan DO    Ordering Physician: Delaney Mix NP   Date of Service: 10/10/20  Procedure(s): CT angio chest PE protocol  Accession Number(s): [id number]    cc:               PHYSICIAN INDICATIONS:  Short Of Breath       TECHNIQUE: High resolution axial CT images of the chest with three-dimensional reconstruction on   separate workstation. Study was performed with intravenous contrast for CT Angiogram. CT Dose   Index: 3.7 - 11.2 mGy. DLP: 245 mGy-cm. Administered 59.0 ml of ISOVUE 300.00 mg/ml. AEC. k=0.014   mSv/(mGy-cm)       FINDINGS:       The pulmonary arteries appear normal in caliber without filling defects to suggest pulmonary   emboli. The aorta is normal in caliber without aneurysm, dissection or rupture. The heart size is mildly   enlarged .        Small bilateral pleural effusions with dependent atelectasis. No area of consolidation. Emphysematous changes with upper lobe predominance. No lung nodules are identified. The mediastinum is unremarkable with no significant adenopathy   identified. The hilar regions are unremarkable. In the upper abdomen, visualized portions of the liver, adrenal glands, kidneys and spleen are   unremarkable. IMPRESSION:       CT Angiogram Chest and Pulmonary Arteries with contrast and high resolution 2-D and 3-D images:       1. No evidence of pulmonary emboli by CT technique. 2. Small bilateral pleural effusions with dependent atelectasis. Dictated ByGeo Vieira MD   DD/DT: 10/10/20 102               Signed By: Quentin Contreras MD 10/10/20 102            : [facility]    Us Dup Lower Extremity Mapping Bilat Venous    Result Date: 10/16/2020  Patient MRN:  [id number] : 1945 Age: 66 years Gender: Male Order Date:  10/16/2020 4:46 PM EXAM: US DUP LOWER EXTREMITY MAPPING BILAT VENOUS NUMBER OF IMAGES: INDICATION:  pre op cabg vein mapping pre op cabg vein mapping What reading provider will be dictating this exam?->[facility]  Preop coronary bypass. COMPARISON: None Bilateral Lower extremity venous mapping Right and left lower extremity venous mapping was performed. Measurements on the the right: Groin: 7.9  mm, High thigh : 3.4 mm, Mid thigh : 4.5 mm, Low thigh : 4 mm, Knee : 4.8 mm, High lower leg : 2.9 mm, Mid lower leg : 3.4 Ankle : 2.5 mm, Measurements on the left; Groin : 6.9 mm, High thigh : 2.6 mm, Mid thigh : 2.7 mm, Low thigh : 1.9 mm, Knee : 3.3 mm, High lower leg : 2.2 mm, Mid lower leg : 2.4 mm, Ankle : 2.4 mm,     Bilateral venous mapping as described. No deep venous thrombosis visualized.      Us Retroperitoneal Complete    Result Date: 10/11/2020  EXAMINATION: RETROPERITONEAL ULTRASOUND OF THE KIDNEYS AND URINARY BLADDER 10/11/2020 COMPARISON: None HISTORY: ORDERING SYSTEM PROVIDED HISTORY: MARIAH TECHNOLOGIST PROVIDED HISTORY: Reason for exam:->MARIAH What reading provider will be dictating this exam?->[facility] FINDINGS: Kidneys: The right kidney measures 11 cm in length and the left kidney measures 11.4 cm in length. Kidneys demonstrate normal cortical echogenicity. No evidence of hydronephrosis or intrarenal stones. No suspicious renal mass is seen. There is an anechoic benign-appearing cyst in the mid left kidney measuring 2.7 x 2.3 x 2.5 cm. Bladder: The bladder is not well distended. No obvious intraluminal mass is seen. Prevoid bladder volume is estimated at at only 11 mL. Ureteral jets are not visualized on color flow imaging. Postvoid imaging was not done. 1. 2.7 cm anechoic left renal cyst. 2. Otherwise, negative renal ultrasound. 3. Poorly distended bladder showing no gross abnormality. Us Carotid Artery Bilateral    Result Date: 10/16/2020  Patient MRN:  [id number] : 1945 Age: 66 years Gender: Male Order Date:  10/16/2020 4:46 PM EXAM: US CAROTID ARTERY BILATERAL NUMBER OF IMAGES:  52 INDICATION:  evaluate for stenosis evaluate for stenosis What reading provider will be dictating this exam?->[facility] COMPARISON: None FINDINGS: Velocities are moderately elevated on the left ICA\CCA ratios are  moderately elevated on the left The right vertebral artery doppler images demonstrate  antegrade flow. The left vertebral artery doppler images demonstrate  antegrade flow. Grey scale images demonstrate mild plaque identified in the right and left carotid arteries. Atherosclerotic disease. Estimated stenosis by NASCET criteria in the proximal right carotid artery is between 0% to 49% . Estimated stenosis by NASCET criteria in the proximal left carotid artery is between 50% to 69%.      ASSESSMENT:  Non-STEMI  Active Problems:    Transaminitis    Non-STEMI (non-ST elevated myocardial infarction) (HCC)    Paroxysmal atrial fibrillation (HCC)    Anemia    History of right-sided carotid endarterectomy Coronary artery disease    Acute kidney injury superimposed on chronic kidney disease (HCC)    Hypotension  Resolved Problems:    * No resolved hospital problems. *       PLAN:    1. Severe three-vessel disease for open heart surgery on Monday  2. Maintain renal function and hypertension controlled and blood sugar control  3 mood seems to be appropriate for now. 4.  Continue with normalized LFTs  5. Receiving  IV iron today  6. A1c was only mildly abnormal and in the prediabetic range. Blood sugars have remained excellently controlled  7. Confirmed bilateral renal artery stenosis by CT scan  Diet: DIET CARDIAC; Diet NPO Time Specified Exceptions are: Sips with Meds  Code Status: Full Code  PT/OT Eval Status:   After surgery  DVT Prophylaxis:    in place  Recommended disposition at discharge:   Home or rehab    +++++++++++++++++++++++++++++++++++++++++++++++++  Levi Batista MD   [facility].  +++++++++++++++++++++++++++++++++++++++++++++++++  NOTE: This report was transcribed using voice recognition software. Every effort was made to ensure accuracy; however, inadvertent computerized transcription errors may be present.

## 2020-10-18 NOTE — PROGRESS NOTES
DAILY PROGRESS NOTE - THE HEART CENTER    SUBJECTIVE:    He is being followed for multivessel CAD and paroxysmal atrial fibrillation. Transferred here with severe abdominal pain, leukocytosis, elevated LFTs. Rapid atrial fibrillation with hypotension that resolved after conversion to sinus rhythm with elevated troponin and subsequent heart catheterization showing severe multivessel CAD requiring CABG. Currently in bed and says he feels fine. Uneventful night. No chest pain, worsening dyspnea, palpitations, syncope, presyncope. Hypertension, hyperlipidemia, diabetes, COPD, right CEA in the past.  Preserved left ventricular systolic function with mild pulmonary hypertension on recent echocardiogram.      OBJECTIVE:    His vital signs were reviewed today. Vitals:    10/18/20 0430   BP: 134/64   Pulse: 66   Resp: 16   Temp: 98.2 °F (36.8 °C)   SpO2: 95%       Scheduled Meds:   ferric gluconate (FERRLECIT) IVPB  125 mg Intravenous Daily    enoxaparin  1 mg/kg Subcutaneous Daily    pantoprazole  40 mg Oral BID AC    sodium chloride  20 mL Intravenous Once    ferric gluconate (FERRLECIT) IVPB  125 mg Intravenous Once    sucralfate  1 g Oral 4x Daily AC & HS    metoprolol tartrate  25 mg Oral BID    timolol  1 drop Left Eye BID    latanoprost  1 drop Both Eyes Nightly    aspirin  81 mg Oral Daily    isosorbide mononitrate  30 mg Oral Daily    fluticasone  1 spray Each Nostril Daily    [Held by provider] pravastatin  80 mg Oral Nightly    sodium chloride flush  10 mL Intravenous 2 times per day     Continuous Infusions:   dilTIAZem      dextrose       PRN Meds:.fentanNYL, ipratropium-albuterol, glucose, dextrose, glucagon (rDNA), dextrose, sodium chloride flush, polyethylene glycol, promethazine **OR** ondansetron, magnesium sulfate, melatonin    REVIEW OF SYSTEMS:     No pruritus, rash, bruising. Cardiac symptoms per HPI.   No nausea, vomiting, abdominal pain, GI bleeding, change in bowel

## 2020-10-18 NOTE — PROGRESS NOTES
Department of Internal Medicine  Nephrology Attending Progress Note    Events reviewed. SUBJECTIVE:  We are following [address]eci [address] for MARIAH on CKD. Reports no major complaints. Physical Exam:    VITALS:  /63   Pulse 73   Temp 96.4 °F (35.8 °C)   Resp 16   Ht 5' 8\" (1.727 m)   Wt 162 lb (73.5 kg)   SpO2 96%   BMI 24.63 kg/m²   24HR INTAKE/OUTPUT:      Intake/Output Summary (Last 24 hours) at 10/18/2020 1112  Last data filed at 10/18/2020 1111  Gross per 24 hour   Intake 900 ml   Output 2200 ml   Net -1300 ml       Constitutional: No apparent distress, appears stated age and cooperative.   HEENT: Normal cephalic, atraumatic, PERRL  Respiratory:  CTA bilaterally   Cardiovascular/Edema: Heart sounds regular   Gastrointestinal:  Soft, nontender, nondistended  Neurologic:    Neurovascularly intact without any focal sensory/motor deficits  Skin:  Normal skin color.  No rashes or lesions  Other:   + edema of lower extremities, + sacral edema     Scheduled Meds:   ferric gluconate (FERRLECIT) IVPB  125 mg Intravenous Daily    enoxaparin  1 mg/kg Subcutaneous Daily    pantoprazole  40 mg Oral BID AC    sodium chloride  20 mL Intravenous Once    ferric gluconate (FERRLECIT) IVPB  125 mg Intravenous Once    sucralfate  1 g Oral 4x Daily AC & HS    metoprolol tartrate  25 mg Oral BID    timolol  1 drop Left Eye BID    latanoprost  1 drop Both Eyes Nightly    aspirin  81 mg Oral Daily    isosorbide mononitrate  30 mg Oral Daily    fluticasone  1 spray Each Nostril Daily    [Held by provider] pravastatin  80 mg Oral Nightly    sodium chloride flush  10 mL Intravenous 2 times per day     Continuous Infusions:   dilTIAZem      dextrose       PRN Meds:.fentanNYL, ipratropium-albuterol, glucose, dextrose, glucagon (rDNA), dextrose, sodium chloride flush, polyethylene glycol, promethazine **OR** ondansetron, magnesium sulfate, melatonin    DATA:    CBC:   Lab Results   Component Value Date    WBC 7.9 obtained in 12 months. Otherwise, no future follow-up is needed. 6. Severe calcified coronary atherosclerosis. 1           CXR 10/13/2020   1. Abnormal chest x-ray.  There is masslike opacity seen within the right    perihilar region measuring 6 cm x 4.2 cm and there is a 2nd rounded opacity    within the right hilum measuring 2.4 cm.  Dedicated CT of the chest is    recommended for further evaluation.                 BRIEF SUMMARY OF INITIAL CONSULT:    Briefly Prashanth Luis, a 73 y. o. year old male with history of CKD stage III, with mild proteinuria, baseline creatinine 1.3 to 1.5 mg/dL, HTN, type II DM, hyperlipidemia, gout, prostate cancer status post resection, emphysema, seronegative inflammatory arthritis possibly psoriatic arthritis on methotrexate, who was admitted on October 10, 2020 transferred from [facility] where he initially was admitted with abdominal pain. Probably he was hypotensive with a blood pressures loss 79/50 and he was fluid resuscitated, he was transferred for a higher level of care. His creatinine level on admission was 1.9 mg/dL reason for this consultation. His medications prior to admission included lisinopril and chlorthalidone. Problems resolved:  · HAGMA, lactic acidosis and probably ketoacidosis, anion gap improved  · Hypotension,multifactroial, cardiogenic, 2/2 to MI, AF  · Hypokalemia, secondary to diuretics, potassium level improved  · Elevated LFTs, likely shock liver    IMPRESSION/RECOMMENDATIONS:      1. MARIAH on CKD, volume responsive prerenal MARIAH (hypotension, thiazide diuretic, in the setting of ACE inhibition), versus ischemic ATN? [place] Renal function relatively stable after cardiac catheterization. Still with significant edema, to repeat Lasix. 2. CKD stage III, with mild proteinuria, secondary to early diabetic nephropathy, baseline creatinine 1.3 to 1.5 mg/dl  3.  CAD, NSTEMI, status post catheterization, has multivessel CAD, for CABG

## 2020-10-19 ENCOUNTER — APPOINTMENT (OUTPATIENT)
Dept: GENERAL RADIOLOGY | Age: 75
DRG: 233 | End: 2020-10-19
Attending: FAMILY MEDICINE
Payer: MEDICARE

## 2020-10-19 ENCOUNTER — ANESTHESIA (OUTPATIENT)
Dept: OPERATING ROOM | Age: 75
DRG: 233 | End: 2020-10-19
Payer: MEDICARE

## 2020-10-19 VITALS — TEMPERATURE: 98.4 F | OXYGEN SATURATION: 89 % | RESPIRATION RATE: 14 BRPM

## 2020-10-19 LAB
AADO2: 185.8 MMHG
AADO2: 197.7 MMHG
ACTIVATED CLOTTING TIME: 110 SECONDS (ref 99–130)
ACTIVATED CLOTTING TIME: 116 SECONDS (ref 99–130)
ACTIVATED CLOTTING TIME: 389 SECONDS (ref 99–130)
ACTIVATED CLOTTING TIME: 403 SECONDS (ref 99–130)
ACTIVATED CLOTTING TIME: 422 SECONDS (ref 99–130)
ACTIVATED CLOTTING TIME: 468 SECONDS (ref 99–130)
ALBUMIN SERPL-MCNC: 3.8 G/DL (ref 3.5–5.2)
ALP BLD-CCNC: 70 U/L (ref 40–129)
ALT SERPL-CCNC: 47 U/L (ref 0–40)
ANION GAP SERPL CALCULATED.3IONS-SCNC: 11 MMOL/L (ref 7–16)
ANION GAP SERPL CALCULATED.3IONS-SCNC: 14 MMOL/L (ref 7–16)
APTT: 37.7 SEC (ref 24.5–35.1)
AST SERPL-CCNC: 15 U/L (ref 0–39)
B.E.: -2.2 MMOL/L (ref -3–3)
B.E.: -5.3 MMOL/L (ref -3–3)
B.E.: -5.8 MMOL/L (ref -3–3)
B.E.: -6.6 MMOL/L (ref -3–3)
B.E.: 0.3 MMOL/L (ref -3–0)
B.E.: 4.2 MMOL/L (ref -3–0)
B.E.: 5.5 MMOL/L (ref -3–0)
BASOPHILS ABSOLUTE: 0.05 E9/L (ref 0–0.2)
BASOPHILS RELATIVE PERCENT: 0.6 % (ref 0–2)
BILIRUB SERPL-MCNC: 1 MG/DL (ref 0–1.2)
BUN BLDV-MCNC: 23 MG/DL (ref 8–23)
BUN BLDV-MCNC: 27 MG/DL (ref 8–23)
CALCIUM SERPL-MCNC: 8.5 MG/DL (ref 8.6–10.2)
CALCIUM SERPL-MCNC: 8.5 MG/DL (ref 8.6–10.2)
CARDIOPULMONARY BYPASS: NO
CHLORIDE BLD-SCNC: 104 MMOL/L (ref 98–107)
CHLORIDE BLD-SCNC: 104 MMOL/L (ref 98–107)
CO2: 26 MMOL/L (ref 22–29)
CO2: 26 MMOL/L (ref 22–29)
COHB: 0.6 % (ref 0–1.5)
COHB: 0.6 % (ref 0–1.5)
COHB: 1 % (ref 0–1.5)
COHB: 1.2 % (ref 0–1.5)
CREAT SERPL-MCNC: 1.4 MG/DL (ref 0.7–1.2)
CREAT SERPL-MCNC: 1.5 MG/DL (ref 0.7–1.2)
CRITICAL: ABNORMAL
DATE ANALYZED: ABNORMAL
DATE OF COLLECTION: ABNORMAL
DEVICE: ABNORMAL
EOSINOPHILS ABSOLUTE: 0.21 E9/L (ref 0.05–0.5)
EOSINOPHILS RELATIVE PERCENT: 2.7 % (ref 0–6)
FIO2: 50 %
FIO2: 50 %
GFR AFRICAN AMERICAN: 55
GFR AFRICAN AMERICAN: 59
GFR NON-AFRICAN AMERICAN: 45 ML/MIN/1.73
GFR NON-AFRICAN AMERICAN: 49 ML/MIN/1.73
GLUCOSE BLD-MCNC: 120 MG/DL (ref 74–99)
GLUCOSE BLD-MCNC: 138 MG/DL (ref 74–99)
HCO3 ARTERIAL: 25 MMOL/L (ref 22–26)
HCO3 ARTERIAL: 28.4 MMOL/L (ref 22–26)
HCO3 ARTERIAL: 28.7 MMOL/L (ref 22–26)
HCO3: 19.6 MMOL/L (ref 22–26)
HCO3: 20.9 MMOL/L (ref 22–26)
HCO3: 21.3 MMOL/L (ref 22–26)
HCO3: 22.3 MMOL/L (ref 22–26)
HCT (EST): 16 % (ref 37–54)
HCT (EST): 19 % (ref 37–54)
HCT (EST): 23 % (ref 37–54)
HCT VFR BLD CALC: 28.8 % (ref 37–54)
HCT VFR BLD CALC: 36.2 % (ref 37–54)
HEMOGLOBIN: 11.9 G/DL (ref 12.5–16.5)
HEMOGLOBIN: 9.3 G/DL (ref 12.5–16.5)
HGB, (EST): 5.3 G/DL (ref 12.5–15.5)
HGB, (EST): 6.6 G/DL (ref 12.5–15.5)
HGB, (EST): 7.7 G/DL (ref 12.5–15.5)
HHB: 1.9 % (ref 0–5)
HHB: 2.9 % (ref 0–5)
HHB: 2.9 % (ref 0–5)
HHB: 3.4 % (ref 0–5)
IMMATURE GRANULOCYTES #: 0.07 E9/L
IMMATURE GRANULOCYTES %: 0.9 % (ref 0–5)
INR BLD: 1.4
LAB: ABNORMAL
LYMPHOCYTES ABSOLUTE: 1.7 E9/L (ref 1.5–4)
LYMPHOCYTES RELATIVE PERCENT: 21.8 % (ref 20–42)
Lab: ABNORMAL
MAGNESIUM: 2.3 MG/DL (ref 1.6–2.6)
MCH RBC QN AUTO: 33.3 PG (ref 26–35)
MCH RBC QN AUTO: 33.6 PG (ref 26–35)
MCHC RBC AUTO-ENTMCNC: 32.3 % (ref 32–34.5)
MCHC RBC AUTO-ENTMCNC: 32.9 % (ref 32–34.5)
MCV RBC AUTO: 101.4 FL (ref 80–99.9)
MCV RBC AUTO: 104 FL (ref 80–99.9)
METER GLUCOSE: 118 MG/DL (ref 74–99)
METER GLUCOSE: 129 MG/DL (ref 74–99)
METER GLUCOSE: 152 MG/DL (ref 74–99)
METER GLUCOSE: 155 MG/DL (ref 74–99)
METER GLUCOSE: 164 MG/DL (ref 74–99)
METER GLUCOSE: 186 MG/DL (ref 74–99)
METER GLUCOSE: 205 MG/DL (ref 74–99)
METER GLUCOSE: 224 MG/DL (ref 74–99)
METHB: 0.2 % (ref 0–1.5)
METHB: 0.3 % (ref 0–1.5)
MODE: ABNORMAL
MODE: AC
MONOCYTES ABSOLUTE: 0.93 E9/L (ref 0.1–0.95)
MONOCYTES RELATIVE PERCENT: 11.9 % (ref 2–12)
NEUTROPHILS ABSOLUTE: 4.85 E9/L (ref 1.8–7.3)
NEUTROPHILS RELATIVE PERCENT: 62.1 % (ref 43–80)
O2 CONTENT: 16.6 ML/DL
O2 SATURATION: 100 % (ref 92–98.5)
O2 SATURATION: 100 % (ref 92–98.5)
O2 SATURATION: 97 % (ref 92–98.5)
O2 SATURATION: 97.4 % (ref 92–98.5)
O2 SATURATION: 97.4 % (ref 92–98.5)
O2 SATURATION: 98.1 % (ref 92–98.5)
O2 SATURATION: 99.6 % (ref 92–98.5)
O2HB: 95.7 % (ref 94–97)
O2HB: 95.9 % (ref 94–97)
O2HB: 96.2 % (ref 94–97)
O2HB: 96.6 % (ref 94–97)
OPERATOR ID: 1893
OPERATOR ID: 789
OPERATOR ID: ABNORMAL
PATIENT TEMP: 37 C
PCO2 ARTERIAL: 34.1 MMHG (ref 35–45)
PCO2 ARTERIAL: 39.4 MMHG (ref 35–45)
PCO2 ARTERIAL: 39.5 MMHG (ref 35–45)
PCO2: 37.3 MMHG (ref 35–45)
PCO2: 38 MMHG (ref 35–45)
PCO2: 43.5 MMHG (ref 35–45)
PCO2: 53.1 MMHG (ref 35–45)
PDW BLD-RTO: 17.9 FL (ref 11.5–15)
PDW BLD-RTO: 18.5 FL (ref 11.5–15)
PEEP/CPAP: 5 CMH2O
PEEP/CPAP: 5 CMH2O
PFO2: 2.07 MMHG/%
PFO2: 2.32 MMHG/%
PH BLOOD GAS: 7.22 (ref 7.35–7.45)
PH BLOOD GAS: 7.3 (ref 7.35–7.45)
PH BLOOD GAS: 7.33 (ref 7.35–7.45)
PH BLOOD GAS: 7.39 (ref 7.35–7.45)
PH BLOOD GAS: 7.41 (ref 7.35–7.45)
PH BLOOD GAS: 7.46 (ref 7.35–7.45)
PH BLOOD GAS: 7.53 (ref 7.35–7.45)
PLATELET # BLD: 171 E9/L (ref 130–450)
PLATELET # BLD: 232 E9/L (ref 130–450)
PMV BLD AUTO: 10.4 FL (ref 7–12)
PMV BLD AUTO: 10.8 FL (ref 7–12)
PO2 ARTERIAL: 180.7 MMHG (ref 60–80)
PO2 ARTERIAL: 471 MMHG (ref 60–80)
PO2 ARTERIAL: 515 MMHG (ref 60–80)
PO2: 100.8 MMHG (ref 75–100)
PO2: 103.6 MMHG (ref 75–100)
PO2: 116.2 MMHG (ref 75–100)
PO2: 116.4 MMHG (ref 75–100)
POTASSIUM REFLEX MAGNESIUM: 3.6 MMOL/L (ref 3.5–5)
POTASSIUM SERPL-SCNC: 3.32 MMOL/L (ref 3.5–5)
POTASSIUM SERPL-SCNC: 3.5 MMOL/L (ref 3.5–5.5)
POTASSIUM SERPL-SCNC: 3.9 MMOL/L (ref 3.5–5)
POTASSIUM SERPL-SCNC: 4 MMOL/L (ref 3.5–5.5)
POTASSIUM SERPL-SCNC: 4 MMOL/L (ref 3.5–5.5)
POTASSIUM SERPL-SCNC: 5.07 MMOL/L (ref 3.5–5)
POTASSIUM SERPL-SCNC: 5.13 MMOL/L (ref 3.5–5)
POTASSIUM SERPL-SCNC: 5.13 MMOL/L (ref 3.5–5)
PROTHROMBIN TIME: 16.2 SEC (ref 9.3–12.4)
PS: 8 CMH20
RBC # BLD: 2.77 E12/L (ref 3.8–5.8)
RBC # BLD: 3.57 E12/L (ref 3.8–5.8)
RI(T): 1.91
RI(T): 160 %
RR MECHANICAL: 12 B/MIN
SODIUM BLD-SCNC: 141 MMOL/L (ref 132–146)
SODIUM BLD-SCNC: 144 MMOL/L (ref 132–146)
SOURCE, BLOOD GAS: ABNORMAL
THB: 11.2 G/DL (ref 11.5–16.5)
THB: 11.6 G/DL (ref 11.5–16.5)
THB: 12 G/DL (ref 11.5–16.5)
THB: 12.1 G/DL (ref 11.5–16.5)
TIME ANALYZED: 1216
TIME ANALYZED: 1647
TIME ANALYZED: 1756
TIME ANALYZED: 1950
TOTAL PROTEIN: 6.5 G/DL (ref 6.4–8.3)
VT MECHANICAL: 480 ML
WBC # BLD: 26.7 E9/L (ref 4.5–11.5)
WBC # BLD: 7.8 E9/L (ref 4.5–11.5)

## 2020-10-19 PROCEDURE — 33508 ENDOSCOPIC VEIN HARVEST: CPT | Performed by: THORACIC SURGERY (CARDIOTHORACIC VASCULAR SURGERY)

## 2020-10-19 PROCEDURE — 2500000003 HC RX 250 WO HCPCS

## 2020-10-19 PROCEDURE — 2500000003 HC RX 250 WO HCPCS: Performed by: THORACIC SURGERY (CARDIOTHORACIC VASCULAR SURGERY)

## 2020-10-19 PROCEDURE — 5A1935Z RESPIRATORY VENTILATION, LESS THAN 24 CONSECUTIVE HOURS: ICD-10-PCS | Performed by: THORACIC SURGERY (CARDIOTHORACIC VASCULAR SURGERY)

## 2020-10-19 PROCEDURE — 6360000002 HC RX W HCPCS: Performed by: NURSE PRACTITIONER

## 2020-10-19 PROCEDURE — 80053 COMPREHEN METABOLIC PANEL: CPT

## 2020-10-19 PROCEDURE — P9045 ALBUMIN (HUMAN), 5%, 250 ML: HCPCS | Performed by: NURSE PRACTITIONER

## 2020-10-19 PROCEDURE — 82805 BLOOD GASES W/O2 SATURATION: CPT

## 2020-10-19 PROCEDURE — 33518 CABG ARTERY-VEIN TWO: CPT | Performed by: THORACIC SURGERY (CARDIOTHORACIC VASCULAR SURGERY)

## 2020-10-19 PROCEDURE — 0BH18EZ INSERTION OF ENDOTRACHEAL AIRWAY INTO TRACHEA, VIA NATURAL OR ARTIFICIAL OPENING ENDOSCOPIC: ICD-10-PCS | Performed by: THORACIC SURGERY (CARDIOTHORACIC VASCULAR SURGERY)

## 2020-10-19 PROCEDURE — 3600000018 HC SURGERY OHS ADDTL 15MIN: Performed by: THORACIC SURGERY (CARDIOTHORACIC VASCULAR SURGERY)

## 2020-10-19 PROCEDURE — 06BP0ZZ EXCISION OF RIGHT SAPHENOUS VEIN, OPEN APPROACH: ICD-10-PCS | Performed by: THORACIC SURGERY (CARDIOTHORACIC VASCULAR SURGERY)

## 2020-10-19 PROCEDURE — 71045 X-RAY EXAM CHEST 1 VIEW: CPT

## 2020-10-19 PROCEDURE — 33533 CABG ARTERIAL SINGLE: CPT | Performed by: THORACIC SURGERY (CARDIOTHORACIC VASCULAR SURGERY)

## 2020-10-19 PROCEDURE — 2000000000 HC ICU R&B

## 2020-10-19 PROCEDURE — 02L70CK OCCLUSION OF LEFT ATRIAL APPENDAGE WITH EXTRALUMINAL DEVICE, OPEN APPROACH: ICD-10-PCS | Performed by: THORACIC SURGERY (CARDIOTHORACIC VASCULAR SURGERY)

## 2020-10-19 PROCEDURE — 2780000010 HC IMPLANT OTHER: Performed by: THORACIC SURGERY (CARDIOTHORACIC VASCULAR SURGERY)

## 2020-10-19 PROCEDURE — 6360000002 HC RX W HCPCS: Performed by: THORACIC SURGERY (CARDIOTHORACIC VASCULAR SURGERY)

## 2020-10-19 PROCEDURE — 85027 COMPLETE CBC AUTOMATED: CPT

## 2020-10-19 PROCEDURE — 02100Z9 BYPASS CORONARY ARTERY, ONE ARTERY FROM LEFT INTERNAL MAMMARY, OPEN APPROACH: ICD-10-PCS | Performed by: THORACIC SURGERY (CARDIOTHORACIC VASCULAR SURGERY)

## 2020-10-19 PROCEDURE — 99291 CRITICAL CARE FIRST HOUR: CPT | Performed by: NURSE PRACTITIONER

## 2020-10-19 PROCEDURE — 85025 COMPLETE CBC W/AUTO DIFF WBC: CPT

## 2020-10-19 PROCEDURE — C1713 ANCHOR/SCREW BN/BN,TIS/BN: HCPCS | Performed by: THORACIC SURGERY (CARDIOTHORACIC VASCULAR SURGERY)

## 2020-10-19 PROCEDURE — 94002 VENT MGMT INPAT INIT DAY: CPT

## 2020-10-19 PROCEDURE — 5A1221Z PERFORMANCE OF CARDIAC OUTPUT, CONTINUOUS: ICD-10-PCS | Performed by: THORACIC SURGERY (CARDIOTHORACIC VASCULAR SURGERY)

## 2020-10-19 PROCEDURE — 3700000001 HC ADD 15 MINUTES (ANESTHESIA): Performed by: THORACIC SURGERY (CARDIOTHORACIC VASCULAR SURGERY)

## 2020-10-19 PROCEDURE — 6370000000 HC RX 637 (ALT 250 FOR IP): Performed by: THORACIC SURGERY (CARDIOTHORACIC VASCULAR SURGERY)

## 2020-10-19 PROCEDURE — 85610 PROTHROMBIN TIME: CPT

## 2020-10-19 PROCEDURE — 3700000000 HC ANESTHESIA ATTENDED CARE: Performed by: THORACIC SURGERY (CARDIOTHORACIC VASCULAR SURGERY)

## 2020-10-19 PROCEDURE — B24BZZ4 ULTRASONOGRAPHY OF HEART WITH AORTA, TRANSESOPHAGEAL: ICD-10-PCS | Performed by: ANESTHESIOLOGY

## 2020-10-19 PROCEDURE — 6370000000 HC RX 637 (ALT 250 FOR IP)

## 2020-10-19 PROCEDURE — 2700000000 HC OXYGEN THERAPY PER DAY

## 2020-10-19 PROCEDURE — 2709999900 HC NON-CHARGEABLE SUPPLY: Performed by: THORACIC SURGERY (CARDIOTHORACIC VASCULAR SURGERY)

## 2020-10-19 PROCEDURE — 02HV33Z INSERTION OF INFUSION DEVICE INTO SUPERIOR VENA CAVA, PERCUTANEOUS APPROACH: ICD-10-PCS | Performed by: ANESTHESIOLOGY

## 2020-10-19 PROCEDURE — 021109W BYPASS CORONARY ARTERY, TWO ARTERIES FROM AORTA WITH AUTOLOGOUS VENOUS TISSUE, OPEN APPROACH: ICD-10-PCS | Performed by: THORACIC SURGERY (CARDIOTHORACIC VASCULAR SURGERY)

## 2020-10-19 PROCEDURE — 80048 BASIC METABOLIC PNL TOTAL CA: CPT

## 2020-10-19 PROCEDURE — 3600000008 HC SURGERY OHS BASE: Performed by: THORACIC SURGERY (CARDIOTHORACIC VASCULAR SURGERY)

## 2020-10-19 PROCEDURE — 37799 UNLISTED PX VASCULAR SURGERY: CPT

## 2020-10-19 PROCEDURE — P9045 ALBUMIN (HUMAN), 5%, 250 ML: HCPCS | Performed by: THORACIC SURGERY (CARDIOTHORACIC VASCULAR SURGERY)

## 2020-10-19 PROCEDURE — C9113 INJ PANTOPRAZOLE SODIUM, VIA: HCPCS | Performed by: THORACIC SURGERY (CARDIOTHORACIC VASCULAR SURGERY)

## 2020-10-19 PROCEDURE — 2580000003 HC RX 258: Performed by: THORACIC SURGERY (CARDIOTHORACIC VASCULAR SURGERY)

## 2020-10-19 PROCEDURE — 85730 THROMBOPLASTIN TIME PARTIAL: CPT

## 2020-10-19 PROCEDURE — 2720000010 HC SURG SUPPLY STERILE: Performed by: THORACIC SURGERY (CARDIOTHORACIC VASCULAR SURGERY)

## 2020-10-19 PROCEDURE — 2580000003 HC RX 258

## 2020-10-19 PROCEDURE — 36415 COLL VENOUS BLD VENIPUNCTURE: CPT

## 2020-10-19 PROCEDURE — 82803 BLOOD GASES ANY COMBINATION: CPT

## 2020-10-19 PROCEDURE — 94640 AIRWAY INHALATION TREATMENT: CPT

## 2020-10-19 PROCEDURE — 82962 GLUCOSE BLOOD TEST: CPT

## 2020-10-19 PROCEDURE — 6360000002 HC RX W HCPCS

## 2020-10-19 PROCEDURE — 2580000003 HC RX 258: Performed by: NURSE PRACTITIONER

## 2020-10-19 PROCEDURE — 84132 ASSAY OF SERUM POTASSIUM: CPT

## 2020-10-19 PROCEDURE — 6370000000 HC RX 637 (ALT 250 FOR IP): Performed by: PHYSICIAN ASSISTANT

## 2020-10-19 PROCEDURE — 85347 COAGULATION TIME ACTIVATED: CPT

## 2020-10-19 PROCEDURE — 83735 ASSAY OF MAGNESIUM: CPT

## 2020-10-19 PROCEDURE — 0PH000Z INSERTION OF RIGID PLATE INTERNAL FIXATION DEVICE INTO STERNUM, OPEN APPROACH: ICD-10-PCS | Performed by: THORACIC SURGERY (CARDIOTHORACIC VASCULAR SURGERY)

## 2020-10-19 DEVICE — ZINACTIVE USE 2540328 DEVICE OCCL CLP L45MM PLUNG GRP FLX SHFT FOR GILLINOV: Type: IMPLANTABLE DEVICE | Site: HEART | Status: FUNCTIONAL

## 2020-10-19 DEVICE — PLATE BONE 1.8MM THICKNESS STRNL LCK 6 H CP TI: Type: IMPLANTABLE DEVICE | Site: STERNUM | Status: FUNCTIONAL

## 2020-10-19 DEVICE — SCREW BNE L9MM DIA2.3MM THOR STRNL TI LOK DRL FREE LEV 1: Type: IMPLANTABLE DEVICE | Site: STERNUM | Status: FUNCTIONAL

## 2020-10-19 DEVICE — PLATE LCK STRNL 8-H LAD T 1.8MM: Type: IMPLANTABLE DEVICE | Site: STERNUM | Status: FUNCTIONAL

## 2020-10-19 DEVICE — SCREW BNE L11MM DIA2.3MM THOR STRNL TI LOK DRL FREE LEV 1: Type: IMPLANTABLE DEVICE | Site: STERNUM | Status: FUNCTIONAL

## 2020-10-19 RX ORDER — MAGNESIUM SULFATE IN WATER 40 MG/ML
2 INJECTION, SOLUTION INTRAVENOUS PRN
Status: DISCONTINUED | OUTPATIENT
Start: 2020-10-19 | End: 2020-10-25

## 2020-10-19 RX ORDER — ACETAMINOPHEN 325 MG/1
650 TABLET ORAL EVERY 4 HOURS PRN
Status: DISCONTINUED | OUTPATIENT
Start: 2020-10-19 | End: 2020-10-25

## 2020-10-19 RX ORDER — HEPARIN SODIUM 10000 [USP'U]/ML
INJECTION, SOLUTION INTRAVENOUS; SUBCUTANEOUS PRN
Status: DISCONTINUED | OUTPATIENT
Start: 2020-10-19 | End: 2020-10-19 | Stop reason: SDUPTHER

## 2020-10-19 RX ORDER — DEXTROSE MONOHYDRATE 25 G/50ML
12.5 INJECTION, SOLUTION INTRAVENOUS PRN
Status: DISCONTINUED | OUTPATIENT
Start: 2020-10-19 | End: 2020-10-25

## 2020-10-19 RX ORDER — CALCIUM CHLORIDE 100 MG/ML
INJECTION INTRAVENOUS; INTRAVENTRICULAR PRN
Status: DISCONTINUED | OUTPATIENT
Start: 2020-10-19 | End: 2020-10-19 | Stop reason: SDUPTHER

## 2020-10-19 RX ORDER — ALBUMIN, HUMAN INJ 5% 5 %
SOLUTION INTRAVENOUS
Status: DISCONTINUED
Start: 2020-10-19 | End: 2020-10-19

## 2020-10-19 RX ORDER — OXYCODONE HYDROCHLORIDE 5 MG/1
5 TABLET ORAL EVERY 4 HOURS PRN
Status: DISCONTINUED | OUTPATIENT
Start: 2020-10-19 | End: 2020-10-25

## 2020-10-19 RX ORDER — SODIUM CHLORIDE 9 MG/ML
INJECTION, SOLUTION INTRAVENOUS CONTINUOUS PRN
Status: DISCONTINUED | OUTPATIENT
Start: 2020-10-19 | End: 2020-10-19 | Stop reason: SDUPTHER

## 2020-10-19 RX ORDER — ASPIRIN 81 MG/1
81 TABLET ORAL DAILY
Status: DISCONTINUED | OUTPATIENT
Start: 2020-10-20 | End: 2020-10-25

## 2020-10-19 RX ORDER — PROPOFOL 10 MG/ML
INJECTION, EMULSION INTRAVENOUS
Status: DISCONTINUED
Start: 2020-10-19 | End: 2020-10-19

## 2020-10-19 RX ORDER — AMINOCAPROIC ACID 250 MG/ML
INJECTION, SOLUTION INTRAVENOUS PRN
Status: DISCONTINUED | OUTPATIENT
Start: 2020-10-19 | End: 2020-10-19 | Stop reason: SDUPTHER

## 2020-10-19 RX ORDER — FENTANYL CITRATE 0.05 MG/ML
INJECTION, SOLUTION INTRAMUSCULAR; INTRAVENOUS PRN
Status: DISCONTINUED | OUTPATIENT
Start: 2020-10-19 | End: 2020-10-19 | Stop reason: SDUPTHER

## 2020-10-19 RX ORDER — MEPERIDINE HYDROCHLORIDE 50 MG/ML
25 INJECTION INTRAMUSCULAR; INTRAVENOUS; SUBCUTANEOUS
Status: ACTIVE | OUTPATIENT
Start: 2020-10-19 | End: 2020-10-19

## 2020-10-19 RX ORDER — ACETAMINOPHEN 650 MG/1
650 SUPPOSITORY RECTAL EVERY 4 HOURS PRN
Status: DISCONTINUED | OUTPATIENT
Start: 2020-10-19 | End: 2020-10-25

## 2020-10-19 RX ORDER — ONDANSETRON 2 MG/ML
4 INJECTION INTRAMUSCULAR; INTRAVENOUS EVERY 8 HOURS PRN
Status: DISCONTINUED | OUTPATIENT
Start: 2020-10-19 | End: 2020-10-25

## 2020-10-19 RX ORDER — NEOSTIGMINE METHYLSULFATE 1 MG/ML
INJECTION, SOLUTION INTRAVENOUS PRN
Status: DISCONTINUED | OUTPATIENT
Start: 2020-10-19 | End: 2020-10-19 | Stop reason: SDUPTHER

## 2020-10-19 RX ORDER — ALBUMIN, HUMAN INJ 5% 5 %
25 SOLUTION INTRAVENOUS ONCE
Status: COMPLETED | OUTPATIENT
Start: 2020-10-19 | End: 2020-10-19

## 2020-10-19 RX ORDER — VECURONIUM BROMIDE 1 MG/ML
INJECTION, POWDER, LYOPHILIZED, FOR SOLUTION INTRAVENOUS PRN
Status: DISCONTINUED | OUTPATIENT
Start: 2020-10-19 | End: 2020-10-19 | Stop reason: SDUPTHER

## 2020-10-19 RX ORDER — SENNA AND DOCUSATE SODIUM 50; 8.6 MG/1; MG/1
1 TABLET, FILM COATED ORAL 2 TIMES DAILY
Status: DISCONTINUED | OUTPATIENT
Start: 2020-10-19 | End: 2020-10-25

## 2020-10-19 RX ORDER — NICOTINE POLACRILEX 4 MG
15 LOZENGE BUCCAL PRN
Status: DISCONTINUED | OUTPATIENT
Start: 2020-10-19 | End: 2020-10-25

## 2020-10-19 RX ORDER — TAMSULOSIN HYDROCHLORIDE 0.4 MG/1
0.4 CAPSULE ORAL DAILY
Status: DISCONTINUED | OUTPATIENT
Start: 2020-10-20 | End: 2020-10-22

## 2020-10-19 RX ORDER — 0.9 % SODIUM CHLORIDE 0.9 %
250 INTRAVENOUS SOLUTION INTRAVENOUS CONTINUOUS PRN
Status: DISCONTINUED | OUTPATIENT
Start: 2020-10-19 | End: 2020-10-25

## 2020-10-19 RX ORDER — PANTOPRAZOLE SODIUM 40 MG/10ML
40 INJECTION, POWDER, LYOPHILIZED, FOR SOLUTION INTRAVENOUS DAILY
Status: COMPLETED | OUTPATIENT
Start: 2020-10-19 | End: 2020-10-19

## 2020-10-19 RX ORDER — POTASSIUM CHLORIDE 29.8 MG/ML
20 INJECTION INTRAVENOUS PRN
Status: DISCONTINUED | OUTPATIENT
Start: 2020-10-19 | End: 2020-10-25

## 2020-10-19 RX ORDER — SODIUM CHLORIDE 0.9 % (FLUSH) 0.9 %
10 SYRINGE (ML) INJECTION EVERY 12 HOURS SCHEDULED
Status: DISCONTINUED | OUTPATIENT
Start: 2020-10-19 | End: 2020-10-25

## 2020-10-19 RX ORDER — SODIUM CHLORIDE 9 MG/ML
30 INJECTION, SOLUTION INTRAVENOUS CONTINUOUS
Status: DISCONTINUED | OUTPATIENT
Start: 2020-10-19 | End: 2020-10-25

## 2020-10-19 RX ORDER — SENNA PLUS 8.6 MG/1
1 TABLET ORAL NIGHTLY
Status: DISCONTINUED | OUTPATIENT
Start: 2020-10-20 | End: 2020-10-25

## 2020-10-19 RX ORDER — TIMOLOL MALEATE 5 MG/ML
1 SOLUTION/ DROPS OPHTHALMIC 2 TIMES DAILY
Status: DISCONTINUED | OUTPATIENT
Start: 2020-10-19 | End: 2020-10-28 | Stop reason: HOSPADM

## 2020-10-19 RX ORDER — FENTANYL CITRATE 50 UG/ML
50 INJECTION, SOLUTION INTRAMUSCULAR; INTRAVENOUS
Status: DISCONTINUED | OUTPATIENT
Start: 2020-10-19 | End: 2020-10-25

## 2020-10-19 RX ORDER — ASPIRIN 300 MG/1
300 SUPPOSITORY RECTAL ONCE
Status: COMPLETED | OUTPATIENT
Start: 2020-10-19 | End: 2020-10-19

## 2020-10-19 RX ORDER — FENTANYL CITRATE 50 UG/ML
25 INJECTION, SOLUTION INTRAMUSCULAR; INTRAVENOUS
Status: DISCONTINUED | OUTPATIENT
Start: 2020-10-19 | End: 2020-10-25

## 2020-10-19 RX ORDER — ALBUMIN, HUMAN INJ 5% 5 %
25 SOLUTION INTRAVENOUS PRN
Status: DISCONTINUED | OUTPATIENT
Start: 2020-10-19 | End: 2020-10-25

## 2020-10-19 RX ORDER — PROTAMINE SULFATE 10 MG/ML
INJECTION, SOLUTION INTRAVENOUS PRN
Status: DISCONTINUED | OUTPATIENT
Start: 2020-10-19 | End: 2020-10-19 | Stop reason: SDUPTHER

## 2020-10-19 RX ORDER — DEXTROSE MONOHYDRATE 50 MG/ML
100 INJECTION, SOLUTION INTRAVENOUS PRN
Status: DISCONTINUED | OUTPATIENT
Start: 2020-10-19 | End: 2020-10-25

## 2020-10-19 RX ORDER — SODIUM CHLORIDE 9 MG/ML
10 INJECTION INTRAVENOUS DAILY
Status: COMPLETED | OUTPATIENT
Start: 2020-10-19 | End: 2020-10-19

## 2020-10-19 RX ORDER — CHLORHEXIDINE GLUCONATE 0.12 MG/ML
15 RINSE ORAL 2 TIMES DAILY
Status: DISCONTINUED | OUTPATIENT
Start: 2020-10-19 | End: 2020-10-19

## 2020-10-19 RX ORDER — IPRATROPIUM BROMIDE AND ALBUTEROL SULFATE 2.5; .5 MG/3ML; MG/3ML
1 SOLUTION RESPIRATORY (INHALATION)
Status: DISCONTINUED | OUTPATIENT
Start: 2020-10-19 | End: 2020-10-28 | Stop reason: HOSPADM

## 2020-10-19 RX ORDER — MIDAZOLAM HYDROCHLORIDE 1 MG/ML
INJECTION INTRAMUSCULAR; INTRAVENOUS PRN
Status: DISCONTINUED | OUTPATIENT
Start: 2020-10-19 | End: 2020-10-19 | Stop reason: SDUPTHER

## 2020-10-19 RX ORDER — LANOLIN ALCOHOL/MO/W.PET/CERES
1000 CREAM (GRAM) TOPICAL 2 TIMES DAILY
Status: DISCONTINUED | OUTPATIENT
Start: 2020-10-19 | End: 2020-10-28 | Stop reason: HOSPADM

## 2020-10-19 RX ORDER — GLYCOPYRROLATE 1 MG/5 ML
SYRINGE (ML) INTRAVENOUS PRN
Status: DISCONTINUED | OUTPATIENT
Start: 2020-10-19 | End: 2020-10-19 | Stop reason: SDUPTHER

## 2020-10-19 RX ORDER — PANTOPRAZOLE SODIUM 40 MG/10ML
INJECTION, POWDER, LYOPHILIZED, FOR SOLUTION INTRAVENOUS
Status: DISPENSED
Start: 2020-10-19 | End: 2020-10-19

## 2020-10-19 RX ORDER — PANTOPRAZOLE SODIUM 40 MG/1
40 TABLET, DELAYED RELEASE ORAL DAILY
Status: DISCONTINUED | OUTPATIENT
Start: 2020-10-20 | End: 2020-10-25

## 2020-10-19 RX ORDER — SODIUM CHLORIDE 0.9 % (FLUSH) 0.9 %
10 SYRINGE (ML) INJECTION PRN
Status: DISCONTINUED | OUTPATIENT
Start: 2020-10-19 | End: 2020-10-25

## 2020-10-19 RX ORDER — CEFAZOLIN SODIUM 1 G/3ML
INJECTION, POWDER, FOR SOLUTION INTRAMUSCULAR; INTRAVENOUS PRN
Status: DISCONTINUED | OUTPATIENT
Start: 2020-10-19 | End: 2020-10-19 | Stop reason: SDUPTHER

## 2020-10-19 RX ORDER — PROPOFOL 10 MG/ML
10 INJECTION, EMULSION INTRAVENOUS CONTINUOUS PRN
Status: DISCONTINUED | OUTPATIENT
Start: 2020-10-19 | End: 2020-10-19

## 2020-10-19 RX ORDER — ASPIRIN 300 MG/1
SUPPOSITORY RECTAL
Status: DISPENSED
Start: 2020-10-19 | End: 2020-10-19

## 2020-10-19 RX ORDER — EPHEDRINE SULFATE/0.9% NACL/PF 50 MG/5 ML
SYRINGE (ML) INTRAVENOUS PRN
Status: DISCONTINUED | OUTPATIENT
Start: 2020-10-19 | End: 2020-10-19 | Stop reason: SDUPTHER

## 2020-10-19 RX ORDER — 0.9 % SODIUM CHLORIDE 0.9 %
500 INTRAVENOUS SOLUTION INTRAVENOUS ONCE
Status: COMPLETED | OUTPATIENT
Start: 2020-10-19 | End: 2020-10-19

## 2020-10-19 RX ORDER — OXYCODONE HYDROCHLORIDE 10 MG/1
10 TABLET ORAL EVERY 4 HOURS PRN
Status: DISCONTINUED | OUTPATIENT
Start: 2020-10-19 | End: 2020-10-25

## 2020-10-19 RX ORDER — PHENYLEPHRINE HCL IN 0.9% NACL 1 MG/10 ML
SYRINGE (ML) INTRAVENOUS PRN
Status: DISCONTINUED | OUTPATIENT
Start: 2020-10-19 | End: 2020-10-19 | Stop reason: SDUPTHER

## 2020-10-19 RX ORDER — LIDOCAINE HYDROCHLORIDE 20 MG/ML
INJECTION, SOLUTION INTRAVENOUS PRN
Status: DISCONTINUED | OUTPATIENT
Start: 2020-10-19 | End: 2020-10-19 | Stop reason: SDUPTHER

## 2020-10-19 RX ORDER — ETOMIDATE 2 MG/ML
INJECTION INTRAVENOUS PRN
Status: DISCONTINUED | OUTPATIENT
Start: 2020-10-19 | End: 2020-10-19 | Stop reason: SDUPTHER

## 2020-10-19 RX ORDER — INSULIN GLARGINE 100 [IU]/ML
0.15 INJECTION, SOLUTION SUBCUTANEOUS NIGHTLY
Status: DISCONTINUED | OUTPATIENT
Start: 2020-10-20 | End: 2020-10-25

## 2020-10-19 RX ADMIN — POTASSIUM CHLORIDE 20 MEQ: 400 INJECTION, SOLUTION INTRAVENOUS at 12:44

## 2020-10-19 RX ADMIN — INSULIN LISPRO 3 UNITS: 100 INJECTION, SOLUTION INTRAVENOUS; SUBCUTANEOUS at 20:28

## 2020-10-19 RX ADMIN — ALBUMIN (HUMAN) 25 G: 12.5 SOLUTION INTRAVENOUS at 12:45

## 2020-10-19 RX ADMIN — AMINOCAPROIC ACID 250 MG: 250 INJECTION, SOLUTION INTRAVENOUS at 06:57

## 2020-10-19 RX ADMIN — CEFAZOLIN 2000 MG: 1 INJECTION, POWDER, FOR SOLUTION INTRAMUSCULAR; INTRAVENOUS at 10:42

## 2020-10-19 RX ADMIN — MIDAZOLAM 2 MG: 1 INJECTION INTRAMUSCULAR; INTRAVENOUS at 06:45

## 2020-10-19 RX ADMIN — FENTANYL CITRATE 250 MCG: 50 INJECTION, SOLUTION INTRAMUSCULAR; INTRAVENOUS at 07:35

## 2020-10-19 RX ADMIN — MUPIROCIN: 20 OINTMENT TOPICAL at 20:35

## 2020-10-19 RX ADMIN — VECURONIUM BROMIDE 20 MG: 10 INJECTION, POWDER, LYOPHILIZED, FOR SOLUTION INTRAVENOUS at 06:45

## 2020-10-19 RX ADMIN — SODIUM CHLORIDE 10 ML: 9 INJECTION INTRAMUSCULAR; INTRAVENOUS; SUBCUTANEOUS at 13:43

## 2020-10-19 RX ADMIN — CEFAZOLIN 2000 MG: 1 INJECTION, POWDER, FOR SOLUTION INTRAMUSCULAR; INTRAVENOUS at 06:57

## 2020-10-19 RX ADMIN — Medication 10 ML: at 20:35

## 2020-10-19 RX ADMIN — INSULIN LISPRO 3 UNITS: 100 INJECTION, SOLUTION INTRAVENOUS; SUBCUTANEOUS at 23:13

## 2020-10-19 RX ADMIN — INSULIN LISPRO 3 UNITS: 100 INJECTION, SOLUTION INTRAVENOUS; SUBCUTANEOUS at 16:00

## 2020-10-19 RX ADMIN — INSULIN HUMAN 3 UNITS: 100 INJECTION, SOLUTION PARENTERAL at 10:10

## 2020-10-19 RX ADMIN — SODIUM CHLORIDE: 9 INJECTION, SOLUTION INTRAVENOUS at 06:23

## 2020-10-19 RX ADMIN — IPRATROPIUM BROMIDE AND ALBUTEROL SULFATE 1 AMPULE: 2.5; .5 SOLUTION RESPIRATORY (INHALATION) at 19:57

## 2020-10-19 RX ADMIN — Medication 10 MG: at 07:33

## 2020-10-19 RX ADMIN — Medication 50 MEQ: at 20:18

## 2020-10-19 RX ADMIN — CALCIUM CHLORIDE 0.5 G: 100 INJECTION, SOLUTION INTRAVENOUS at 10:48

## 2020-10-19 RX ADMIN — Medication 100 MCG: at 06:48

## 2020-10-19 RX ADMIN — Medication 10 MG: at 08:42

## 2020-10-19 RX ADMIN — PANTOPRAZOLE SODIUM 40 MG: 40 INJECTION, POWDER, FOR SOLUTION INTRAVENOUS at 12:52

## 2020-10-19 RX ADMIN — SODIUM CHLORIDE 30 ML/HR: 9 INJECTION, SOLUTION INTRAVENOUS at 11:55

## 2020-10-19 RX ADMIN — INSULIN HUMAN 5 UNITS: 100 INJECTION, SOLUTION PARENTERAL at 09:11

## 2020-10-19 RX ADMIN — Medication 10 MG: at 07:51

## 2020-10-19 RX ADMIN — Medication 100 MCG: at 08:42

## 2020-10-19 RX ADMIN — Medication 100 MCG: at 07:05

## 2020-10-19 RX ADMIN — MUPIROCIN: 20 OINTMENT TOPICAL at 12:00

## 2020-10-19 RX ADMIN — Medication 10 MG: at 07:47

## 2020-10-19 RX ADMIN — CHLORHEXIDINE GLUCONATE 0.12% ORAL RINSE 15 ML: 1.2 LIQUID ORAL at 12:00

## 2020-10-19 RX ADMIN — TIMOLOL MALEATE 1 DROP: 5 SOLUTION OPHTHALMIC at 20:35

## 2020-10-19 RX ADMIN — ALBUMIN (HUMAN) 25 G: 12.5 INJECTION, SOLUTION INTRAVENOUS at 12:07

## 2020-10-19 RX ADMIN — EPINEPHRINE 1 MCG/MIN: 1 INJECTION INTRAMUSCULAR; INTRAVENOUS; SUBCUTANEOUS at 11:55

## 2020-10-19 RX ADMIN — LIDOCAINE HYDROCHLORIDE 100 MG: 20 INJECTION, SOLUTION INTRAVENOUS at 06:45

## 2020-10-19 RX ADMIN — POTASSIUM CHLORIDE 20 MEQ: 400 INJECTION, SOLUTION INTRAVENOUS at 14:33

## 2020-10-19 RX ADMIN — FENTANYL CITRATE 400 MCG: 50 INJECTION, SOLUTION INTRAMUSCULAR; INTRAVENOUS at 07:20

## 2020-10-19 RX ADMIN — Medication 0.6 MG: at 11:34

## 2020-10-19 RX ADMIN — AMINOCAPROIC ACID 250 MG: 250 INJECTION, SOLUTION INTRAVENOUS at 07:05

## 2020-10-19 RX ADMIN — HEPARIN SODIUM 30000 UNITS: 10000 INJECTION, SOLUTION INTRAVENOUS; SUBCUTANEOUS at 07:59

## 2020-10-19 RX ADMIN — Medication 100 MCG: at 06:51

## 2020-10-19 RX ADMIN — ASPIRIN 300 MG: 300 SUPPOSITORY RECTAL at 11:55

## 2020-10-19 RX ADMIN — PROTAMINE SULFATE 300 MG: 10 INJECTION, SOLUTION INTRAVENOUS at 10:28

## 2020-10-19 RX ADMIN — MIDAZOLAM 2 MG: 1 INJECTION INTRAMUSCULAR; INTRAVENOUS at 06:23

## 2020-10-19 RX ADMIN — MUPIROCIN: 20 OINTMENT TOPICAL at 05:35

## 2020-10-19 RX ADMIN — Medication 10 MG: at 08:38

## 2020-10-19 RX ADMIN — PROPOFOL 10 MCG/KG/MIN: 10 INJECTION, EMULSION INTRAVENOUS at 11:55

## 2020-10-19 RX ADMIN — Medication 2 MCG/MIN: at 12:00

## 2020-10-19 RX ADMIN — SODIUM CHLORIDE: 9 INJECTION, SOLUTION INTRAVENOUS at 10:35

## 2020-10-19 RX ADMIN — Medication 300 MCG: at 08:44

## 2020-10-19 RX ADMIN — FENTANYL CITRATE 250 MCG: 50 INJECTION, SOLUTION INTRAMUSCULAR; INTRAVENOUS at 10:28

## 2020-10-19 RX ADMIN — SODIUM CHLORIDE 3 UNITS/HR: 9 INJECTION, SOLUTION INTRAVENOUS at 08:46

## 2020-10-19 RX ADMIN — Medication 100 MCG: at 11:04

## 2020-10-19 RX ADMIN — SODIUM BICARBONATE 50 MEQ: 84 INJECTION, SOLUTION INTRAVENOUS at 20:18

## 2020-10-19 RX ADMIN — ETOMIDATE 14 MG: 2 INJECTION, SOLUTION INTRAVENOUS at 06:45

## 2020-10-19 RX ADMIN — FENTANYL CITRATE 100 MCG: 50 INJECTION, SOLUTION INTRAMUSCULAR; INTRAVENOUS at 06:45

## 2020-10-19 RX ADMIN — CALCIUM CHLORIDE 0.5 G: 100 INJECTION, SOLUTION INTRAVENOUS at 10:34

## 2020-10-19 RX ADMIN — Medication 100 MCG: at 06:59

## 2020-10-19 RX ADMIN — Medication 2 G: at 17:54

## 2020-10-19 RX ADMIN — EPINEPHRINE 0.04 MCG/KG/MIN: 1 INJECTION PARENTERAL at 10:10

## 2020-10-19 RX ADMIN — SODIUM CHLORIDE 500 ML: 9 INJECTION, SOLUTION INTRAVENOUS at 13:42

## 2020-10-19 RX ADMIN — Medication 10 ML: at 12:30

## 2020-10-19 RX ADMIN — Medication 3 MG: at 11:34

## 2020-10-19 RX ADMIN — FENTANYL CITRATE 50 MCG: 50 INJECTION, SOLUTION INTRAMUSCULAR; INTRAVENOUS at 12:52

## 2020-10-19 RX ADMIN — Medication 100 MCG: at 10:59

## 2020-10-19 RX ADMIN — MIDAZOLAM 2 MG: 1 INJECTION INTRAMUSCULAR; INTRAVENOUS at 06:40

## 2020-10-19 RX ADMIN — ALBUMIN (HUMAN) 25 G: 12.5 INJECTION, SOLUTION INTRAVENOUS at 18:45

## 2020-10-19 RX ADMIN — IPRATROPIUM BROMIDE AND ALBUTEROL SULFATE 1 AMPULE: 2.5; .5 SOLUTION RESPIRATORY (INHALATION) at 15:51

## 2020-10-19 RX ADMIN — CHLORHEXIDINE GLUCONATE 15 ML: 1.2 RINSE ORAL at 05:35

## 2020-10-19 ASSESSMENT — PULMONARY FUNCTION TESTS
PIF_VALUE: 22
PIF_VALUE: 21
PIF_VALUE: 2
PIF_VALUE: 23
PIF_VALUE: 1
PIF_VALUE: 22
PIF_VALUE: 20
PIF_VALUE: 2
PIF_VALUE: 0
PIF_VALUE: 20
PIF_VALUE: 20
PIF_VALUE: 21
PIF_VALUE: 23
PIF_VALUE: 1
PIF_VALUE: 1
PIF_VALUE: 19
PIF_VALUE: 1
PIF_VALUE: 19
PIF_VALUE: 22
PIF_VALUE: 20
PIF_VALUE: 23
PIF_VALUE: 24
PIF_VALUE: 21
PIF_VALUE: 22
PIF_VALUE: 21
PIF_VALUE: 1
PIF_VALUE: 23
PIF_VALUE: 23
PIF_VALUE: 21
PIF_VALUE: 22
PIF_VALUE: 1
PIF_VALUE: 1
PIF_VALUE: 23
PIF_VALUE: 26
PIF_VALUE: 22
PIF_VALUE: 0
PIF_VALUE: 1
PIF_VALUE: 22
PIF_VALUE: 1
PIF_VALUE: 21
PIF_VALUE: 25
PIF_VALUE: 22
PIF_VALUE: 1
PIF_VALUE: 23
PIF_VALUE: 22
PIF_VALUE: 21
PIF_VALUE: 22
PIF_VALUE: 0
PIF_VALUE: 1
PIF_VALUE: 20
PIF_VALUE: 23
PIF_VALUE: 21
PIF_VALUE: 23
PIF_VALUE: 21
PIF_VALUE: 1
PIF_VALUE: 22
PIF_VALUE: 23
PIF_VALUE: 2
PIF_VALUE: 21
PIF_VALUE: 22
PIF_VALUE: 20
PIF_VALUE: 23
PIF_VALUE: 22
PIF_VALUE: 20
PIF_VALUE: 22
PIF_VALUE: 2
PIF_VALUE: 2
PIF_VALUE: 22
PIF_VALUE: 1
PIF_VALUE: 23
PIF_VALUE: 1
PIF_VALUE: 22
PIF_VALUE: 0
PIF_VALUE: 40
PIF_VALUE: 20
PIF_VALUE: 23
PIF_VALUE: 0
PIF_VALUE: 20
PIF_VALUE: 23
PIF_VALUE: 0
PIF_VALUE: 1
PIF_VALUE: 23
PIF_VALUE: 20
PIF_VALUE: 22
PIF_VALUE: 23
PIF_VALUE: 0
PIF_VALUE: 6
PIF_VALUE: 20
PIF_VALUE: 6
PIF_VALUE: 20
PIF_VALUE: 2
PIF_VALUE: 24
PIF_VALUE: 2
PIF_VALUE: 1
PIF_VALUE: 24
PIF_VALUE: 24
PIF_VALUE: 23
PIF_VALUE: 22
PIF_VALUE: 23
PIF_VALUE: 22
PIF_VALUE: 1
PIF_VALUE: 23
PIF_VALUE: 23
PIF_VALUE: 0
PIF_VALUE: 13
PIF_VALUE: 22
PIF_VALUE: 1
PIF_VALUE: 21
PIF_VALUE: 19
PIF_VALUE: 26
PIF_VALUE: 22
PIF_VALUE: 23
PIF_VALUE: 20
PIF_VALUE: 1
PIF_VALUE: 22
PIF_VALUE: 22
PIF_VALUE: 1
PIF_VALUE: 1
PIF_VALUE: 22
PIF_VALUE: 23
PIF_VALUE: 3
PIF_VALUE: 22
PIF_VALUE: 21
PIF_VALUE: 47
PIF_VALUE: 21
PIF_VALUE: 1
PIF_VALUE: 26
PIF_VALUE: 20
PIF_VALUE: 21
PIF_VALUE: 21
PIF_VALUE: 0
PIF_VALUE: 20
PIF_VALUE: 20
PIF_VALUE: 0
PIF_VALUE: 23
PIF_VALUE: 0
PIF_VALUE: 24
PIF_VALUE: 22
PIF_VALUE: 0
PIF_VALUE: 17
PIF_VALUE: 20
PIF_VALUE: 2
PIF_VALUE: 1
PIF_VALUE: 0
PIF_VALUE: 1
PIF_VALUE: 23
PIF_VALUE: 20
PIF_VALUE: 21
PIF_VALUE: 0
PIF_VALUE: 21
PIF_VALUE: 2
PIF_VALUE: 22
PIF_VALUE: 2
PIF_VALUE: 23
PIF_VALUE: 20
PIF_VALUE: 23
PIF_VALUE: 23
PIF_VALUE: 19
PIF_VALUE: 1
PIF_VALUE: 21
PIF_VALUE: 23
PIF_VALUE: 1
PIF_VALUE: 22
PIF_VALUE: 1
PIF_VALUE: 22
PIF_VALUE: 0
PIF_VALUE: 25
PIF_VALUE: 24
PIF_VALUE: 22
PIF_VALUE: 1
PIF_VALUE: 22
PIF_VALUE: 23
PIF_VALUE: 21
PIF_VALUE: 0
PIF_VALUE: 22
PIF_VALUE: 22
PIF_VALUE: 20
PIF_VALUE: 22
PIF_VALUE: 22
PIF_VALUE: 21
PIF_VALUE: 1
PIF_VALUE: 1
PIF_VALUE: 22
PIF_VALUE: 20
PIF_VALUE: 0
PIF_VALUE: 20
PIF_VALUE: 21
PIF_VALUE: 22
PIF_VALUE: 23
PIF_VALUE: 24
PIF_VALUE: 23
PIF_VALUE: 22
PIF_VALUE: 22
PIF_VALUE: 21
PIF_VALUE: 20
PIF_VALUE: 22
PIF_VALUE: 23
PIF_VALUE: 2
PIF_VALUE: 1
PIF_VALUE: 21
PIF_VALUE: 1
PIF_VALUE: 5
PIF_VALUE: 23
PIF_VALUE: 1
PIF_VALUE: 20
PIF_VALUE: 1
PIF_VALUE: 0
PIF_VALUE: 28
PIF_VALUE: 16
PIF_VALUE: 23
PIF_VALUE: 0
PIF_VALUE: 0
PIF_VALUE: 20
PIF_VALUE: 22
PIF_VALUE: 1
PIF_VALUE: 1
PIF_VALUE: 2
PIF_VALUE: 21
PIF_VALUE: 19
PIF_VALUE: 1
PIF_VALUE: 20
PIF_VALUE: 2
PIF_VALUE: 23
PIF_VALUE: 19
PIF_VALUE: 19
PIF_VALUE: 22
PIF_VALUE: 21
PIF_VALUE: 20
PIF_VALUE: 23
PIF_VALUE: 20
PIF_VALUE: 2
PIF_VALUE: 1
PIF_VALUE: 20
PIF_VALUE: 22
PIF_VALUE: 22
PIF_VALUE: 18
PIF_VALUE: 1
PIF_VALUE: 2
PIF_VALUE: 24
PIF_VALUE: 1
PIF_VALUE: 14
PIF_VALUE: 22
PIF_VALUE: 21
PIF_VALUE: 1
PIF_VALUE: 1
PIF_VALUE: 25
PIF_VALUE: 23
PIF_VALUE: 22
PIF_VALUE: 23
PIF_VALUE: 1
PIF_VALUE: 1
PIF_VALUE: 2
PIF_VALUE: 20
PIF_VALUE: 1
PIF_VALUE: 23
PIF_VALUE: 23
PIF_VALUE: 20
PIF_VALUE: 1
PIF_VALUE: 21
PIF_VALUE: 1
PIF_VALUE: 3
PIF_VALUE: 21
PIF_VALUE: 1
PIF_VALUE: 21
PIF_VALUE: 22
PIF_VALUE: 22
PIF_VALUE: 20
PIF_VALUE: 1
PIF_VALUE: 24
PIF_VALUE: 20
PIF_VALUE: 2
PIF_VALUE: 30
PIF_VALUE: 1
PIF_VALUE: 19
PIF_VALUE: 2
PIF_VALUE: 23
PIF_VALUE: 20
PIF_VALUE: 26
PIF_VALUE: 22
PIF_VALUE: 20
PIF_VALUE: 2
PIF_VALUE: 23
PIF_VALUE: 22
PIF_VALUE: 1
PIF_VALUE: 21
PIF_VALUE: 3
PIF_VALUE: 22
PIF_VALUE: 2
PIF_VALUE: 24
PIF_VALUE: 22
PIF_VALUE: 1
PIF_VALUE: 20
PIF_VALUE: 1
PIF_VALUE: 1
PIF_VALUE: 22
PIF_VALUE: 22
PIF_VALUE: 20
PIF_VALUE: 0
PIF_VALUE: 1
PIF_VALUE: 1
PIF_VALUE: 24
PIF_VALUE: 23
PIF_VALUE: 22
PIF_VALUE: 20
PIF_VALUE: 20
PIF_VALUE: 22
PIF_VALUE: 22
PIF_VALUE: 21
PIF_VALUE: 22
PIF_VALUE: 23
PIF_VALUE: 22
PIF_VALUE: 19
PIF_VALUE: 20
PIF_VALUE: 20
PIF_VALUE: 2
PIF_VALUE: 1
PIF_VALUE: 22
PIF_VALUE: 30
PIF_VALUE: 22
PIF_VALUE: 21
PIF_VALUE: 22
PIF_VALUE: 24
PIF_VALUE: 20
PIF_VALUE: 22
PIF_VALUE: 1
PIF_VALUE: 22
PIF_VALUE: 20
PIF_VALUE: 1
PIF_VALUE: 1
PIF_VALUE: 22
PIF_VALUE: 1
PIF_VALUE: 20
PIF_VALUE: 25
PIF_VALUE: 1
PIF_VALUE: 20

## 2020-10-19 ASSESSMENT — PAIN SCALES - GENERAL
PAINLEVEL_OUTOF10: 0

## 2020-10-19 NOTE — BRIEF OP NOTE
Brief Postoperative Note      Patient: Arthur Loco  YOB: 1945  MRN: [id number]    Date of Procedure: 10/19/2020    Pre-Op Diagnosis: MVCAD, ICM    Post-Op Diagnosis: Same       Procedure(s):  CABG x3 (LIMA-LAD, SVG-OM, SVG-PDA)   LAAL  EVH   KLS Plating       Surgeon(s):  Rasta Leija DO    Assistant:  First Assistant: Stephen Pulido MD, Jade Aguilar  Physician Assistant: DARLENE Young; DARLENE Luna    Anesthesia: General    Estimated Blood Loss (mL): less than 50     Complications: None    Specimens:   * No specimens in log *    Implants:  Implant Name Type Inv. Item Serial No.  Lot No. LRB No. Used Action   PLATE STERNAL TI 6 HL 1.8MM Screw/Plate/Nail/Ty PLATE STERNAL TI 6 HL 1.8MM  K[employer]YA [employer]  N/A 1 Implanted   PLATE STERNL LK 8 HL LADDER 1.8MM Screw/Plate/Nail/Ty PLATE STERNL LK 8 HL LADDER 1.8MM  K[employer] ANAYA LP  N/A 1 Implanted   CLIP ATRICLIP FLEX HNDL 45MM Heart CLIP ATRICLIP FLEX HNDL 45MM  ATRICURE [id number] N/A 1 Implanted   SCREW STERNAL LOCK 2. [id number] Screw/Plate/Nail/Ty SCREW STERNAL LOCK 2. [id number]  KLS ANAYA LP  N/A 8 Implanted   SCREW LK STNAL 2.3X11MM Screw/Plate/Nail/Ty SCREW LK STNAL 2.3X11MM  KLS ANAYA LP  N/A 6 Implanted         Drains:   Chest Tube 1 Mediastinal 19 Tristanian (Active)   Dressing Status Clean;Dry; Intact 10/19/20 0804   Dressing Type Dry dressing 10/19/20 0804   Site Assessment Clean;Dry; Intact 10/19/20 0804       Chest Tube 2 Anterior Pleural 19 Tristanian (Active)   Dressing Status Clean;Dry; Intact 10/19/20 0804   Dressing Type Dry dressing 10/19/20 0804   Site Assessment Clean;Dry; Intact 10/19/20 0804       Chest Tube 3 Posterior Pleural 19 Tristanian (Active)   Dressing Status Clean;Dry; Intact 10/19/20 0804   Dressing Type Dry dressing 10/19/20 0804   Site Assessment Clean;Dry; Intact 10/19/20 0804       NG/OG/NJ/NE Tube Orogastric Center mouth (Active)       Urethral Catheter Non-latex 16 fr (Active)         Electronically signed by Joni Finley David Cevallos,  on 10/19/2020 at 11:57 AM

## 2020-10-19 NOTE — ANESTHESIA PROCEDURE NOTES
Arterial Line:    An arterial line was placed using ultrasound guidance and surface landmarks, in the OR for the following indication(s): continuous blood pressure monitoring and blood sampling needed. A 20 gauge (size), 12 cm (length), Arrow (type) catheter was placed, into the right brachial artery, secured by Tegaderm and tape. Anesthesia type: Local  Local infiltration: Injection    Events:  patient tolerated procedure well with no complications.   Anesthesiologist: Juan Miguel Franks DO  Other anesthesia staff: Meg Rivera RN  Performed: Anesthesiologist   Preanesthetic Checklist  Completed: patient identified, site marked, surgical consent, pre-op evaluation, timeout performed, IV checked, risks and benefits discussed, monitors and equipment checked, anesthesia consent given, oxygen available and patient being monitored
Central Venous Line:    A central venous line was placed using ultrasound guidance, in the OR for the following indication(s): central venous access. Sterility preparation included the following: hand hygiene performed prior to procedure, maximum sterile barriers used and sterile technique used to drape from head to toe. The patient was placed in Trendelenburg position. The right internal jugular vein was prepped. The site was prepped with Chloraprep. A 8.5 Fr (size), introducer slick was placed. During the procedure, the following specific steps were taken: target vein identified, needle advanced into vein and blood aspirated and guidewire advanced into vein. Intravenous verification was obtained by ultrasound and venous blood return. Post insertion care included: all ports aspirated, all ports flushed easily, guidewire removed intact, Biopatch applied, line sutured in place and dressing applied. During the procedure the patient experienced: patient tolerated procedure well with no complications.       Anesthesia type: local..No  Staffing  Anesthesiologist: Davina Crisostomo DO  Performed: Anesthesiologist   Preanesthetic Checklist  Completed: patient identified, site marked, surgical consent, pre-op evaluation, timeout performed, IV checked, risks and benefits discussed, monitors and equipment checked, anesthesia consent given, oxygen available and patient being monitored
moderate. Leaflets normal.  Leaflet motions normal.    Pulmonic Valve: Annulus normal.  Stenosis not present. Regurgitation none. Aorta    Ascending Aorta:  Size normal.  Dissection not present. Aortic Arch:  Size normal.  Dissection not present. Descending Aorta:  Size normal.  Dissection not present. Other Aortic Findings:       Zulma Oxford grade 1 atheroma throughout the aorta    Atria    Right Atrium:  Size normal.  Spontaneous echo contrast not present. Thrombus not present. Tumor not present. Device not present. Left Atrium:  Size normal.  Spontaneous echo contrast not present. Thrombus not present. Tumor not present. Device not present. Left atrial appendage normal.      Septa    Atrial Septum:  Intra-atrial septal morphology normal.      Ventricular Septum:  Intra-ventricular septum morphology normal.          Other Findings  Pericardium:  normal  Pleural Effusion:  left  Pulmonary Arteries:  normal  Pulmonary Venous Flow:  normal    Anesthesia Information  Performed Personally  Anesthesiologist:  Rochelle Iyer, DO      Echocardiogram Comments:       S/p CABG x 3 LVEF increased ~ 40% with inotropic support. Inferior wall remains near akinetic. Drained L pleural effusion. No new findings from pre-CPB.   All findings d/w surgeon

## 2020-10-19 NOTE — PROGRESS NOTES
Department of Internal Medicine  Nephrology Attending Progress Note    Events reviewed. SUBJECTIVE:  We are following Mr. David Chery Bltammy for MARIAH on CKD. He underwent a CABG x 3 this am. I have seen and examined him in the ICU, he is intubated and sedated.  UO is adequate  Physical Exam:    VITALS:  BP (!) 150/69   Pulse 70   Temp 98.4 °F (36.9 °C) (Temporal)   Resp 16   Ht 5' 11\" (1.803 m)   Wt 163 lb 6.4 oz (74.1 kg)   SpO2 94%   BMI 22.79 kg/m²   24HR INTAKE/OUTPUT:      Intake/Output Summary (Last 24 hours) at 10/19/2020 1115  Last data filed at 10/19/2020 1113  Gross per 24 hour   Intake 3050 ml   Output 3250 ml   Net -200 ml       Constitutional: No apparent distress, intubated , sedated  HEENT: Normal cephalic, atraumatic, PERRL  Respiratory:  CTA bilaterally   Cardiovascular/Edema: Heart sounds regular   Gastrointestinal:  Soft, nontender, nondistended  Neurologic:    Neurovascularly intact without any focal sensory/motor deficits  Skin:  Normal skin color.  No rashes or lesions  Other:   + edema of lower extremities, + sacral edema     Scheduled Meds:   timolol  1 drop Left Eye BID    vitamin B-12  1,000 mcg Oral BID    sodium chloride flush  10 mL Intravenous 2 times per day    ceFAZolin (ANCEF) IVPB  2 g Intravenous Q8H    sennosides-docusate sodium  1 tablet Oral BID    [START ON 10/20/2020] pantoprazole  40 mg Oral Daily    pantoprazole  40 mg Intravenous Daily    And    sodium chloride (PF)  10 mL Intravenous Daily    chlorhexidine  15 mL Mouth/Throat BID    [START ON 10/20/2020] magnesium oxide  400 mg Oral Daily    mupirocin   Nasal BID    [START ON 10/20/2020] aspirin  81 mg Oral Daily    aspirin  300 mg Rectal Once    ipratropium-albuterol  1 ampule Inhalation Q4H WA    [START ON 10/20/2020] insulin glargine  0.15 Units/kg Subcutaneous Nightly    [START ON 10/20/2020] senna  1 tablet Oral Nightly    [START ON 10/20/2020] tamsulosin  0.4 mg Oral Daily    [START ON 10/20/2020] insulin lispro  0-18 Units Subcutaneous Q4H    insulin lispro  0-18 Units Subcutaneous Q4H    latanoprost  1 drop Both Eyes Nightly     Continuous Infusions:   sodium chloride      propofol      sodium chloride      norepinephrine      clevidipine      insulin      dextrose      EPINEPHrine infusion       PRN Meds:.sodium chloride flush, potassium chloride, magnesium sulfate, acetaminophen, acetaminophen, oxyCODONE **OR** oxyCODONE, fentanNYL **OR** fentanNYL, magnesium hydroxide, ondansetron, propofol, meperidine, albumin human, sodium chloride, norepinephrine, clevidipine, insulin, glucose, dextrose, glucagon (rDNA), dextrose, calcium gluconate IVPB    DATA:    CBC:   Lab Results   Component Value Date    WBC 7.8 10/19/2020    RBC 2.77 10/19/2020    HGB 9.3 10/19/2020    HCT 28.8 10/19/2020    .0 10/19/2020    MCH 33.6 10/19/2020    MCHC 32.3 10/19/2020    RDW 18.5 10/19/2020     10/19/2020    MPV 10.4 10/19/2020     CMP:    Lab Results   Component Value Date     10/19/2020    K 3.5 10/19/2020    K 3.6 10/19/2020     10/19/2020    CO2 26 10/19/2020    BUN 27 10/19/2020    CREATININE 1.5 10/19/2020    GFRAA 55 10/19/2020    AGRATIO 0.9 10/10/2020    LABGLOM 45 10/19/2020    GLUCOSE 120 10/19/2020    PROT 6.5 10/19/2020    LABALBU 3.8 10/19/2020    CALCIUM 8.5 10/19/2020    BILITOT 1.0 10/19/2020    ALKPHOS 70 10/19/2020    AST 15 10/19/2020    ALT 47 10/19/2020     Magnesium:    Lab Results   Component Value Date    MG 1.7 10/16/2020     Phosphorus:  No results found for: PHOS     Urine sodium: 71  Urine potassium: 51.9  Urine chloride: 68  Urine creatinine: 118  Urine albumin/creatinine: 184 mg/grams  Urine protein/creatinine: 0.5 mg/grams    proBNP: 29,713      Radiology Review:      Nuclear medicine clinic with flow and function without pharmacological intervention October 12, 2020   Findings:         The patient was injected with 10 mCi of technetium MAG3 intravenously           Split uptake on the left was 47.8  percent, time to peak was 4.5    minutes, one half clearance time was 18.9         Split uptake on the right was 52.2 percent, time to peak was 0.5    minutes, one half clearance time was 23.4 minutes         Renal curves demonstrate perfusion to be delayed. Extraction was delayed. Excretion was delayed. Lasix was not administered         Impression:  Diminished perfusion and extraction and excretion, the findings are    compatible with bilateral renal artery stenosis versus ATN versus    chronic renal failure                CTA pulmonary with IV contrast October 10, 2020                    IMPRESSION:               CT Angiogram Chest and Pulmonary Arteries with contrast and high resolution 2-D and 3-D images:               1. No evidence of pulmonary emboli by CT technique.               2. Small bilateral pleural effusions with dependent atelectasis.            Dictated Alberto Yates MD   DD/DT: 10/10/20 1022                  Signed Alberto Yates MD 10/10/20 1022                    : [facility]      CT Abdomen and pelvis  W/o contrast 10/9/2020               IMPRESSION:       1.   Mild bilateral perinephric stranding is again identified (without hydronephrosis upon       bilateral assessment).               2.  Mild urinary bladder wall thickening is identified.  Surgical absence of prostate gland.               3.   Moderate colonic diverticulosis is identified (sigmoid predominance) without acute       diverticulitis.  Nondistended sigmoid colon extends into left inguinal hernia defect.               4. Pleural parenchymal scarring is again identified in right lower lobe, slightly more pronounced       than on prior examination.               5. Significant vascular calcifications are identified (including greater than 50% degree of       stenosis bilaterally in renal arteries, duplicated bilaterally).             Dictated By: Mukul Moore MD chlorthalidone. Problems resolved:  · HAGMA, lactic acidosis and probably ketoacidosis, anion gap improved  · Hypotension,multifactroial, cardiogenic, 2/2 to MI, AF  · Hypokalemia, secondary to diuretics, potassium level improved  · Elevated LFTs, likely shock liver    IMPRESSION/RECOMMENDATIONS:      1. MARIAH on CKD, volume responsive prerenal MARIAH (hypotension, thiazide diuretic, in the setting of ACE inhibition), versus ischemic ATN? Enriqueta [place] Renal function relatively stable after cardiac catheterization. Still with significant edema, to repeat Lasix. 2. CKD stage III, with mild proteinuria, secondary to early diabetic nephropathy, baseline creatinine 1.3 to 1.5 mg/dl  3. CAD, NSTEMI, status post catheterization, has multivessel CAD, for CABG tomorrow  4. PAF, on sinus now   5. HFpEF 60%, proBNP 29,713, needs diuresis  6. Bilateral renal stenosis, by CT abdomen  7. History of prostate cancer status post surgery  8.  Macrocytic anemia, history of B12 deficiency and iron deficiency, status post transfusion, on IV iron    Plan:    · S/p CABG x 3  · UO is adequate  · Replaced Potassium IV, D/W the ICU RN  · Continue to monitor the renal function closely

## 2020-10-19 NOTE — ANESTHESIA PRE PROCEDURE
LUMIGAN 0.01 % SOLN ophthalmic drops  5/18/19  Yes Historical Provider, MD   vitamin D (CHOLECALCIFEROL) 1000 UNIT TABS tablet Take by mouth   Yes Historical Provider, MD   folic acid (FOLVITE) 1 MG tablet Take 1 mg by mouth daily  4/25/19  Yes Historical Provider, MD       Current medications:    Current Facility-Administered Medications   Medication Dose Route Frequency Provider Last Rate Last Dose    ceFAZolin (ANCEF) 2 g in sterile water 20 mL IV syringe  2 g Intravenous See Admin Instructions DARLENE Pires        mupirocin (BACTROBAN) 2 % ointment   Nasal BID DARLENE Pires        [START ON 10/19/2020] chlorhexidine (PERIDEX) 0.12 % solution 15 mL  15 mL Mouth/Throat Once Rahel Pires        chlorhexidine (HIBICLENS) 4 % liquid   Topical See Admin Instructions DARLENE Pires        pantoprazole (PROTONIX) tablet 40 mg  40 mg Oral BID AC Donnal Andrea, DO   40 mg at 10/18/20 1728    0.9 % sodium chloride bolus  20 mL Intravenous Once Donnal Andrea, DO        ferric gluconate (FERRLECIT) 125 mg in sodium chloride 0.9 % 100 mL IVPB  125 mg Intravenous Once Donnal Andrea, DO        sucralfate (CARAFATE) tablet 1 g  1 g Oral 4x Daily AC & HS Donnal Andrea, DO   1 g at 10/18/20 1728    fentaNYL (SUBLIMAZE) injection 25 mcg  25 mcg Intravenous Q2H PRN Donnal Andrea, DO        ipratropium-albuterol (DUONEB) nebulizer solution 1 ampule  1 ampule Inhalation Q4H PRN Donnal Andrea, DO        metoprolol tartrate (LOPRESSOR) tablet 25 mg  25 mg Oral BID Donnal Andrea, DO   25 mg at 10/18/20 1827    timolol (TIMOPTIC) 0.5 % ophthalmic solution 1 drop  1 drop Left Eye BID Donnal Andrea, DO   1 drop at 10/18/20 0927    latanoprost (XALATAN) 0.005 % ophthalmic solution 1 drop  1 drop Both Eyes Nightly Donnal Andrea, DO   1 drop at 10/17/20 2217    aspirin EC tablet 81 mg  81 mg Oral Daily Donnal Andrea, DO   81 mg at 10/18/20 6628    isosorbide mononitrate (IMDUR) extended release tablet 30 mg  30 mg Oral Daily Meng Bunkers, DO   30 mg at 10/18/20 0928    glucose (GLUTOSE) 40 % oral gel 15 g  15 g Oral PRN Meng Bunkers, DO        dextrose 50 % IV solution  12.5 g Intravenous PRN Meng Bunkers, DO        glucagon (rDNA) injection 1 mg  1 mg Intramuscular PRN Meng Bunkers, DO        dextrose 5 % solution  100 mL/hr Intravenous PRN Meng Bunkers, DO        fluticasone [facility]) 50 MCG/ACT nasal spray 1 spray  1 spray Each Nostril Daily Meng Bunkers, DO   Stopped at 10/17/20 0900    [Held by provider] pravastatin (PRAVACHOL) tablet 80 mg  80 mg Oral Nightly Keenan Keith MD        sodium chloride flush 0.9 % injection 10 mL  10 mL Intravenous 2 times per day Meng Bunkers, DO   10 mL at 10/18/20 0594    sodium chloride flush 0.9 % injection 10 mL  10 mL Intravenous PRN Meng Bunkers, DO        polyethylene glycol [facility]) packet 17 g  17 g Oral Daily PRN Meng Bunkers, DO        promethazine (PHENERGAN) tablet 12.5 mg  12.5 mg Oral Q6H PRN Meng Bunkers, DO        Or    [facility]dansetron [facility]) injection 4 mg  4 mg Intravenous Q6H PRN Meng Bunkers, DO        magnesium sulfate 2 g in 50 mL IVPB premix  2 g Intravenous PRN Meng Bunkers, DO        melatonin tablet 3 mg  3 mg Oral Nightly PRN Meng Bunkers, DO   3 mg at 10/14/20 2306       Allergies:     Allergies   Allergen Reactions    Methotrexate Derivatives      Caused Blisters       Problem List:    Patient Active Problem List   Diagnosis Code    Essential hypertension I10    Hyperlipidemia E78.5    Prostate CA ([facility] C61    Inflammatory polyarthropathy ([facility] M06.4    Diabetic nephropathy with proteinuria (HCC) E11.21    Transaminitis R74.01    Non-STEMI (non-ST elevated myocardial infarction) ([facility] I21.4    Paroxysmal atrial fibrillation (HCC) I48.0    Anemia D64.9    History of right-sided carotid endarterectomy Z98.890    Coronary artery disease I25.10    Acute kidney injury superimposed on chronic kidney disease (HCC) N17.9, N18.9    Hypotension I95.9       Past Medical History:        Diagnosis Date    Carotid artery stenosis     Cholecystitis     Choledocholithiasis     Cholelithiasis     Colon polyps     Essential hypertension 5/23/2019    Gout     Hiatal hernia     History of laparoscopic cholecystectomy     Hyperlipidemia 5/23/2019    Inflammatory polyarthropathy ([facility] 5/23/2019    Malignant tumor of prostate ([facility]     Osteoarthritis     Pancreatitis     Prostate CA ([facility] 5/23/2019    Pulmonary emphysema (HCC)     Pulmonary nodules     Type 2 diabetes mellitus without complication, without long-term current use of insulin ([facility] [address] 5/23/2019    Ventral hernia        Past Surgical History:        Procedure Laterality Date    CARDIAC CATHETERIZATION  10/16/2020    Dr. Bebe Pressley       Social History:    Social History     Tobacco Use    Smoking status: Never Smoker    Smokeless tobacco: Never Used   Substance Use Topics    Alcohol use: Not Currently                                Counseling given: Not Answered      Vital Signs (Current):   Vitals:    10/18/20 0015 10/18/20 0430 10/18/20 0845 10/18/20 1515   BP: 123/61 134/64 136/63 (!) 109/55   Pulse: 64 66 73 69   Resp: 16 16 16 16   Temp: 36.6 °C (97.8 °F) 36.8 °C (98.2 °F) 35.8 °C (96.4 °F) 36.9 °C (98.5 °F)   TempSrc: Temporal Temporal  Temporal   SpO2: 95% 95% 96% 96%   Weight:       Height:                                                  BP Readings from Last 3 Encounters:   10/18/20 (!) 109/55   09/02/20 124/62   07/24/20 132/62       NPO Status:  NPO after midnight                                                                               BMI:   Wt Readings from Last 3 Encounters:   10/10/20 162 lb (73.5 kg)   09/02/20 159 lb 12.8 oz (72.5 kg)   07/24/20 162 lb 6.4 oz (73.7 kg)     Body mass index is 24.63 kg/m².     CBC:   Lab Results   Component Value Date    WBC 7.9 10/18/2020 RBC 2.83 10/18/2020    HGB 9.5 10/18/2020    HCT 30.2 10/18/2020    .7 10/18/2020    RDW 18.4 10/18/2020     10/18/2020       CMP:   Lab Results   Component Value Date     10/18/2020    K 4.3 10/18/2020     10/18/2020    CO2 24 10/18/2020    BUN 29 10/18/2020    CREATININE 1.5 10/18/2020    GFRAA 55 10/18/2020    AGRATIO 0.9 10/10/2020    LABGLOM 45 10/18/2020    GLUCOSE 110 10/18/2020    PROT 6.2 10/18/2020    CALCIUM 8.6 10/18/2020    BILITOT 1.0 10/18/2020    ALKPHOS 67 10/18/2020    AST 15 10/18/2020    ALT 56 10/18/2020       POC Tests: No results for input(s): POCGLU, POCNA, POCK, POCCL, POCBUN, POCHEMO, POCHCT in the last 72 hours. Coags:   Lab Results   Component Value Date    PROTIME 15.2 10/18/2020    INR 1.3 10/18/2020    APTT 40.8 10/18/2020       HCG (If Applicable): No results found for: PREGTESTUR, PREGSERUM, HCG, HCGQUANT     ABGs: No results found for: PHART, PO2ART, ACB3KUI, LRV1QRC, BEART, X2FXRJTG     Type & Screen (If Applicable):  No results found for: LABABO, LABRH    Drug/Infectious Status (If Applicable):  No results found for: HIV, HEPCAB    COVID-19 Screening (If Applicable): No results found for: COVID19    CXR 10/13/2020  FINDINGS:    Within the right perihilar region there is a 6.0 x 4.2 cm rounded opacity. There is also a 2nd rounded opacity seen within the right hilum measuring 2.4    cm.         There is no appreciable inflammatory process within left lung.         The heart is at upper limits of normal in size.              Impression    1. Abnormal chest x-ray. Amedeo Nails is masslike opacity seen within the right    perihilar region measuring 6 cm x 4.2 cm and there is a 2nd rounded opacity    within the right hilum measuring 2.4 cm.  Dedicated CT of the chest is    recommended for further evaluation.       CT CHEST 10/13/2020  FINDINGS:    Mediastinum: The heart is normal in size.  Severe calcified coronary    atherosclerosis is seen.  The main pulmonary 10/16/2020    Angiographic Results/findings:   Left Main: Calcified.  Distal 75% stenosis at the bifurcation.     LAD: Heavily calcified.  Tortuous.  Proximal mid diffuse 50%   D1: Small vessel.  No angiographically significant stenosis. D2: Very small vessel.  No angiographically significant stenosis. Cx: Ostial diffuse 85% stenosis. OM1: Large vessel.  Proximal eccentric 20 to 30% stenosis. RCA: Dominant.  Ostial 100% chronic total occlusion with   left-to-right collaterals. ECHO 10/14/2020   Findings      Left Ventricle   Left ventricle size is normal.   Normal left ventricle wall thickness. Ejection fraction is visually estimated at 45-50% with mild global   hypokinesia. Stage I diastolic dysfunction. Right Ventricle   Normal right ventricular size and function. Left Atrium   Normal sized left atrium. Right Atrium   Normal right atrium size. Mitral Valve   Physiologic and/or trace mitral regurgitation is present. No mitral valve stenosis present. Tricuspid Valve   Mild tricuspid regurgitation. Pulmonary hypertension is mild . Aortic Valve   The aortic valve appears severely sclerotic. No evidence of aortic valve regurgitation. No hemodynamically significant aortic stenosis is present. Pulmonic Valve   The pulmonic valve was not well visualized. Pericardial Effusion   No evidence of pericardial effusion. Aorta   Aortic root dimension within normal limits. Conclusions      Summary   Ejection fraction is visually estimated at 45-50% with mild global   hypokinesia. Stage I diastolic dysfunction. Mild tricuspid regurgitation. Pulmonary hypertension is mild .     Anesthesia Evaluation  Patient summary reviewed no history of anesthetic complications:   Airway: Mallampati: II  TM distance: >3 FB   Neck ROM: limited  Mouth opening: > = 3 FB Dental:          Pulmonary:   (+) COPD (Pulmonary emphysema):  decreased breath sounds,      (-) shortness of breath and not a current smoker                           Cardiovascular:    (+) hypertension:, past MI:, CAD:, dysrhythmias (Paroxysmal atrial fibrillation): atrial fibrillation, hyperlipidemia    (-)  angina and  NAYLOR    ECG reviewed  Rhythm: regular  Rate: normal  Echocardiogram reviewed         Beta Blocker:  Dose within 24 Hrs         Neuro/Psych:   Negative Neuro/Psych ROS  (+) neuromuscular disease (Diabetic nephropathy):,             GI/Hepatic/Renal:   (+) hiatal hernia, GERD:, renal disease: CRI,           Endo/Other:    (+) DiabetesType II DM, , blood dyscrasia: anemia, arthritis: OA., malignancy/cancer (prostate ca). Abdominal:           Vascular:   + PVD, aortic or cerebral (Carotid artery stenosis), .        ROS comment: Hx of carotid endarterectomy about 8 years ago. Anesthesia Plan      general     ASA 3     (18g L AC)  Induction: intravenous. arterial line, BIS, central line and KYAW  MIPS: Postoperative opioids intended, Prophylactic antiemetics administered and Postoperative trial extubation. Anesthetic plan and risks discussed with patient. Use of blood products discussed with patient whom consented to blood products. Plan discussed with CRNA and attending. Dayana Hadley RN   10/18/2020          DOS STAFF ADDENDUM:    Patient seen and chart reviewed. Physical exam and history updated as indicated. NPO status confirmed. Anesthesia options and plan discussed including risks benefits with patient/legal guardian and family as available. Concerns and questions addressed. Consent verbalized to proceed.   Anesthesia plan, options and intraoperative/postoperative concerns discussed with care team.    Nils Trivedi DO  10/19/2020  6:14 AM

## 2020-10-19 NOTE — PROGRESS NOTES
CVICU Admission Note    Name: Amandeep Pereyra  MRN: [id number]    CC: Postoperative Critical Care Management     Indication for Surgery/Procedure: CAD  LVEF:  30% pre op; 40% postop  RVF:  NML    Important/Relevant PMH/PSH: HTN, HPL,PAF,  COPD, carotid artery stenosis s/p carotid artery endarterectomy ~2012, DM Type2, pulmonary nodules, GERD, hiatal hernia, OA, h/o prostate CA    Procedure/Surgeries: 10/19/2020 CABG x3 (LIMA-LAD, SVG-OM, SVG-PDA), LAAL, EVH, KLS Plating      Pacing wires: Ventricular       Physical Exam:    BP (!) 106/46   Pulse 68   Temp 98.1 °F (36.7 °C) (Temporal)   Resp 12   Ht 5' 11\" (1.803 m)   Wt 163 lb 6.4 oz (74.1 kg)   SpO2 100%   BMI 22.79 kg/m²     Recent Labs     10/19/20  1203   WBC 26.7*   RBC 3.57*   HGB 11.9*   HCT 36.2*   .4*   MCH 33.3   MCHC 32.9   RDW 17.9*      MPV 10.8     Recent Labs     10/19/20  1203 10/19/20  1216     --    K 3.9 3.32*     --    CO2 26  --    BUN 23  --    CREATININE 1.4*  --    GLUCOSE 138*  --    CALCIUM 8.5*  --          Post operative CXR:        Impression:      Line and tube placements. Vascular congestion with bilateral pleural effusions and basilar atelectasis right greater than left. General Appearance: Arrived to ICU intubated, sedated. On epinephrine gtt for hemodynamic support  Eyes: PERRL  Pulmonary: Diminished bibasilar. No wheezes, no accessory muscle use noted   Ventilator: Mode: AC/VC, 50% FiO2, 7 PEEP, 480 Vt   Cardiovascular: RRR, no heaves or thrills palpated   Telemetry: SR  Abdomen: Soft, OG to LIWS   Extremities: Palpable pulses all extremities, trace edema   Neurologic/Psych: Sedation   Skin: Warm and dry   Incision: MSI with YURY intact, RLE SVG sites with ace wrap       Assessment/Plan: Day of Surgery     1. CAD S/p CABGx3 (LIMA-LAD, SVG-OM, SVG-PDA)  - ASA, statin  - Ancef   - Monitor chest tube output and hemodynamics closely    2.  Paroxsymal atrial fibrillation   -s/p LAAL  -Postop NSR     3. Acute Postoperative Respiratory Insufficiency  - 2/2 surgery, h/o pulmonary emphysema  - Intubated on ventilator   - Wean vent settings and extubate patient once awake, following commands, RUIZ, and no signs of bleeding  - ABGs per protocol and PRN     4. Acute Post Operative Pain   - PRN fentanyl for pain management until able to take PO     5. Cardiac insufficiency  - H/o ICM, EF 30% preop; 40% post op  - Required epinephrine gtt post CPB for hemodynamic support  - Target MAP >70, wean gtt slowly as able     6. CKD Stage 3   - Baseline Scr 1.3-1.5  - Dr. Lauren Contreras following preop for MARIAH in CKD   - Monitor UOP, labs, avoid hypotension and nephrotoxic agents; target MAP >70 for renal perfusion      7. Postoperative hypotension  - Labile BP on arrival to ICU, IVF resuscitation PRN   - Add low dose levophed gtt to maintain MAP >70      This patient has a high probability of sudden deterioration, which requires preparedness to urgently intervene. I participated in the decision making process and managed the direct care of the patient that required frequent assessment and treatment. I personally spent 44 minutes of critical care time treating the patient.        Electronically signed by ROSANGELA Keating CNP on 10/19/2020 at 1:02 PM

## 2020-10-19 NOTE — PLAN OF CARE
Problem: Cardiac Output - Decreased:  Goal: Hemodynamic stability will improve  Description: Hemodynamic stability will improve  Outcome: Met This Shift  Note: Monitor vital signs, administer meds as ordered, monitor fluid balance, replace electrolytes prn     Problem: Gas Exchange - Impaired:  Goal: Ability to maintain adequate ventilation will improve  Description: Ability to maintain adequate ventilation will improve  Outcome: Met This Shift  Note: Wean & extubate pt as tolerated, monitor ABGs & SaO2, monitor breath sounds & CXR, encourage deep breathing and cough, intruct pt on use of IS

## 2020-10-19 NOTE — PROGRESS NOTES
Hospitalist Progress Note      SYNOPSIS: Patient admitted on 10/10/2020 for abdominal pain in the right upper quadrant, radiate to the back.   Patient had been admitted to [facility] where another work-up was obtained. Kiko Guevara things were looking pretty unremarkable until today.  LFTs were initially normal and increased into the 500-600 range.  Also noted to have magnesium 1.0. Creatinine increased from 1.4 to 1.9.  WBC evaded at 18,000. patient was transferred to [facility] for further work-up and gastroenterological consultation. Patient had an episode of A. fib with RVR required RRT, was found to have positive troponin that are trending up. Surgical evaluation showed stool stability heme negative, patient will be receiving a blood transfusion today per request of cardiology and is scheduled for cardiac cath done  Pt. Required CABGx 3 vessels done today        SUBJECTIVE: Intubated& vented  Patient seen and examined  Records reviewed. .   Temp (24hrs), Av.1 °F (36.2 °C), Min:92.5 °F (33.6 °C), Max:99 °F (37.2 °C)    DIET: DIET CARDIAC; Carb Control: 5 carbs/meal (approximate 2000 kcals/day)  Dietary Nutrition Supplements: Diabetic Oral Supplement  CODE: Full Code    Intake/Output Summary (Last 24 hours) at 10/19/2020 1703  Last data filed at 10/19/2020 1500  Gross per 24 hour   Intake 4725.2 ml   Output 4435 ml   Net 290.2 ml       OBJECTIVE:    BP (!) 106/46   Pulse 71   Temp 98.2 °F (36.8 °C) (Bladder)   Resp 25 Comment: Simultaneous filing. User may not have seen previous data. Ht 5' 11\" (1.803 m)   Wt 163 lb 6.4 oz (74.1 kg)   SpO2 99% Comment: Simultaneous filing. User may not have seen previous data. BMI 22.79 kg/m²     General appearance: Intubated,Vented,opens his eyes to his name  HEENT:  Conjunctivae/corneas clear.    Neck: Supple,no adenopathy  Respiratory: Rales anteriorly bilaterally  Cardiovascular: Regular rate rhythm, normal S1-S2  Abdomen: Soft, nontender, nondistended  Musculoskeletal: No clubbing, cyanosis, no bilateral lower extremity edema. Brisk capillary refill. Skin:  No rashes  on visible skin  Neurologic: Intubated and vented  ASSESSMENT:  CAD native arteries  S/p CABG today  New onset atrial fibrillation ,SR post op  Acute resp. Failure post op  Hypotension Post op         PLAN:  Meds reviewed  Respiratory acidosis,REpeat ABGs improving  Vent. Weaning per intensevist  Monitor BP,Pt.  Requiring epinephrine to maintain MAP>70    Medications:  REVIEWED    Infusion Medications    sodium chloride 30 mL/hr (10/19/20 1155)    propofol Stopped (10/19/20 1404)    sodium chloride      norepinephrine Stopped (10/19/20 1330)    clevidipine      insulin      dextrose      EPINEPHrine infusion 3.5 mcg/min (10/19/20 1338)     Scheduled Medications    timolol  1 drop Left Eye BID    vitamin B-12  1,000 mcg Oral BID    sodium chloride flush  10 mL Intravenous 2 times per day    ceFAZolin (ANCEF) IVPB  2 g Intravenous Q8H    sennosides-docusate sodium  1 tablet Oral BID    [START ON 10/20/2020] pantoprazole  40 mg Oral Daily    chlorhexidine  15 mL Mouth/Throat BID    [START ON 10/20/2020] magnesium oxide  400 mg Oral Daily    mupirocin   Nasal BID    [START ON 10/20/2020] aspirin  81 mg Oral Daily    ipratropium-albuterol  1 ampule Inhalation Q4H WA    [START ON 10/20/2020] insulin glargine  0.15 Units/kg Subcutaneous Nightly    [START ON 10/20/2020] senna  1 tablet Oral Nightly    [START ON 10/20/2020] tamsulosin  0.4 mg Oral Daily    [START ON 10/20/2020] insulin lispro  0-18 Units Subcutaneous Q4H    insulin lispro  0-18 Units Subcutaneous Q4H    pantoprazole        aspirin        latanoprost  1 drop Both Eyes Nightly     PRN Meds: sodium chloride flush, potassium chloride, magnesium sulfate, acetaminophen, acetaminophen, oxyCODONE **OR** oxyCODONE, fentanNYL **OR** fentanNYL, magnesium hydroxide, ondansetron, propofol, meperidine,

## 2020-10-19 NOTE — CARE COORDINATION
10/19/2020 - Arrived in [address] today, CABG x3 today.  Will follow up with pt and family in am.

## 2020-10-19 NOTE — FLOWSHEET NOTE
Pt admitted to [facility] post ACB x3, all hemodynamic lines leveled and calibrated, pt on ventilator, report given by anesthesia

## 2020-10-20 ENCOUNTER — APPOINTMENT (OUTPATIENT)
Dept: GENERAL RADIOLOGY | Age: 75
DRG: 233 | End: 2020-10-20
Attending: FAMILY MEDICINE
Payer: MEDICARE

## 2020-10-20 LAB
ANION GAP SERPL CALCULATED.3IONS-SCNC: 12 MMOL/L (ref 7–16)
ANION GAP SERPL CALCULATED.3IONS-SCNC: 16 MMOL/L (ref 7–16)
B.E.: -2.9 MMOL/L (ref -3–3)
BUN BLDV-MCNC: 27 MG/DL (ref 8–23)
BUN BLDV-MCNC: 30 MG/DL (ref 8–23)
CALCIUM SERPL-MCNC: 8 MG/DL (ref 8.6–10.2)
CALCIUM SERPL-MCNC: 8.4 MG/DL (ref 8.6–10.2)
CHLORIDE BLD-SCNC: 104 MMOL/L (ref 98–107)
CHLORIDE BLD-SCNC: 107 MMOL/L (ref 98–107)
CO2: 22 MMOL/L (ref 22–29)
CO2: 23 MMOL/L (ref 22–29)
COHB: 0.9 % (ref 0–1.5)
CREAT SERPL-MCNC: 1.8 MG/DL (ref 0.7–1.2)
CREAT SERPL-MCNC: 1.9 MG/DL (ref 0.7–1.2)
CRITICAL: ABNORMAL
DATE ANALYZED: ABNORMAL
DATE OF COLLECTION: ABNORMAL
GFR AFRICAN AMERICAN: 42
GFR AFRICAN AMERICAN: 44
GFR NON-AFRICAN AMERICAN: 35 ML/MIN/1.73
GFR NON-AFRICAN AMERICAN: 37 ML/MIN/1.73
GLUCOSE BLD-MCNC: 159 MG/DL (ref 74–99)
GLUCOSE BLD-MCNC: 175 MG/DL (ref 74–99)
HCO3: 23.2 MMOL/L (ref 22–26)
HCT VFR BLD CALC: 31.1 % (ref 37–54)
HEMOGLOBIN: 9.9 G/DL (ref 12.5–16.5)
HHB: 2.6 % (ref 0–5)
LAB: ABNORMAL
LACTIC ACID: 1.8 MMOL/L (ref 0.5–2.2)
LACTIC ACID: 2.2 MMOL/L (ref 0.5–2.2)
LACTIC ACID: 2.3 MMOL/L (ref 0.5–2.2)
LACTIC ACID: 2.4 MMOL/L (ref 0.5–2.2)
Lab: ABNORMAL
MAGNESIUM: 1.9 MG/DL (ref 1.6–2.6)
MCH RBC QN AUTO: 33.8 PG (ref 26–35)
MCHC RBC AUTO-ENTMCNC: 31.8 % (ref 32–34.5)
MCV RBC AUTO: 106.1 FL (ref 80–99.9)
METER GLUCOSE: 140 MG/DL (ref 74–99)
METER GLUCOSE: 159 MG/DL (ref 74–99)
METER GLUCOSE: 166 MG/DL (ref 74–99)
METHB: 0.2 % (ref 0–1.5)
MODE: ABNORMAL
O2 SATURATION: 97.9 % (ref 92–98.5)
O2HB: 96.3 % (ref 94–97)
OPERATOR ID: ABNORMAL
PATIENT TEMP: 37 C
PCO2: 45.8 MMHG (ref 35–45)
PDW BLD-RTO: 17.9 FL (ref 11.5–15)
PH BLOOD GAS: 7.32 (ref 7.35–7.45)
PLATELET # BLD: 166 E9/L (ref 130–450)
PMV BLD AUTO: 11.5 FL (ref 7–12)
PO2: 115.1 MMHG (ref 75–100)
POTASSIUM SERPL-SCNC: 4.7 MMOL/L (ref 3.5–5)
POTASSIUM SERPL-SCNC: 5 MMOL/L (ref 3.5–5)
POTASSIUM SERPL-SCNC: 5.5 MMOL/L (ref 3.5–5)
POTASSIUM SERPL-SCNC: 5.64 MMOL/L (ref 3.5–5)
RBC # BLD: 2.93 E12/L (ref 3.8–5.8)
SODIUM BLD-SCNC: 141 MMOL/L (ref 132–146)
SODIUM BLD-SCNC: 143 MMOL/L (ref 132–146)
SOURCE, BLOOD GAS: ABNORMAL
THB: 11 G/DL (ref 11.5–16.5)
TIME ANALYZED: 135
URINE CULTURE, ROUTINE: NORMAL
WBC # BLD: 25.9 E9/L (ref 4.5–11.5)

## 2020-10-20 PROCEDURE — 36415 COLL VENOUS BLD VENIPUNCTURE: CPT

## 2020-10-20 PROCEDURE — 97530 THERAPEUTIC ACTIVITIES: CPT

## 2020-10-20 PROCEDURE — 6370000000 HC RX 637 (ALT 250 FOR IP): Performed by: THORACIC SURGERY (CARDIOTHORACIC VASCULAR SURGERY)

## 2020-10-20 PROCEDURE — 83605 ASSAY OF LACTIC ACID: CPT

## 2020-10-20 PROCEDURE — P9047 ALBUMIN (HUMAN), 25%, 50ML: HCPCS | Performed by: NURSE PRACTITIONER

## 2020-10-20 PROCEDURE — 83735 ASSAY OF MAGNESIUM: CPT

## 2020-10-20 PROCEDURE — 2700000000 HC OXYGEN THERAPY PER DAY

## 2020-10-20 PROCEDURE — 99291 CRITICAL CARE FIRST HOUR: CPT | Performed by: NURSE PRACTITIONER

## 2020-10-20 PROCEDURE — 6360000002 HC RX W HCPCS: Performed by: NURSE PRACTITIONER

## 2020-10-20 PROCEDURE — 97535 SELF CARE MNGMENT TRAINING: CPT

## 2020-10-20 PROCEDURE — 2580000003 HC RX 258: Performed by: THORACIC SURGERY (CARDIOTHORACIC VASCULAR SURGERY)

## 2020-10-20 PROCEDURE — 2580000003 HC RX 258: Performed by: NURSE PRACTITIONER

## 2020-10-20 PROCEDURE — 84132 ASSAY OF SERUM POTASSIUM: CPT

## 2020-10-20 PROCEDURE — 94640 AIRWAY INHALATION TREATMENT: CPT

## 2020-10-20 PROCEDURE — 2500000003 HC RX 250 WO HCPCS: Performed by: INTERNAL MEDICINE

## 2020-10-20 PROCEDURE — 82805 BLOOD GASES W/O2 SATURATION: CPT

## 2020-10-20 PROCEDURE — 6360000002 HC RX W HCPCS: Performed by: THORACIC SURGERY (CARDIOTHORACIC VASCULAR SURGERY)

## 2020-10-20 PROCEDURE — 85027 COMPLETE CBC AUTOMATED: CPT

## 2020-10-20 PROCEDURE — 97162 PT EVAL MOD COMPLEX 30 MIN: CPT

## 2020-10-20 PROCEDURE — 82962 GLUCOSE BLOOD TEST: CPT

## 2020-10-20 PROCEDURE — 71045 X-RAY EXAM CHEST 1 VIEW: CPT

## 2020-10-20 PROCEDURE — 2000000000 HC ICU R&B

## 2020-10-20 PROCEDURE — 97166 OT EVAL MOD COMPLEX 45 MIN: CPT

## 2020-10-20 PROCEDURE — 80048 BASIC METABOLIC PNL TOTAL CA: CPT

## 2020-10-20 RX ORDER — ALBUMIN (HUMAN) 12.5 G/50ML
50 SOLUTION INTRAVENOUS ONCE
Status: COMPLETED | OUTPATIENT
Start: 2020-10-20 | End: 2020-10-20

## 2020-10-20 RX ORDER — BUMETANIDE 0.25 MG/ML
1 INJECTION, SOLUTION INTRAMUSCULAR; INTRAVENOUS ONCE
Status: COMPLETED | OUTPATIENT
Start: 2020-10-20 | End: 2020-10-20

## 2020-10-20 RX ADMIN — TIMOLOL MALEATE 1 DROP: 5 SOLUTION OPHTHALMIC at 20:24

## 2020-10-20 RX ADMIN — Medication 1000 MCG: at 20:23

## 2020-10-20 RX ADMIN — Medication 10 ML: at 08:31

## 2020-10-20 RX ADMIN — MUPIROCIN: 20 OINTMENT TOPICAL at 20:23

## 2020-10-20 RX ADMIN — CALCIUM GLUCONATE 1 G: 98 INJECTION, SOLUTION INTRAVENOUS at 08:04

## 2020-10-20 RX ADMIN — ALBUMIN (HUMAN) 50 G: 0.25 INJECTION, SOLUTION INTRAVENOUS at 10:22

## 2020-10-20 RX ADMIN — BUMETANIDE 1 MG: 0.25 INJECTION INTRAMUSCULAR; INTRAVENOUS at 12:52

## 2020-10-20 RX ADMIN — Medication 1000 MCG: at 08:31

## 2020-10-20 RX ADMIN — MUPIROCIN: 20 OINTMENT TOPICAL at 08:31

## 2020-10-20 RX ADMIN — PANTOPRAZOLE SODIUM 40 MG: 40 TABLET, DELAYED RELEASE ORAL at 08:31

## 2020-10-20 RX ADMIN — INSULIN LISPRO 3 UNITS: 100 INJECTION, SOLUTION INTRAVENOUS; SUBCUTANEOUS at 07:49

## 2020-10-20 RX ADMIN — SODIUM CHLORIDE 30 ML/HR: 9 INJECTION, SOLUTION INTRAVENOUS at 10:55

## 2020-10-20 RX ADMIN — Medication 2 G: at 10:21

## 2020-10-20 RX ADMIN — SENNOSIDES 8.6 MG: 8.6 TABLET, FILM COATED ORAL at 20:24

## 2020-10-20 RX ADMIN — Medication 10 ML: at 20:23

## 2020-10-20 RX ADMIN — OXYCODONE HYDROCHLORIDE 10 MG: 10 TABLET ORAL at 18:21

## 2020-10-20 RX ADMIN — TAMSULOSIN HYDROCHLORIDE 0.4 MG: 0.4 CAPSULE ORAL at 08:31

## 2020-10-20 RX ADMIN — LATANOPROST 1 DROP: 50 SOLUTION OPHTHALMIC at 20:24

## 2020-10-20 RX ADMIN — AMIODARONE HYDROCHLORIDE 0.5 MG/MIN: 50 INJECTION, SOLUTION INTRAVENOUS at 11:03

## 2020-10-20 RX ADMIN — OXYCODONE HYDROCHLORIDE 10 MG: 10 TABLET ORAL at 08:42

## 2020-10-20 RX ADMIN — INSULIN LISPRO 3 UNITS: 100 INJECTION, SOLUTION INTRAVENOUS; SUBCUTANEOUS at 12:07

## 2020-10-20 RX ADMIN — ASPIRIN 81 MG: 81 TABLET, FILM COATED ORAL at 08:31

## 2020-10-20 RX ADMIN — EPINEPHRINE 4 MCG/MIN: 1 INJECTION INTRAMUSCULAR; INTRAVENOUS; SUBCUTANEOUS at 08:34

## 2020-10-20 RX ADMIN — MAGNESIUM GLUCONATE 500 MG ORAL TABLET 400 MG: 500 TABLET ORAL at 08:31

## 2020-10-20 RX ADMIN — Medication 2 G: at 01:32

## 2020-10-20 RX ADMIN — Medication 2 G: at 17:48

## 2020-10-20 RX ADMIN — TIMOLOL MALEATE 1 DROP: 5 SOLUTION OPHTHALMIC at 08:31

## 2020-10-20 RX ADMIN — SODIUM CHLORIDE 100 ML/HR: 9 INJECTION, SOLUTION INTRAVENOUS at 01:17

## 2020-10-20 RX ADMIN — IPRATROPIUM BROMIDE AND ALBUTEROL SULFATE 1 AMPULE: 2.5; .5 SOLUTION RESPIRATORY (INHALATION) at 20:36

## 2020-10-20 RX ADMIN — IPRATROPIUM BROMIDE AND ALBUTEROL SULFATE 1 AMPULE: 2.5; .5 SOLUTION RESPIRATORY (INHALATION) at 15:28

## 2020-10-20 RX ADMIN — DOCUSATE SODIUM 50 MG AND SENNOSIDES 8.6 MG 1 TABLET: 8.6; 5 TABLET, FILM COATED ORAL at 08:31

## 2020-10-20 RX ADMIN — DOCUSATE SODIUM 50 MG AND SENNOSIDES 8.6 MG 1 TABLET: 8.6; 5 TABLET, FILM COATED ORAL at 20:24

## 2020-10-20 RX ADMIN — IPRATROPIUM BROMIDE AND ALBUTEROL SULFATE 1 AMPULE: 2.5; .5 SOLUTION RESPIRATORY (INHALATION) at 11:22

## 2020-10-20 RX ADMIN — INSULIN LISPRO 3 UNITS: 100 INJECTION, SOLUTION INTRAVENOUS; SUBCUTANEOUS at 20:07

## 2020-10-20 RX ADMIN — INSULIN LISPRO 3 UNITS: 100 INJECTION, SOLUTION INTRAVENOUS; SUBCUTANEOUS at 03:07

## 2020-10-20 RX ADMIN — INSULIN LISPRO 3 UNITS: 100 INJECTION, SOLUTION INTRAVENOUS; SUBCUTANEOUS at 15:25

## 2020-10-20 RX ADMIN — INSULIN GLARGINE 11 UNITS: 100 INJECTION, SOLUTION SUBCUTANEOUS at 20:24

## 2020-10-20 ASSESSMENT — PAIN DESCRIPTION - ORIENTATION: ORIENTATION: MID

## 2020-10-20 ASSESSMENT — PAIN SCALES - GENERAL
PAINLEVEL_OUTOF10: 0
PAINLEVEL_OUTOF10: 10
PAINLEVEL_OUTOF10: 0
PAINLEVEL_OUTOF10: 8
PAINLEVEL_OUTOF10: 0

## 2020-10-20 ASSESSMENT — PAIN DESCRIPTION - DESCRIPTORS
DESCRIPTORS: SORE
DESCRIPTORS: SORE

## 2020-10-20 ASSESSMENT — PAIN DESCRIPTION - LOCATION
LOCATION: CHEST
LOCATION: CHEST

## 2020-10-20 ASSESSMENT — PAIN DESCRIPTION - PAIN TYPE
TYPE: SURGICAL PAIN
TYPE: SURGICAL PAIN

## 2020-10-20 ASSESSMENT — PAIN DESCRIPTION - PROGRESSION: CLINICAL_PROGRESSION: GRADUALLY WORSENING

## 2020-10-20 NOTE — PLAN OF CARE
Problem: Pain:  Goal: Pain level will decrease  Description: Pain level will decrease  10/20/2020 1948 by Luli Wynn RN  Outcome: Met This Shift     Problem: Falls - Risk of:  Goal: Will remain free from falls  Description: Will remain free from falls  10/20/2020 1948 by Luli Wynn RN  Outcome: Met This Shift     Problem: Skin Integrity:  Goal: Will show no infection signs and symptoms  Description: Will show no infection signs and symptoms  10/20/2020 1948 by Luli Wynn RN  Outcome: Met This Shift     Problem:  Activity Intolerance:  Goal: Able to perform prescribed physical activity  Description: Able to perform prescribed physical activity  10/20/2020 1948 by Luli Wynn RN  Outcome: Met This Shift     Problem: Cardiac Output - Decreased:  Goal: Cardiac output within specified parameters  Description: Cardiac output within specified parameters  10/20/2020 1948 by Luli Wynn RN  Outcome: Met This Shift     Problem: Pain:  Goal: Pain level will decrease  Description: Pain level will decrease  10/20/2020 1948 by Luli Wynn RN  Outcome: Met This Shift     Problem: Tissue Perfusion - Cardiopulmonary, Altered:  Goal: Absence of angina  Description: Absence of angina  10/20/2020 1948 by Luli Wynn RN  Outcome: Met This Shift

## 2020-10-20 NOTE — PROGRESS NOTES
DAILY PROGRESS NOTE - [facility]    SUBJECTIVE:    Underwent CABG x3 vessels yesterday 10/19. LIMA to LAD, vein graft to obtuse marginal and PDA. Sitting up in bed and says he feels fine. Low-dose pressors at this time. No chest pain, worsening dyspnea, palpitations, syncope, presyncope. Preserved left ventricular systolic function on recent echocardiogram.    Hypertension, hyperlipidemia, diabetes, COPD, right CEA in the past.  Paroxysmal atrial fibrillation with elevated LFTs and renal failure recently. In sinus rhythm on telemetry. OBJECTIVE:    His vital signs were reviewed today.     Vitals:    10/20/20 0740   BP:    Pulse:    Resp: 30   Temp:    SpO2: 99%       Scheduled Meds:   timolol  1 drop Left Eye BID    vitamin B-12  1,000 mcg Oral BID    sodium chloride flush  10 mL Intravenous 2 times per day    ceFAZolin (ANCEF) IVPB  2 g Intravenous Q8H    sennosides-docusate sodium  1 tablet Oral BID    pantoprazole  40 mg Oral Daily    magnesium oxide  400 mg Oral Daily    mupirocin   Nasal BID    aspirin  81 mg Oral Daily    ipratropium-albuterol  1 ampule Inhalation Q4H WA    insulin glargine  0.15 Units/kg Subcutaneous Nightly    senna  1 tablet Oral Nightly    tamsulosin  0.4 mg Oral Daily    insulin lispro  0-18 Units Subcutaneous Q4H    insulin lispro  0-18 Units Subcutaneous Q4H    latanoprost  1 drop Both Eyes Nightly     Continuous Infusions:   sodium chloride 50 mL/hr (10/20/20 0982)    sodium chloride      norepinephrine 1 mcg/min (10/20/20 0640)    clevidipine      insulin      dextrose      EPINEPHrine infusion 4 mcg/min (10/19/20 1954)     PRN Meds:.sodium chloride flush, potassium chloride, magnesium sulfate, acetaminophen, acetaminophen, oxyCODONE **OR** oxyCODONE HCl, fentanNYL **OR** fentanNYL, magnesium hydroxide, ondansetron, albumin human, sodium chloride, norepinephrine, clevidipine, insulin, glucose, dextrose, glucagon (rDNA), dextrose, calcium gluconate IVPB    REVIEW OF SYSTEMS:     No pruritus, rash, bruising. Cardiac symptoms per HPI. No nausea, vomiting, abdominal pain, GI bleeding, change in bowel habits. No dysuria, urinary frequency, urgency, hematuria, flank pain. Joint pain but no muscle soreness, stiffness, aching. No headache, speech disturbance, lateralizing neurologic deficit. No hemoptysis, epistaxis, easy bruising. No anxiety, depression. No symptoms of hypothyroidism, hyperthyroidism, diabetes, heat or cold intolerance. FAMILY HISTORY: Negative for CAD in first-degree relatives. SOCIAL HISTORY: Negative for alcohol, tobacco, or illicit drug use. PHYSICAL EXAM:    General Appearance:  awake, alert, oriented, in no acute distress  Neck:  no bruits  Lungs:  Normal expansion. Clear to auscultation. No rales, rhonchi, or wheezing. Heart:  Heart sounds are normal.  Regular rate and rhythm without murmur, gallop or rub. Abdomen:  Soft, non-tender. Extremities: Extremities warm to touch, pink, with no edema. Neuro/musculosketal:  Unremarkable. LABS:    Recent Labs     10/19/20  0406 10/19/20  1203 10/20/20  0421    141 141   CREATININE 1.5* 1.4* 1.8*       Recent Labs     10/19/20  0406 10/19/20  1203 10/20/20  0421   HGB 9.3* 11.9* 9.9*       Recent Labs     10/18/20  1207 10/19/20  1203   INR 1.3 1.4         IMPRESSION:    #1. CAD post non-STEMI and CABG-doing well at this time. Continue aspirin. Supportive care. #2.  Paroxysmal atrial fibrillation-no evidence on recent telemetry. Hold off on anticoagulation at this time. Consider starting low-dose metoprolol succinate. #3. Hypertension-controlled. #4. Hyperlipidemia-no statin pending liver enzymes trend. #5.  Diabetes-per intensivist.    #6.  Renal failure-creatinine increasing from 1.4 and now 1.8. Nephrology following. #7.  Continue to follow.

## 2020-10-20 NOTE — PROGRESS NOTES
Hospitalist Progress Note      PCP: Bravo Ro MD    Date of Admission: 10/10/2020    Chief Complaint: * chest pain    Hospital Course: **s/p CABG, still has chest tubes.      Subjective: having post op pain, wife at bedside*       Medications:  Reviewed    Infusion Medications    amiodarone 450mg/250ml D5W infusion 0.5 mg/min (10/20/20 1103)    sodium chloride 30 mL/hr (10/20/20 1055)    sodium chloride      norepinephrine 1 mcg/min (10/20/20 0800)    clevidipine      insulin      dextrose      EPINEPHrine infusion 4 mcg/min (10/20/20 0834)     Scheduled Medications    timolol  1 drop Left Eye BID    vitamin B-12  1,000 mcg Oral BID    sodium chloride flush  10 mL Intravenous 2 times per day    ceFAZolin (ANCEF) IVPB  2 g Intravenous Q8H    sennosides-docusate sodium  1 tablet Oral BID    pantoprazole  40 mg Oral Daily    magnesium oxide  400 mg Oral Daily    mupirocin   Nasal BID    aspirin  81 mg Oral Daily    ipratropium-albuterol  1 ampule Inhalation Q4H WA    insulin glargine  0.15 Units/kg Subcutaneous Nightly    senna  1 tablet Oral Nightly    tamsulosin  0.4 mg Oral Daily    insulin lispro  0-18 Units Subcutaneous Q4H    latanoprost  1 drop Both Eyes Nightly     PRN Meds: sodium chloride flush, potassium chloride, magnesium sulfate, acetaminophen, acetaminophen, oxyCODONE **OR** oxyCODONE HCl, fentanNYL **OR** fentanNYL, magnesium hydroxide, ondansetron, albumin human, sodium chloride, norepinephrine, clevidipine, insulin, glucose, dextrose, glucagon (rDNA), dextrose, calcium gluconate IVPB      Intake/Output Summary (Last 24 hours) at 10/20/2020 1619  Last data filed at 10/20/2020 1600  Gross per 24 hour   Intake 4640.41 ml   Output 1610 ml   Net 3030.41 ml       Exam:    /63   Pulse 83   Temp 98.2 °F (36.8 °C) (Temporal)   Resp 25   Ht 5' 11\" (1.803 m)   Wt 176 lb 12.9 oz (80.2 kg)   SpO2 97%   BMI 24.66 kg/m²           Gen: *well developed  HEENT: NC/AT, moist mucous membranes, no oropharyngeal erythema or exudate  Neck: supple, trachea midline, no anterior cervical or SC LAD  Heart:  Normal s1/s2, RRR, no murmurs, gallops, or rubs. Lungs:  *cta  bilaterally, *  Abd: bowel sounds present, soft, nontender, nondistended, no masses  Extrem:  No clubbing, cyanosis,  *no* edema  Skin: no rashes or lesions  Psych: A & O x3  Neuro: grossly intact, moves all four extremities. Capillary Refill: Brisk,< 3 seconds   Peripheral Pulses: +2 palpable, equal bilaterally               Labs:   Recent Labs     10/19/20  0406 10/19/20  1203 10/20/20  0421   WBC 7.8 26.7* 25.9*   HGB 9.3* 11.9* 9.9*   HCT 28.8* 36.2* 31.1*    171 166     Recent Labs     10/19/20  0406  10/19/20  1203  10/20/20  0135 10/20/20  0421 10/20/20  1205     --  141  --   --  141  --    K 3.6   < > 3.9   < > 5.64* 5.5* 5.0     --  104  --   --  107  --    CO2 26  --  26  --   --  22  --    BUN 27*  --  23  --   --  27*  --    CREATININE 1.5*  --  1.4*  --   --  1.8*  --    CALCIUM 8.5*  --  8.5*  --   --  8.0*  --     < > = values in this interval not displayed. Recent Labs     10/18/20  0557 10/19/20  0406   AST 15 15   ALT 56* 47*   BILITOT 1.0 1.0   ALKPHOS 67 70     Recent Labs     10/18/20  1207 10/19/20  1203   INR 1.3 1.4     No results for input(s): CKTOTAL, TROPONINI in the last 72 hours.   Recent Labs     10/18/20  0557 10/19/20  0406   AST 15 15   ALT 56* 47*   BILITOT 1.0 1.0   ALKPHOS 67 70     Recent Labs     10/20/20  0850 10/20/20  1200   LACTA 2.4* 2.3*     No results found for: Roslyn Boothe  No results found for: AMMONIA    Assessment:    Active Hospital Problems    Diagnosis Date Noted    Cardiac insufficiency ([facility] [I50.9]     Acute postoperative respiratory insufficiency [J95.89]     Stage 3 chronic kidney disease [N18.30]     Acute postoperative pain [G89.18]     Non-STEM[facility] (non-ST elevated [facility] [I21.4] 10/14/2020    [facility]ox[facility] ([facility] [I48.0] 10/14/2020    Anemia [D64.9] 10/14/2020    History of right-sided carotid endarterectomy [Z98.890] 10/14/2020    Coronary artery disease [I25.10] 10/14/2020    Acute kidney injury superimposed on chronic kidney disease ([facility] [N17.9, N18.9] 10/14/2020    Hypotension [I95.9] 10/14/2020    Transaminitis [R74.01] 10/10/2020   s/p CABG  PAF  II dM    Plan:   on amiodarone   cont ancef   cont SSI         DVT Prophylaxis: scd  Diet: DIET CARDIAC; Carb Control: 5 carbs/meal (approximate 2000 kcals/day)  Dietary Nutrition Supplements: Diabetic Oral Supplement  Code Status: Full Code    PT/OT Eval Status: *ordered    Dispo - home**      Electronically signed by Evan Zapata DO on 10/20/2020 at 4:19 PM [facility]

## 2020-10-20 NOTE — OP NOTE
[address]                  [address] [facility], [address] [place] [address]                                OPERATIVE REPORT    PATIENT NAME: Ahmet Turner               :        1945  MED REC NO:   [id number]                            ROOM:       0553  ACCOUNT NO:   [de-identified]                           ADMIT DATE: 10/10/2020  PROVIDER:     Isabela Bragg DO    DATE OF PROCEDURE:  10/19/2020    PREOPERATIVE DIAGNOSES:  Severe multivessel coronary artery disease,  ischemic cardiomyopathy. POSTOPERATIVE DIAGNOSES:  Severe multivessel coronary artery disease,  ischemic cardiomyopathy. PROCEDURE PERFORMED:  1. Coronary artery bypass grafting x3 using left internal mammary  artery to the left anterior descending artery, reverse saphenous vein  graft to the dominant obtuse marginal branch of the circumflex, reverse  saphenous vein graft to the posterior descending artery. 2.  Left atrial appendage ligation. 3.  Lower extremity endoscopic vein harvest.  4.  Rigid sternal fixation with KLS plating system. SURGEON:  Isabela Bragg DO    ASSISTANTS:  Dr. Dmitry Rome, no qualified resident was available, also  Rahel Wilson; DARLENE Whittaker; and Richie Savage PA-C. ANESTHESIA:  General.    ESTIMATED BLOOD LOSS:  Less than 50. COMPLICATIONS:  None. SPECIMENS:  None. DESCRIPTION OF PROCEDURE:  After informed and written consent had been  obtained, the patient was brought to the operating room, placed in the  supine position on the operating table. Monitoring lines as well as  Nix catheter and transesophageal echo probe were inserted after  induction of anesthesia. Preoperative echo demonstrated an ejection  fraction of 30% to 35% with mild mitral regurgitation. Also noted  normal right ventricular function and no other valvular abnormalities.    Once the preoperative antibiotics were administered and surgical timeout  was held, a standard midline sternotomy incision was carried out. The  sternum was divided with a sternal saw. Simultaneously, the lower  extremity endoscopic vein harvest was carried out. The left internal  mammary artery was dissected free from the undersurface of the left  chest wall with the pedicled technique. All branches were clipped and  divided. The patient was administered full dose heparin for an adequate  ACT for bypass and the distal portion of the left internal mammary  artery was clipped and divided, this was noted to be a good conduit for  bypass. Next, the pericardium was opened, the ascending aorta was  palpated and noted to be free of atherosclerotic disease. Suitable  areas for cannulation, cross-clamping, and proximal anastomoses were  apparent. Next, the central cannulation was commenced in the usual  fashion with the ascending aorta and right atrial cannulas. This was  followed by insertion of antegrade and retrograde cardioplegia lines. Next, the ACT was verified and the patient was placed in the  cardiopulmonary bypass. The distal targets were then inspected and  noted to be adequate. The distal right coronary artery system was  heavily heavily calcified and the only target for this right coronary  was the posterior descending artery. There was one dominant obtuse  marginal branch of the circumflex and the mid portion of the LAD also  was found to be adequate. All adequate targets were bypassed. Next,  the cross-clamp was applied. The heart was arrested with a combination  of antegrade and retrograde cardioplegia. We achieved an excellent  arrest and administered maintenance doses via the retrograde  cardioplegia cannula every 15 minutes. First, our attention was turned  to the right coronary artery system. The PDA was opened in a standard  arteriotomy and end-to-side vein graft anastomosis was created here with  a running 7-0 Prolene suture.   This was tested and had excellent flow  and also was hemostatic. Next, our attention was turned to the obtuse  marginal branch after arteriotomy and end-to-side vein graft anastomosis  was created here with a running 7-0 Prolene suture. This was also  tested and had excellent flow and was hemostatic. Next, our attention  was turned to the mid portion of the LAD, which was noted to be very  heavily calcified in its proximal section. We were able to find one  area that was suitable for bypass in between two of the calcified areas  after arteriotomy and end-to-side LIMA to LAD anastomosis was created  here with a running 7-0 Prolene suture. This was tested by removing the  bulldog clamp in the left internal mammary artery, where we noted  excellent runoff to the distal vessel and adjacent branches and also it  was hemostatic. Next, our attention was turned to the proximal  anastomoses. Two standard aortotomies were fashioned in the ascending  aorta with a 4.8 mm punch. Vein grafts were then measured to length and  the proximal anastomoses were created with a running 6-0 Prolene suture. Once this was completed, a warm dose of blood was administered via the  retrograde cardioplegia cannula as a \"hot shot. \"  Once this was  completed, the bulldog clamp was removed from the left internal mammary  artery, cross-clamp removed from the aorta, the heart began to reanimate  immediately. A temporary ventricular pacing wire was placed, however,  not used secondary to the patient being in normal sinus rhythm. Once  all evidence of intracardiac air was gone, the aortic root vent was  removed as well as the retrograde cardioplegia cannula. Post bypass  echo demonstrated once again mild mitral regurgitation and improved left  ventricular function with a slightly improved overall ejection fraction  as well. The patient was then weaned from cardiopulmonary bypass  without difficulty and the venous cannula was then removed.   Protamine  was administered to normalize the ACT and the aortic cannula was then  removed. Once the ACT was verified to be back down to its baseline, a  detailed inspection for hemostasis was carried out. The sponge and  needle counts were noted to be correct. Three chest tubes were  inserted, one in the left pleural space and two in the mediastinal  space, and the sternum was approximated with stainless steel wires and  in addition to this, the sternum underwent rigid fixation with the KLS  plating system with one plate in the manubrium and one in the body of  the sternum. The wound was then copiously irrigated and closed in  multiple layers. The patient tolerated the procedure well and was  transferred to the ICU in stable condition. Cardiopulmonary bypass time  was 98 minutes, cross-clamp time was 72 minutes.         Nasreen Allen DO    D: 10/19/2020 13:40:23       T: 10/19/2020 14:46:00     [email or web address]  Job#: [id number]     Doc#: [id number]    CC:

## 2020-10-20 NOTE — PLAN OF CARE
Problem: Pain:  Goal: Pain level will decrease  Description: Pain level will decrease  Outcome: Met This Shift     Problem: Falls - Risk of:  Goal: Will remain free from falls  Description: Will remain free from falls  Outcome: Met This Shift     Problem: Skin Integrity:  Goal: Will show no infection signs and symptoms  Description: Will show no infection signs and symptoms  Outcome: Met This Shift     Problem: Discharge Planning:  Goal: Discharged to appropriate level of care  Description: Discharged to appropriate level of care  Outcome: Met This Shift     Problem:  Activity Intolerance:  Goal: Able to perform prescribed physical activity  Description: Able to perform prescribed physical activity  Outcome: Met This Shift     Problem: Cardiac Output - Decreased:  Goal: Hemodynamic stability will improve  Description: Hemodynamic stability will improve       Problem: Fluid Volume - Imbalance:  Goal: Ability to achieve a balanced intake and output will improve  Description: Ability to achieve a balanced intake and output will improve  Outcome: Met This Shift     Problem: Gas Exchange - Impaired:  Goal: Levels of oxygenation will improve  Description: Levels of oxygenation will improve  Outcome: Met This Shift       Problem: Pain:  Goal: Pain level will decrease  Description: Pain level will decrease  Outcome: Met This Shift     Problem: Tissue Perfusion - Cardiopulmonary, Altered:  Goal: Hemodynamic stability will improve  Description: Hemodynamic stability will improve  Outcome: Met This Shift  Goal: Will show no evidence of cardiac arrhythmias  Description: Will show no evidence of cardiac arrhythmias  Outcome: Met This Shift

## 2020-10-20 NOTE — PROGRESS NOTES
OCCUPATIONAL THERAPY INITIAL EVALUATION      Date:10/20/2020  Patient Name: Rola Bo  MRN: [id number]  : 1945  Room: [address] [place] [address]    Referring Provider:  Sharri Hinkle DO      Evaluating OT: Elier Oconnell OTR/L [id number]    AM-PAC Daily Activity Raw Score:     Recommended Adaptive Equipment:  LB dressing AE, shower seat      Diagnosis: Transaminitis   Surgery/Procedures: 10/19 CABG x 3     Pertinent Medical History: DM, HLD, Prostate CA    Precautions: Falls, Sternal, Chest tube x 2, O2     Home Living: Pt lives wife  in a 1 story home ([place]) with 6+6 step(s) to enter and 2 rail(s); bed/bath on one level  Bathroom setup: tub/shower; higher commode seat  Equipment owned: no device     Prior Level of Function: IND with ADLs;  IND with IADLs. No device for ambulation. Driving: yes  Occupation: retired    Pain Level: pt c/o 6/10 chest pain  this session; s/p pain medication       Cognition: A&O: 4/4    Follows 1-2 step commands appropriately.    Memory: Good   Comprehension Good   Problem solving: Fair+/Good   Judgement/safety: Fair+/Good               Communication skills: WFL           Vision: WFL               Glasses:reading                                                   Hearing: WFL     RASS: 0  CAM-ICU: (NT) Delirium    UE Assessment:  Hand Dominance: Right [x]  Left []     ROM Strength STM goal: PRN   RUE  Grossly WFL within precautions Not formally tested; grossly WFL              WNL for ADLS     LUE Grossly WFL within precautions Not formally tested; grossly WFL              WNL for ADLS       Sensation: No c/o numbness or tingling in extremities   Tone: WNL   Edema: [facility]     Functional Assessment   Initial Eval Status  Date: 10/20 Treatment Status  Date: STG=LTG  5-7 days   Feeding S; set up                       Rush  while seated up in chair to increase activity tolerance        Grooming Min A                       Rush   while standing sink level requiring no device for balance and demonstrating G tolerance      UB dressing/bathing Max A  (due to medical lines/pain)                       Min A   demonstrating G knowledge of precautions during tasks     LB dressing/bathing Dep    Max A  after instruction on LB dressing AE for safe reach within precautions                       SBA  using AE as needed for safe reach/ energy conservation       Toileting NT                       S     Bed Mobility  Supine to sit: Mod A+2 with HOB elevated    Sit to supine: NT                       Min A  in prep of ADL tasks & transfers   Functional Transfers Sit to stand: Min A  from higher bed surface;    Min A from lower chair surface    Stand to sit: Mod A                       Rush  sit<>stand/functional bathroom transfers using AD/DME as needed for balance and safety   Functional Mobility Min A with occasional LOB  no device; assist to correct                        Rush   functional/bathroom mobility using AD as needed & demonstrating G safety     Balance Sitting:     Static:  SBA    Dynamic:Min A  Standing: Min A  Rush dynamic sitting balance; Rush dynamic standing balance  during ADL tasks & transfers   Endurance/Activity Tolerance   F tolerance with moderate activity; short breaks    G   tolerance with moderate activity/self care routine   Visual/  Perceptual               WFL                            Vitals:   Heart Rate at rest 83 bpm Heart Rate post session 96 bpm   SpO2 at rest 97% SpO2 post session 95%   Blood Pressure at rest 133/63 mmHg Blood Pressure post session 124/51 mmHg      Assessment of current deficits   [x]Functional mobility   [x]ADLs [x]Strength  []Cognition  [x]Functional transfers  [x]IADLs [x]Safety Awareness  [x]Endurance  []Fine Motor Coordination  [x]Balance      []Vision/perception  []Sensation    []Gross Motor Coordination  [x]ROM  []Delirium                  []Communication     Plan of Care: 1-5 tx/wk PRN   [x]ADL retraining: adapted techniques and AE safety & energy conservation. Pt/Family Education: Provided with handouts on energy conservation and sternal precautions during functional activities for safe return home. Pt/family demonstrates G understanding. Line management and environmental modifications made prior to and end of session to ensure patient safety and to increase efficiency of session. Skilled monitoring of HR, O2 saturation, blood pressure and patient's response to activity performed throughout session. Comments: OK from RN to see patient. Upon arrival, patient supine in bed; agreeable to session. At end of session, patient left seated in chair to improve tolerance. Call light within reach, all lines and tubes intact. Pt instructed on use of call light for assistance and fall prevention. Patient presents with decreased activity tolerance, dynamic balance, functional mobility limiting completion of ADLs and safety. Pt can benefit from continued skilled OT to increase safety and functional independence. Evaluation Complexity: Moderate    · History: Expanded chart review of consults, imaging, and psychosocial history related to current functional performance. · Exam: 5+ performance deficits identified limiting functional independence and safe return home   · Assistance/Modification: Min/mod assistance or modifications required to perform tasks. May have comorbidities that affect occupational performance. Rehab Potential: good for established goals    Patient / Family Goal: return to PLOF    Patient and/or family were instructed/educated on diagnosis, prognosis/goals and plan of care. Pt demonstrated F understanding. [] Malnutrition indicators have been identified and nursing has been notified to ensure a dietitian consult is ordered.       Time In:0920              Time Out: 1000       Total Treatment Time: 30          Min Units   OT Eval Low 28170     OT Eval Medium 70559 X    OT Eval High 98783     OT Re-Eval 20853     Therapeutic Ex 90793     Therapeutic Activities 00584 15 1   ADL/Self Care 39338 15 1   Orthotic Management 13055     Neuro Re-Ed 26233     Non-Billable Time       Evaluation time includes thorough review of current medical information, gathering information on past medical history/social history and prior level of function, completion of standardized testing/informal observation of tasks, assessment of data and development of POC/Goals.      Jagjit Srinivasan, OTR/L [id number]

## 2020-10-20 NOTE — PROGRESS NOTES
Department of Internal Medicine  Nephrology Attending Progress Note    Events reviewed. SUBJECTIVE:  We are following [address] for MARIAH on CKD.   He is no extubated, OOB sitting in chair eating lunch  Remains on pressor support  UO is marginal 20-25 ml/hr  Physical Exam:    VITALS:  /63   Pulse 83   Temp 98 °F (36.7 °C) (Temporal)   Resp 16   Ht 5' 11\" (1.803 m)   Wt 176 lb 12.9 oz (80.2 kg)   SpO2 99%   BMI 24.66 kg/m²   24HR INTAKE/OUTPUT:      Intake/Output Summary (Last 24 hours) at 10/20/2020 1136  Last data filed at 10/20/2020 1100  Gross per 24 hour   Intake 4982.41 ml   Output 2560 ml   Net 2422.41 ml       Constitutional: No apparent distress, extubated, OOB sitting in chair, eating lunch, Fio2 at 2 L via NC  HEENT: Normal cephalic, atraumatic, PERRL  Respiratory:  Fine rales bilaterally  Cardiovascular/Edema: Heart sounds regular   Gastrointestinal:  Soft, nontender, nondistended  Neurologic:    Neurovascularly intact without any focal sensory/motor deficits  Skin:  Normal skin color.  No rashes or lesions  Other:   + edema of lower extremities, + sacral edema     Scheduled Meds:   bumetanide  1 mg Intravenous Once    timolol  1 drop Left Eye BID    vitamin B-12  1,000 mcg Oral BID    sodium chloride flush  10 mL Intravenous 2 times per day    ceFAZolin (ANCEF) IVPB  2 g Intravenous Q8H    sennosides-docusate sodium  1 tablet Oral BID    pantoprazole  40 mg Oral Daily    magnesium oxide  400 mg Oral Daily    mupirocin   Nasal BID    aspirin  81 mg Oral Daily    ipratropium-albuterol  1 ampule Inhalation Q4H WA    insulin glargine  0.15 Units/kg Subcutaneous Nightly    senna  1 tablet Oral Nightly    tamsulosin  0.4 mg Oral Daily    insulin lispro  0-18 Units Subcutaneous Q4H    latanoprost  1 drop Both Eyes Nightly     Continuous Infusions:   amiodarone 450mg/250ml D5W infusion 0.5 mg/min (10/20/20 1103)    sodium chloride 30 mL/hr (10/20/20 1055)    sodium chloride 0.5    minutes, one half clearance time was 23.4 minutes         Renal curves demonstrate perfusion to be delayed. Extraction was delayed. Excretion was delayed. Lasix was not administered         Impression:  Diminished perfusion and extraction and excretion, the findings are    compatible with bilateral renal artery stenosis versus ATN versus    chronic renal failure                CTA pulmonary with IV contrast October 10, 2020                    IMPRESSION:               CT Angiogram Chest and Pulmonary Arteries with contrast and high resolution 2-D and 3-D images:               1. No evidence of pulmonary emboli by CT technique.               2. Small bilateral pleural effusions with dependent atelectasis.            Dictated Angelia Cavanaugh MD   DD/DT: 10/10/20 1022                  Signed Angelia Cavanaugh MD 10/10/20 1022                    : OLVIN      CT Abdomen and pelvis  W/o contrast 10/9/2020               IMPRESSION:       1.   Mild bilateral perinephric stranding is again identified (without hydronephrosis upon       bilateral assessment).               2.  Mild urinary bladder wall thickening is identified.  Surgical absence of prostate gland.               3.   Moderate colonic diverticulosis is identified (sigmoid predominance) without acute       diverticulitis.  Nondistended sigmoid colon extends into left inguinal hernia defect.               4. Pleural parenchymal scarring is again identified in right lower lobe, slightly more pronounced       than on prior examination.               5. Significant vascular calcifications are identified (including greater than 50% degree of       stenosis bilaterally in renal arteries, duplicated bilaterally).            Dictated By: Winsome De Los Santos MD   DD/DT: 10/09/20 0813                  Signed Shmuel Naylor MD 10/09/20 0847                    :      CT Chest w/o contrast 10/13/2020   1.  Corresponding to the admission included lisinopril and chlorthalidone. Problems resolved:  · HAGMA, lactic acidosis and probably ketoacidosis, anion gap improved  · Hypotension,multifactroial, cardiogenic, 2/2 to MI, AF  · Hypokalemia, secondary to diuretics, potassium level improved  · Elevated LFTs, likely shock liver    IMPRESSION/RECOMMENDATIONS:      1. MARIAH on CKD, volume responsive prerenal MARIAH (hypotension, thiazide diuretic, in the setting of ACE inhibition), versus ischemic ATN? [place] Renal function relatively stable after cardiac catheterization. Still with significant edema, to repeat Lasix. 2. CKD stage III, with mild proteinuria, secondary to early diabetic nephropathy, baseline creatinine 1.3 to 1.5 mg/dl  3. CAD, NSTEMI, status post catheterization, has multivessel CAD, for CABG tomorrow  4. PAF, on sinus now   5. HFpEF 60%, proBNP 29,713, needs diuresis  6. Bilateral renal stenosis, by CT abdomen  7. History of prostate cancer status post surgery  8.  Macrocytic anemia, history of B12 deficiency and iron deficiency, status post transfusion, on IV iron    Plan:    · CXR results reviewed, has worsening pulmonary vascular congestion  · UO remains marginal with worsening hyperkalemia  · Bumex 1 mg IV once  · Discussed with the ICU nurse

## 2020-10-20 NOTE — PROGRESS NOTES
[facility] Progress Note    Name: Yue Vidal  MRN: [id number]    CC: Postoperative Critical Care Management     Indication for Surgery/Procedure: CAD  LVEF:  30% pre op; 40% postop  RVF:  NML     Important/Relevant PMH/PSH: HTN, HPL,PAF,  COPD, carotid artery stenosis s/p carotid artery endarterectomy ~2012, DM Type2, pulmonary nodules, GERD, hiatal hernia, OA, h/o prostate CA     Procedure/Surgeries: 10/19/2020 CABG x3 (LIMA-LAD, SVG-OM, SVG-PDA), LAAL, EVH, KLS Plating       Pacing wires: Ventricular       Intake/Output Summary (Last 24 hours) at 10/20/2020 0901  Last data filed at 10/20/2020 0800  Gross per 24 hour   Intake 6932.41 ml   Output 3840 ml   Net 3092.41 ml       Recent Labs     10/19/20  0406 10/19/20  1203 10/20/20  0421   WBC 7.8 26.7* 25.9*   HGB 9.3* 11.9* 9.9*   HCT 28.8* 36.2* 31.1*    171 166      Recent Labs     10/19/20  0406  10/19/20  1203  10/19/20  1950 10/20/20  0135 10/20/20  0421     --  141  --   --   --  141   K 3.6   < > 3.9   < > 5.13* 5.64* 5.5*     --  104  --   --   --  107   CO2 26  --  26  --   --   --  22   BUN 27*  --  23  --   --   --  27*   CREATININE 1.5*  --  1.4*  --   --   --  1.8*   GLUCOSE 120*  --  138*  --   --   --  175*   CALCIUM 8.5*  --  8.5*  --   --   --  8.0*    < > = values in this interval not displayed. Physical Exam:    /60   Pulse 87   Temp 98.1 °F (36.7 °C) (Temporal)   Resp 27   Ht 5' 11\" (1.803 m)   Wt 176 lb 12.9 oz (80.2 kg)   SpO2 97%   BMI 24.66 kg/m²       CXR Findings: 10/20/2020      Impression:      Mild increased right pleural fluid. Increased prominence of central pulmonary vessels, likely related to vascular congestion. Mild patchy bibasilar atelectasis or infiltrates. ----  CXR personally viewed and interpreted by ICU Nurse Practitioner, agree with above findings     General: Awake, alert. Denies SOB, palpitations   Eyes: PERRL, anicteric   Pulmonary: Diminished bibasilar.  No wheezes, no accessory muscle use noted   Cardiovascular:  RRR, no heaves or thrills on palpation  Tele: SR  Abdomen: Soft, nontender, + BS   Extremities: Palpable pulses all extremities, no edema   Neurologic/Psych: A&Ox3, RUIZ to command   Skin: Warm and dry  Incisions: MSI with andrea dressing intact, RLE SVG sites with ace wrap on      Assessment/Plan: POD #1  1. CAD S/p CABGx3 (LIMA-LAD, SVG-OM, SVG-PDA)  - ASA, statin  - Ancef   - Keep chest tubes today   - PT/OT      2. Paroxsymal atrial fibrillation   -s/p LAAL  -Postop NSR, add IV amio for afib prophylaxis while on inopressors      3. Acute Postoperative Respiratory Insufficiency  - 2/2 surgery, h/o pulmonary emphysema  - adequate o/v on 2L NC, encourage IS, EZpap per RT, ambulate with Pt/OT     4. Acute Post Operative Pain   - PRN percocet      5. Postoperative hypotension   - H/o ICM, EF 30% preop; 40% post op  - remains on Epi and Norepi for hemodynamic support, wean slowly. Fluids running at 100cc/hr  -check lactic level   - Target MAP >70, wean gtt slowly as able      6. Acute on chronic kidney disease Stage 3   - Baseline Scr 1.3-1.5  - Dr. Tamica Osorio following  -Scr 1.8 postop, continue supportive care. 0.9NS fluid 100cc/hr   - Monitor potassium level, recheck BMP later today   - Monitor UOP, labs, avoid hypotension and nephrotoxic agents; target MAP >70 for renal perfusion         VTE Prophylaxis: Pharmacologic/Mechanical: Yes, SCDs   Line infection prevention: Can CVC or arterial line be removed: No   Continued need for urinary catheter:  Yes - clinical indication: Patient post major surgery requiring fluid balance and input and output measurement. This patient has a high probability of sudden deterioration, which requires preparedness to urgently intervene. I participated in the decision making process and managed the direct care of the patient that required frequent assessment and treatment.      I personally spent 38 minutes of critical care time

## 2020-10-20 NOTE — PROGRESS NOTES
Physical Therapy  Physical Therapy Initial Assessment     Name: Felicia Cortes  : 1945  MRN: [id number]    Referring Provider:  Julius Burrell DO    Date of Service: 10/20/2020    Evaluating PT:  Woodrow Joshi, PT, DPT [id number]    Room #:  2693/6004-K  Diagnosis:  Transaminitis   Precautions: Falls, Sternal, Chest tube x 2, O2  Procedure/Surgery:  10/19 CABG x 3  PMHx/PSHx:  DM, HLD, Prostate CA  Equipment Needs:  TBD    SUBJECTIVE:    Pt lives with wife in a 1 story home with 6+6 stairs to enter and 1 rail. Pt ambulated with no device and was independent PTA. OBJECTIVE:   Initial Evaluation  Date: 10/20/20 Treatment Short Term/ Long Term   Goals   AM-PAC 6 Clicks 83/95     Was pt agreeable to Eval/treatment?  Yes     Does pt have pain? 5-6/10 surgical pain     Bed Mobility  Rolling: NT  Supine to sit: ModA x 2 with HOB elevated  Sit to supine: NT  Scooting: MaxA  Rosie   Transfers Sit to stand: Rosie bed; [address] [place] chair  Stand to sit: ModA  Stand pivot: Rosie no device  Independent   Ambulation   90 feet with Rosie no device  >400 feet Independently   Stair negotiation: ascended and descended NT  >6 steps with 1 rail Mod Independent   ROM BUE:  Defer to OT note  BLE:  WNL     Strength BUE:  Defer to OT note  BLE:  4/5  Increase by 1/3 MMT grade   Balance Sitting EOB:  Rosie  Dynamic Standing:  Rosie no device  Sitting EOB:  Independent  Dynamic Standing:  Independent     Pt is A & O x 4  CAM-ICU: NT  RASS: 0  Sensation:  \"cold\" hands and feet  Edema:  None    Vitals:  Heart Rate at rest 83 bpm Heart Rate post session 96 bpm   SpO2 at rest 97% SpO2 post session 95%   Blood Pressure at rest 133/63 mmHg Blood Pressure post session 124/51 mmHg     Functional Status Score-Intensive Care Unit (FSS-ICU)   Rolling -/7   Supine to sit transfer 2/7   Unsupported sitting  4/7   Sit to stand transfers 4/7   Ambulation 2/7   Total         Therapeutic Exercises:  NA    Patient education  Pt educated on safety    Patient response to education:   Pt verbalized understanding Pt demonstrated skill Pt requires further education in this area   x x x     ASSESSMENT:    Comments:  RN reported pt was stable for session. Pt was in bed upon arrival, agreeable to initial evaluation. Pt was educated on sternal precautions and PLB prior to activity. Pt's wife was present for session. Increased assistance required for bed mobility due to deconditioning and precautions. Pt completed shuffled steps to chair initially. Instructed pt on technique for sit to stand from lower surface height. Pt ambulated with decreased gait speed with 1 LOB noted. Fatigue limited activity. Pt was left in chair with all needs met and call light in reach. All lines remained intact. Assistance of two required due to acuity of medical condition, complex medical lines management as well as overall deconditioning of patient. Treatment:  Patient practiced and was instructed in the following treatment:     Bed mobility training - pt given verbal and tactile cues to facilitate proper sequencing and safety during supine>sit as well as provided with physical assistance to complete task    Sitting EOB for >8 minutes for upright tolerance, postural awareness and BLE ROM   Transfer training - pt was given verbal and tactile cues to facilitate proper hand placement, technique and safety during sit to stand and stand to sit as well as provided with physical assistance to complete task.  Gait training- pt was given verbal and tactile cues to facilitate safety and balance during ambulation as well as provided with physical assistance to complete task. Pt's/ family goals   1. Return home    Patient and or family understand(s) diagnosis, prognosis, and plan of care.   Yes    PLAN OF CARE:    Current Treatment Recommendations     [x] Strengthening     [] ROM   [x] Balance Training   [x] Endurance Training   [x] Transfer Training   [x] Gait Training [x] Stair Training   [] Positioning   [x] Safety and Education Training   [x] Patient/Caregiver Education   [] HEP  [] Other     Frequency of treatments: 2-5x/week x 1-2 weeks. Time in  0910  Time out  0945    Total Treatment Time  30 minutes     Evaluation Time includes thorough review of current medical information, gathering information on past medical history/social history and prior level of function, completion of standardized testing/informal observation of tasks, assessment of data and education on plan of care and goals.     CPT codes:  [] Low Complexity PT evaluation 00541  [x] Moderate Complexity PT evaluation 96495  [] High Complexity PT evaluation 75538  [] PT Re-evaluation 85006  [] Gait training 03740 - minutes  [] Manual therapy 10472 - minutes  [x] Therapeutic activities 04902 30 minutes  [] Therapeutic exercises 38518 - minutes  [] Neuromuscular reeducation 49730 - minutes     Elis Monroe, PT, DPT  [id number]

## 2020-10-21 ENCOUNTER — APPOINTMENT (OUTPATIENT)
Dept: GENERAL RADIOLOGY | Age: 75
DRG: 233 | End: 2020-10-21
Attending: FAMILY MEDICINE
Payer: MEDICARE

## 2020-10-21 ENCOUNTER — ANESTHESIA (OUTPATIENT)
Dept: ICU | Age: 75
DRG: 233 | End: 2020-10-21
Payer: MEDICARE

## 2020-10-21 ENCOUNTER — ANESTHESIA EVENT (OUTPATIENT)
Dept: ICU | Age: 75
DRG: 233 | End: 2020-10-21
Payer: MEDICARE

## 2020-10-21 LAB
AADO2: 107.6 MMHG
AADO2: 224.6 MMHG
AADO2: 244.8 MMHG
ANION GAP SERPL CALCULATED.3IONS-SCNC: 13 MMOL/L (ref 7–16)
ANION GAP SERPL CALCULATED.3IONS-SCNC: 19 MMOL/L (ref 7–16)
ANION GAP SERPL CALCULATED.3IONS-SCNC: 21 MMOL/L (ref 7–16)
B.E.: -3.1 MMOL/L (ref -3–3)
B.E.: -6.4 MMOL/L (ref -3–3)
B.E.: -9.9 MMOL/L (ref -3–3)
BUN BLDV-MCNC: 33 MG/DL (ref 8–23)
BUN BLDV-MCNC: 41 MG/DL (ref 8–23)
BUN BLDV-MCNC: 45 MG/DL (ref 8–23)
CALCIUM SERPL-MCNC: 8.4 MG/DL (ref 8.6–10.2)
CALCIUM SERPL-MCNC: 8.6 MG/DL (ref 8.6–10.2)
CALCIUM SERPL-MCNC: 9.4 MG/DL (ref 8.6–10.2)
CHLORIDE BLD-SCNC: 100 MMOL/L (ref 98–107)
CHLORIDE BLD-SCNC: 104 MMOL/L (ref 98–107)
CHLORIDE BLD-SCNC: 99 MMOL/L (ref 98–107)
CO2: 19 MMOL/L (ref 22–29)
CO2: 19 MMOL/L (ref 22–29)
CO2: 21 MMOL/L (ref 22–29)
COHB: 0.3 % (ref 0–1.5)
COHB: 0.3 % (ref 0–1.5)
COHB: 0.7 % (ref 0–1.5)
CREAT SERPL-MCNC: 1.9 MG/DL (ref 0.7–1.2)
CREAT SERPL-MCNC: 2.2 MG/DL (ref 0.7–1.2)
CREAT SERPL-MCNC: 2.3 MG/DL (ref 0.7–1.2)
CRITICAL: ABNORMAL
DATE ANALYZED: ABNORMAL
DATE OF COLLECTION: ABNORMAL
FIO2: 40 %
FIO2: 60 %
FIO2: 60 %
GFR AFRICAN AMERICAN: 34
GFR AFRICAN AMERICAN: 35
GFR AFRICAN AMERICAN: 42
GFR NON-AFRICAN AMERICAN: 28 ML/MIN/1.73
GFR NON-AFRICAN AMERICAN: 29 ML/MIN/1.73
GFR NON-AFRICAN AMERICAN: 35 ML/MIN/1.73
GLUCOSE BLD-MCNC: 102 MG/DL (ref 74–99)
GLUCOSE BLD-MCNC: 157 MG/DL (ref 74–99)
GLUCOSE BLD-MCNC: 178 MG/DL (ref 74–99)
HCO3: 15.9 MMOL/L (ref 22–26)
HCO3: 16 MMOL/L (ref 22–26)
HCO3: 19.6 MMOL/L (ref 22–26)
HCT VFR BLD CALC: 29.2 % (ref 37–54)
HEMOGLOBIN: 9.3 G/DL (ref 12.5–16.5)
HHB: 1.6 % (ref 0–5)
HHB: 1.7 % (ref 0–5)
HHB: 1.8 % (ref 0–5)
LAB: ABNORMAL
LACTIC ACID: 1.4 MMOL/L (ref 0.5–2.2)
LACTIC ACID: 1.7 MMOL/L (ref 0.5–2.2)
LACTIC ACID: 1.7 MMOL/L (ref 0.5–2.2)
LACTIC ACID: 3.6 MMOL/L (ref 0.5–2.2)
LACTIC ACID: 4.1 MMOL/L (ref 0.5–2.2)
LACTIC ACID: 4.1 MMOL/L (ref 0.5–2.2)
LV EF: 48 %
LVEF MODALITY: NORMAL
Lab: ABNORMAL
MAGNESIUM: 2 MG/DL (ref 1.6–2.6)
MCH RBC QN AUTO: 33.7 PG (ref 26–35)
MCHC RBC AUTO-ENTMCNC: 31.8 % (ref 32–34.5)
MCV RBC AUTO: 105.8 FL (ref 80–99.9)
METER GLUCOSE: 103 MG/DL (ref 74–99)
METER GLUCOSE: 104 MG/DL (ref 74–99)
METER GLUCOSE: 104 MG/DL (ref 74–99)
METER GLUCOSE: 137 MG/DL (ref 74–99)
METER GLUCOSE: 164 MG/DL (ref 74–99)
METER GLUCOSE: 187 MG/DL (ref 74–99)
METHB: 0.1 % (ref 0–1.5)
METHB: 0.2 % (ref 0–1.5)
METHB: 0.2 % (ref 0–1.5)
MODE: AC
O2 SATURATION: 98.2 % (ref 92–98.5)
O2 SATURATION: 98.3 % (ref 92–98.5)
O2 SATURATION: 98.4 % (ref 92–98.5)
O2HB: 97.4 % (ref 94–97)
O2HB: 97.7 % (ref 94–97)
O2HB: 98 % (ref 94–97)
OPERATOR ID: 1632
OPERATOR ID: 421
OPERATOR ID: 914
PATIENT TEMP: 37 C
PCO2: 22.8 MMHG (ref 35–45)
PCO2: 27.3 MMHG (ref 35–45)
PCO2: 34.6 MMHG (ref 35–45)
PDW BLD-RTO: 17.7 FL (ref 11.5–15)
PEEP/CPAP: 5 CMH2O
PFO2: 2.3 MMHG/%
PFO2: 2.5 MMHG/%
PFO2: 3.53 MMHG/%
PH BLOOD GAS: 7.28 (ref 7.35–7.45)
PH BLOOD GAS: 7.46 (ref 7.35–7.45)
PH BLOOD GAS: 7.47 (ref 7.35–7.45)
PLATELET # BLD: 119 E9/L (ref 130–450)
PMV BLD AUTO: 10.3 FL (ref 7–12)
PO2: 138 MMHG (ref 75–100)
PO2: 141.4 MMHG (ref 75–100)
PO2: 150.1 MMHG (ref 75–100)
POTASSIUM SERPL-SCNC: 4.8 MMOL/L (ref 3.5–5)
POTASSIUM SERPL-SCNC: 4.9 MMOL/L (ref 3.5–5)
POTASSIUM SERPL-SCNC: 5.1 MMOL/L (ref 3.5–5)
POTASSIUM SERPL-SCNC: 5.45 MMOL/L (ref 3.5–5)
RBC # BLD: 2.76 E12/L (ref 3.8–5.8)
RI(T): 0.76
RI(T): 1.5
RI(T): 1.77
RR MECHANICAL: 14 B/MIN
RR MECHANICAL: 14 B/MIN
RR MECHANICAL: 16 B/MIN
SODIUM BLD-SCNC: 137 MMOL/L (ref 132–146)
SODIUM BLD-SCNC: 138 MMOL/L (ref 132–146)
SODIUM BLD-SCNC: 140 MMOL/L (ref 132–146)
SOURCE, BLOOD GAS: ABNORMAL
THB: 10.5 G/DL (ref 11.5–16.5)
THB: 9.7 G/DL (ref 11.5–16.5)
THB: 9.7 G/DL (ref 11.5–16.5)
TIME ANALYZED: 1016
TIME ANALYZED: 1607
TIME ANALYZED: 1940
VT MECHANICAL: 500 ML
WBC # BLD: 23.3 E9/L (ref 4.5–11.5)

## 2020-10-21 PROCEDURE — 6370000000 HC RX 637 (ALT 250 FOR IP): Performed by: THORACIC SURGERY (CARDIOTHORACIC VASCULAR SURGERY)

## 2020-10-21 PROCEDURE — 83605 ASSAY OF LACTIC ACID: CPT

## 2020-10-21 PROCEDURE — 94640 AIRWAY INHALATION TREATMENT: CPT

## 2020-10-21 PROCEDURE — 84132 ASSAY OF SERUM POTASSIUM: CPT

## 2020-10-21 PROCEDURE — P9045 ALBUMIN (HUMAN), 5%, 250 ML: HCPCS | Performed by: THORACIC SURGERY (CARDIOTHORACIC VASCULAR SURGERY)

## 2020-10-21 PROCEDURE — 94003 VENT MGMT INPAT SUBQ DAY: CPT

## 2020-10-21 PROCEDURE — 85027 COMPLETE CBC AUTOMATED: CPT

## 2020-10-21 PROCEDURE — P9047 ALBUMIN (HUMAN), 25%, 50ML: HCPCS

## 2020-10-21 PROCEDURE — 2700000000 HC OXYGEN THERAPY PER DAY

## 2020-10-21 PROCEDURE — 6370000000 HC RX 637 (ALT 250 FOR IP): Performed by: NURSE PRACTITIONER

## 2020-10-21 PROCEDURE — 83735 ASSAY OF MAGNESIUM: CPT

## 2020-10-21 PROCEDURE — 6360000002 HC RX W HCPCS: Performed by: NURSE PRACTITIONER

## 2020-10-21 PROCEDURE — 6360000002 HC RX W HCPCS: Performed by: THORACIC SURGERY (CARDIOTHORACIC VASCULAR SURGERY)

## 2020-10-21 PROCEDURE — 2500000003 HC RX 250 WO HCPCS

## 2020-10-21 PROCEDURE — 97530 THERAPEUTIC ACTIVITIES: CPT

## 2020-10-21 PROCEDURE — 82805 BLOOD GASES W/O2 SATURATION: CPT

## 2020-10-21 PROCEDURE — 2580000003 HC RX 258: Performed by: INTERNAL MEDICINE

## 2020-10-21 PROCEDURE — 31500 INSERT EMERGENCY AIRWAY: CPT | Performed by: ANESTHESIOLOGY

## 2020-10-21 PROCEDURE — 31500 INSERT EMERGENCY AIRWAY: CPT

## 2020-10-21 PROCEDURE — 2500000003 HC RX 250 WO HCPCS: Performed by: INTERNAL MEDICINE

## 2020-10-21 PROCEDURE — 2580000003 HC RX 258: Performed by: NURSE PRACTITIONER

## 2020-10-21 PROCEDURE — 2500000003 HC RX 250 WO HCPCS: Performed by: NURSE PRACTITIONER

## 2020-10-21 PROCEDURE — 99291 CRITICAL CARE FIRST HOUR: CPT | Performed by: NURSE PRACTITIONER

## 2020-10-21 PROCEDURE — 71045 X-RAY EXAM CHEST 1 VIEW: CPT

## 2020-10-21 PROCEDURE — 36415 COLL VENOUS BLD VENIPUNCTURE: CPT

## 2020-10-21 PROCEDURE — 82962 GLUCOSE BLOOD TEST: CPT

## 2020-10-21 PROCEDURE — 2580000003 HC RX 258: Performed by: THORACIC SURGERY (CARDIOTHORACIC VASCULAR SURGERY)

## 2020-10-21 PROCEDURE — 97535 SELF CARE MNGMENT TRAINING: CPT

## 2020-10-21 PROCEDURE — 80048 BASIC METABOLIC PNL TOTAL CA: CPT

## 2020-10-21 PROCEDURE — 37799 UNLISTED PX VASCULAR SURGERY: CPT

## 2020-10-21 PROCEDURE — 6360000002 HC RX W HCPCS

## 2020-10-21 PROCEDURE — 2000000000 HC ICU R&B

## 2020-10-21 PROCEDURE — 93306 TTE W/DOPPLER COMPLETE: CPT

## 2020-10-21 RX ORDER — ALBUMIN (HUMAN) 12.5 G/50ML
25 SOLUTION INTRAVENOUS ONCE
Status: COMPLETED | OUTPATIENT
Start: 2020-10-21 | End: 2020-10-21

## 2020-10-21 RX ORDER — HEPARIN SODIUM 10000 [USP'U]/ML
5000 INJECTION, SOLUTION INTRAVENOUS; SUBCUTANEOUS EVERY 8 HOURS
Status: DISCONTINUED | OUTPATIENT
Start: 2020-10-21 | End: 2020-10-26

## 2020-10-21 RX ORDER — ALBUMIN (HUMAN) 12.5 G/50ML
SOLUTION INTRAVENOUS
Status: COMPLETED
Start: 2020-10-21 | End: 2020-10-21

## 2020-10-21 RX ORDER — ETOMIDATE 2 MG/ML
INJECTION INTRAVENOUS
Status: COMPLETED
Start: 2020-10-21 | End: 2020-10-21

## 2020-10-21 RX ORDER — ACETAMINOPHEN 500 MG
1000 TABLET ORAL EVERY 8 HOURS SCHEDULED
Status: DISCONTINUED | OUTPATIENT
Start: 2020-10-21 | End: 2020-10-25

## 2020-10-21 RX ORDER — PROPOFOL 10 MG/ML
INJECTION, EMULSION INTRAVENOUS
Status: DISPENSED
Start: 2020-10-21 | End: 2020-10-21

## 2020-10-21 RX ORDER — VASOPRESSIN 20 U/ML
INJECTION PARENTERAL
Status: COMPLETED
Start: 2020-10-21 | End: 2020-10-21

## 2020-10-21 RX ORDER — SUCCINYLCHOLINE CHLORIDE 20 MG/ML
INJECTION INTRAMUSCULAR; INTRAVENOUS
Status: COMPLETED
Start: 2020-10-21 | End: 2020-10-21

## 2020-10-21 RX ADMIN — ALBUMIN (HUMAN) 25 G: 0.25 INJECTION, SOLUTION INTRAVENOUS at 10:15

## 2020-10-21 RX ADMIN — SODIUM CHLORIDE 50 ML/HR: 9 INJECTION, SOLUTION INTRAVENOUS at 00:14

## 2020-10-21 RX ADMIN — TIMOLOL MALEATE 1 DROP: 5 SOLUTION OPHTHALMIC at 21:34

## 2020-10-21 RX ADMIN — ETOMIDATE 12 MG: 2 INJECTION INTRAVENOUS at 09:49

## 2020-10-21 RX ADMIN — ACETAMINOPHEN 650 MG: 325 TABLET, FILM COATED ORAL at 08:51

## 2020-10-21 RX ADMIN — HEPARIN SODIUM 5000 UNITS: 10000 INJECTION INTRAVENOUS; SUBCUTANEOUS at 20:13

## 2020-10-21 RX ADMIN — MUPIROCIN: 20 OINTMENT TOPICAL at 20:16

## 2020-10-21 RX ADMIN — INSULIN GLARGINE 11 UNITS: 100 INJECTION, SOLUTION SUBCUTANEOUS at 20:13

## 2020-10-21 RX ADMIN — SUCCINYLCHOLINE CHLORIDE 120 MG: 20 INJECTION, SOLUTION INTRAMUSCULAR; INTRAVENOUS at 10:50

## 2020-10-21 RX ADMIN — VASOPRESSIN 0.04 UNITS/MIN: 20 INJECTION INTRAVENOUS at 10:26

## 2020-10-21 RX ADMIN — SODIUM BICARBONATE: 84 INJECTION, SOLUTION INTRAVENOUS at 17:29

## 2020-10-21 RX ADMIN — INSULIN LISPRO 3 UNITS: 100 INJECTION, SOLUTION INTRAVENOUS; SUBCUTANEOUS at 16:13

## 2020-10-21 RX ADMIN — EPINEPHRINE 3 MCG/MIN: 1 INJECTION INTRAMUSCULAR; INTRAVENOUS; SUBCUTANEOUS at 04:09

## 2020-10-21 RX ADMIN — Medication 2 G: at 01:52

## 2020-10-21 RX ADMIN — Medication 1000 MCG: at 20:31

## 2020-10-21 RX ADMIN — TIMOLOL MALEATE 1 DROP: 5 SOLUTION OPHTHALMIC at 08:38

## 2020-10-21 RX ADMIN — MAGNESIUM GLUCONATE 500 MG ORAL TABLET 200 MG: 500 TABLET ORAL at 08:36

## 2020-10-21 RX ADMIN — LATANOPROST 1 DROP: 50 SOLUTION OPHTHALMIC at 21:34

## 2020-10-21 RX ADMIN — TAMSULOSIN HYDROCHLORIDE 0.4 MG: 0.4 CAPSULE ORAL at 08:36

## 2020-10-21 RX ADMIN — ACETAMINOPHEN 1000 MG: 500 TABLET ORAL at 23:01

## 2020-10-21 RX ADMIN — IPRATROPIUM BROMIDE AND ALBUTEROL SULFATE 1 AMPULE: 2.5; .5 SOLUTION RESPIRATORY (INHALATION) at 11:58

## 2020-10-21 RX ADMIN — AMIODARONE HYDROCHLORIDE 0.5 MG/MIN: 50 INJECTION, SOLUTION INTRAVENOUS at 14:08

## 2020-10-21 RX ADMIN — INSULIN LISPRO 3 UNITS: 100 INJECTION, SOLUTION INTRAVENOUS; SUBCUTANEOUS at 20:13

## 2020-10-21 RX ADMIN — HEPARIN SODIUM 5000 UNITS: 10000 INJECTION INTRAVENOUS; SUBCUTANEOUS at 12:24

## 2020-10-21 RX ADMIN — VASOPRESSIN 0.01 UNITS/MIN: 20 INJECTION INTRAVENOUS at 23:49

## 2020-10-21 RX ADMIN — SODIUM BICARBONATE: 84 INJECTION, SOLUTION INTRAVENOUS at 12:24

## 2020-10-21 RX ADMIN — ASPIRIN 81 MG: 81 TABLET, FILM COATED ORAL at 08:36

## 2020-10-21 RX ADMIN — OXYCODONE 5 MG: 5 TABLET ORAL at 04:04

## 2020-10-21 RX ADMIN — SODIUM BICARBONATE 100 MEQ: 84 INJECTION, SOLUTION INTRAVENOUS at 10:15

## 2020-10-21 RX ADMIN — Medication 1000 MCG: at 08:36

## 2020-10-21 RX ADMIN — IPRATROPIUM BROMIDE AND ALBUTEROL SULFATE 1 AMPULE: 2.5; .5 SOLUTION RESPIRATORY (INHALATION) at 16:02

## 2020-10-21 RX ADMIN — PANTOPRAZOLE SODIUM 40 MG: 40 TABLET, DELAYED RELEASE ORAL at 08:38

## 2020-10-21 RX ADMIN — ALBUMIN (HUMAN) 12.5 G: 12.5 INJECTION, SOLUTION INTRAVENOUS at 21:00

## 2020-10-21 RX ADMIN — ACETAMINOPHEN 1000 MG: 500 TABLET ORAL at 14:24

## 2020-10-21 RX ADMIN — Medication 10 ML: at 08:38

## 2020-10-21 RX ADMIN — MUPIROCIN: 20 OINTMENT TOPICAL at 08:36

## 2020-10-21 RX ADMIN — ALBUMIN (HUMAN) 25 G: 12.5 SOLUTION INTRAVENOUS at 10:15

## 2020-10-21 RX ADMIN — ACETAMINOPHEN 650 MG: 325 TABLET, FILM COATED ORAL at 20:35

## 2020-10-21 RX ADMIN — CALCIUM GLUCONATE 1 G: 98 INJECTION, SOLUTION INTRAVENOUS at 09:01

## 2020-10-21 RX ADMIN — Medication 10 ML: at 21:32

## 2020-10-21 RX ADMIN — EPINEPHRINE 7 MCG/MIN: 1 INJECTION INTRAMUSCULAR; INTRAVENOUS; SUBCUTANEOUS at 19:50

## 2020-10-21 RX ADMIN — VASOPRESSIN 20 UNITS: 20 INJECTION INTRAVENOUS at 10:25

## 2020-10-21 RX ADMIN — DOCUSATE SODIUM 50 MG AND SENNOSIDES 8.6 MG 1 TABLET: 8.6; 5 TABLET, FILM COATED ORAL at 20:31

## 2020-10-21 RX ADMIN — IPRATROPIUM BROMIDE AND ALBUTEROL SULFATE 1 AMPULE: 2.5; .5 SOLUTION RESPIRATORY (INHALATION) at 07:53

## 2020-10-21 RX ADMIN — DOCUSATE SODIUM 50 MG AND SENNOSIDES 8.6 MG 1 TABLET: 8.6; 5 TABLET, FILM COATED ORAL at 08:38

## 2020-10-21 RX ADMIN — IPRATROPIUM BROMIDE AND ALBUTEROL SULFATE 1 AMPULE: 2.5; .5 SOLUTION RESPIRATORY (INHALATION) at 21:26

## 2020-10-21 RX ADMIN — AMIODARONE HYDROCHLORIDE 0.5 MG/MIN: 50 INJECTION, SOLUTION INTRAVENOUS at 00:14

## 2020-10-21 RX ADMIN — SENNOSIDES 8.6 MG: 8.6 TABLET, FILM COATED ORAL at 20:31

## 2020-10-21 ASSESSMENT — PAIN DESCRIPTION - PAIN TYPE
TYPE: SURGICAL PAIN
TYPE: SURGICAL PAIN

## 2020-10-21 ASSESSMENT — PULMONARY FUNCTION TESTS
PIF_VALUE: 27
PIF_VALUE: 24
PIF_VALUE: 25
PIF_VALUE: 29
PIF_VALUE: 26
PIF_VALUE: 24
PIF_VALUE: 35
PIF_VALUE: 25
PIF_VALUE: 25
PIF_VALUE: 28
PIF_VALUE: 26
PIF_VALUE: 29
PIF_VALUE: 25
PIF_VALUE: 24
PIF_VALUE: 25
PIF_VALUE: 26

## 2020-10-21 ASSESSMENT — PAIN SCALES - GENERAL
PAINLEVEL_OUTOF10: 0
PAINLEVEL_OUTOF10: 5
PAINLEVEL_OUTOF10: 4
PAINLEVEL_OUTOF10: 0
PAINLEVEL_OUTOF10: 6
PAINLEVEL_OUTOF10: 6
PAINLEVEL_OUTOF10: 0
PAINLEVEL_OUTOF10: 6
PAINLEVEL_OUTOF10: 4
PAINLEVEL_OUTOF10: 6

## 2020-10-21 ASSESSMENT — PAIN DESCRIPTION - LOCATION
LOCATION: CHEST
LOCATION: CHEST

## 2020-10-21 ASSESSMENT — PAIN DESCRIPTION - ONSET: ONSET: ON-GOING

## 2020-10-21 ASSESSMENT — PAIN DESCRIPTION - PROGRESSION: CLINICAL_PROGRESSION: GRADUALLY WORSENING

## 2020-10-21 ASSESSMENT — PAIN DESCRIPTION - ORIENTATION
ORIENTATION: MID
ORIENTATION: MID

## 2020-10-21 ASSESSMENT — PAIN DESCRIPTION - DESCRIPTORS
DESCRIPTORS: SORE
DESCRIPTORS: SORE;SHARP;DISCOMFORT

## 2020-10-21 ASSESSMENT — PAIN DESCRIPTION - FREQUENCY: FREQUENCY: INTERMITTENT

## 2020-10-21 NOTE — FLOWSHEET NOTE
Physical therapy calls RN to bedside and CT NP. Apparently patient was fatigued when working with PT. Pt was sat in chair where unable to obtain manual BP reading. Patient became decreased in responsiveness and was safely transferred into the bed. Pt noted to become bradycardic on monitor, but never lost pulse, 1 of epi given at 0943 and 1 g calcium chloride push at 0943. Respiratory arrest noted by NP, decision to intubate via RT and CT NP. Anesthesia paged to bedside, STAT. Dr Zunilda Rasmussen and CRNA to room for intubation. 12 of etomidate and 100 of succinylcholine administered via Dr. Zunilda Rasmussen, see MAR. Patient intubated with a # 8, 23 mm at lip. End Tidal WNL per RT. Ngt placed in L nares and confirmed with air bolus. CXR ordered. Multiple RNs, NP, Attending, and residents to bedside for assistance during episode.

## 2020-10-21 NOTE — PROGRESS NOTES
Physical Therapy  Physical Therapy Treatment Note    Name: Torres Luu  : 1945  MRN: [id number]    Referring Provider:  Yumiko Hayden DO    Date of Service: 10/21/2020    Evaluating PT:  Doroteo Linder PT, DPT [id number]    Room #:  1762/7975-R  Diagnosis:  Transaminitis   Precautions: Falls, Sternal, Chest tube, O2  Procedure/Surgery:  10/19 CABG x 3  PMHx/PSHx:  DM, HLD, Prostate CA  Equipment Needs:  TBD    SUBJECTIVE:    Pt lives with wife in a 1 story home with 6+6 stairs to enter and 1 rail. Pt ambulated with no device and was independent PTA. OBJECTIVE:   Initial Evaluation  Date: 10/20/20 Treatment  10/21/20 Short Term/ Long Term   Goals   AM-PAC 6 Clicks  77/10    Was pt agreeable to Eval/treatment?  Yes Yes    Does pt have pain? 5-6/10 surgical pain /10 surgical pain    Bed Mobility  Rolling: NT  Supine to sit: ModA x 2 with HOB elevated  Sit to supine: NT  Scooting: MaxA Rolling: NT  Supine to sit: MaxA x 2 with HOB elevated  Sit to supine: NT  Scooting: MaxA Rosie   Transfers Sit to stand: Rosie bed; [address] [place] chair  Stand to sit: ModA  Stand pivot: Rosie no device Sit to stand: Rosie bed; Dep x 2 from chair - see comments  Stand to sit: ModA  Stand pivot: Rosie no device Independent   Ambulation   90 feet with Rosie no device 40 feet with Rosie no device >400 feet Independently   Stair negotiation: ascended and descended NT NT >6 steps with 1 rail Mod Independent   ROM BUE:  Defer to OT note  BLE:  WNL     Strength BUE:  Defer to OT note  BLE:  4/5  Increase by 1/3 MMT grade   Balance Sitting EOB:  Rosie  Dynamic Standing:  Rosie no device Sitting EOB:  Rosie  Dynamic Standing:  Rosie no device Sitting EOB:  Independent  Dynamic Standing:  Independent     Pt is A & O x 4  CAM-ICU: NT  RASS: -1  Sensation:  \"cold\" hands and feet  Edema:  None    Vitals:  Heart Rate at rest 76 bpm   SpO2 at rest 95%   Blood Pressure at rest 120/52 mmHg     Functional Status Score-Intensive Care Unit (FSS-ICU) Rolling -/7   Supine to sit transfer 1/7   Unsupported sitting  4/7   Sit to stand transfers 4/7   Ambulation 2/7   Total  11/35       Therapeutic Exercises:  NA    Patient education  Pt educated on safety    Patient response to education:   Pt verbalized understanding Pt demonstrated skill Pt requires further education in this area   x x x     ASSESSMENT:    Comments:  NP reported pt was stable for session. Pt was in bed upon arrival, agreeable to treatment session with encouragement. Pt reported lethargy due to inability to sleep last night. Pt was educated on sternal precautions and PLB prior to activity. Increased assistance compared to initial evaluation required for bed mobility. Mild dizziness reported upon sitting EOB but symptoms improved with time. Pt ambulated with decreased gait speed, flexed posture and unsteadiness. Ambulation distance limited by fatigue. All vitals remained WNL. Pt was returned to chair and reported dizziness. Instructed pt on PLB and ankle pumps. Attempted to take BP via cuff but unable to read. Pt became less responsive and was dependently transferred back to bed. NP notified immediately. Multiple staff members in room to care for pt. Assistance of two required due to acuity of medical condition, complex medical lines management as well as overall deconditioning of patient. Treatment:  Patient practiced and was instructed in the following treatment:     Bed mobility training - pt given verbal and tactile cues to facilitate proper sequencing and safety during supine>sit as well as provided with physical assistance to complete task    Sitting EOB for >5 minutes for upright tolerance, postural awareness and BLE ROM   Transfer training - pt was given verbal and tactile cues to facilitate proper hand placement, technique and safety during sit to stand and stand to sit as well as provided with physical assistance to complete task.    Gait training- pt was given verbal and tactile cues to facilitate safety and balance during ambulation as well as provided with physical assistance to complete task. PLAN:    Patient is making progress towards established goals. Will continue with current POC.       Time in  0908  Time out  0938    Total Treatment Time  30 minutes     CPT codes:  [] Gait training 63768 - minutes  [] Manual therapy 96701 - minutes  [x] Therapeutic activities 01300 30 minutes  [] Therapeutic exercises 58855 - minutes  [] Neuromuscular reeducation 46829 - minutes    Jaguar Batista PT, DPT  [id number]

## 2020-10-21 NOTE — PLAN OF CARE
Problem: Pain:  Description: Pain management should include both nonpharmacologic and pharmacologic interventions. Goal: Pain level will decrease  Outcome: Met This Shift     Problem: Pain:  Description: Pain management should include both nonpharmacologic and pharmacologic interventions. Goal: Control of acute pain  Outcome: Met This Shift     Problem: Falls - Risk of:  Goal: Will remain free from falls  Outcome: Met This Shift     Problem: Falls - Risk of:  Goal: Absence of physical injury  Outcome: Met This Shift     Problem: Skin Integrity:  Goal: Will show no infection signs and symptoms  Outcome: Met This Shift     Problem: Skin Integrity:  Goal: Absence of new skin breakdown  Outcome: Met This Shift     Problem: Anxiety:  Goal: Level of anxiety will decrease  Outcome: Met This Shift     Problem: Pain:  Description: Pain management should include both nonpharmacologic and pharmacologic interventions. Goal: Pain level will decrease  Outcome: Met This Shift   Plan of care discussed with patient/family. Patient/family incorporated into plan of care.

## 2020-10-21 NOTE — PROGRESS NOTES
Hospitalist Progress Note      PCP: Griselda Canton, MD    Date of Admission: 10/10/2020    Chief Complaint: *chest pain     Hospital Course: **s/p CABG, still has chest tubes**pt became hypotensive, went into a fib   The bradycardic, had to be reintubated and started on pressors. Wife at bedside.        Subjective: *sedated and intubated*       Medications:  Reviewed    Infusion Medications    vasopressin (Septic Shock) infusion 0.03 Units/min (10/21/20 1240)    IV infusion builder 50 mL/hr at 10/21/20 1224    amiodarone 450mg/250ml D5W infusion 0.5 mg/min (10/21/20 1300)    sodium chloride Stopped (10/21/20 1214)    sodium chloride      norepinephrine Stopped (10/21/20 1010)    clevidipine      insulin      dextrose      EPINEPHrine infusion 8 mcg/min (10/21/20 1235)     Scheduled Medications    [START ON 10/22/2020] magnesium oxide  200 mg Oral Daily    acetaminophen  1,000 mg Oral 3 times per day    propofol        heparin (porcine)  5,000 Units Subcutaneous Q8H    timolol  1 drop Left Eye BID    vitamin B-12  1,000 mcg Oral BID    sodium chloride flush  10 mL Intravenous 2 times per day    sennosides-docusate sodium  1 tablet Oral BID    pantoprazole  40 mg Oral Daily    mupirocin   Nasal BID    aspirin  81 mg Oral Daily    ipratropium-albuterol  1 ampule Inhalation Q4H WA    insulin glargine  0.15 Units/kg Subcutaneous Nightly    senna  1 tablet Oral Nightly    tamsulosin  0.4 mg Oral Daily    insulin lispro  0-18 Units Subcutaneous Q4H    latanoprost  1 drop Both Eyes Nightly     PRN Meds: perflutren lipid microspheres, sodium chloride flush, potassium chloride, magnesium sulfate, acetaminophen, acetaminophen, oxyCODONE **OR** oxyCODONE HCl, fentanNYL **OR** fentanNYL, magnesium hydroxide, ondansetron, albumin human, sodium chloride, norepinephrine, clevidipine, insulin, glucose, dextrose, glucagon (rDNA), dextrose, calcium gluconate IVPB      Intake/Output Summary (Last 24 hours) at 10/21/2020 1356  Last data filed at 10/21/2020 1300  Gross per 24 hour   Intake 2584.03 ml   Output 1643 ml   Net 941.03 ml       Exam:    BP (!) 132/49   Pulse 63   Temp 99.6 °F (37.6 °C) (Axillary)   Resp 18   Ht 5' 11\" (1.803 m)   Wt 176 lb 12.9 oz (80.2 kg)   SpO2 100%   BMI 24.66 kg/m²       Gen: *well developed  HEENT: NC/AT, moist mucous membranes, no oropharyngeal erythema or exudate  Neck: supple, trachea midline, no anterior cervical or SC LAD  Heart:  irreg  Lungs:  *cta  bilaterally, *  Abd: bowel sounds present, soft, nontender, nondistended, no masses  Extrem:  No clubbing, cyanosis,  *no* edema  Skin: no rashes or lesions  Neuro: grossly intact, moves all four extremities. Capillary Refill: Brisk,< 3 seconds   Peripheral Pulses: +2 palpable, equal bilaterally                  Labs:   Recent Labs     10/19/20  1203 10/20/20  0421 10/21/20  0355   WBC 26.7* 25.9* 23.3*   HGB 11.9* 9.9* 9.3*   HCT 36.2* 31.1* 29.2*    166 119*     Recent Labs     10/20/20  0421  10/20/20  1520 10/21/20  0355 10/21/20  1016     --  143 138  --    K 5.5*   < > 4.7 4.9 5.45*     --  104 104  --    CO2 22  --  23 21*  --    BUN 27*  --  30* 33*  --    CREATININE 1.8*  --  1.9* 1.9*  --    CALCIUM 8.0*  --  8.4* 8.4*  --     < > = values in this interval not displayed. Recent Labs     10/19/20  0406   AST 15   ALT 47*   BILITOT 1.0   ALKPHOS 70     Recent Labs     10/19/20  1203   INR 1.4     No results for input(s): Kody Neves in the last 72 hours.   Recent Labs     10/19/20  0406   AST 15   ALT 47*   BILITOT 1.0   ALKPHOS 70     Recent Labs     10/21/20  0005 10/21/20  0355 10/21/20  0805   LACTA 1.7 1.4 1.7     No results found for: Karen Corcoran  No results found for: AMMONIA    Assessment:    Active Hospital Problems    Diagnosis Date Noted    Postoperative hypotension [I95.89]     Cardiac insufficiency ([facility] [I50.9]     Acute postoperative respiratory insufficiency [J95.89]     Stage 3 chronic kidney disease [N18.30]     Acute postoperative pain [G89.18]     Non-STEMI (non-ST elevated myocardial infarction) [facility] [I21.4] 10/14/2020    Paroxysmal atrial fibrillation ([facility] [I48.0] 10/14/2020    Anemia [D64.9] 10/14/2020    History of right-sided carotid endarterectomy [Z98.890] 10/14/2020    Coronary artery disease [I25.10] 10/14/2020    Acute kidney injury superimposed on chronic kidney disease ([facility] [N17.9, N18.9] 10/14/2020    Hypotension [I95.9] 10/14/2020    Transaminitis [R74.01] 10/10/2020   s/p CABG  PAF  II dM     Plan:   on amiodarone   cont ancef   cont SSI   epi          DVT Prophylaxis: scd  Diet: DIET CARDIAC; Carb Control: 5 carbs/meal (approximate 2000 kcals/day)  Dietary Nutrition Supplements: Diabetic Oral Supplement  Code Status: Full Code     PT/OT Eval Status: *ordered     Dispo - home**          Electronically signed by Shruthi Farfan DO on 10/21/2020 at 1:56 PM [facility]

## 2020-10-21 NOTE — ANESTHESIA PROCEDURE NOTES
Airway  Date/Time: 10/21/2020 9:50 AM  Urgency: emergent    Airway not difficult    General Information and Staff    Patient location during procedure: ICU  Anesthesiologist: Rosalee Kang DO  Resident/CRNA: ROSANGELA Anderson CRNA  Performed: resident/CRNA     Consent for Airway (if performed for an anesthetic, see related documentation for consents)  Patient identity confirmed: per hospital policy  Consent: The procedure was performed in an emergent situation. Verbal consent not obtained. Written consent not obtained. Risks and benefits: risks, benefits and alternatives were not discussed      Code status verified:yes  Indications and Patient Condition  Indications for airway management: cardio/pulmonary arrest and cardiovascular instability  Spontaneous ventilation: present  Sedation level: deep  Preoxygenated: yes  Patient position: sniffing  MILS not maintained throughout  Mask difficulty assessment: vent by bag mask    Final Airway Details  Final airway type: endotracheal airway      Successful airway: ETT  Cuffed: yes   Successful intubation technique: video laryngoscopy  Facilitating devices/methods: intubating stylet  Endotracheal tube insertion site: oral  Blade type: CMAC.   Blade size: #4  ETT size (mm): 8.0  Cormack-Lehane Classification: grade I - full view of glottis  Placement verified by: chest auscultation and capnometry   Measured from: teeth  ETT to teeth (cm): 23  Number of attempts at approach: 1  Ventilation between attempts: bag mask

## 2020-10-21 NOTE — PROGRESS NOTES
OCCUPATIONAL THERAPY TREATMENT SESSION    Date:10/21/2020  Patient Name: Nessa Geronimo  MRN: [id number]  : 1945  Room: [address] [place] [address]    Referring Provider:  DO Raven Granado OT: JUDD Kimble/L [id number]    AM-PAC Daily Activity Raw Score:     Recommended Adaptive Equipment:  LB dressing AE, shower seat      Diagnosis: Transaminitis   Surgery/Procedures: 10/19 CABG x 3     Pertinent Medical History: DM, HLD, Prostate CA    Precautions: Falls, Sternal, Chest tube x 2, O2     Home Living: Pt lives wife in a 1 story home ([place]) with 6+6 step(s) to enter and 2 rail(s); bed/bath on one level  Bathroom setup: tub/shower; higher commode seat  Equipment owned: no device     Prior Level of Function: IND with ADLs;  IND with IADLs. No device for ambulation. Driving: yes  Occupation: retired    Pain Level: pt c/o 5/10 chest pain  this session; s/p pain medication. Therapist       Cognition: A&O: 4/4    Follows 1-2 step commands appropriately.    Memory: Good   Comprehension Good   Problem solving: Fair+/Good   Judgement/safety: Fair+/Good               Communication skills: WFL           Vision: WFL               Glasses:reading                                                   Hearing: WFL     RASS: 0  CAM-ICU: (NT) Delirium    UE Assessment:  Hand Dominance: Right [x]  Left []     ROM Strength STM goal: PRN   RUE  Grossly WFL within precautions Not formally tested; grossly WFL              WNL for ADLS     LUE Grossly WFL within precautions Not formally tested; grossly WFL              WNL for ADLS       Sensation: No c/o numbness or tingling in extremities   Tone: WNL   Edema: [facility]     Functional Assessment   Initial Eval Status  Date: 10/20 Treatment Status  Date: 10/21 STG=LTG  5-7 days   Feeding S; set up Supervision                       Rush  while seated up in chair to increase activity tolerance        Grooming Min A Min A    Seated / standing grooming tasks Rush   while standing sink level requiring no device for balance and demonstrating G tolerance      UB dressing/bathing Max A  (due to medical lines/pain) Mod A    Cuing on modified techniques within sternal precautions                      Min A   demonstrating G knowledge of precautions during tasks     LB dressing/bathing Dep    Max A  after instruction on LB dressing AE for safe reach within precautions Max A    Cuing on modified techniques / figure 4 technique.                          SBA  using AE as needed for safe reach/ energy conservation       Toileting NT Dep                      S     Bed Mobility  Supine to sit: Mod A+2 with HOB elevated    Sit to supine: NT Max A x2    Sit to supine: Dep x2                      Min A  in prep of ADL tasks & transfers   Functional Transfers Sit to stand: Min A  from higher bed surface;    Min A from lower chair surface    Stand to sit: Mod A Mod A    (sit to stand / stand pivot transfer from bed)    Dep x2 from transfer from chair to bed due to unresponsive episode (see below)                      Rush  sit<>stand/functional bathroom transfers using AD/DME as needed for balance and safety   Functional Mobility Min A with occasional LOB  no device; assist to correct Min A    Ambulated in room and hallway without AD - cuing on posture and safety                       Rush   functional/bathroom mobility using AD as needed & demonstrating G safety     Balance Sitting:     Static:  SBA    Dynamic:Min A  Standing: Min A SBA (seated)    Min A (standing) Rush dynamic sitting balance; Rush dynamic standing balance  during ADL tasks & transfers   Endurance/Activity Tolerance   F tolerance with moderate activity; short breaks   F-    Moderate activity; limited due to fatigue.  (see below) G   tolerance with moderate activity/self care routine   Visual/  Perceptual               WFL                            Vitals:   BP:  120/52  HR: 78  O2: 95%    Assessment of current

## 2020-10-21 NOTE — PROGRESS NOTES
DAILY PROGRESS NOTE - [facility]    SUBJECTIVE:    Underwent CABG x3 vessels 10/19. LIMA to LAD, vein graft to obtuse marginal and PDA. Sitting up in bed and says he feels fine. Low-dose pressors at this time which are being weaned down. No chest pain, worsening dyspnea, palpitations, syncope, presyncope. EF 45-50% on recent echocardiogram.    Hypertension, hyperlipidemia, diabetes, COPD, right CEA in the past.  Paroxysmal atrial fibrillation with elevated LFTs and renal failure recently. In sinus rhythm on telemetry but he was placed on IV amiodarone infusion prophylactically. OBJECTIVE:    His vital signs were reviewed today.     Vitals:    10/21/20 0700   BP: 136/66   Pulse: 72   Resp: 17   Temp:    SpO2: 97%       Scheduled Meds:   timolol  1 drop Left Eye BID    vitamin B-12  1,000 mcg Oral BID    sodium chloride flush  10 mL Intravenous 2 times per day    sennosides-docusate sodium  1 tablet Oral BID    pantoprazole  40 mg Oral Daily    magnesium oxide  400 mg Oral Daily    mupirocin   Nasal BID    aspirin  81 mg Oral Daily    ipratropium-albuterol  1 ampule Inhalation Q4H WA    insulin glargine  0.15 Units/kg Subcutaneous Nightly    senna  1 tablet Oral Nightly    tamsulosin  0.4 mg Oral Daily    insulin lispro  0-18 Units Subcutaneous Q4H    latanoprost  1 drop Both Eyes Nightly     Continuous Infusions:   amiodarone 450mg/250ml D5W infusion 0.5 mg/min (10/21/20 0014)    sodium chloride 50 mL/hr (10/21/20 0014)    sodium chloride      norepinephrine Stopped (10/20/20 1720)    clevidipine      insulin      dextrose      EPINEPHrine infusion 2.5 mcg/min (10/21/20 0615)     PRN Meds:.sodium chloride flush, potassium chloride, magnesium sulfate, acetaminophen, acetaminophen, oxyCODONE **OR** oxyCODONE HCl, fentanNYL **OR** fentanNYL, magnesium hydroxide, ondansetron, albumin human, sodium chloride, norepinephrine, clevidipine, insulin, glucose, dextrose, glucagon (rDNA), dextrose, calcium gluconate IVPB    REVIEW OF SYSTEMS:     No pruritus, rash, bruising. Cardiac symptoms per HPI. No nausea, vomiting, abdominal pain, GI bleeding, change in bowel habits. No dysuria, urinary frequency, urgency, hematuria, flank pain. Joint pain but no muscle soreness, stiffness, aching. No headache, speech disturbance, lateralizing neurologic deficit. No hemoptysis, epistaxis, easy bruising. No anxiety, depression. No symptoms of hypothyroidism, hyperthyroidism, diabetes, heat or cold intolerance. FAMILY HISTORY: Negative for CAD in first-degree relatives. SOCIAL HISTORY: Negative for alcohol, tobacco, or illicit drug use. PHYSICAL EXAM:    General Appearance:  awake, alert, oriented, in no acute distress  Neck:  no bruits  Lungs:  Normal expansion. Clear to auscultation. No rales, rhonchi, or wheezing. Heart:  Heart sounds are normal.  Regular rate and rhythm without murmur, gallop or rub. Abdomen:  Soft, non-tender. Extremities: Extremities warm to touch, pink, with no edema. Neuro/musculosketal:  Unremarkable. LABS:    Recent Labs     10/20/20  0421 10/20/20  1520 10/21/20  0355    143 138   CREATININE 1.8* 1.9* 1.9*       Recent Labs     10/19/20  1203 10/20/20  0421 10/21/20  0355   HGB 11.9* 9.9* 9.3*       Recent Labs     10/18/20  1207 10/19/20  1203   INR 1.3 1.4         IMPRESSION:    #1. CAD post non-STEMI and CABG-doing well at this time. Continue aspirin. Supportive care. #2.  Paroxysmal atrial fibrillation-no evidence on recent telemetry. Hold off on anticoagulation at this time. Consider starting low-dose metoprolol succinate after he comes off of pressors. IV amiodarone for short time followed by p.o. amiodarone when felt appropriate by the CT surgery service. #3. Hypertension-controlled. #4. Hyperlipidemia-no statin pending liver enzymes trend.     #5.  Diabetes-per intensivist.    #6.  Renal failure-creatinine increasing from 1.4 and now 1.9, but currently appears stable. Nephrology following. #7.  Continue to follow.

## 2020-10-21 NOTE — PROGRESS NOTES
nontender, hypoactive BS   Extremities: BLE +DP/PT signals, 1+ LE edema   Neurologic/Psych: A&Ox3, RUIZ to command   Skin: Warm and dry  Incisions: MSI with YURY intact, RLE SVG sites with       Assessment/Plan: POD #1    1. CAD S/p CABGx3 (LIMA-LAD, SVG-OM, SVG-PDA)  - ASA, statin  - Monitor pleural chest tube output, let pleural with 350cc/24 hours, MS with 120cc/24 hours. MS chest tubes removed without difficulty   - PT/OT      2. Paroxsymal atrial fibrillation   -s/p LAAL  -Postop NSR, on amio gtt for afib prophylaxis while on inotropes, will transition to PO amio later today      3. Acute Postoperative Respiratory Insufficiency  - 2/2 surgery, h/o pulmonary emphysema  - Adequate oxygenation on 2L NC,  -Continue EZPAP q 4 hours, encourage IS, instructed to C&DB, OOBTC with PT/OT, increase activity as tolerated, wean O2 as able for sats >92%    4. Acute Post Operative Pain   - Pain control suboptimal, add tylenol ATC, continue to encourage PO PRN narcotics       5. Postoperative hypotension   - H/o ICM, EF 30% preop; 40% post op  - POD 1, levophed weaned off, lactic acid normalized   - POD2, epi at 2.5mcg, continue to slowly wean per hemodynamics  - Target MAP >70, wean gtt slowly as able      6. Acute on chronic kidney disease Stage 3   - Baseline Scr 1.3-1.5  - Dr. Linette De following  - Scr 1.9, given bumex x1 yesterday   - Monitor UOP, labs, avoid hypotension and nephrotoxic agents; target MAP >70 for renal perfusion       7. Acute blood loss anemia  - H/H 9.3/29.2, continue to monitor     VTE Prophylaxis: Pharmacologic/Mechanical:  Yes, SCDs   Line infection prevention: Can CVC or arterial line be removed: no  Continued need for urinary catheter:  Yes - clinical indication: Patient post major surgery requiring fluid balance and input and output measurement.       Dispo: CVICU while on inotropic medications     Electronically signed by ROSANGELA Herr CNP on 10/21/2020 at 8:35 AM        Addendum:     After ambulating with PT, patient became severely hypotensive and bradycardic, then Atrial fibrillation RVR. Called into room, pulse identified, pt unresponsive and cyanotic; pt bagged, increased epinephrine gtt and restarted levophed gtt. Administered 1gm Calcium Chloride. Amio gtt increased to 1mg. Anesthesia called for STAT intubation. Pt following commands s/p intubation. Vasopressin started and levophed weaned off. STAT echo complete with  at bedside. LV ~45%, moderate RV dysfunction, no pericardial effusion noted. Wife updated at bedside by myself and Dr. Nellie Vargas. Will monitor closely. This patient has a high probability of sudden deterioration, which requires preparedness to urgently intervene. I participated in the decision making process and managed the direct care of the patient that required frequent assessment and treatment. I personally spent 38 minutes of critical care time treating the patient.

## 2020-10-21 NOTE — PROGRESS NOTES
Department of Internal Medicine  Nephrology Attending Progress Note    Events reviewed. SUBJECTIVE:  We are following Mr. [address] for MARIAH on CKD.   He got re intubated again for respiratory failure  Events reviewed with the ICU RN  Physical Exam:    VITALS:  /64   Pulse 64   Temp 99.8 °F (37.7 °C)   Resp 15   Ht 5' 11\" (1.803 m)   Wt 176 lb 12.9 oz (80.2 kg)   SpO2 97%   BMI 24.66 kg/m²   24HR INTAKE/OUTPUT:      Intake/Output Summary (Last 24 hours) at 10/21/2020 1109  Last data filed at 10/21/2020 1100  Gross per 24 hour   Intake 2584.03 ml   Output 1554 ml   Net 1030.03 ml       Constitutional: No apparent distress, intubated, Fio2 at 50 %  HEENT: Normal cephalic, atraumatic, PERRL  Respiratory:  Fine rales bilaterally  Cardiovascular/Edema: Heart sounds regular   Gastrointestinal:  Soft, nontender, nondistended  Neurologic:    Neurovascularly intact without any focal sensory/motor deficits  Skin:  Normal skin color.  No rashes or lesions  Other:   + edema of lower extremities, + sacral edema     Scheduled Meds:   [START ON 10/22/2020] magnesium oxide  200 mg Oral Daily    acetaminophen  1,000 mg Oral 3 times per day    propofol        timolol  1 drop Left Eye BID    vitamin B-12  1,000 mcg Oral BID    sodium chloride flush  10 mL Intravenous 2 times per day    sennosides-docusate sodium  1 tablet Oral BID    pantoprazole  40 mg Oral Daily    mupirocin   Nasal BID    aspirin  81 mg Oral Daily    ipratropium-albuterol  1 ampule Inhalation Q4H WA    insulin glargine  0.15 Units/kg Subcutaneous Nightly    senna  1 tablet Oral Nightly    tamsulosin  0.4 mg Oral Daily    insulin lispro  0-18 Units Subcutaneous Q4H    latanoprost  1 drop Both Eyes Nightly     Continuous Infusions:   vasopressin (Septic Shock) infusion 0.04 Units/min (10/21/20 1026)    IV infusion builder      amiodarone 450mg/250ml D5W infusion 0.5 mg/min (10/21/20 0014)    sodium chloride 50 mL/hr (10/21/20 0014)  sodium chloride      norepinephrine Stopped (10/20/20 1720)    clevidipine      insulin      dextrose      EPINEPHrine infusion 2 mcg/min (10/21/20 0820)     PRN Meds:.perflutren lipid microspheres, sodium chloride flush, potassium chloride, magnesium sulfate, acetaminophen, acetaminophen, oxyCODONE **OR** oxyCODONE HCl, fentanNYL **OR** fentanNYL, magnesium hydroxide, ondansetron, albumin human, sodium chloride, norepinephrine, clevidipine, insulin, glucose, dextrose, glucagon (rDNA), dextrose, calcium gluconate IVPB    DATA:    CBC:   Lab Results   Component Value Date    WBC 23.3 10/21/2020    RBC 2.76 10/21/2020    HGB 9.3 10/21/2020    HCT 29.2 10/21/2020    .8 10/21/2020    MCH 33.7 10/21/2020    MCHC 31.8 10/21/2020    RDW 17.7 10/21/2020     10/21/2020    MPV 10.3 10/21/2020     CMP:    Lab Results   Component Value Date     10/21/2020    K 5.45 10/21/2020    K 3.6 10/19/2020     10/21/2020    CO2 21 10/21/2020    BUN 33 10/21/2020    CREATININE 1.9 10/21/2020    GFRAA 42 10/21/2020    AGRATIO 0.9 10/10/2020    LABGLOM 35 10/21/2020    GLUCOSE 102 10/21/2020    PROT 6.5 10/19/2020    LABALBU 3.8 10/19/2020    CALCIUM 8.4 10/21/2020    BILITOT 1.0 10/19/2020    ALKPHOS 70 10/19/2020    AST 15 10/19/2020    ALT 47 10/19/2020     Magnesium:    Lab Results   Component Value Date    MG 2.0 10/21/2020     Phosphorus:  No results found for: PHOS     Urine sodium: 71  Urine potassium: 51.9  Urine chloride: 68  Urine creatinine: 118  Urine albumin/creatinine: 184 mg/grams  Urine protein/creatinine: 0.5 mg/grams    proBNP: 29,713      Radiology Review:      Nuclear medicine clinic with flow and function without pharmacological intervention October 12, 2020   Findings:         The patient was injected with 10 mCi of technetium MAG3 intravenously              Split uptake on the left was 47.8  percent, time to peak was 4.5    minutes, one half clearance time was 18.9         Split uptake on the right was 52.2 percent, time to peak was 0.5    minutes, one half clearance time was 23.4 minutes         Renal curves demonstrate perfusion to be delayed. Extraction was delayed. Excretion was delayed. Lasix was not administered         Impression:  Diminished perfusion and extraction and excretion, the findings are    compatible with bilateral renal artery stenosis versus ATN versus    chronic renal failure                CTA pulmonary with IV contrast October 10, 2020                    IMPRESSION:               CT Angiogram Chest and Pulmonary Arteries with contrast and high resolution 2-D and 3-D images:               1. No evidence of pulmonary emboli by CT technique.               2. Small bilateral pleural effusions with dependent atelectasis.            Dictated ByColin Davis MD   DD/DT: 10/10/20 1022                  Signed Feng Davis MD 10/10/20 1022                    : OLVIN      CT Abdomen and pelvis  W/o contrast 10/9/2020               IMPRESSION:       1.   Mild bilateral perinephric stranding is again identified (without hydronephrosis upon       bilateral assessment).               2.  Mild urinary bladder wall thickening is identified.  Surgical absence of prostate gland.               3.   Moderate colonic diverticulosis is identified (sigmoid predominance) without acute       diverticulitis.  Nondistended sigmoid colon extends into left inguinal hernia defect.               4. Pleural parenchymal scarring is again identified in right lower lobe, slightly more pronounced       than on prior examination.               5. Significant vascular calcifications are identified (including greater than 50% degree of       stenosis bilaterally in renal arteries, duplicated bilaterally).             Dictated By: Rosaline Mantilla MD   DD/DT: 10/09/20 0813                  Signed Michael Salcedo MD 10/09/20 0847                    :      CT Chest w/o contrast 10/13/2020   1. Corresponding to the masslike lesion seen in the right thorax on the    earlier chest x-ray is area of trapped fluid within the oblique fissure on    the right. 2. Pleural thickening and pulmonary scarring is seen along the posterior    aspect of the right lung. 3. Small bilateral pleural effusions. 4. No pulmonary mass, lymphadenopathy or metastatic disease seen. 5. 2 mm right upper lobe nodule of doubtful clinical significance.  If the    patient is at a high risk for malignancy, per the recommendations of the    [employer]ischner society, follow-up CT of the chest may be obtained in 12 months. Otherwise, no future follow-up is needed. 6. Severe calcified coronary atherosclerosis. 1           CXR 10/13/2020   1. Abnormal chest x-ray. Noreene Shouts is masslike opacity seen within the right    perihilar region measuring 6 cm x 4.2 cm and there is a 2nd rounded opacity    within the right hilum measuring 2.4 cm.  Dedicated CT of the chest is    recommended for further evaluation.             CXR: 10/20:    Mild increased right pleural fluid.         Increased prominence of central pulmonary vessels, likely related to vascular    congestion.         Mild patchy bibasilar atelectasis or infiltrates.               BRIEF SUMMARY OF INITIAL CONSULT:    Briefly Prashanth Juarez 89, [id number] y. o. year old male with history of CKD stage III, with mild proteinuria, baseline creatinine 1.3 to 1.5 mg/dL, HTN, type II DM, hyperlipidemia, gout, prostate cancer status post resection, emphysema, seronegative inflammatory arthritis possibly psoriatic arthritis on methotrexate, who was admitted on October 10, 2020 transferred from [facility] where he initially was admitted with abdominal pain. Probably he was hypotensive with a blood pressures loss 79/50 and he was fluid resuscitated, he was transferred for a higher level of care.   His creatinine level on admission was 1.9 mg/dL reason for this consultation. His medications prior to admission included lisinopril and chlorthalidone. Problems resolved:  · HAGMA, lactic acidosis and probably ketoacidosis, anion gap improved  · Hypotension,multifactroial, cardiogenic, 2/2 to MI, AF  · Hypokalemia, secondary to diuretics, potassium level improved  · Elevated LFTs, likely shock liver    IMPRESSION/RECOMMENDATIONS:      1. MARIAH on CKD, volume responsive prerenal MARIAH (hypotension, thiazide diuretic, in the setting of ACE inhibition), versus ischemic ATN? Kathryn De La Cruz Renal function relatively stable after cardiac catheterization. Still with significant edema, to repeat Lasix. 2. CKD stage III, with mild proteinuria, secondary to early diabetic nephropathy, baseline creatinine 1.3 to 1.5 mg/dl  3. CAD, NSTEMI, status post catheterization, has multivessel CAD, for CABG tomorrow  4. PAF, on sinus now   5. HFpEF 60%, proBNP 29,713, needs diuresis  6. Bilateral renal stenosis, by CT abdomen  7. History of prostate cancer status post surgery  8. Macrocytic anemia, history of B12 deficiency and iron deficiency, status post transfusion, on IV iron    Plan:    Start  1/2 NS with 75 meq of sodium bicarbonate at 50 ml/hr for persistent hyperkalemia with acidosis  2. Continue to monitor renal function and UO closely  3. Discussed with the ICU RN and patient's wife at bedside  4.  Repeat BMP at 8 pm, call if K>5.5    Josh Suárez MD

## 2020-10-22 ENCOUNTER — APPOINTMENT (OUTPATIENT)
Dept: GENERAL RADIOLOGY | Age: 75
DRG: 233 | End: 2020-10-22
Attending: FAMILY MEDICINE
Payer: MEDICARE

## 2020-10-22 LAB
AADO2: 101.6 MMHG
AADO2: 78.6 MMHG
AADO2: 93.3 MMHG
ABO/RH: NORMAL
ALBUMIN SERPL-MCNC: 3.3 G/DL (ref 3.5–5.2)
ALBUMIN SERPL-MCNC: 3.7 G/DL (ref 3.5–5.2)
ALP BLD-CCNC: 84 U/L (ref 40–129)
ALP BLD-CCNC: 89 U/L (ref 40–129)
ALT SERPL-CCNC: 143 U/L (ref 0–40)
ALT SERPL-CCNC: 78 U/L (ref 0–40)
ANION GAP SERPL CALCULATED.3IONS-SCNC: 14 MMOL/L (ref 7–16)
ANION GAP SERPL CALCULATED.3IONS-SCNC: 15 MMOL/L (ref 7–16)
ANTIBODY SCREEN: NORMAL
AST SERPL-CCNC: 408 U/L (ref 0–39)
AST SERPL-CCNC: 933 U/L (ref 0–39)
B.E.: -2.2 MMOL/L (ref -3–3)
B.E.: 0.2 MMOL/L (ref -3–3)
B.E.: 1.3 MMOL/L (ref -3–3)
B.E.: 1.6 MMOL/L (ref -3–3)
BILIRUB SERPL-MCNC: 2.1 MG/DL (ref 0–1.2)
BILIRUB SERPL-MCNC: 2.2 MG/DL (ref 0–1.2)
BLOOD BANK DISPENSE STATUS: NORMAL
BLOOD BANK PRODUCT CODE: NORMAL
BPU ID: NORMAL
BUN BLDV-MCNC: 49 MG/DL (ref 8–23)
BUN BLDV-MCNC: 50 MG/DL (ref 8–23)
BUN BLDV-MCNC: 50 MG/DL (ref 8–23)
CALCIUM SERPL-MCNC: 8 MG/DL (ref 8.6–10.2)
CALCIUM SERPL-MCNC: 8.3 MG/DL (ref 8.6–10.2)
CHLORIDE BLD-SCNC: 99 MMOL/L (ref 98–107)
CHLORIDE BLD-SCNC: 99 MMOL/L (ref 98–107)
CHLORIDE URINE RANDOM: <20 MMOL/L
CO2: 23 MMOL/L (ref 22–29)
CO2: 26 MMOL/L (ref 22–29)
COHB: 0 % (ref 0–1.5)
COHB: 0.3 % (ref 0–1.5)
COHB: 0.3 % (ref 0–1.5)
COHB: 0.4 % (ref 0–1.5)
CREAT SERPL-MCNC: 2.1 MG/DL (ref 0.7–1.2)
CREAT SERPL-MCNC: 2.4 MG/DL (ref 0.7–1.2)
CREATININE URINE: 111 MG/DL (ref 40–278)
CRITICAL: ABNORMAL
DATE ANALYZED: ABNORMAL
DATE OF COLLECTION: ABNORMAL
DESCRIPTION BLOOD BANK: NORMAL
FIO2: 40 %
GFR AFRICAN AMERICAN: 32
GFR AFRICAN AMERICAN: 37
GFR NON-AFRICAN AMERICAN: 26 ML/MIN/1.73
GFR NON-AFRICAN AMERICAN: 31 ML/MIN/1.73
GLUCOSE BLD-MCNC: 139 MG/DL (ref 74–99)
GLUCOSE BLD-MCNC: 98 MG/DL (ref 74–99)
HCO3: 21.2 MMOL/L (ref 22–26)
HCO3: 22.3 MMOL/L (ref 22–26)
HCO3: 24.6 MMOL/L (ref 22–26)
HCO3: 25.5 MMOL/L (ref 22–26)
HCT VFR BLD CALC: 25.1 % (ref 37–54)
HEMOGLOBIN: 8.3 G/DL (ref 12.5–16.5)
HHB: 1.6 % (ref 0–5)
HHB: 1.8 % (ref 0–5)
HHB: 1.9 % (ref 0–5)
HHB: 2.4 % (ref 0–5)
LAB: ABNORMAL
LACTIC ACID: 1.4 MMOL/L (ref 0.5–2.2)
LACTIC ACID: 1.4 MMOL/L (ref 0.5–2.2)
LACTIC ACID: 2.2 MMOL/L (ref 0.5–2.2)
LACTIC ACID: 3 MMOL/L (ref 0.5–2.2)
LACTIC ACID: 3.6 MMOL/L (ref 0.5–2.2)
Lab: ABNORMAL
MAGNESIUM: 1.9 MG/DL (ref 1.6–2.6)
MCH RBC QN AUTO: 33.5 PG (ref 26–35)
MCHC RBC AUTO-ENTMCNC: 33.1 % (ref 32–34.5)
MCV RBC AUTO: 101.2 FL (ref 80–99.9)
METER GLUCOSE: 104 MG/DL (ref 74–99)
METER GLUCOSE: 138 MG/DL (ref 74–99)
METER GLUCOSE: 175 MG/DL (ref 74–99)
METER GLUCOSE: 186 MG/DL (ref 74–99)
METER GLUCOSE: 85 MG/DL (ref 74–99)
METER GLUCOSE: 97 MG/DL (ref 74–99)
METHB: 0.2 % (ref 0–1.5)
MODE: ABNORMAL
MODE: ABNORMAL
MODE: AC
MODE: AC
O2 SATURATION: 97.6 % (ref 92–98.5)
O2 SATURATION: 98.1 % (ref 92–98.5)
O2 SATURATION: 98.2 % (ref 92–98.5)
O2 SATURATION: 98.4 % (ref 92–98.5)
O2HB: 97 % (ref 94–97)
O2HB: 97.6 % (ref 94–97)
O2HB: 97.7 % (ref 94–97)
O2HB: 98.2 % (ref 94–97)
OPERATOR ID: 1632
OPERATOR ID: 1632
OPERATOR ID: 1893
OPERATOR ID: ABNORMAL
PATIENT TEMP: 37 C
PCO2: 27.5 MMHG (ref 35–45)
PCO2: 31.2 MMHG (ref 35–45)
PCO2: 32.8 MMHG (ref 35–45)
PCO2: 38.7 MMHG (ref 35–45)
PDW BLD-RTO: 17.2 FL (ref 11.5–15)
PEEP/CPAP: 5 CMH2O
PFO2: 3.55 MMHG/%
PFO2: 3.65 MMHG/%
PFO2: 3.97 MMHG/%
PH BLOOD GAS: 7.44 (ref 7.35–7.45)
PH BLOOD GAS: 7.45 (ref 7.35–7.45)
PH BLOOD GAS: 7.49 (ref 7.35–7.45)
PH BLOOD GAS: 7.53 (ref 7.35–7.45)
PLATELET # BLD: 122 E9/L (ref 130–450)
PMV BLD AUTO: 12.1 FL (ref 7–12)
PO2: 113.4 MMHG (ref 75–100)
PO2: 142 MMHG (ref 75–100)
PO2: 146 MMHG (ref 75–100)
PO2: 158.9 MMHG (ref 75–100)
POTASSIUM SERPL-SCNC: 3.5 MMOL/L (ref 3.5–5)
POTASSIUM SERPL-SCNC: 3.61 MMOL/L (ref 3.5–5)
POTASSIUM SERPL-SCNC: 3.9 MMOL/L (ref 3.5–5)
POTASSIUM, UR: 46.9 MMOL/L
PS: 10 CMH20
RBC # BLD: 2.48 E12/L (ref 3.8–5.8)
RI(T): 0.49
RI(T): 0.64
RI(T): 0.72
RR MECHANICAL: 14 B/MIN
RR MECHANICAL: 14 B/MIN
SODIUM BLD-SCNC: 137 MMOL/L (ref 132–146)
SODIUM BLD-SCNC: 139 MMOL/L (ref 132–146)
SODIUM URINE: <20 MMOL/L
SOURCE, BLOOD GAS: ABNORMAL
THB: 10 G/DL (ref 11.5–16.5)
THB: 11.2 G/DL (ref 11.5–16.5)
THB: 9.2 G/DL (ref 11.5–16.5)
THB: 9.4 G/DL (ref 11.5–16.5)
TIME ANALYZED: 1200
TIME ANALYZED: 1312
TIME ANALYZED: 204
TIME ANALYZED: 616
TOTAL PROTEIN: 5.2 G/DL (ref 6.4–8.3)
TOTAL PROTEIN: 5.4 G/DL (ref 6.4–8.3)
UREA NITROGEN, UR: 833 MG/DL (ref 800–1666)
VT MECHANICAL: 500 ML
VT MECHANICAL: 500 ML
WBC # BLD: 19.3 E9/L (ref 4.5–11.5)

## 2020-10-22 PROCEDURE — 84132 ASSAY OF SERUM POTASSIUM: CPT

## 2020-10-22 PROCEDURE — 6370000000 HC RX 637 (ALT 250 FOR IP): Performed by: THORACIC SURGERY (CARDIOTHORACIC VASCULAR SURGERY)

## 2020-10-22 PROCEDURE — 2500000003 HC RX 250 WO HCPCS: Performed by: INTERNAL MEDICINE

## 2020-10-22 PROCEDURE — 94640 AIRWAY INHALATION TREATMENT: CPT

## 2020-10-22 PROCEDURE — 86901 BLOOD TYPING SEROLOGIC RH(D): CPT

## 2020-10-22 PROCEDURE — 71045 X-RAY EXAM CHEST 1 VIEW: CPT

## 2020-10-22 PROCEDURE — 82436 ASSAY OF URINE CHLORIDE: CPT

## 2020-10-22 PROCEDURE — 86850 RBC ANTIBODY SCREEN: CPT

## 2020-10-22 PROCEDURE — 2700000000 HC OXYGEN THERAPY PER DAY

## 2020-10-22 PROCEDURE — 94003 VENT MGMT INPAT SUBQ DAY: CPT

## 2020-10-22 PROCEDURE — 36415 COLL VENOUS BLD VENIPUNCTURE: CPT

## 2020-10-22 PROCEDURE — 2000000000 HC ICU R&B

## 2020-10-22 PROCEDURE — 82962 GLUCOSE BLOOD TEST: CPT

## 2020-10-22 PROCEDURE — 80053 COMPREHEN METABOLIC PANEL: CPT

## 2020-10-22 PROCEDURE — 84133 ASSAY OF URINE POTASSIUM: CPT

## 2020-10-22 PROCEDURE — 84540 ASSAY OF URINE/UREA-N: CPT

## 2020-10-22 PROCEDURE — 84300 ASSAY OF URINE SODIUM: CPT

## 2020-10-22 PROCEDURE — 83605 ASSAY OF LACTIC ACID: CPT

## 2020-10-22 PROCEDURE — 82805 BLOOD GASES W/O2 SATURATION: CPT

## 2020-10-22 PROCEDURE — 6370000000 HC RX 637 (ALT 250 FOR IP): Performed by: NURSE PRACTITIONER

## 2020-10-22 PROCEDURE — 36430 TRANSFUSION BLD/BLD COMPNT: CPT

## 2020-10-22 PROCEDURE — 2580000003 HC RX 258: Performed by: THORACIC SURGERY (CARDIOTHORACIC VASCULAR SURGERY)

## 2020-10-22 PROCEDURE — 84520 ASSAY OF UREA NITROGEN: CPT

## 2020-10-22 PROCEDURE — P9016 RBC LEUKOCYTES REDUCED: HCPCS

## 2020-10-22 PROCEDURE — 6360000002 HC RX W HCPCS: Performed by: THORACIC SURGERY (CARDIOTHORACIC VASCULAR SURGERY)

## 2020-10-22 PROCEDURE — 86900 BLOOD TYPING SEROLOGIC ABO: CPT

## 2020-10-22 PROCEDURE — 82570 ASSAY OF URINE CREATININE: CPT

## 2020-10-22 PROCEDURE — 37799 UNLISTED PX VASCULAR SURGERY: CPT | Performed by: NURSE PRACTITIONER

## 2020-10-22 PROCEDURE — 83735 ASSAY OF MAGNESIUM: CPT

## 2020-10-22 PROCEDURE — 99291 CRITICAL CARE FIRST HOUR: CPT | Performed by: NURSE PRACTITIONER

## 2020-10-22 PROCEDURE — 85027 COMPLETE CBC AUTOMATED: CPT

## 2020-10-22 PROCEDURE — 86923 COMPATIBILITY TEST ELECTRIC: CPT

## 2020-10-22 PROCEDURE — 6360000002 HC RX W HCPCS: Performed by: NURSE PRACTITIONER

## 2020-10-22 PROCEDURE — 2580000003 HC RX 258: Performed by: NURSE PRACTITIONER

## 2020-10-22 RX ORDER — BUMETANIDE 0.25 MG/ML
1 INJECTION, SOLUTION INTRAMUSCULAR; INTRAVENOUS 2 TIMES DAILY
Status: DISCONTINUED | OUTPATIENT
Start: 2020-10-22 | End: 2020-10-25

## 2020-10-22 RX ORDER — DOBUTAMINE HYDROCHLORIDE 400 MG/100ML
0.5 INJECTION INTRAVENOUS CONTINUOUS
Status: DISPENSED | OUTPATIENT
Start: 2020-10-22 | End: 2020-10-27

## 2020-10-22 RX ORDER — BUMETANIDE 0.25 MG/ML
1 INJECTION, SOLUTION INTRAMUSCULAR; INTRAVENOUS 2 TIMES DAILY
Status: DISCONTINUED | OUTPATIENT
Start: 2020-10-22 | End: 2020-10-22

## 2020-10-22 RX ORDER — 0.9 % SODIUM CHLORIDE 0.9 %
20 INTRAVENOUS SOLUTION INTRAVENOUS ONCE
Status: COMPLETED | OUTPATIENT
Start: 2020-10-22 | End: 2020-10-22

## 2020-10-22 RX ADMIN — SENNOSIDES 8.6 MG: 8.6 TABLET, FILM COATED ORAL at 21:42

## 2020-10-22 RX ADMIN — IPRATROPIUM BROMIDE AND ALBUTEROL SULFATE 1 AMPULE: 2.5; .5 SOLUTION RESPIRATORY (INHALATION) at 09:34

## 2020-10-22 RX ADMIN — FENTANYL CITRATE 50 MCG: 50 INJECTION, SOLUTION INTRAMUSCULAR; INTRAVENOUS at 12:32

## 2020-10-22 RX ADMIN — FENTANYL CITRATE 50 MCG: 50 INJECTION, SOLUTION INTRAMUSCULAR; INTRAVENOUS at 16:24

## 2020-10-22 RX ADMIN — TIMOLOL MALEATE 1 DROP: 5 SOLUTION OPHTHALMIC at 21:41

## 2020-10-22 RX ADMIN — IPRATROPIUM BROMIDE AND ALBUTEROL SULFATE 1 AMPULE: 2.5; .5 SOLUTION RESPIRATORY (INHALATION) at 16:38

## 2020-10-22 RX ADMIN — HEPARIN SODIUM 5000 UNITS: 10000 INJECTION INTRAVENOUS; SUBCUTANEOUS at 04:16

## 2020-10-22 RX ADMIN — ASPIRIN 81 MG: 81 TABLET, FILM COATED ORAL at 08:13

## 2020-10-22 RX ADMIN — PANTOPRAZOLE SODIUM 40 MG: 40 TABLET, DELAYED RELEASE ORAL at 08:13

## 2020-10-22 RX ADMIN — SODIUM CHLORIDE, PRESERVATIVE FREE 10 ML: 5 INJECTION INTRAVENOUS at 08:15

## 2020-10-22 RX ADMIN — MAGNESIUM GLUCONATE 500 MG ORAL TABLET 200 MG: 500 TABLET ORAL at 08:22

## 2020-10-22 RX ADMIN — MUPIROCIN: 20 OINTMENT TOPICAL at 08:14

## 2020-10-22 RX ADMIN — ACETAMINOPHEN 1000 MG: 500 TABLET ORAL at 21:42

## 2020-10-22 RX ADMIN — HEPARIN SODIUM 5000 UNITS: 10000 INJECTION INTRAVENOUS; SUBCUTANEOUS at 21:35

## 2020-10-22 RX ADMIN — HEPARIN SODIUM 5000 UNITS: 10000 INJECTION INTRAVENOUS; SUBCUTANEOUS at 12:30

## 2020-10-22 RX ADMIN — BUMETANIDE 1 MG: 0.25 INJECTION INTRAMUSCULAR; INTRAVENOUS at 21:35

## 2020-10-22 RX ADMIN — Medication 1000 MCG: at 08:22

## 2020-10-22 RX ADMIN — BUMETANIDE 1 MG: 0.25 INJECTION INTRAMUSCULAR; INTRAVENOUS at 13:51

## 2020-10-22 RX ADMIN — DOCUSATE SODIUM 50 MG AND SENNOSIDES 8.6 MG 1 TABLET: 8.6; 5 TABLET, FILM COATED ORAL at 08:13

## 2020-10-22 RX ADMIN — DOCUSATE SODIUM 50 MG AND SENNOSIDES 8.6 MG 1 TABLET: 8.6; 5 TABLET, FILM COATED ORAL at 21:42

## 2020-10-22 RX ADMIN — ACETAMINOPHEN 1000 MG: 500 TABLET ORAL at 13:51

## 2020-10-22 RX ADMIN — IPRATROPIUM BROMIDE AND ALBUTEROL SULFATE 1 AMPULE: 2.5; .5 SOLUTION RESPIRATORY (INHALATION) at 21:58

## 2020-10-22 RX ADMIN — TIMOLOL MALEATE 1 DROP: 5 SOLUTION OPHTHALMIC at 08:11

## 2020-10-22 RX ADMIN — LATANOPROST 1 DROP: 50 SOLUTION OPHTHALMIC at 21:42

## 2020-10-22 RX ADMIN — Medication 10 ML: at 22:03

## 2020-10-22 RX ADMIN — EPINEPHRINE 6 MCG/MIN: 1 INJECTION INTRAMUSCULAR; INTRAVENOUS; SUBCUTANEOUS at 08:39

## 2020-10-22 RX ADMIN — Medication 1000 MCG: at 21:42

## 2020-10-22 RX ADMIN — DOBUTAMINE HYDROCHLORIDE 2.5 MCG/KG/MIN: 400 INJECTION INTRAVENOUS at 12:24

## 2020-10-22 RX ADMIN — SODIUM CHLORIDE 20 ML: 9 INJECTION, SOLUTION INTRAVENOUS at 12:05

## 2020-10-22 RX ADMIN — INSULIN GLARGINE 11 UNITS: 100 INJECTION, SOLUTION SUBCUTANEOUS at 21:35

## 2020-10-22 RX ADMIN — INSULIN LISPRO 3 UNITS: 100 INJECTION, SOLUTION INTRAVENOUS; SUBCUTANEOUS at 01:10

## 2020-10-22 RX ADMIN — FENTANYL CITRATE 50 MCG: 50 INJECTION, SOLUTION INTRAMUSCULAR; INTRAVENOUS at 08:12

## 2020-10-22 RX ADMIN — INSULIN LISPRO 3 UNITS: 100 INJECTION, SOLUTION INTRAVENOUS; SUBCUTANEOUS at 08:10

## 2020-10-22 RX ADMIN — IPRATROPIUM BROMIDE AND ALBUTEROL SULFATE 1 AMPULE: 2.5; .5 SOLUTION RESPIRATORY (INHALATION) at 13:04

## 2020-10-22 ASSESSMENT — PAIN SCALES - GENERAL
PAINLEVEL_OUTOF10: 4
PAINLEVEL_OUTOF10: 0
PAINLEVEL_OUTOF10: 8
PAINLEVEL_OUTOF10: 0
PAINLEVEL_OUTOF10: 0
PAINLEVEL_OUTOF10: 5
PAINLEVEL_OUTOF10: 5

## 2020-10-22 ASSESSMENT — PULMONARY FUNCTION TESTS
PIF_VALUE: 27
PIF_VALUE: 25
PIF_VALUE: 20
PIF_VALUE: 16
PIF_VALUE: 16
PIF_VALUE: 27
PIF_VALUE: 11
PIF_VALUE: 27
PIF_VALUE: 21
PIF_VALUE: 25
PIF_VALUE: 26
PIF_VALUE: 16
PIF_VALUE: 20
PIF_VALUE: 25
PIF_VALUE: 43
PIF_VALUE: 25
PIF_VALUE: 29
PIF_VALUE: 27

## 2020-10-22 ASSESSMENT — PAIN DESCRIPTION - DESCRIPTORS
DESCRIPTORS: SHARP
DESCRIPTORS: DISCOMFORT;SORE

## 2020-10-22 ASSESSMENT — PAIN DESCRIPTION - ORIENTATION
ORIENTATION: MID

## 2020-10-22 ASSESSMENT — PAIN DESCRIPTION - FREQUENCY
FREQUENCY: CONTINUOUS
FREQUENCY: INTERMITTENT
FREQUENCY: INTERMITTENT

## 2020-10-22 ASSESSMENT — PAIN DESCRIPTION - ONSET
ONSET: ON-GOING
ONSET: ON-GOING

## 2020-10-22 ASSESSMENT — PAIN - FUNCTIONAL ASSESSMENT: PAIN_FUNCTIONAL_ASSESSMENT: PREVENTS OR INTERFERES SOME ACTIVE ACTIVITIES AND ADLS

## 2020-10-22 ASSESSMENT — PAIN DESCRIPTION - LOCATION
LOCATION: CHEST

## 2020-10-22 ASSESSMENT — PAIN DESCRIPTION - PAIN TYPE
TYPE: SURGICAL PAIN

## 2020-10-22 ASSESSMENT — PAIN DESCRIPTION - PROGRESSION: CLINICAL_PROGRESSION: GRADUALLY IMPROVING

## 2020-10-22 NOTE — PROGRESS NOTES
clevidipine      insulin      dextrose      EPINEPHrine infusion 7 mcg/min (10/22/20 0000)     PRN Meds:.perflutren lipid microspheres, sodium chloride flush, potassium chloride, magnesium sulfate, acetaminophen, acetaminophen, oxyCODONE **OR** oxyCODONE HCl, fentanNYL **OR** fentanNYL, magnesium hydroxide, ondansetron, albumin human, sodium chloride, norepinephrine, clevidipine, insulin, glucose, dextrose, glucagon (rDNA), dextrose, calcium gluconate IVPB    REVIEW OF SYSTEMS: Not obtainable due to intubated state. FAMILY HISTORY: Negative for CAD in first-degree relatives. SOCIAL HISTORY: Negative for alcohol, tobacco, or illicit drug use. PHYSICAL EXAM:    General Appearance: Intubated, awake and responsive  Neck:  no bruits  Lungs:  Normal expansion. Clear to auscultation. No rales, rhonchi, or wheezing. Heart:  Heart sounds are normal.  Regular rate and rhythm without murmur, gallop or rub. Abdomen:  Soft, non-tender. Extremities: Extremities warm to touch, pink, with no edema. Neuro/musculosketal:  Unremarkable. LABS:    Recent Labs     10/21/20  0355 10/21/20  1415 10/21/20  1950    140 137   CREATININE 1.9* 2.2* 2.3*       Recent Labs     10/19/20  1203 10/20/20  0421 10/21/20  0355   HGB 11.9* 9.9* 9.3*       Recent Labs     10/19/20  1203   INR 1.4         IMPRESSION:    #1.  Acute loss of blood pressure and pulse-unclear as to the cause. May be due to pulmonary embolus, especially as repeat echo showed dilated and moderately to severely hypokinetic right ventricle with increased pulmonary pressure compared to the one done around a week ago. Renal function precludes CT with contrast but would consider anticoagulation with heparin infusion at this time and a pulmonary consultation. Defer to the CT surgery service. #2.  CAD post non-STEMI and CABG-Continue aspirin. Supportive care. #2.  Paroxysmal atrial fibrillation-no evidence on recent telemetry.     #3. Hypertension-controlled. #4. Hyperlipidemia-no statin pending liver enzymes trend. #5.  Diabetes-per intensivist.    #6.  Renal failure-creatinine increasing from 1.4 and 2.3. Minimal urine output. Nephrology following. #7.  Continue to follow.

## 2020-10-22 NOTE — PROGRESS NOTES
Neurologic/Psych: Intubated, awake; RUIZ to command   Skin: Warm and dry  Incisions: MSI with YURY intact, RLE SVG sites with ace wrap       Assessment/Plan: POD #3    1. CAD S/p CABGx3 (LIMA-LAD, SVG-OM, SVG-PDA)  - ASA, statin  - Left pleural chest tube 230cc output past 24 hours   - Heparin SQ VTE prophylaxis      2. Paroxsymal atrial fibrillation   -s/p LAAL  -Post op NSR, PAF with respiratory arrest, reverted back to NSR  -Amio gtt stopped overnight due to bradycardia     3. Acute Postoperative Respiratory Failure  - 2/2 surgery, h/o pulmonary emphysema  - reintubated 10/21 following respiratory arrest, ?pulmonary source vs severe orthostatic event, CTA chest 10/10 without evidence for PE, avoid repeat CTA with renal function, will discuss anticoagulation with CTS  - Work toward extubation this AM, PS trials as tolerated  - ABGs per protocol and PRN      4. Cardiac insufficiency   - H/o ICM, EF 30% preop; 40% post op  - Echo 10/21: EF 45-50% on inotropic support with stage 2 diastolic dysfunction; RV with moderate to severe dysfunction  - POD 1, levophed weaned off, lactic acid normalized   - POD2, epi at 2.5mcg, respiratory arrest with severe hypotension and bradycardia following ambulation with PT, pt did not lose pulse during this time. 1gm Calcium chloride IVP given, Epi gtt increased, levophed started then switched to vasopressin gtt  - POD3, vaso gtt weaned off overnight, epi gtt at 6.5mcg, plan to slowly wean per hemodynamics as tolerated; consider starting dobutamine to facilitate weaning, will discuss with Dr. Alfreda aGrcia   - Target MAP >70, wean gtt slowly as able      5. Acute kidney injury on chronic kidney disease Stage 3   - Baseline Scr 1.3-1.5  - Scr up to 2.4 today, UOP decreased overnight  - Nephrology following, on bicarb gtt @60ml/hr   - Monitor UOP, labs, avoid hypotension and nephrotoxic agents; target MAP >70 for renal perfusion      6.  Acute Post Operative Pain   - PRN fentanyl until able to take PO, then resume tylenol ATC, PO PRN narcotics      7. Acute blood loss anemia  - H/H 8.3/25.1, will transfuse this AM  - Monitor     8. Leukocytosis   - WBC 19.3, afebrile   - continue to monitor, consider starting empiric antibiotics     9. Shock liver  - 2/2 RV dysfunction  - Continue to trend, supportive care     VTE Prophylaxis: Pharmacologic/Mechanical:  Yes, SCDs, heparin SQ  Line infection prevention: Can CVC or arterial line be removed: no  Continued need for urinary catheter:  Yes - clinical indication: Patient post major surgery requiring fluid balance and input and output measurement. This patient has a high probability of sudden deterioration, which requires preparedness to urgently intervene. I participated in the decision making process and managed the direct care of the patient that required frequent assessment and treatment. I personally spent 36 minutes of critical care time treating the patient.         Dispo: CVICU     Electronically signed by ROSANGELA Kimble CNP on 10/22/2020 at 7:59 AM

## 2020-10-22 NOTE — PLAN OF CARE
Problem: Pain:  Goal: Pain level will decrease  Description: Pain level will decrease  Outcome: Met This Shift     Problem: Pain:  Goal: Control of acute pain  Description: Control of acute pain  Outcome: Met This Shift     Problem: Pain:  Goal: Control of chronic pain  Description: Control of chronic pain  Outcome: Met This Shift     Problem: Falls - Risk of:  Goal: Will remain free from falls  Description: Will remain free from falls  Outcome: Met This Shift     Problem: Falls - Risk of:  Goal: Absence of physical injury  Description: Absence of physical injury  Outcome: Met This Shift     Problem: Skin Integrity:  Goal: Will show no infection signs and symptoms  Description: Will show no infection signs and symptoms  Outcome: Met This Shift     Problem: Skin Integrity:  Goal: Absence of new skin breakdown  Description: Absence of new skin breakdown  Outcome: Met This Shift     Problem: Anxiety:  Goal: Level of anxiety will decrease  Description: Level of anxiety will decrease  Outcome: Met This Shift     Problem: Cardiac Output - Decreased:  Goal: Cardiac output within specified parameters  Description: Cardiac output within specified parameters  Outcome: Met This Shift     Problem: Cardiac Output - Decreased:  Goal: Hemodynamic stability will improve  Description: Hemodynamic stability will improve  Outcome: Met This Shift     Problem: Gas Exchange - Impaired:  Goal: Levels of oxygenation will improve  Description: Levels of oxygenation will improve  Outcome: Met This Shift     Problem: Gas Exchange - Impaired:  Goal: Ability to maintain adequate ventilation will improve  Description: Ability to maintain adequate ventilation will improve  Outcome: Met This Shift     Problem: Pain:  Goal: Pain level will decrease  Description: Pain level will decrease  Outcome: Met This Shift     Problem: Fluid Volume - Imbalance:  Goal: Ability to achieve a balanced intake and output will improve  Description: Ability to achieve a balanced intake and output will improve  Outcome: Ongoing     Problem: Fluid Volume - Imbalance:  Goal: Chest tube drainage is within specified parameters  Description: Chest tube drainage is within specified parameters  Outcome: Ongoing

## 2020-10-22 NOTE — PROGRESS NOTES
Hospitalist Progress Note      PCP: Stacey Crespo MD    Date of Admission: 10/10/2020    Chief Complaint: chest pain     Hospital Course: **s/p CABG, still has chest tubes**pt became hypotensive, went into a fib   The bradycardic, had to be reintubated and started on pressors. 10/21. He is more alert and responding to commands.       Subjective:  No complaints      Medications:  Reviewed    Infusion Medications    sodium chloride Stopped (10/21/20 1214)    sodium chloride      norepinephrine Stopped (10/21/20 1010)    clevidipine      insulin      dextrose      EPINEPHrine infusion 6 mcg/min (10/22/20 0839)     Scheduled Medications    sodium chloride  20 mL Intravenous Once    magnesium oxide  200 mg Oral Daily    acetaminophen  1,000 mg Oral 3 times per day    heparin (porcine)  5,000 Units Subcutaneous Q8H    timolol  1 drop Left Eye BID    vitamin B-12  1,000 mcg Oral BID    sodium chloride flush  10 mL Intravenous 2 times per day    sennosides-docusate sodium  1 tablet Oral BID    pantoprazole  40 mg Oral Daily    mupirocin   Nasal BID    aspirin  81 mg Oral Daily    ipratropium-albuterol  1 ampule Inhalation Q4H WA    insulin glargine  0.15 Units/kg Subcutaneous Nightly    senna  1 tablet Oral Nightly    insulin lispro  0-18 Units Subcutaneous Q4H    latanoprost  1 drop Both Eyes Nightly     PRN Meds: perflutren lipid microspheres, sodium chloride flush, potassium chloride, magnesium sulfate, acetaminophen, acetaminophen, oxyCODONE **OR** oxyCODONE HCl, fentanNYL **OR** fentanNYL, magnesium hydroxide, ondansetron, albumin human, sodium chloride, norepinephrine, clevidipine, insulin, glucose, dextrose, glucagon (rDNA), dextrose, calcium gluconate IVPB      Intake/Output Summary (Last 24 hours) at 10/22/2020 1135  Last data filed at 10/22/2020 1100  Gross per 24 hour   Intake 5239.08 ml   Output 851 ml   Net 4388.08 ml       Exam:    BP (!) 139/51   Pulse 60   Temp 97.8 °F (36.6 °C) (Axillary)   Resp 25   Ht 5' 11\" (1.803 m)   Wt 176 lb 12.9 oz (80.2 kg)   SpO2 100%   BMI 24.66 kg/m²       Gen: *well developed  HEENT: NC/AT, moist mucous membranes, no oropharyngeal erythema or exudate  Neck: supple, trachea midline, no anterior cervical or SC LAD  Heart:  RR  Lungs:  *cta  bilaterally, *  Abd: bowel sounds present, soft, nontender, nondistended,  Extrem:  No clubbing, cyanosis,  *no* edema  Skin: no rashes or lesions  Neuro: grossly intact, moves all four extremities.    Capillary Refill: Brisk,< 3 seconds   Peripheral Pulses: +2 palpable, equal bilaterally                             Labs:   Recent Labs     10/20/20  0421 10/21/20  0355 10/22/20  0600   WBC 25.9* 23.3* 19.3*   HGB 9.9* 9.3* 8.3*   HCT 31.1* 29.2* 25.1*    119* 122*     Recent Labs     10/21/20  1415 10/21/20  1950 10/22/20  0600    137 137   K 5.1* 4.8 3.9    99 99   CO2 19* 19* 23   BUN 41* 45* 50*   CREATININE 2.2* 2.3* 2.4*   CALCIUM 9.4 8.6 8.3*     Recent Labs     10/22/20  0600   *   *   BILITOT 2.1*   ALKPHOS 84     Recent Labs     10/19/20  1203   INR 1.4     No results for input(s): CKTOTAL, TROPONINI in the last 72 hours.   Recent Labs     10/22/20  0600   *   *   BILITOT 2.1*   ALKPHOS 84     Recent Labs     10/22/20  0200 10/22/20  0600 10/22/20  1008   LACTA 3.6* 3.0* 2.2     No results found for: [facility]  No results found for: AMMONIA    Assessment:    Active Hospital Problems    Diagnosis Date Noted    Postoperative hypotension [I95.89]     Cardiac insufficiency (HCC) [I50.9]     Acute postoperative respiratory insufficiency [J95.89]     Stage 3 chronic kidney disease [N18.30]     Acute postoperative pain [G89.18]     Non-STEMI (non-ST elevated myocardial infarction) ([facility] [I21.4] 10/14/2020    Paroxysmal atrial fibrillation ([facility] [I48.0] 10/14/2020    Anemia [D64.9] 10/14/2020    History of right-sided carotid endarterectomy [Z98.890] 10/14/2020    Coronary artery disease [I25.10] 10/14/2020    Acute kidney injury superimposed on chronic kidney disease ([facility] [address] [facility] [N17.9, N18.9] 10/14/2020    Hypotension [I95.9] 10/14/2020    Transaminitis [R74.01] 10/10/2020   s/p CABG  PAF  II dM     Plan:   on amiodarone   cont ancef   cont SSI   epi           DVT Prophylaxis: scd  Diet: DIET CARDIAC; Carb Control: 5 carbs/meal (approximate 2000 kcals/day)  Dietary Nutrition Supplements: Diabetic Oral Supplement  Code Status: Full Code     PT/OT Eval Status: *ordered     Dispo - home**        Electronically signed by Donna Mendez DO on 10/22/2020 at 11:35 AM Yesica clarke

## 2020-10-22 NOTE — PROGRESS NOTES
infusion 6 mcg/min (10/22/20 0839)     PRN Meds:.perflutren lipid microspheres, sodium chloride flush, potassium chloride, magnesium sulfate, acetaminophen, acetaminophen, oxyCODONE **OR** oxyCODONE HCl, fentanNYL **OR** fentanNYL, magnesium hydroxide, ondansetron, albumin human, sodium chloride, norepinephrine, clevidipine, insulin, glucose, dextrose, glucagon (rDNA), dextrose, calcium gluconate IVPB    DATA:    CBC:   Lab Results   Component Value Date    WBC 19.3 10/22/2020    RBC 2.48 10/22/2020    HGB 8.3 10/22/2020    HCT 25.1 10/22/2020    .2 10/22/2020    MCH 33.5 10/22/2020    MCHC 33.1 10/22/2020    RDW 17.2 10/22/2020     10/22/2020    MPV 12.1 10/22/2020     CMP:    Lab Results   Component Value Date     10/22/2020    K 3.9 10/22/2020    K 3.6 10/19/2020    CL 99 10/22/2020    CO2 23 10/22/2020    BUN 50 10/22/2020    CREATININE 2.4 10/22/2020    GFRAA 32 10/22/2020    AGRATIO 0.9 10/10/2020    LABGLOM 26 10/22/2020    GLUCOSE 139 10/22/2020    PROT 5.4 10/22/2020    LABALBU 3.7 10/22/2020    CALCIUM 8.3 10/22/2020    BILITOT 2.1 10/22/2020    ALKPHOS 84 10/22/2020     10/22/2020     10/22/2020     Magnesium:    Lab Results   Component Value Date    MG 1.9 10/22/2020     Phosphorus:  No results found for: PHOS     Urine sodium: 71  Urine potassium: 51.9  Urine chloride: 68  Urine creatinine: 118  Urine albumin/creatinine: 184 mg/grams  Urine protein/creatinine: 0.5 mg/grams    proBNP: 29,713      Radiology Review:      Nuclear medicine clinic with flow and function without pharmacological intervention October 12, 2020   Findings:         The patient was injected with 10 mCi of technetium MAG3 intravenously              Split uptake on the left was 47.8  percent, time to peak was 4.5    minutes, one half clearance time was 18.9         Split uptake on the right was 52.2 percent, time to peak was 0.5    minutes, one half clearance time was 23.4 minutes         Renal curves demonstrate perfusion to be delayed. Extraction was delayed. Excretion was delayed. Lasix was not administered         Impression:  Diminished perfusion and extraction and excretion, the findings are    compatible with bilateral renal artery stenosis versus ATN versus    chronic renal failure                CTA pulmonary with IV contrast October 10, 2020                    IMPRESSION:               CT Angiogram Chest and Pulmonary Arteries with contrast and high resolution 2-D and 3-D images:               1. No evidence of pulmonary emboli by CT technique.               2. Small bilateral pleural effusions with dependent atelectasis.            Dictated Hossein Lopez MD   DD/DT: 10/10/20 1022                  Signed Hossein Lopez MD 10/10/20 1022                    : [facility]      CT Abdomen and pelvis  W/o contrast 10/9/2020               IMPRESSION:       1.   Mild bilateral perinephric stranding is again identified (without hydronephrosis upon       bilateral assessment).               2.  Mild urinary bladder wall thickening is identified.  Surgical absence of prostate gland.               3.   Moderate colonic diverticulosis is identified (sigmoid predominance) without acute       diverticulitis.  Nondistended sigmoid colon extends into left inguinal hernia defect.               4. Pleural parenchymal scarring is again identified in right lower lobe, slightly more pronounced       than on prior examination.               5. Significant vascular calcifications are identified (including greater than 50% degree of       stenosis bilaterally in renal arteries, duplicated bilaterally).            Dictated By: Louie Gitelman MD   DD/DT: 10/09/20 0813                  Signed Jacob Rivas MD 10/09/20 0847                    :      CT Chest w/o contrast 10/13/2020   1.  Corresponding to the masslike lesion seen in the right thorax on the    earlier chest x-ray is area resolved:  · HAGMA, lactic acidosis and probably ketoacidosis, anion gap improved  · Hypotension,multifactroial, cardiogenic, 2/2 to MI, AF  · Hypokalemia, secondary to diuretics, potassium level improved  · Elevated LFTs, likely shock liver    IMPRESSION/RECOMMENDATIONS:      1. MARIAH on CKD, volume responsive prerenal MARIAH (hypotension, thiazide diuretic, in the setting of ACE inhibition), versus ischemic ATN? Heddy  Renal function relatively stable after cardiac catheterization. Still with significant edema, to repeat Lasix. 2. CKD stage III, with mild proteinuria, secondary to early diabetic nephropathy, baseline creatinine 1.3 to 1.5 mg/dl  3. CAD, NSTEMI, status post catheterization, has multivessel CAD, for CABG tomorrow  4. PAF, on sinus now   5. HFpEF 60%, proBNP 29,713, needs diuresis  6. Bilateral renal stenosis, by CT abdomen  7. History of prostate cancer status post surgery  8. Macrocytic anemia, history of B12 deficiency and iron deficiency, status post transfusion, on IV iron    Plan:    1. Acidosis and hyperkalemia have resolved, stop IV bicarbonate  2. Start Bumex 1 mg IV BID  3. Obtain urine for random electrolytes, creatinine and urea  4.  Anemia panel with AM labs, once Iron studies are reviewed will start ELLIOTT    Discussed with the ICU RN    Bo Burrell MD

## 2020-10-22 NOTE — PROGRESS NOTES
Pt extubated at this time. Lungs and oral airway suctioned, cuff deflated and ETT pulled. Place on 6L NC, Sp02 %. Pt has a strong productive cough. No stridor auscultated,  Breath sounds clear bilaterally. Will continue to monitor patient.

## 2020-10-22 NOTE — PROGRESS NOTES
OCCUPATIONAL THERAPY    Date:10/22/2020  Patient Name: Steve Thompson  MRN: [id number]  : 1945  Room: [facility]              Chart reviewed. Attempted OT evaluation this am. Pt on hold for medical instability. Will re-attempt at later time. Thank you.     Millicent Beyer OTR/ROBINSON 2550 '

## 2020-10-22 NOTE — PLAN OF CARE
Problem: Pain:  Goal: Pain level will decrease  Description: Pain level will decrease  10/21/2020 2138 by Hans Myers  Outcome: Met This Shift     Problem: Pain:  Goal: Control of acute pain  Description: Control of acute pain  10/21/2020 2138 by Hans Arrow  Outcome: Met This Shift     Problem: Pain:  Goal: Control of chronic pain  Description: Control of chronic pain  Outcome: Met This Shift     Problem: Falls - Risk of:  Goal: Will remain free from falls  Description: Will remain free from falls  10/21/2020 2138 by Hans Arrow  Outcome: Met This Shift     Problem: Falls - Risk of:  Goal: Absence of physical injury  Description: Absence of physical injury  10/21/2020 2138 by Hans Myers  Outcome: Met This Shift

## 2020-10-22 NOTE — PROGRESS NOTES
Patient extubated to 6 L NC after tolerating PS and adequate gases obtained. Able to cough and clear secretions. O2 sat 100% on nasal cannula.  Will continue to monitor

## 2020-10-23 ENCOUNTER — APPOINTMENT (OUTPATIENT)
Dept: GENERAL RADIOLOGY | Age: 75
DRG: 233 | End: 2020-10-23
Attending: FAMILY MEDICINE
Payer: MEDICARE

## 2020-10-23 LAB
ALBUMIN SERPL-MCNC: 3.1 G/DL (ref 3.5–5.2)
ALBUMIN SERPL-MCNC: 3.1 G/DL (ref 3.5–5.2)
ALP BLD-CCNC: 75 U/L (ref 40–129)
ALP BLD-CCNC: 87 U/L (ref 40–129)
ALT SERPL-CCNC: 59 U/L (ref 0–40)
ALT SERPL-CCNC: 64 U/L (ref 0–40)
ANION GAP SERPL CALCULATED.3IONS-SCNC: 11 MMOL/L (ref 7–16)
ANION GAP SERPL CALCULATED.3IONS-SCNC: 13 MMOL/L (ref 7–16)
AST SERPL-CCNC: 204 U/L (ref 0–39)
AST SERPL-CCNC: 269 U/L (ref 0–39)
B.E.: 3.2 MMOL/L (ref -3–3)
BILIRUB SERPL-MCNC: 1.9 MG/DL (ref 0–1.2)
BILIRUB SERPL-MCNC: 2.2 MG/DL (ref 0–1.2)
BUN BLDV-MCNC: 47 MG/DL (ref 8–23)
BUN BLDV-MCNC: 48 MG/DL (ref 8–23)
CALCIUM SERPL-MCNC: 8 MG/DL (ref 8.6–10.2)
CALCIUM SERPL-MCNC: 8.1 MG/DL (ref 8.6–10.2)
CHLORIDE BLD-SCNC: 99 MMOL/L (ref 98–107)
CHLORIDE BLD-SCNC: 99 MMOL/L (ref 98–107)
CO2: 25 MMOL/L (ref 22–29)
CO2: 28 MMOL/L (ref 22–29)
COHB: 0.8 % (ref 0–1.5)
CREAT SERPL-MCNC: 1.8 MG/DL (ref 0.7–1.2)
CREAT SERPL-MCNC: 1.8 MG/DL (ref 0.7–1.2)
CRITICAL: ABNORMAL
DATE ANALYZED: ABNORMAL
DATE OF COLLECTION: ABNORMAL
FERRITIN: 4969 NG/ML
GFR AFRICAN AMERICAN: 44
GFR AFRICAN AMERICAN: 44
GFR NON-AFRICAN AMERICAN: 37 ML/MIN/1.73
GFR NON-AFRICAN AMERICAN: 37 ML/MIN/1.73
GLUCOSE BLD-MCNC: 129 MG/DL (ref 74–99)
GLUCOSE BLD-MCNC: 85 MG/DL (ref 74–99)
HCO3: 27 MMOL/L (ref 22–26)
HCT VFR BLD CALC: 34 % (ref 37–54)
HEMOGLOBIN: 11.3 G/DL (ref 12.5–16.5)
HHB: 6.3 % (ref 0–5)
IRON SATURATION: 63 % (ref 20–55)
IRON: 49 MCG/DL (ref 59–158)
LAB: ABNORMAL
LACTIC ACID: 0.9 MMOL/L (ref 0.5–2.2)
LACTIC ACID: 1 MMOL/L (ref 0.5–2.2)
LACTIC ACID: 1.2 MMOL/L (ref 0.5–2.2)
Lab: ABNORMAL
MAGNESIUM: 1.7 MG/DL (ref 1.6–2.6)
MCH RBC QN AUTO: 33 PG (ref 26–35)
MCHC RBC AUTO-ENTMCNC: 33.2 % (ref 32–34.5)
MCV RBC AUTO: 99.4 FL (ref 80–99.9)
METER GLUCOSE: 101 MG/DL (ref 74–99)
METER GLUCOSE: 155 MG/DL (ref 74–99)
METER GLUCOSE: 85 MG/DL (ref 74–99)
METER GLUCOSE: 96 MG/DL (ref 74–99)
METER GLUCOSE: 97 MG/DL (ref 74–99)
METER GLUCOSE: 98 MG/DL (ref 74–99)
METHB: 0 % (ref 0–1.5)
MODE: ABNORMAL
O2 SATURATION: 93.6 % (ref 92–98.5)
O2HB: 92.9 % (ref 94–97)
OPERATOR ID: 1632
PATIENT TEMP: 37 C
PCO2: 38.2 MMHG (ref 35–45)
PDW BLD-RTO: 18.1 FL (ref 11.5–15)
PH BLOOD GAS: 7.47 (ref 7.35–7.45)
PLATELET # BLD: 145 E9/L (ref 130–450)
PMV BLD AUTO: 11.6 FL (ref 7–12)
PO2: 70.8 MMHG (ref 75–100)
POTASSIUM SERPL-SCNC: 3.2 MMOL/L (ref 3.5–5)
POTASSIUM SERPL-SCNC: 3.3 MMOL/L (ref 3.5–5)
RBC # BLD: 3.42 E12/L (ref 3.8–5.8)
SODIUM BLD-SCNC: 137 MMOL/L (ref 132–146)
SODIUM BLD-SCNC: 138 MMOL/L (ref 132–146)
SOURCE, BLOOD GAS: ABNORMAL
THB: 12.3 G/DL (ref 11.5–16.5)
TIME ANALYZED: 447
TOTAL IRON BINDING CAPACITY: 78 MCG/DL (ref 250–450)
TOTAL PROTEIN: 5.2 G/DL (ref 6.4–8.3)
TOTAL PROTEIN: 5.5 G/DL (ref 6.4–8.3)
WBC # BLD: 14.8 E9/L (ref 4.5–11.5)

## 2020-10-23 PROCEDURE — 83550 IRON BINDING TEST: CPT

## 2020-10-23 PROCEDURE — 71045 X-RAY EXAM CHEST 1 VIEW: CPT

## 2020-10-23 PROCEDURE — 6360000002 HC RX W HCPCS: Performed by: THORACIC SURGERY (CARDIOTHORACIC VASCULAR SURGERY)

## 2020-10-23 PROCEDURE — 82805 BLOOD GASES W/O2 SATURATION: CPT

## 2020-10-23 PROCEDURE — 83540 ASSAY OF IRON: CPT

## 2020-10-23 PROCEDURE — 02HV33Z INSERTION OF INFUSION DEVICE INTO SUPERIOR VENA CAVA, PERCUTANEOUS APPROACH: ICD-10-PCS | Performed by: THORACIC SURGERY (CARDIOTHORACIC VASCULAR SURGERY)

## 2020-10-23 PROCEDURE — 37799 UNLISTED PX VASCULAR SURGERY: CPT

## 2020-10-23 PROCEDURE — 2500000003 HC RX 250 WO HCPCS: Performed by: INTERNAL MEDICINE

## 2020-10-23 PROCEDURE — 6370000000 HC RX 637 (ALT 250 FOR IP): Performed by: NURSE PRACTITIONER

## 2020-10-23 PROCEDURE — 6370000000 HC RX 637 (ALT 250 FOR IP): Performed by: THORACIC SURGERY (CARDIOTHORACIC VASCULAR SURGERY)

## 2020-10-23 PROCEDURE — P9047 ALBUMIN (HUMAN), 25%, 50ML: HCPCS | Performed by: NURSE PRACTITIONER

## 2020-10-23 PROCEDURE — 85027 COMPLETE CBC AUTOMATED: CPT

## 2020-10-23 PROCEDURE — 80053 COMPREHEN METABOLIC PANEL: CPT

## 2020-10-23 PROCEDURE — 6360000002 HC RX W HCPCS: Performed by: NURSE PRACTITIONER

## 2020-10-23 PROCEDURE — 2000000000 HC ICU R&B

## 2020-10-23 PROCEDURE — 82728 ASSAY OF FERRITIN: CPT

## 2020-10-23 PROCEDURE — 83605 ASSAY OF LACTIC ACID: CPT

## 2020-10-23 PROCEDURE — 76937 US GUIDE VASCULAR ACCESS: CPT

## 2020-10-23 PROCEDURE — 94640 AIRWAY INHALATION TREATMENT: CPT

## 2020-10-23 PROCEDURE — 2700000000 HC OXYGEN THERAPY PER DAY

## 2020-10-23 PROCEDURE — 36569 INSJ PICC 5 YR+ W/O IMAGING: CPT

## 2020-10-23 PROCEDURE — 2580000003 HC RX 258: Performed by: NURSE PRACTITIONER

## 2020-10-23 PROCEDURE — C1751 CATH, INF, PER/CENT/MIDLINE: HCPCS

## 2020-10-23 PROCEDURE — 83735 ASSAY OF MAGNESIUM: CPT

## 2020-10-23 PROCEDURE — 36415 COLL VENOUS BLD VENIPUNCTURE: CPT

## 2020-10-23 PROCEDURE — 99291 CRITICAL CARE FIRST HOUR: CPT | Performed by: NURSE PRACTITIONER

## 2020-10-23 PROCEDURE — 82962 GLUCOSE BLOOD TEST: CPT

## 2020-10-23 PROCEDURE — 2580000003 HC RX 258: Performed by: THORACIC SURGERY (CARDIOTHORACIC VASCULAR SURGERY)

## 2020-10-23 RX ORDER — SODIUM CHLORIDE 0.9 % (FLUSH) 0.9 %
10 SYRINGE (ML) INJECTION PRN
Status: DISCONTINUED | OUTPATIENT
Start: 2020-10-23 | End: 2020-10-25

## 2020-10-23 RX ORDER — LIDOCAINE HYDROCHLORIDE 10 MG/ML
5 INJECTION, SOLUTION EPIDURAL; INFILTRATION; INTRACAUDAL; PERINEURAL ONCE
Status: DISCONTINUED | OUTPATIENT
Start: 2020-10-23 | End: 2020-10-25

## 2020-10-23 RX ORDER — HEPARIN SODIUM (PORCINE) LOCK FLUSH IV SOLN 100 UNIT/ML 100 UNIT/ML
3 SOLUTION INTRAVENOUS PRN
Status: DISCONTINUED | OUTPATIENT
Start: 2020-10-23 | End: 2020-10-25

## 2020-10-23 RX ORDER — AMIODARONE HYDROCHLORIDE 50 MG/ML
INJECTION, SOLUTION INTRAVENOUS
Status: DISPENSED
Start: 2020-10-23 | End: 2020-10-23

## 2020-10-23 RX ORDER — ALBUMIN (HUMAN) 12.5 G/50ML
25 SOLUTION INTRAVENOUS ONCE
Status: COMPLETED | OUTPATIENT
Start: 2020-10-23 | End: 2020-10-23

## 2020-10-23 RX ORDER — HEPARIN SODIUM (PORCINE) LOCK FLUSH IV SOLN 100 UNIT/ML 100 UNIT/ML
3 SOLUTION INTRAVENOUS EVERY 12 HOURS SCHEDULED
Status: DISCONTINUED | OUTPATIENT
Start: 2020-10-23 | End: 2020-10-25

## 2020-10-23 RX ADMIN — MAGNESIUM SULFATE HEPTAHYDRATE 2 G: 40 INJECTION, SOLUTION INTRAVENOUS at 06:20

## 2020-10-23 RX ADMIN — Medication 10 ML: at 21:47

## 2020-10-23 RX ADMIN — Medication 10 ML: at 09:09

## 2020-10-23 RX ADMIN — PANTOPRAZOLE SODIUM 40 MG: 40 TABLET, DELAYED RELEASE ORAL at 09:08

## 2020-10-23 RX ADMIN — Medication 1000 MCG: at 09:08

## 2020-10-23 RX ADMIN — LATANOPROST 1 DROP: 50 SOLUTION OPHTHALMIC at 21:43

## 2020-10-23 RX ADMIN — SENNOSIDES 8.6 MG: 8.6 TABLET, FILM COATED ORAL at 21:45

## 2020-10-23 RX ADMIN — TIMOLOL MALEATE 1 DROP: 5 SOLUTION OPHTHALMIC at 09:08

## 2020-10-23 RX ADMIN — ASPIRIN 81 MG: 81 TABLET, FILM COATED ORAL at 09:08

## 2020-10-23 RX ADMIN — ALBUMIN (HUMAN) 25 G: 0.25 INJECTION, SOLUTION INTRAVENOUS at 09:19

## 2020-10-23 RX ADMIN — ACETAMINOPHEN 1000 MG: 500 TABLET ORAL at 05:14

## 2020-10-23 RX ADMIN — POTASSIUM CHLORIDE 20 MEQ: 400 INJECTION, SOLUTION INTRAVENOUS at 06:20

## 2020-10-23 RX ADMIN — DOCUSATE SODIUM 50 MG AND SENNOSIDES 8.6 MG 1 TABLET: 8.6; 5 TABLET, FILM COATED ORAL at 09:09

## 2020-10-23 RX ADMIN — IPRATROPIUM BROMIDE AND ALBUTEROL SULFATE 1 AMPULE: 2.5; .5 SOLUTION RESPIRATORY (INHALATION) at 08:41

## 2020-10-23 RX ADMIN — HEPARIN SODIUM 5000 UNITS: 10000 INJECTION INTRAVENOUS; SUBCUTANEOUS at 05:14

## 2020-10-23 RX ADMIN — Medication 1000 MCG: at 21:48

## 2020-10-23 RX ADMIN — HEPARIN 300 UNITS: 100 SYRINGE at 21:43

## 2020-10-23 RX ADMIN — BUMETANIDE 1 MG: 0.25 INJECTION INTRAMUSCULAR; INTRAVENOUS at 21:42

## 2020-10-23 RX ADMIN — DOCUSATE SODIUM 50 MG AND SENNOSIDES 8.6 MG 1 TABLET: 8.6; 5 TABLET, FILM COATED ORAL at 21:45

## 2020-10-23 RX ADMIN — MUPIROCIN: 20 OINTMENT TOPICAL at 09:09

## 2020-10-23 RX ADMIN — MAGNESIUM GLUCONATE 500 MG ORAL TABLET 200 MG: 500 TABLET ORAL at 09:08

## 2020-10-23 RX ADMIN — DEXTROSE MONOHYDRATE 150 MG: 50 INJECTION, SOLUTION INTRAVENOUS at 07:08

## 2020-10-23 RX ADMIN — AMIODARONE HYDROCHLORIDE 1 MG/MIN: 50 INJECTION, SOLUTION INTRAVENOUS at 23:52

## 2020-10-23 RX ADMIN — IPRATROPIUM BROMIDE AND ALBUTEROL SULFATE 1 AMPULE: 2.5; .5 SOLUTION RESPIRATORY (INHALATION) at 16:52

## 2020-10-23 RX ADMIN — OXYCODONE 5 MG: 5 TABLET ORAL at 02:15

## 2020-10-23 RX ADMIN — MUPIROCIN: 20 OINTMENT TOPICAL at 21:44

## 2020-10-23 RX ADMIN — EPINEPHRINE 1.5 MCG/MIN: 1 INJECTION INTRAMUSCULAR; INTRAVENOUS; SUBCUTANEOUS at 05:16

## 2020-10-23 RX ADMIN — IPRATROPIUM BROMIDE AND ALBUTEROL SULFATE 1 AMPULE: 2.5; .5 SOLUTION RESPIRATORY (INHALATION) at 12:42

## 2020-10-23 RX ADMIN — POTASSIUM CHLORIDE 20 MEQ: 400 INJECTION, SOLUTION INTRAVENOUS at 18:27

## 2020-10-23 RX ADMIN — INSULIN LISPRO 3 UNITS: 100 INJECTION, SOLUTION INTRAVENOUS; SUBCUTANEOUS at 16:30

## 2020-10-23 RX ADMIN — TIMOLOL MALEATE 1 DROP: 5 SOLUTION OPHTHALMIC at 21:46

## 2020-10-23 RX ADMIN — AMIODARONE HYDROCHLORIDE 1 MG/MIN: 50 INJECTION, SOLUTION INTRAVENOUS at 14:52

## 2020-10-23 RX ADMIN — AMIODARONE HYDROCHLORIDE 1 MG/MIN: 50 INJECTION, SOLUTION INTRAVENOUS at 08:14

## 2020-10-23 RX ADMIN — BUMETANIDE 1 MG: 0.25 INJECTION INTRAMUSCULAR; INTRAVENOUS at 09:09

## 2020-10-23 RX ADMIN — IPRATROPIUM BROMIDE AND ALBUTEROL SULFATE 1 AMPULE: 2.5; .5 SOLUTION RESPIRATORY (INHALATION) at 21:22

## 2020-10-23 RX ADMIN — HEPARIN SODIUM 5000 UNITS: 10000 INJECTION INTRAVENOUS; SUBCUTANEOUS at 13:39

## 2020-10-23 RX ADMIN — SODIUM CHLORIDE 30 ML/HR: 9 INJECTION, SOLUTION INTRAVENOUS at 05:17

## 2020-10-23 RX ADMIN — ACETAMINOPHEN 1000 MG: 500 TABLET ORAL at 13:39

## 2020-10-23 RX ADMIN — HEPARIN SODIUM 5000 UNITS: 10000 INJECTION INTRAVENOUS; SUBCUTANEOUS at 22:00

## 2020-10-23 RX ADMIN — ACETAMINOPHEN 1000 MG: 500 TABLET ORAL at 21:42

## 2020-10-23 ASSESSMENT — PAIN DESCRIPTION - ORIENTATION: ORIENTATION: MID

## 2020-10-23 ASSESSMENT — PAIN DESCRIPTION - LOCATION: LOCATION: CHEST

## 2020-10-23 ASSESSMENT — PAIN SCALES - GENERAL
PAINLEVEL_OUTOF10: 0
PAINLEVEL_OUTOF10: 5
PAINLEVEL_OUTOF10: 0
PAINLEVEL_OUTOF10: 5
PAINLEVEL_OUTOF10: 0

## 2020-10-23 ASSESSMENT — PAIN DESCRIPTION - ONSET: ONSET: ON-GOING

## 2020-10-23 ASSESSMENT — PAIN DESCRIPTION - DESCRIPTORS: DESCRIPTORS: DISCOMFORT;SORE

## 2020-10-23 ASSESSMENT — PAIN - FUNCTIONAL ASSESSMENT: PAIN_FUNCTIONAL_ASSESSMENT: PREVENTS OR INTERFERES SOME ACTIVE ACTIVITIES AND ADLS

## 2020-10-23 ASSESSMENT — PAIN DESCRIPTION - PAIN TYPE: TYPE: SURGICAL PAIN

## 2020-10-23 ASSESSMENT — PAIN DESCRIPTION - FREQUENCY: FREQUENCY: CONTINUOUS

## 2020-10-23 NOTE — PROGRESS NOTES
Physical Therapy  Physical Therapy Attempt    Name: Vidya Connors  : 1945  MRN: [id number]      Date of Service: 10/23/2020  Chart reviewed. Spoke with NP - who requested to hold this morning due to Afib with RVR. Will re-attempt as able.     Irene Saldana, PT, DPT  [id number]

## 2020-10-23 NOTE — PROGRESS NOTES
DAILY PROGRESS NOTE - THE HEART CENTER    SUBJECTIVE:    Underwent CABG x3 vessels 10/19. LIMA to LAD, vein graft to obtuse marginal and PDA. He lost his blood pressure and pulse 10/21 and was intubated. LVEF around 50% with dilated hypokinetic right ventricle and moderate pulmonary hypertension. Off of pressors at this time and has been extubated. Very fatigued and does not speak while in bed. Developed rapid atrial fibrillation requiring IV amiodarone infusion which was stopped as he converted to sinus rhythm. Hypertension, hyperlipidemia, diabetes, COPD, right CEA in the past.  Paroxysmal atrial fibrillation with elevated LFTs and renal failure recently. In sinus rhythm on telemetry but he was placed on IV amiodarone infusion prophylactically. OBJECTIVE:    His vital signs were reviewed today.   Pulse 75 and sinus, respiratory rate 16    Vitals:    10/23/20 0746   BP: (!) 114/55   Pulse:    Resp:    Temp:    SpO2:        Scheduled Meds:   amiodarone        bumetanide  1 mg Intravenous BID    magnesium oxide  200 mg Oral Daily    acetaminophen  1,000 mg Oral 3 times per day    heparin (porcine)  5,000 Units Subcutaneous Q8H    timolol  1 drop Left Eye BID    vitamin B-12  1,000 mcg Oral BID    sodium chloride flush  10 mL Intravenous 2 times per day    sennosides-docusate sodium  1 tablet Oral BID    pantoprazole  40 mg Oral Daily    mupirocin   Nasal BID    aspirin  81 mg Oral Daily    ipratropium-albuterol  1 ampule Inhalation Q4H WA    insulin glargine  0.15 Units/kg Subcutaneous Nightly    senna  1 tablet Oral Nightly    insulin lispro  0-18 Units Subcutaneous Q4H    latanoprost  1 drop Both Eyes Nightly     Continuous Infusions:   amiodarone 450mg/250ml D5W infusion      DOBUTamine Stopped (10/23/20 0700)    sodium chloride 30 mL/hr (10/23/20 0517)    sodium chloride      norepinephrine Stopped (10/21/20 1010)    clevidipine      insulin      dextrose      EPINEPHrine infusion 2 mcg/min (10/23/20 0743)     PRN Meds:.perflutren lipid microspheres, sodium chloride flush, potassium chloride, magnesium sulfate, acetaminophen, acetaminophen, oxyCODONE **OR** oxyCODONE HCl, fentanNYL **OR** fentanNYL, magnesium hydroxide, ondansetron, albumin human, sodium chloride, norepinephrine, clevidipine, insulin, glucose, dextrose, glucagon (rDNA), dextrose, calcium gluconate IVPB    REVIEW OF SYSTEMS: Not obtainable due to intubated state. FAMILY HISTORY: Negative for CAD in first-degree relatives. SOCIAL HISTORY: Negative for alcohol, tobacco, or illicit drug use. PHYSICAL EXAM:    General Appearance: Extubated in bed and very fatigued-appearing. Neck:  no bruits  Lungs:  Normal expansion. Clear to auscultation. No rales, rhonchi, or wheezing. Heart:  Heart sounds are normal.  Regular rate and rhythm without murmur, gallop or rub. Abdomen:  Soft, non-tender. Extremities: Extremities warm to touch, pink, with no edema. Neuro/musculosketal:  Unremarkable. LABS:    Recent Labs     10/22/20  0600 10/22/20  1820 10/23/20  0430    139 137   CREATININE 2.4* 2.1* 1.8*       Recent Labs     10/21/20  0355 10/22/20  0600 10/23/20  0430   HGB 9.3* 8.3* 11.3*       No results for input(s): INR in the last 72 hours. IMPRESSION:  #1. CAD post non-STEMI and CABG-now extubated. Unclear as to why blood pressure dropped but now off the ventilator and blood pressure stable. Continue aspirin. Supportive care. #2.  Paroxysmal atrial fibrillation-converted to sinus rhythm on IV amiodarone infusion. Consider oral amiodarone to maintain sinus rhythm but will defer to CT surgery service. #3. Hypertension-controlled. #4. Hyperlipidemia-no statin pending liver enzymes trend. #5.  Diabetes-per intensivist.    #6.  Renal failure-creatinine increased to 2.4 but now decreasing and is 1.8 today. Nephrology following. On IV Bumex. #7.  Continue to follow.

## 2020-10-23 NOTE — PROGRESS NOTES
Comprehensive Nutrition Assessment    Type and Reason for Visit:  Reassess    Nutrition Assessment:  Pt admit 2/2 abd pain. Noted CAD s/p CABG x3, heart cath. H/o DM. PO diet advanced w/ poor intake, ONS ordered. Malnutrition Assessment:  Malnutrition Status:  Insufficient data    Context:  Acute Illness     Findings of the 6 clinical characteristics of malnutrition:  Energy Intake:  No significant decrease in energy intake  Weight Loss:  Unable to assess(d/t possible fluid shifts)     Body Fat Loss:  Unable to assess(data not available to assess at this time)     Muscle Mass Loss:  Unable to assess(data not available to assess at this time)    Fluid Accumulation:  No significant fluid accumulation     Strength:  Not Performed    Estimated Daily Nutrient Needs:  Energy (kcal):  MSJ 1486 x1.2= 1783; ; Weight Used for Energy Requirements:  Admission     Protein (g):  110-145(1.5-2.0gm/kg admit wt); Weight Used for Protein Requirements:  Admission          Nutrition Related Findings:  +I&Os (+2.2 L), no edema, active BS, A&O x 4      Wounds:  Multiple, Surgical Wound       Current Nutrition Therapies:    DIET CARDIAC; Carb Control: 5 carbs/meal (approximate 2000 kcals/day)  Dietary Nutrition Supplements: Diabetic Oral Supplement    Anthropometric Measures:  · Height: 5' 11\" (180.3 cm)  · Current Body Weight: 163 lb (73.9 kg)(10/19 actual; 174# 10/23 actual)   · Usual Body Weight: 168 lb (76.2 kg)(05/20 per EMR, actual)     · Ideal Body Weight: 172 lbs; % Ideal Body Weight 94.8 %   · BMI: 22.7  · BMI Categories: Normal Weight (BMI 18.5-24. 9)       Nutrition Diagnosis:   · Increased nutrient needs related to cardiac dysfunction as evidenced by wounds    Nutrition Interventions:   Food and/or Nutrient Delivery:  Continue Current Diet, Continue Oral Nutrition Supplement  Nutrition Education/Counseling:  Education not indicated   Coordination of Nutrition Care:  Continued Inpatient Monitoring, Coordination of Community Care    Goals:  Pt to consume >50% of meals and ONS       Nutrition Monitoring and Evaluation:   Food/Nutrient Intake Outcomes:  Food and Nutrient Intake, Supplement Intake  Physical Signs/Symptoms Outcomes:  Biochemical Data, Nutrition Focused Physical Findings, Skin, Weight, GI Status, Fluid Status or Edema, Hemodynamic Status       Electronically signed by Lester Carolina MS, RD, LD on 10/23/20 at 11:41 AM EDT

## 2020-10-23 NOTE — PROCEDURES
[address]                  [address] [facility],  [facility]                               PULMONARY FUNCTION    PATIENT NAME: Olivia Ramos               :        1945  MED REC NO:   [id number]                            ROOM:       5169  ACCOUNT NO:   [de-identified]                           ADMIT DATE: 10/10/2020  PROVIDER:     Karin Nash MD    DATE OF PROCEDURE:  10/22/2020    FINDINGS:  Starting with spirometry, FVC 2.19 which is 58. FEV1 1.30  which is 46. FEV1/FVC 59 which is 78 of predicted. Lung volumes, slow  vital capacity 1.48 which is 39. ERV 0.26 which is 40. Diffusion  capacity 6. 13 which is 20, corrects for alveolar volume to 65. IMPRESSION:  This spirometry is showing evidence of severe obstructive  lung disease with moderate reduction in diffusion capacity. Most likely  diagnosis is COPD, emphysema. Clinical and radiological correlation  with full PFT and the patient's history is indicated.         Manpreet Lau MD    D: 10/22/2020 11:14:17       T: 10/22/2020 11:22:02     [id number]  Job#: [id number]     Doc#: [id number]    CC:

## 2020-10-23 NOTE — PROGRESS NOTES
Department of Internal Medicine  Nephrology Progress Note    Events reviewed. SUBJECTIVE:  We are following MrCeci Chery [facility] for MARIAH on CKD. He was seen with his wife sitting at his bedside. He reports no new complaints.     PHYSICAL EXAM:    VITALS:  BP (!) 133/55   Pulse 62   Temp 97.8 °F (36.6 °C) (Oral)   Resp 22   Ht 5' 11\" (1.803 m)   Wt 174 lb 6.1 oz (79.1 kg)   SpO2 97%   BMI 24.32 kg/m²   24HR INTAKE/OUTPUT:      Intake/Output Summary (Last 24 hours) at 10/23/2020 1150  Last data filed at 10/23/2020 1100  Gross per 24 hour   Intake 1461.57 ml   Output 3312 ml   Net -1850.43 ml       Constitutional: alert, in no distress  HEENT: Normocephalic, atraumatic, PERRL  Respiratory:  Fine rales bilaterally, requiring 4L via nasal cannula  Cardiovascular/Edema: Heart sounds regular   Gastrointestinal:  Soft, nontender, nondistended  Neurologic:    Nonfocal  Skin:  Warm, dry  Other:  3+ pitting edema generalized  L chest tube  Nix patent and draining  Right IJ introducer, R brachial arterial line    Scheduled Meds:   amiodarone        lidocaine PF  5 mL Intradermal Once    heparin flush  3 mL Intravenous 2 times per day    bumetanide  1 mg Intravenous BID    magnesium oxide  200 mg Oral Daily    acetaminophen  1,000 mg Oral 3 times per day    heparin (porcine)  5,000 Units Subcutaneous Q8H    timolol  1 drop Left Eye BID    vitamin B-12  1,000 mcg Oral BID    sodium chloride flush  10 mL Intravenous 2 times per day    sennosides-docusate sodium  1 tablet Oral BID    pantoprazole  40 mg Oral Daily    mupirocin   Nasal BID    aspirin  81 mg Oral Daily    ipratropium-albuterol  1 ampule Inhalation Q4H WA    insulin glargine  0.15 Units/kg Subcutaneous Nightly    senna  1 tablet Oral Nightly    insulin lispro  0-18 Units Subcutaneous Q4H    latanoprost  1 drop Both Eyes Nightly     Continuous Infusions:   amiodarone 450mg/250ml D5W infusion 1 mg/min (10/23/20 0814)    DOBUTamine 2.5 mcg/kg/min (10/23/20 0816)    sodium chloride 30 mL/hr (10/23/20 0517)    sodium chloride      norepinephrine Stopped (10/21/20 1010)    clevidipine      insulin      dextrose      EPINEPHrine infusion 1 mcg/min (10/23/20 0948)     PRN Meds:.sodium chloride flush, heparin flush, perflutren lipid microspheres, sodium chloride flush, potassium chloride, magnesium sulfate, acetaminophen, acetaminophen, oxyCODONE **OR** oxyCODONE HCl, fentanNYL **OR** fentanNYL, magnesium hydroxide, ondansetron, albumin human, sodium chloride, norepinephrine, clevidipine, insulin, glucose, dextrose, glucagon (rDNA), dextrose, calcium gluconate IVPB    DATA:    CBC:   Lab Results   Component Value Date    WBC 14.8 10/23/2020    RBC 3.42 10/23/2020    HGB 11.3 10/23/2020    HCT 34.0 10/23/2020    MCV 99.4 10/23/2020    MCH 33.0 10/23/2020    MCHC 33.2 10/23/2020    RDW 18.1 10/23/2020     10/23/2020    MPV 11.6 10/23/2020     CMP:    Lab Results   Component Value Date     10/23/2020    K 3.2 10/23/2020    K 3.6 10/19/2020    CL 99 10/23/2020    CO2 25 10/23/2020    BUN 47 10/23/2020    CREATININE 1.8 10/23/2020    GFRAA 44 10/23/2020    AGRATIO 0.9 10/10/2020    LABGLOM 37 10/23/2020    GLUCOSE 85 10/23/2020    PROT 5.2 10/23/2020    LABALBU 3.1 10/23/2020    CALCIUM 8.0 10/23/2020    BILITOT 2.2 10/23/2020    ALKPHOS 87 10/23/2020     10/23/2020    ALT 64 10/23/2020     Magnesium:    Lab Results   Component Value Date    MG 1.7 10/23/2020     Phosphorus:  No results found for: PHOS     Urine sodium: 71  Urine potassium: 51.9  Urine chloride: 68  Urine creatinine: 118  Urine albumin/creatinine: 184 mg/grams  Urine protein/creatinine: 0.5 mg/grams    proBNP: 29,713      Radiology Review:      Nuclear medicine clinic with flow and function without pharmacological intervention October 12, 2020   Findings:         The patient was injected with 10 mCi of technetium MAG3 intravenously              Split uptake on the left was 47.8  percent, time to peak was 4.5    minutes, one half clearance time was 18.9         Split uptake on the right was 52.2 percent, time to peak was 0.5    minutes, one half clearance time was 23.4 minutes         Renal curves demonstrate perfusion to be delayed. Extraction was delayed. Excretion was delayed. Lasix was not administered         Impression:  Diminished perfusion and extraction and excretion, the findings are    compatible with bilateral renal artery stenosis versus ATN versus    chronic renal failure                CTA pulmonary with IV contrast October 10, 2020                    IMPRESSION:               CT Angiogram Chest and Pulmonary Arteries with contrast and high resolution 2-D and 3-D images:               1. No evidence of pulmonary emboli by CT technique.               2. Small bilateral pleural effusions with dependent atelectasis.            Dictated ByPortia Arvizu MD   DD/DT: 10/10/20 1022                  Signed ByPortia Arvizu MD 10/10/20 1022                    : [facility]      CT Abdomen and pelvis  W/o contrast 10/9/2020               IMPRESSION:       1.   Mild bilateral perinephric stranding is again identified (without hydronephrosis upon       bilateral assessment).               2.  Mild urinary bladder wall thickening is identified.  Surgical absence of prostate gland.               3.   Moderate colonic diverticulosis is identified (sigmoid predominance) without acute       diverticulitis.  Nondistended sigmoid colon extends into left inguinal hernia defect.               4. Pleural parenchymal scarring is again identified in right lower lobe, slightly more pronounced       than on prior examination.               5. Significant vascular calcifications are identified (including greater than 50% degree of       stenosis bilaterally in renal arteries, duplicated bilaterally).             Dictated By: Ladan Mathew MD   DD/DT: 10/09/20 0813

## 2020-10-23 NOTE — CARE COORDINATION
KYLER unable to see Pt due to placement of [address] being placed. Pt was extubated. Remains on the following IV: Amiodarone, dobutamine. Epinephrine, norepinephrine, Vasopressin. [facility] - Terris Canavan is following the Pt. Waiting to see if therapy scores improve closer to discharge. KYLER/LEO to follow for discharge needs.     Josemanuel Mcqueen, L.S.W.  [phone]

## 2020-10-23 NOTE — PROGRESS NOTES
CVICU Progress Note    Name: Reji Slater  MRN: [id number]    CC: Postoperative Critical Care Management     Indication for Surgery/Procedure: CAD  LVEF:  30% pre op; 40% postop  RVF:  NML     Important/Relevant PMH/PSH: HTN, HPL,PAF,  COPD, carotid artery stenosis s/p carotid artery endarterectomy ~2012, DM Type2, pulmonary nodules, GERD, hiatal hernia, OA, h/o prostate CA     Procedure/Surgeries: 10/19/2020 CABG x3 (LIMA-LAD, SVG-OM, SVG-PDA), LAAL, EVH, KLS Plating       Pacing wires: Ventricular        Intake/Output Summary (Last 24 hours) at 10/23/2020 1012  Last data filed at 10/23/2020 0900  Gross per 24 hour   Intake 1732.57 ml   Output 3232 ml   Net -1499.43 ml       Recent Labs     10/21/20  0355 10/22/20  0600 10/23/20  0430   WBC 23.3* 19.3* 14.8*   HGB 9.3* 8.3* 11.3*   HCT 29.2* 25.1* 34.0*   * 122* 145      Recent Labs     10/22/20  0600 10/22/20  1312 10/22/20  1400 10/22/20  1820 10/23/20  0430     --   --  139 137   K 3.9 3.61  --  3.5 3.2*   CL 99  --   --  99 99   CO2 23  --   --  26 25   BUN 50*  --  50* 49* 47*   CREATININE 2.4*  --   --  2.1* 1.8*   GLUCOSE 139*  --   --  98 85   CALCIUM 8.3*  --   --  8.0* 8.0*         Physical Exam:    BP (!) 115/50   Pulse 63   Temp 97.5 °F (36.4 °C) (Oral)   Resp 20   Ht 5' 11\" (1.803 m)   Wt 174 lb 6.1 oz (79.1 kg)   SpO2 97%   BMI 24.32 kg/m²       CXR Findings: 10/23/2020      Impression:      1. Left lung airspace disease which could represent a combination of   atelectasis, pneumonia and/or pleural effusion. 2. Patchy right perihilar airspace disease.    ---- CXR personally viewed and interpreted by ICU Nurse Practitioner, agree with above findings     General: Awake, alert. Feeling weak today  Eyes: PERRL, anicteric   Pulmonary: Diminished bibasilar.  No wheezes, no accessory muscle use noted   Cardiovascular:  RRR, no heaves or thrills on palpation  Tele: SR  Abdomen: Soft, nontender, + BS   Extremities: Palpable pulses all extremities, 1+ generalized edema   Neurologic/Psych: A&Ox3, RUIZ to command   Skin: Warm and dry  Incisions: MSI with andrea dressing intact, RLE SVG sites well approximated       Assessment/Plan: POD #4  1. CAD S/p CABGx3 (LIMA-LAD, SVG-OM, SVG-PDA)  - ASA, statin  - Left pleural chest tube 130cc output past 24 hours, watch drainage once OOBTC  - Heparin SQ VTE prophylaxis   -PICC line placed 10/23/2020     2. Paroxsymal atrial fibrillation   -s/p LAAL  -Post op NSR, PAF with respiratory arrest, reverted back to NSR  -Again episode of RVR this morning--lasted ~20mins reverted back to SR with amio bolus, now on gtt     3. Acute Postoperative Respiratory Failure  - 2/2 surgery, h/o pulmonary emphysema  - reintubated 10/21 following respiratory arrest, ?pulmonary source vs severe orthostatic event, CTA chest 10/10 without evidence for PE, avoid repeat CTA with renal function  -10/22 Extubated, tolerating 4L, Encourage IS, Ezpap per RT     4. Cardiac insufficiency   - H/o ICM, EF 30% preop; 40% post op  - Echo 10/21: EF 45-50% on inotropic support with stage 2 diastolic dysfunction; RV with moderate to severe dysfunction  - POD 1, levophed weaned off, lactic acid normalized   - POD2, epi at 2.5mcg, respiratory arrest with severe hypotension and bradycardia following ambulation with PT, pt did not lose pulse during this time. 1gm Calcium chloride IVP given, Epi gtt increased, levophed started then switched to vasopressin gtt  - POD3, vaso gtt weaned off overnight, epi gtt at 6.5mcg, started on dobutamine   -POD4, Wean off Epi--keep dobutamine, lactic normal   - Target MAP >70     5. Acute kidney injury on chronic kidney disease Stage 3   - Baseline Scr 1.3-1.5  - Scr peaked 2.4 following hypotensive episode a few days ago, Scr improving, diuresis per renal team     8. Acute blood loss anemia  - s/p 1prbc yesterday.  H/H stable     8. Leukocytosis   - WBC downtrending 14.8K, afebrile   - continue to monitor monitor off antibiotics     9. Shock liver  - 2/2 RV dysfunction  - improving, Continue to trend, supportive care        VTE Prophylaxis: Pharmacologic/Mechanical: Yes, SCDs/SQ heparin    Line infection prevention: Can CVC or arterial line be removed: Yes   Continued need for urinary catheter: Yes - clinical indication: Patient post major surgery requiring fluid balance and input and output measurement. This patient has a high probability of sudden deterioration, which requires preparedness to urgently intervene. I participated in the decision making process and managed the direct care of the patient that required frequent assessment and treatment. I personally spent 34minutes of critical care time treating the patient.          Dispo: CVICU    Electronically signed by ROSANGELA Norman CNP on 10/23/2020 at 10:12 AM

## 2020-10-23 NOTE — PLAN OF CARE
Problem: Pain:  Goal: Pain level will decrease  Description: Pain level will decrease  10/22/2020 2301 by Chapo Ochoa RN  Outcome: Met This Shift     Problem: Falls - Risk of:  Goal: Will remain free from falls  Description: Will remain free from falls  10/22/2020 2301 by Chapo Ochoa RN  Outcome: Met This Shift     Problem: Skin Integrity:  Goal: Will show no infection signs and symptoms  Description: Will show no infection signs and symptoms  10/22/2020 2301 by Chapo Ochoa RN  Outcome: Met This Shift     Problem: Discharge Planning:  Goal: Discharged to appropriate level of care  Description: Discharged to appropriate level of care  Outcome: Met This Shift     Problem: Cardiac Output - Decreased:  Goal: Cardiac output within specified parameters  Description: Cardiac output within specified parameters  10/22/2020 2301 by Chapo Ochoa RN  Outcome: Met This Shift     Problem: Cardiac Output - Decreased:  Goal: Hemodynamic stability will improve  Description: Hemodynamic stability will improve  10/22/2020 2301 by Chapo Ochoa RN  Outcome: Met This Shift     Problem: Gas Exchange - Impaired:  Goal: Levels of oxygenation will improve  Description: Levels of oxygenation will improve  10/22/2020 2301 by Chapo Ochoa RN  Outcome: Met This Shift     Problem: Pain:  Goal: Pain level will decrease  Description: Pain level will decrease  10/22/2020 2301 by Chapo Ochoa RN  Outcome: Met This Shift     Problem: Tissue Perfusion - Cardiopulmonary, Altered:  Goal: Hemodynamic stability will improve  Description: Hemodynamic stability will improve  Outcome: Met This Shift

## 2020-10-23 NOTE — PROGRESS NOTES
Hospitalist Progress Note      PCP: Allan Mejia MD    Date of Admission: 10/10/2020    Chief Complaint: **chest pain     Hospital Course: **s/p CABG, still has chest tubes**pt became hypotensive, went into a fib   The bradycardic, had to be reintubated and started on pressors. 10/21* now extubated        Subjective: ** not seen today      Medications:  Reviewed    Infusion Medications    amiodarone 450mg/250ml D5W infusion 1 mg/min (10/23/20 0814)    DOBUTamine 2.5 mcg/kg/min (10/23/20 0816)    sodium chloride 30 mL/hr (10/23/20 0517)    sodium chloride      dextrose       Scheduled Medications    amiodarone        lidocaine PF  5 mL Intradermal Once    heparin flush  3 mL Intravenous 2 times per day    bumetanide  1 mg Intravenous BID    magnesium oxide  200 mg Oral Daily    acetaminophen  1,000 mg Oral 3 times per day    heparin (porcine)  5,000 Units Subcutaneous Q8H    timolol  1 drop Left Eye BID    vitamin B-12  1,000 mcg Oral BID    sodium chloride flush  10 mL Intravenous 2 times per day    sennosides-docusate sodium  1 tablet Oral BID    pantoprazole  40 mg Oral Daily    mupirocin   Nasal BID    aspirin  81 mg Oral Daily    ipratropium-albuterol  1 ampule Inhalation Q4H WA    insulin glargine  0.15 Units/kg Subcutaneous Nightly    senna  1 tablet Oral Nightly    insulin lispro  0-18 Units Subcutaneous Q4H    latanoprost  1 drop Both Eyes Nightly     PRN Meds: sodium chloride flush, heparin flush, perflutren lipid microspheres, sodium chloride flush, potassium chloride, magnesium sulfate, acetaminophen, acetaminophen, oxyCODONE **OR** oxyCODONE HCl, fentanNYL **OR** fentanNYL, magnesium hydroxide, ondansetron, albumin human, sodium chloride, glucose, dextrose, glucagon (rDNA), dextrose, calcium gluconate IVPB      Intake/Output Summary (Last 24 hours) at 10/23/2020 1435  Last data filed at 10/23/2020 1400  Gross per 24 hour   Intake 1714.75 ml   Output 3502 ml   Net -1787.25 ml [R74.01] 10/10/2020   s/p CABG  PAF  II dM     Plan:   on amiodarone   cont ancef   cont SSI   epi   s/p extubation         DVT Prophylaxis: scd  Diet: DIET CARDIAC; Carb Control: 5 carbs/meal (approximate 2000 kcals/day)  Dietary Nutrition Supplements: Diabetic Oral Supplement  Code Status: Full Code     PT/OT Eval Status: *ordered     Dispo - home**            Electronically signed by Kaylynn Anguiano DO on 10/23/2020 at 2:35 PM [facility]

## 2020-10-24 LAB
ALBUMIN SERPL-MCNC: 3.1 G/DL (ref 3.5–5.2)
ALBUMIN SERPL-MCNC: 3.4 G/DL (ref 3.5–5.2)
ALP BLD-CCNC: 71 U/L (ref 40–129)
ALP BLD-CCNC: 75 U/L (ref 40–129)
ALT SERPL-CCNC: 57 U/L (ref 0–40)
ALT SERPL-CCNC: 60 U/L (ref 0–40)
ANION GAP SERPL CALCULATED.3IONS-SCNC: 12 MMOL/L (ref 7–16)
ANION GAP SERPL CALCULATED.3IONS-SCNC: 12 MMOL/L (ref 7–16)
AST SERPL-CCNC: 146 U/L (ref 0–39)
AST SERPL-CCNC: 184 U/L (ref 0–39)
BILIRUB SERPL-MCNC: 2 MG/DL (ref 0–1.2)
BILIRUB SERPL-MCNC: 2.1 MG/DL (ref 0–1.2)
BUN BLDV-MCNC: 45 MG/DL (ref 8–23)
BUN BLDV-MCNC: 47 MG/DL (ref 8–23)
CALCIUM SERPL-MCNC: 7.7 MG/DL (ref 8.6–10.2)
CALCIUM SERPL-MCNC: 7.9 MG/DL (ref 8.6–10.2)
CHLORIDE BLD-SCNC: 100 MMOL/L (ref 98–107)
CHLORIDE BLD-SCNC: 101 MMOL/L (ref 98–107)
CO2: 26 MMOL/L (ref 22–29)
CO2: 26 MMOL/L (ref 22–29)
CREAT SERPL-MCNC: 1.6 MG/DL (ref 0.7–1.2)
CREAT SERPL-MCNC: 1.6 MG/DL (ref 0.7–1.2)
GFR AFRICAN AMERICAN: 51
GFR AFRICAN AMERICAN: 51
GFR NON-AFRICAN AMERICAN: 42 ML/MIN/1.73
GFR NON-AFRICAN AMERICAN: 42 ML/MIN/1.73
GLUCOSE BLD-MCNC: 122 MG/DL (ref 74–99)
GLUCOSE BLD-MCNC: 130 MG/DL (ref 74–99)
HCT VFR BLD CALC: 30.1 % (ref 37–54)
HEMOGLOBIN: 9.7 G/DL (ref 12.5–16.5)
LACTIC ACID: 1 MMOL/L (ref 0.5–2.2)
MAGNESIUM: 1.9 MG/DL (ref 1.6–2.6)
MAGNESIUM: 2.1 MG/DL (ref 1.6–2.6)
MCH RBC QN AUTO: 32.9 PG (ref 26–35)
MCHC RBC AUTO-ENTMCNC: 32.2 % (ref 32–34.5)
MCV RBC AUTO: 102 FL (ref 80–99.9)
METER GLUCOSE: 113 MG/DL (ref 74–99)
METER GLUCOSE: 118 MG/DL (ref 74–99)
METER GLUCOSE: 128 MG/DL (ref 74–99)
METER GLUCOSE: 130 MG/DL (ref 74–99)
METER GLUCOSE: 147 MG/DL (ref 74–99)
PDW BLD-RTO: 17.5 FL (ref 11.5–15)
PLATELET # BLD: 144 E9/L (ref 130–450)
PMV BLD AUTO: 11.3 FL (ref 7–12)
POTASSIUM SERPL-SCNC: 3.8 MMOL/L (ref 3.5–5)
POTASSIUM SERPL-SCNC: 3.9 MMOL/L (ref 3.5–5)
POTASSIUM SERPL-SCNC: 4.1 MMOL/L (ref 3.5–5)
RBC # BLD: 2.95 E12/L (ref 3.8–5.8)
SODIUM BLD-SCNC: 138 MMOL/L (ref 132–146)
SODIUM BLD-SCNC: 139 MMOL/L (ref 132–146)
TOTAL PROTEIN: 5.1 G/DL (ref 6.4–8.3)
TOTAL PROTEIN: 5.6 G/DL (ref 6.4–8.3)
WBC # BLD: 10.6 E9/L (ref 4.5–11.5)

## 2020-10-24 PROCEDURE — 2580000003 HC RX 258: Performed by: THORACIC SURGERY (CARDIOTHORACIC VASCULAR SURGERY)

## 2020-10-24 PROCEDURE — 36415 COLL VENOUS BLD VENIPUNCTURE: CPT

## 2020-10-24 PROCEDURE — 6370000000 HC RX 637 (ALT 250 FOR IP): Performed by: THORACIC SURGERY (CARDIOTHORACIC VASCULAR SURGERY)

## 2020-10-24 PROCEDURE — 80053 COMPREHEN METABOLIC PANEL: CPT

## 2020-10-24 PROCEDURE — 6360000002 HC RX W HCPCS: Performed by: NURSE PRACTITIONER

## 2020-10-24 PROCEDURE — 2500000003 HC RX 250 WO HCPCS: Performed by: INTERNAL MEDICINE

## 2020-10-24 PROCEDURE — 83605 ASSAY OF LACTIC ACID: CPT

## 2020-10-24 PROCEDURE — 2700000000 HC OXYGEN THERAPY PER DAY

## 2020-10-24 PROCEDURE — 6360000002 HC RX W HCPCS: Performed by: INTERNAL MEDICINE

## 2020-10-24 PROCEDURE — 6370000000 HC RX 637 (ALT 250 FOR IP): Performed by: NURSE PRACTITIONER

## 2020-10-24 PROCEDURE — 37799 UNLISTED PX VASCULAR SURGERY: CPT

## 2020-10-24 PROCEDURE — 6360000002 HC RX W HCPCS: Performed by: THORACIC SURGERY (CARDIOTHORACIC VASCULAR SURGERY)

## 2020-10-24 PROCEDURE — 94640 AIRWAY INHALATION TREATMENT: CPT

## 2020-10-24 PROCEDURE — 85027 COMPLETE CBC AUTOMATED: CPT

## 2020-10-24 PROCEDURE — 2140000000 HC CCU INTERMEDIATE R&B

## 2020-10-24 PROCEDURE — 82962 GLUCOSE BLOOD TEST: CPT

## 2020-10-24 PROCEDURE — 83735 ASSAY OF MAGNESIUM: CPT

## 2020-10-24 PROCEDURE — 84132 ASSAY OF SERUM POTASSIUM: CPT

## 2020-10-24 PROCEDURE — P9047 ALBUMIN (HUMAN), 25%, 50ML: HCPCS | Performed by: INTERNAL MEDICINE

## 2020-10-24 RX ORDER — AMIODARONE HYDROCHLORIDE 200 MG/1
400 TABLET ORAL 2 TIMES DAILY
Status: DISCONTINUED | OUTPATIENT
Start: 2020-10-24 | End: 2020-10-28 | Stop reason: HOSPADM

## 2020-10-24 RX ORDER — ALBUMIN (HUMAN) 12.5 G/50ML
25 SOLUTION INTRAVENOUS ONCE
Status: COMPLETED | OUTPATIENT
Start: 2020-10-24 | End: 2020-10-24

## 2020-10-24 RX ADMIN — HEPARIN SODIUM 5000 UNITS: 10000 INJECTION INTRAVENOUS; SUBCUTANEOUS at 12:02

## 2020-10-24 RX ADMIN — IPRATROPIUM BROMIDE AND ALBUTEROL SULFATE 1 AMPULE: 2.5; .5 SOLUTION RESPIRATORY (INHALATION) at 08:49

## 2020-10-24 RX ADMIN — DOCUSATE SODIUM 50 MG AND SENNOSIDES 8.6 MG 1 TABLET: 8.6; 5 TABLET, FILM COATED ORAL at 09:46

## 2020-10-24 RX ADMIN — Medication 10 ML: at 09:46

## 2020-10-24 RX ADMIN — CALCIUM GLUCONATE 1 G: 98 INJECTION, SOLUTION INTRAVENOUS at 09:50

## 2020-10-24 RX ADMIN — TIMOLOL MALEATE 1 DROP: 5 SOLUTION OPHTHALMIC at 21:07

## 2020-10-24 RX ADMIN — AMIODARONE HYDROCHLORIDE 400 MG: 200 TABLET ORAL at 10:25

## 2020-10-24 RX ADMIN — ACETAMINOPHEN 1000 MG: 500 TABLET ORAL at 14:31

## 2020-10-24 RX ADMIN — MAGNESIUM SULFATE HEPTAHYDRATE 2 G: 40 INJECTION, SOLUTION INTRAVENOUS at 11:01

## 2020-10-24 RX ADMIN — Medication 10 ML: at 21:08

## 2020-10-24 RX ADMIN — ALBUMIN (HUMAN) 25 G: 0.25 INJECTION, SOLUTION INTRAVENOUS at 12:53

## 2020-10-24 RX ADMIN — AMIODARONE HYDROCHLORIDE 1 MG/MIN: 50 INJECTION, SOLUTION INTRAVENOUS at 07:25

## 2020-10-24 RX ADMIN — TIMOLOL MALEATE 1 DROP: 5 SOLUTION OPHTHALMIC at 09:46

## 2020-10-24 RX ADMIN — IPRATROPIUM BROMIDE AND ALBUTEROL SULFATE 1 AMPULE: 2.5; .5 SOLUTION RESPIRATORY (INHALATION) at 20:16

## 2020-10-24 RX ADMIN — POTASSIUM CHLORIDE 20 MEQ: 400 INJECTION, SOLUTION INTRAVENOUS at 14:22

## 2020-10-24 RX ADMIN — INSULIN LISPRO 3 UNITS: 100 INJECTION, SOLUTION INTRAVENOUS; SUBCUTANEOUS at 12:04

## 2020-10-24 RX ADMIN — POTASSIUM CHLORIDE 20 MEQ: 400 INJECTION, SOLUTION INTRAVENOUS at 09:26

## 2020-10-24 RX ADMIN — BUMETANIDE 1 MG: 0.25 INJECTION INTRAMUSCULAR; INTRAVENOUS at 09:46

## 2020-10-24 RX ADMIN — SENNOSIDES 8.6 MG: 8.6 TABLET, FILM COATED ORAL at 21:07

## 2020-10-24 RX ADMIN — HEPARIN SODIUM 5000 UNITS: 10000 INJECTION INTRAVENOUS; SUBCUTANEOUS at 04:45

## 2020-10-24 RX ADMIN — AMIODARONE HYDROCHLORIDE 400 MG: 200 TABLET ORAL at 21:07

## 2020-10-24 RX ADMIN — Medication 1000 MCG: at 21:07

## 2020-10-24 RX ADMIN — INSULIN GLARGINE 11 UNITS: 100 INJECTION, SOLUTION SUBCUTANEOUS at 21:25

## 2020-10-24 RX ADMIN — PANTOPRAZOLE SODIUM 40 MG: 40 TABLET, DELAYED RELEASE ORAL at 09:46

## 2020-10-24 RX ADMIN — BUMETANIDE 1 MG: 0.25 INJECTION INTRAMUSCULAR; INTRAVENOUS at 21:07

## 2020-10-24 RX ADMIN — MAGNESIUM GLUCONATE 500 MG ORAL TABLET 200 MG: 500 TABLET ORAL at 09:47

## 2020-10-24 RX ADMIN — IPRATROPIUM BROMIDE AND ALBUTEROL SULFATE 1 AMPULE: 2.5; .5 SOLUTION RESPIRATORY (INHALATION) at 12:19

## 2020-10-24 RX ADMIN — ASPIRIN 81 MG: 81 TABLET, FILM COATED ORAL at 09:47

## 2020-10-24 RX ADMIN — IPRATROPIUM BROMIDE AND ALBUTEROL SULFATE 1 AMPULE: 2.5; .5 SOLUTION RESPIRATORY (INHALATION) at 16:05

## 2020-10-24 RX ADMIN — DOCUSATE SODIUM 50 MG AND SENNOSIDES 8.6 MG 1 TABLET: 8.6; 5 TABLET, FILM COATED ORAL at 21:07

## 2020-10-24 RX ADMIN — Medication 1000 MCG: at 09:47

## 2020-10-24 RX ADMIN — LATANOPROST 1 DROP: 50 SOLUTION OPHTHALMIC at 21:07

## 2020-10-24 RX ADMIN — ACETAMINOPHEN 1000 MG: 500 TABLET ORAL at 05:59

## 2020-10-24 RX ADMIN — CALCIUM GLUCONATE 1 G: 98 INJECTION, SOLUTION INTRAVENOUS at 21:09

## 2020-10-24 RX ADMIN — HEPARIN SODIUM 5000 UNITS: 10000 INJECTION INTRAVENOUS; SUBCUTANEOUS at 21:25

## 2020-10-24 ASSESSMENT — PAIN - FUNCTIONAL ASSESSMENT: PAIN_FUNCTIONAL_ASSESSMENT: ACTIVITIES ARE NOT PREVENTED

## 2020-10-24 ASSESSMENT — PAIN DESCRIPTION - FREQUENCY: FREQUENCY: CONTINUOUS

## 2020-10-24 ASSESSMENT — PAIN DESCRIPTION - PROGRESSION: CLINICAL_PROGRESSION: NOT CHANGED

## 2020-10-24 ASSESSMENT — PAIN SCALES - GENERAL
PAINLEVEL_OUTOF10: 0
PAINLEVEL_OUTOF10: 6
PAINLEVEL_OUTOF10: 0
PAINLEVEL_OUTOF10: 3

## 2020-10-24 ASSESSMENT — PAIN DESCRIPTION - DESCRIPTORS: DESCRIPTORS: DISCOMFORT;PRESSURE;SORE

## 2020-10-24 ASSESSMENT — PAIN DESCRIPTION - LOCATION: LOCATION: ABDOMEN

## 2020-10-24 ASSESSMENT — PAIN DESCRIPTION - ORIENTATION: ORIENTATION: MID

## 2020-10-24 ASSESSMENT — PAIN DESCRIPTION - PAIN TYPE: TYPE: ACUTE PAIN

## 2020-10-24 ASSESSMENT — PAIN DESCRIPTION - ONSET: ONSET: ON-GOING

## 2020-10-24 NOTE — PROGRESS NOTES
thorax on the    earlier chest x-ray is area of trapped fluid within the oblique fissure on    the right. 2. Pleural thickening and pulmonary scarring is seen along the posterior    aspect of the right lung. 3. Small bilateral pleural effusions. 4. No pulmonary mass, lymphadenopathy or metastatic disease seen. 5. 2 mm right upper lobe nodule of doubtful clinical significance.  If the    patient is at a high risk for malignancy, per the recommendations of the    Fleischner society, follow-up CT of the chest may be obtained in 12 months. Otherwise, no future follow-up is needed. 6. Severe calcified coronary atherosclerosis. 1           CXR 10/23/2020   1. Left lung airspace disease which could represent a combination of    atelectasis, pneumonia and/or pleural effusion. 2. Patchy right perihilar airspace disease. BRIEF SUMMARY OF INITIAL CONSULT:    Briefly Prashanth Larry 89, K 69 y. o. year old male with history of CKD stage III, with mild proteinuria, baseline creatinine 1.3 to 1.5 mg/dL, HTN, type II DM, hyperlipidemia, gout, prostate cancer status post resection, emphysema, seronegative inflammatory arthritis possibly psoriatic arthritis on methotrexate, who was admitted on October 10, 2020 transferred from [facility] where he initially was admitted with abdominal pain. Probably he was hypotensive with a blood pressures loss 79/50 and he was fluid resuscitated, he was transferred for a higher level of care. His creatinine level on admission was 1.9 mg/dL reason for this consultation. His medications prior to admission included lisinopril and chlorthalidone. Problems resolved:  · HAGMA, lactic acidosis and probably ketoacidosis, anion gap improved  · Hypotension,multifactroial, cardiogenic, 2/2 to MI, AF  · Hypokalemia, secondary to diuretics, potassium level improved  · Elevated LFTs, likely shock liver    IMPRESSION/RECOMMENDATIONS:      1.  MARIAH on CKD,

## 2020-10-24 NOTE — PROGRESS NOTES
Hospitalist Progress Note      PCP: Toni Maria MD    Date of Admission: 10/10/2020    Chief Complaint: *chest pain     Hospital Course: **s/p CABG, still has chest tubes**pt became hypotensive, went into a fib   The bradycardic, had to be reintubated and started on pressors. 10/21* now extubated  On dobutamine    **    Subjective: * feels weak      Medications:  Reviewed    Infusion Medications    amiodarone 450mg/250ml D5W infusion 0.5 mg/min (10/24/20 1031)    DOBUTamine 2.5 mcg/kg/min (10/24/20 0000)    sodium chloride 30 mL/hr (10/24/20 0000)    sodium chloride      dextrose       Scheduled Medications    amiodarone  400 mg Oral BID    albumin human  25 g Intravenous Once    lidocaine PF  5 mL Intradermal Once    heparin flush  3 mL Intravenous 2 times per day    bumetanide  1 mg Intravenous BID    magnesium oxide  200 mg Oral Daily    acetaminophen  1,000 mg Oral 3 times per day    heparin (porcine)  5,000 Units Subcutaneous Q8H    timolol  1 drop Left Eye BID    vitamin B-12  1,000 mcg Oral BID    sodium chloride flush  10 mL Intravenous 2 times per day    sennosides-docusate sodium  1 tablet Oral BID    pantoprazole  40 mg Oral Daily    aspirin  81 mg Oral Daily    ipratropium-albuterol  1 ampule Inhalation Q4H WA    insulin glargine  0.15 Units/kg Subcutaneous Nightly    senna  1 tablet Oral Nightly    insulin lispro  0-18 Units Subcutaneous Q4H    latanoprost  1 drop Both Eyes Nightly     PRN Meds: sodium chloride flush, heparin flush, perflutren lipid microspheres, sodium chloride flush, potassium chloride, magnesium sulfate, acetaminophen, acetaminophen, oxyCODONE **OR** oxyCODONE HCl, fentanNYL **OR** fentanNYL, magnesium hydroxide, ondansetron, albumin human, sodium chloride, glucose, dextrose, glucagon (rDNA), dextrose, calcium gluconate IVPB      Intake/Output Summary (Last 24 hours) at 10/24/2020 1329  Last data filed at 10/24/2020 1208  Gross per 24 hour   Intake 2680.18 ml   Output 1675 ml   Net 1005.18 ml       Exam:    BP (!) 121/43   Pulse 64   Temp 98.1 °F (36.7 °C)   Resp 12   Ht 5' 11\" (1.803 m)   Wt 174 lb 2.6 oz (79 kg)   SpO2 98%   BMI 24.29 kg/m²       Gen: *well developed  HEENT: NC/AT, moist mucous membranes,   Neck: supple, trachea midline,  Heart:  Normal s1/s2, RRR, no murmurs, gallops, or rubs. Lungs:  *cta  bilaterally, *  Abd: bowel sounds present, soft, nontender, nondistended, no masses  Extrem:  No clubbing, cyanosis,  *pos* edema  Skin: no rashes or lesions  Psych: A & O x3  Neuro: grossly intact, moves all four extremities. Capillary Refill: Brisk,< 3 seconds   Peripheral Pulses: +2 palpable, equal bilaterally                 Labs:   Recent Labs     10/22/20  0600 10/23/20  0430 10/24/20  0450   WBC 19.3* 14.8* 10.6   HGB 8.3* 11.3* 9.7*   HCT 25.1* 34.0* 30.1*   * 145 144     Recent Labs     10/23/20  0430 10/23/20  1757 10/24/20  0450 10/24/20  1215    138 139  --    K 3.2* 3.3* 3.8 3.9   CL 99 99 101  --    CO2 25 28 26  --    BUN 47* 48* 45*  --    CREATININE 1.8* 1.8* 1.6*  --    CALCIUM 8.0* 8.1* 7.7*  --      Recent Labs     10/23/20  0430 10/23/20  1757 10/24/20  0450   * 204* 184*   ALT 64* 59* 60*   BILITOT 2.2* 1.9* 2.1*   ALKPHOS 87 75 75     No results for input(s): INR in the last 72 hours. No results for input(s): Jake Clap in the last 72 hours.   Recent Labs     10/23/20  0430 10/23/20  1757 10/24/20  0450   * 204* 184*   ALT 64* 59* 60*   BILITOT 2.2* 1.9* 2.1*   ALKPHOS 87 75 75     Recent Labs     10/23/20  1050 10/23/20  2225 10/24/20  1215   LACTA 1.0 0.9 1.0     No results found for: Julian Diegobarb  No results found for: AMMONIA    Assessment:    Active Hospital Problems    Diagnosis Date Noted    Postoperative hypotension [I95.89]     Cardiac insufficiency (HCC) [I50.9]     Acute postoperative respiratory insufficiency [J95.89]     Stage 3 chronic kidney disease [N18.30]  Acute postoperative pain [G89.18]     Non-[facility] (non-ST elevated [facility]) [facility] [I21.4] 10/14/2020    Paroxysmal atrial fibrillation [facility] [I48.0] 10/14/2020    Anemia [D64.9] 10/14/2020    History of right-sided carotid endarterectomy [Z98.890] 10/14/2020    Coronary artery disease [I25.10] 10/14/2020    [facility] kidney [facility] [N17.9, N18.9] 10/14/2020    Hypotension [I95.9] 10/14/2020    Transaminitis [R74.01] 10/10/2020   s/p CABG  PAF  II dM     Plan:   on  Dobutamine    cont ancef   cont SSI           DVT Prophylaxis: scd  Diet: DIET CARDIAC; Carb Control: 5 carbs/meal (approximate 2000 kcals/day)  Dietary Nutrition Supplements: Diabetic Oral Supplement  Code Status: Full Code     PT/OT Eval Status: *ordered     Dispo - home**         Electronically signed by Erin Morley DO on 10/24/2020 at 1:29 PM [facility]

## 2020-10-24 NOTE — PROGRESS NOTES
[facility] [address]    Name: Jag Freire    Age: 66 y.o. PCP: Hiram Paniagua MD    Date of Admission: 10/10/2020  5:46 PM    Date of Service: 10/24/2020    Chief Complaint: Follow-up for CABG 10/19/20, post op af  Underwent CABG x3 vessels 10/19. LIMA to LAD, vein graft to obtuse marginal and PDA. He lost his blood pressure and pulse 10/21 and was intubated. LVEF around 50% with dilated hypokinetic right ventricle and moderate pulmonary hypertension. Off of pressors at this time and has been extubated. Very fatigued and does not speak while in bed. Developed rapid atrial fibrillation requiring IV amiodarone infusion which was stopped as he converted to sinus rhythm.     Hypertension, hyperlipidemia, diabetes, COPD, right CEA in the past.  Paroxysmal atrial fibrillation with elevated LFTs and renal failure recently. In sinus rhythm on telemetry but he was placed on IV amiodarone infusion prophylactically.     Interim History:  No new overnight cardiac complaints. Currently with no complaints of palpitations, dizziness, or lightheadedness. Sore marybeth , chest tube still in .  Still on amio and dobutrex    Telemetry personally reviewed and showed sinus rhythm      Review of Systems:   Cardiac: As per HPI  General: No fever, chills  Pulmonary: No cough, wheeze, or shortness of breath  GI: No nausea, vomiting,or abdominal pain  Neuro: No headache or confusion    Problem List:  Active Problems:    Transaminitis    Non-STEMI (non-ST elevated myocardial infarction) (HCC)    Paroxysmal atrial fibrillation (HCC)    Anemia    History of right-sided carotid endarterectomy    Coronary artery disease    Acute kidney injury superimposed on chronic kidney disease (HCC)    Hypotension    Cardiac insufficiency (HCC)    Acute postoperative respiratory insufficiency    Stage 3 chronic kidney disease    Acute postoperative pain    Postoperative hypotension  Resolved Problems: * No resolved hospital problems. *      Past Medical History:  Past Medical History:   Diagnosis Date    Carotid artery stenosis     Cholecystitis     Choledocholithiasis     Cholelithiasis     Colon polyps     Essential hypertension 5/23/2019    Gout     Hiatal hernia     History of laparoscopic cholecystectomy     Hyperlipidemia 5/23/2019    Inflammatory polyarthropathy ([facility] 5/23/2019    Malignant tumor of prostate ([facility]     Osteoarthritis     Pancreatitis     Prostate CA ([facility] 5/23/2019    Pulmonary emphysema (HCC)     Pulmonary nodules     Type 2 diabetes mellitus without complication, without long-term current use of insulin ([facility] 5/23/2019    Ventral hernia        Allergies:   Allergies   Allergen Reactions    Methotrexate Derivatives      Caused Blisters       Current Medications:  Current Facility-Administered Medications   Medication Dose Route Frequency Provider Last Rate Last Dose    amiodarone (CORDARONE) tablet 400 mg  400 mg Oral BID ROSANGELA Jarquin CNP   400 mg at 10/24/20 1025    amiodarone (CORDARONE) 450 mg in dextrose 5 % 250 mL infusion  0.5 mg/min Intravenous Continuous ROSANGELA Jarquin CNP 16.7 mL/hr at 10/24/20 1031 0.5 mg/min at 10/24/20 1031    lidocaine PF 1 % injection 5 mL  5 mL Intradermal Once ROSANGELA Norman CNP        sodium chloride flush 0.9 % injection 10 mL  10 mL Intravenous PRN ROSANGELA Norman - CNP        heparin flush 100 UNIT/ML injection 300 Units  3 mL Intravenous 2 times per day ROSANGELA Norman - CNP   300 Units at 10/23/20 2143    heparin flush 100 UNIT/ML injection 300 Units  3 mL Intracatheter PRN ROSANGELA Norman CNP        DOBUTamine (DOBUTREX) 1000 mg in dextrose 5 % 250 mL infusion  2.5 mcg/kg/min Intravenous Continuous ROSANGELA Santos CNP 3 mL/hr at 10/24/20 0000 2.5 mcg/kg/min at 10/24/20 0000    bumetanide (BUMEX) injection 1 mg  1 mg Intravenous BID Melina Dietrich MD   1 mg at 10/24/20 0946    magnesium oxide (MAG-OX) tablet 200 mg  200 mg Oral Daily Felyvinod Sancheznc, APRN - CNP   200 mg at 10/24/20 0947    acetaminophen (TYLENOL) tablet 1,000 mg  1,000 mg Oral 3 times per day Fely Roblero, APRN - CNP   1,000 mg at 10/24/20 0559    perflutren lipid microspheres (DEFINITY) injection 1.65 mg  1.5 mL Intravenous ONCE PRN Fina Neat, APRN - CNP        heparin (porcine) injection 5,000 Units  5,000 Units Subcutaneous Q8H Fina Neat, APRN - CNP   5,000 Units at 10/24/20 0445    timolol (TIMOPTIC) 0.5 % ophthalmic solution 1 drop  1 drop Left Eye BID Idalmis Pemberton, DO   1 drop at 10/24/20 0946    vitamin B-12 (CYANOCOBALAMIN) tablet 1,000 mcg  1,000 mcg Oral BID Idalmis Pemberton DO   1,000 mcg at 10/24/20 0947    0.9 % sodium chloride infusion  30 mL/hr Intravenous Continuous Idalmis Pemberton DO 30 mL/hr at 10/24/20 0000 30 mL/hr at 10/24/20 0000    sodium chloride flush 0.9 % injection 10 mL  10 mL Intravenous 2 times per day Idalmis Pemberton DO   10 mL at 10/24/20 0946    sodium chloride flush 0.9 % injection 10 mL  10 mL Intravenous PRN Idalmis Pemberton, DO   10 mL at 10/22/20 0815    potassium chloride 20 mEq/50 mL IVPB (Central Line)  20 mEq Intravenous PRN Idalmis Pemberton DO   Stopped at 10/24/20 1030    magnesium sulfate 2 g in 50 mL IVPB premix  2 g Intravenous PRN Idalmis Pemberton, DO 25 mL/hr at 10/24/20 1101 2 g at 10/24/20 1101    acetaminophen (TYLENOL) tablet 650 mg  650 mg Oral Q4H PRN Idalmis Pemberton, DO   650 mg at 10/21/20 2035    acetaminophen (TYLENOL) suppository 650 mg  650 mg Rectal Q4H PRN Idalmis Pemberton, DO        oxyCODONE (ROXICODONE) immediate release tablet 5 mg  5 mg Oral Q4H PRN Idalmis Pemberton, DO   5 mg at 10/23/20 0215    Or    oxyCODONE HCl (OXY-IR) immediate release tablet 10 mg  10 mg Oral Q4H PRN Idalmis Pemberton DO   10 mg at 10/20/20 1821    fentaNYL (SUBLIMAZE) injection 25 mcg  25 mcg Intravenous Q1H PRN Idalmis Pemberton DO        Or    fentaNYL (SUBLIMAZE) injection 50 mcg  50 mcg Intravenous Q1H PRN Rafael Leyva, DO   50 mcg at 10/22/20 1624    sennosides-docusate sodium (SENOKOT-S) 8.6-50 MG tablet 1 tablet  1 tablet Oral BID Rafael Leyva, DO   1 tablet at 10/24/20 0946    magnesium hydroxide (MILK OF MAGNESIA) 400 MG/5ML suspension 30 mL  30 mL Oral Daily PRN Rafael Leyva DO        ondansetron [facility]) injection 4 mg  4 mg Intravenous Q8H PRN Karen Mejia, DO        pantoprazole (PROTONIX) tablet 40 mg  40 mg Oral Daily Rafael Leyva DO   40 mg at 10/24/20 0946    aspirin EC tablet 81 mg  81 mg Oral Daily Rafael Leyva, DO   81 mg at 10/24/20 0947    ipratropium-albuterol (DUONEB) nebulizer solution 1 ampule  1 ampule Inhalation Q4H NINA Mejia, DO   1 ampule at 10/24/20 0849    albumin human 5 % IV solution 25 g  25 g Intravenous PRN Rafael Leyva, DO   Stopped at 10/21/20 2219    0.9 % sodium chloride bolus  250 mL Intravenous Continuous PRN Rafael Leyva DO        insulin glargine (LANTUS) injection vial 11 Units  0.15 Units/kg Subcutaneous Nightly Rafale Leyva DO   11 Units at 10/22/20 2135    glucose (GLUTOSE) 40 % oral gel 15 g  15 g Oral PRN Rafael Leyva DO        dextrose 50 % IV solution  12.5 g Intravenous PRN Rafael Leyva DO        glucagon (rDNA) injection 1 mg  1 mg Intramuscular PRN Karen Mejia, DO        dextrose 5 % solution  100 mL/hr Intravenous PRN Rafael Leyva DO        senna (SENOKOT) tablet 8.6 mg  1 tablet Oral Nightly Rafael Leyva, DO   8.6 mg at 10/23/20 2145    insulin lispro (HUMALOG) injection vial 0-18 Units  0-18 Units Subcutaneous Q4H Rafael Leyva, DO   3 Units at 10/23/20 1630    calcium gluconate 1 g in dextrose 5 % 100 mL IVPB  1 g Intravenous PRN Rafael Leyva,  100 mL/hr at 10/24/20 0950 1 g at 10/24/20 0950    latanoprost (XALATAN) 0.005 % ophthalmic solution 1 drop  1 drop Both Eyes Nightly Rafael Leyva DO   1 drop at 10/23/20 0442      amiodarone 450mg/250ml D5W 5. 2* 5.5* 5.1*   LABALBU 3.1* 3.1* 3.1*   BILITOT 2.2* 1.9* 2.1*   ALKPHOS 87 75 75   * 204* 184*   ALT 64* 59* 60*       Last 3 Mag/Phos:  Recent Labs     10/22/20  0600 10/23/20  0430 10/24/20  0450   MG 1.9 1.7 1.9       Last 3 CK, CKMB, Troponin  No results for input(s): CKTOTAL, CKMB, TROPONINI in the last 72 hours. Last 3 BNP:  No results for input(s): BNP in the last 72 hours. Lab Results   Component Value Date    BNP 1,461 (H) 10/10/2020       Last 3 Glucose:     Recent Labs     10/23/20  0430 10/23/20  1757 10/24/20  0450   GLUCOSE 85 129* 122*       Last 3 Coags:  No results for input(s): PROTIME, INR, PTT in the last 72 hours. Lab Results   Component Value Date    PROTIME 16.2 10/19/2020    INR 1.4 10/19/2020       Last 3 Lipid Panel:  No results for input(s): LDLCALC, HDL, TRIG in the last 72 hours. Invalid input(s): CHLPL  Lab Results   Component Value Date    LDLCALC 93 07/19/2019     Lab Results   Component Value Date    HDL 44 05/01/2020    HDL 43 07/19/2019     Lab Results   Component Value Date    TRIG 125 05/01/2020    TRIG 163 07/19/2019     No components found for: CHLPL    TSH:  No results for input(s): TSH in the last 72 hours. Lab Results   Component Value Date    TSH 1.26 08/03/2020           Radiology:  XR CHEST PORTABLE   Final Result   1. Left lung airspace disease which could represent a combination of   atelectasis, pneumonia and/or pleural effusion. 2. Patchy right perihilar airspace disease. XR CHEST PORTABLE   Final Result   Continued bilateral infiltrates with a small right pleural effusion. Possible consolidation in the left lower lung zone medially which raises the   question of pneumonia. XR CHEST PORTABLE   Final Result   Interval placement of a gastric tube with distal portion visualized as far as   the mid esophagus. Distal tip may be further but the mediastinum obscures   the tube.   Consider follow-up with abdomen radiograph centered on the   diaphragm in attempts to visualize gastric tube distal tip      Interval placement of endotracheal tube with distal tip approximately 2.3 cm   above the fritz      Slightly decreased bilateral lower lung opacities suggestive of improvement   in atelectasis and/or edema. Differential includes small infiltrates from   inflammation/infection         XR CHEST PORTABLE   Final Result   Slightly improved central vascular congestion. No other interval change,   status post open heart surgery. Remaining tubes and lines stable. Basilar   opacities, likely combination atelectasis and pleural fluid. XR CHEST PORTABLE   Final Result   Mild increased right pleural fluid. Increased prominence of central pulmonary vessels, likely related to vascular   congestion. Mild patchy bibasilar atelectasis or infiltrates. XR CHEST PORTABLE   Final Result   Line and tube placements. Vascular congestion with bilateral pleural effusions and basilar atelectasis   right greater than left. US CAROTID ARTERY BILATERAL   Final Result   Atherosclerotic disease. Estimated stenosis by NASCET criteria in the proximal right carotid   artery is between 0% to 49% . Estimated stenosis by NASCET criteria in the proximal left carotid   artery is between 50% to 69%. US DUP LOWER EXTREMITY MAPPING BILAT VENOUS   Final Result   Bilateral venous mapping as described. No deep venous   thrombosis visualized. CT CHEST WO CONTRAST   Final Result   1. Corresponding to the masslike lesion seen in the right thorax on the   earlier chest x-ray is area of trapped fluid within the oblique fissure on   the right. 2. Pleural thickening and pulmonary scarring is seen along the posterior   aspect of the right lung. 3. Small bilateral pleural effusions. 4. No pulmonary mass, lymphadenopathy or metastatic disease seen. 5. 2 mm right upper lobe nodule of doubtful clinical significance.   If the patient is at a high risk for malignancy, per the recommendations of the   Fleischner society, follow-up CT of the chest may be obtained in 12 months. Otherwise, no future follow-up is needed. 6. Severe calcified coronary atherosclerosis. 1         XR CHEST PORTABLE   Final Result   1. Abnormal chest x-ray. There is masslike opacity seen within the right   perihilar region measuring 6 cm x 4.2 cm and there is a 2nd rounded opacity   within the right hilum measuring 2.4 cm. Dedicated CT of the chest is   recommended for further evaluation. NM KIDNEY W FLOW AND FUNCTION WO PHARMACOLOGICAL INTERVENTION   Final Result   Diminished perfusion and extraction and excretion, the findings are   compatible with bilateral renal artery stenosis versus ATN versus   chronic renal failure            US RETROPERITONEAL COMPLETE   Final Result   1. 2.7 cm anechoic left renal cyst.   2. Otherwise, negative renal ultrasound. 3. Poorly distended bladder showing no gross abnormality. ASSESSMENT / PLAN:  #1. CAD post non-STEMI and CABG-now extubated. Unclear as to why blood pressure dropped but now off the ventilator and blood pressure stable. Wean off dobutrex. Continue aspirin. Supportive care. Possibly to floor today.   #2. Paroxysmal atrial fibrillation-converted to sinus rhythm on IV amiodarone infusion. Consider oral amiodarone to maintain sinus rhythm but will defer to CT surgery service.     #3. Hypertension-controlled.     #4. Hyperlipidemia-no statin pending liver enzymes trend.      #5. Diabetes-per intensivist.     #6. Renal failure-creatinine increased to 2.4 but now decreasing and is 1.6 today. Nephrology following.   On IV Bumex.             Josue Lui MD, [address] [place] [address] at [facility]    Electronically signed by Josue Lui MD on 10/24/2020 at 11:08 AM

## 2020-10-25 ENCOUNTER — APPOINTMENT (OUTPATIENT)
Dept: GENERAL RADIOLOGY | Age: 75
DRG: 233 | End: 2020-10-25
Attending: FAMILY MEDICINE
Payer: MEDICARE

## 2020-10-25 LAB
ANION GAP SERPL CALCULATED.3IONS-SCNC: 15 MMOL/L (ref 7–16)
BUN BLDV-MCNC: 44 MG/DL (ref 8–23)
CALCIUM SERPL-MCNC: 8.2 MG/DL (ref 8.6–10.2)
CHLORIDE BLD-SCNC: 100 MMOL/L (ref 98–107)
CO2: 27 MMOL/L (ref 22–29)
CREAT SERPL-MCNC: 1.6 MG/DL (ref 0.7–1.2)
GFR AFRICAN AMERICAN: 51
GFR NON-AFRICAN AMERICAN: 42 ML/MIN/1.73
GLUCOSE BLD-MCNC: 82 MG/DL (ref 74–99)
HCT VFR BLD CALC: 29.3 % (ref 37–54)
HEMOGLOBIN: 9.6 G/DL (ref 12.5–16.5)
LACTIC ACID: 1.2 MMOL/L (ref 0.5–2.2)
MCH RBC QN AUTO: 33.4 PG (ref 26–35)
MCHC RBC AUTO-ENTMCNC: 32.8 % (ref 32–34.5)
MCV RBC AUTO: 102.1 FL (ref 80–99.9)
METER GLUCOSE: 114 MG/DL (ref 74–99)
METER GLUCOSE: 142 MG/DL (ref 74–99)
METER GLUCOSE: 145 MG/DL (ref 74–99)
METER GLUCOSE: 155 MG/DL (ref 74–99)
METER GLUCOSE: 81 MG/DL (ref 74–99)
PDW BLD-RTO: 17.2 FL (ref 11.5–15)
PLATELET # BLD: 157 E9/L (ref 130–450)
PMV BLD AUTO: 11.1 FL (ref 7–12)
POTASSIUM SERPL-SCNC: 3.7 MMOL/L (ref 3.5–5)
RBC # BLD: 2.87 E12/L (ref 3.8–5.8)
SODIUM BLD-SCNC: 142 MMOL/L (ref 132–146)
WBC # BLD: 12.1 E9/L (ref 4.5–11.5)

## 2020-10-25 PROCEDURE — 6370000000 HC RX 637 (ALT 250 FOR IP): Performed by: NURSE PRACTITIONER

## 2020-10-25 PROCEDURE — 2500000003 HC RX 250 WO HCPCS: Performed by: INTERNAL MEDICINE

## 2020-10-25 PROCEDURE — 94640 AIRWAY INHALATION TREATMENT: CPT

## 2020-10-25 PROCEDURE — 6360000002 HC RX W HCPCS: Performed by: NURSE PRACTITIONER

## 2020-10-25 PROCEDURE — 80048 BASIC METABOLIC PNL TOTAL CA: CPT

## 2020-10-25 PROCEDURE — 2500000003 HC RX 250 WO HCPCS: Performed by: NURSE PRACTITIONER

## 2020-10-25 PROCEDURE — 2700000000 HC OXYGEN THERAPY PER DAY

## 2020-10-25 PROCEDURE — 93798 PHYS/QHP OP CAR RHAB W/ECG: CPT

## 2020-10-25 PROCEDURE — 83605 ASSAY OF LACTIC ACID: CPT

## 2020-10-25 PROCEDURE — 2580000003 HC RX 258: Performed by: NURSE PRACTITIONER

## 2020-10-25 PROCEDURE — 85027 COMPLETE CBC AUTOMATED: CPT

## 2020-10-25 PROCEDURE — 71045 X-RAY EXAM CHEST 1 VIEW: CPT

## 2020-10-25 PROCEDURE — 2140000000 HC CCU INTERMEDIATE R&B

## 2020-10-25 PROCEDURE — 82962 GLUCOSE BLOOD TEST: CPT

## 2020-10-25 RX ORDER — CLOPIDOGREL BISULFATE 75 MG/1
75 TABLET ORAL DAILY
Status: DISCONTINUED | OUTPATIENT
Start: 2020-10-26 | End: 2020-10-25

## 2020-10-25 RX ORDER — ASCORBIC ACID 500 MG
500 TABLET ORAL 2 TIMES DAILY
Status: DISCONTINUED | OUTPATIENT
Start: 2020-10-25 | End: 2020-10-28 | Stop reason: HOSPADM

## 2020-10-25 RX ORDER — DEXTROSE MONOHYDRATE 25 G/50ML
12.5 INJECTION, SOLUTION INTRAVENOUS PRN
Status: DISCONTINUED | OUTPATIENT
Start: 2020-10-25 | End: 2020-10-28 | Stop reason: HOSPADM

## 2020-10-25 RX ORDER — PRAVASTATIN SODIUM 20 MG
80 TABLET ORAL NIGHTLY
Status: DISCONTINUED | OUTPATIENT
Start: 2020-10-25 | End: 2020-10-25

## 2020-10-25 RX ORDER — FOLIC ACID 1 MG/1
1 TABLET ORAL DAILY
Status: DISCONTINUED | OUTPATIENT
Start: 2020-10-25 | End: 2020-10-28 | Stop reason: HOSPADM

## 2020-10-25 RX ORDER — POLYETHYLENE GLYCOL 3350 17 G/17G
17 POWDER, FOR SOLUTION ORAL DAILY
Status: DISCONTINUED | OUTPATIENT
Start: 2020-10-25 | End: 2020-10-26

## 2020-10-25 RX ORDER — ASPIRIN 81 MG/1
81 TABLET ORAL DAILY
Status: DISCONTINUED | OUTPATIENT
Start: 2020-10-25 | End: 2020-10-28 | Stop reason: HOSPADM

## 2020-10-25 RX ORDER — SENNA AND DOCUSATE SODIUM 50; 8.6 MG/1; MG/1
1 TABLET, FILM COATED ORAL 2 TIMES DAILY
Status: DISCONTINUED | OUTPATIENT
Start: 2020-10-25 | End: 2020-10-26

## 2020-10-25 RX ORDER — ACETAMINOPHEN 325 MG/1
650 TABLET ORAL EVERY 4 HOURS PRN
Status: DISCONTINUED | OUTPATIENT
Start: 2020-10-25 | End: 2020-10-28 | Stop reason: HOSPADM

## 2020-10-25 RX ORDER — POTASSIUM CHLORIDE 20 MEQ/1
20 TABLET, EXTENDED RELEASE ORAL PRN
Status: DISCONTINUED | OUTPATIENT
Start: 2020-10-25 | End: 2020-10-28 | Stop reason: HOSPADM

## 2020-10-25 RX ORDER — NICOTINE POLACRILEX 4 MG
15 LOZENGE BUCCAL PRN
Status: DISCONTINUED | OUTPATIENT
Start: 2020-10-25 | End: 2020-10-28 | Stop reason: HOSPADM

## 2020-10-25 RX ORDER — BISACODYL 10 MG
10 SUPPOSITORY, RECTAL RECTAL DAILY
Status: DISCONTINUED | OUTPATIENT
Start: 2020-10-25 | End: 2020-10-26

## 2020-10-25 RX ORDER — FERROUS SULFATE 325(65) MG
325 TABLET ORAL 2 TIMES DAILY WITH MEALS
Status: DISCONTINUED | OUTPATIENT
Start: 2020-10-25 | End: 2020-10-28 | Stop reason: HOSPADM

## 2020-10-25 RX ORDER — HYDROCODONE BITARTRATE AND ACETAMINOPHEN 5; 325 MG/1; MG/1
1 TABLET ORAL EVERY 4 HOURS PRN
Status: DISCONTINUED | OUTPATIENT
Start: 2020-10-25 | End: 2020-10-28 | Stop reason: HOSPADM

## 2020-10-25 RX ORDER — HYDROCODONE BITARTRATE AND ACETAMINOPHEN 5; 325 MG/1; MG/1
2 TABLET ORAL EVERY 4 HOURS PRN
Status: DISCONTINUED | OUTPATIENT
Start: 2020-10-25 | End: 2020-10-28 | Stop reason: HOSPADM

## 2020-10-25 RX ORDER — BUMETANIDE 0.25 MG/ML
2 INJECTION, SOLUTION INTRAMUSCULAR; INTRAVENOUS 2 TIMES DAILY
Status: DISCONTINUED | OUTPATIENT
Start: 2020-10-25 | End: 2020-10-27

## 2020-10-25 RX ORDER — DEXTROSE MONOHYDRATE 50 MG/ML
100 INJECTION, SOLUTION INTRAVENOUS PRN
Status: DISCONTINUED | OUTPATIENT
Start: 2020-10-25 | End: 2020-10-28 | Stop reason: HOSPADM

## 2020-10-25 RX ORDER — ONDANSETRON 2 MG/ML
4 INJECTION INTRAMUSCULAR; INTRAVENOUS EVERY 8 HOURS PRN
Status: DISCONTINUED | OUTPATIENT
Start: 2020-10-25 | End: 2020-10-28 | Stop reason: HOSPADM

## 2020-10-25 RX ADMIN — BISACODYL 10 MG: 10 SUPPOSITORY RECTAL at 17:13

## 2020-10-25 RX ADMIN — AMIODARONE HYDROCHLORIDE 0.5 MG/MIN: 50 INJECTION, SOLUTION INTRAVENOUS at 01:15

## 2020-10-25 RX ADMIN — MAGNESIUM HYDROXIDE 30 ML: 2400 SUSPENSION ORAL at 09:01

## 2020-10-25 RX ADMIN — ACETAMINOPHEN 650 MG: 325 TABLET ORAL at 01:31

## 2020-10-25 RX ADMIN — LATANOPROST 1 DROP: 50 SOLUTION OPHTHALMIC at 20:43

## 2020-10-25 RX ADMIN — DOCUSATE SODIUM 50 MG AND SENNOSIDES 8.6 MG 1 TABLET: 8.6; 5 TABLET, FILM COATED ORAL at 09:02

## 2020-10-25 RX ADMIN — Medication 500 MG: at 20:42

## 2020-10-25 RX ADMIN — AMIODARONE HYDROCHLORIDE 400 MG: 200 TABLET ORAL at 20:42

## 2020-10-25 RX ADMIN — BUMETANIDE 1 MG: 0.25 INJECTION INTRAMUSCULAR; INTRAVENOUS at 09:01

## 2020-10-25 RX ADMIN — TIMOLOL MALEATE 1 DROP: 5 SOLUTION OPHTHALMIC at 09:01

## 2020-10-25 RX ADMIN — DOCUSATE SODIUM 50 MG AND SENNOSIDES 8.6 MG 1 TABLET: 8.6; 5 TABLET, FILM COATED ORAL at 01:29

## 2020-10-25 RX ADMIN — FOLIC ACID 1 MG: 1 TABLET ORAL at 09:02

## 2020-10-25 RX ADMIN — IPRATROPIUM BROMIDE AND ALBUTEROL SULFATE 1 AMPULE: 2.5; .5 SOLUTION RESPIRATORY (INHALATION) at 13:37

## 2020-10-25 RX ADMIN — INSULIN LISPRO 1 UNITS: 100 INJECTION, SOLUTION INTRAVENOUS; SUBCUTANEOUS at 12:18

## 2020-10-25 RX ADMIN — POTASSIUM CHLORIDE 40 MEQ: 1500 TABLET, EXTENDED RELEASE ORAL at 09:01

## 2020-10-25 RX ADMIN — POLYETHYLENE GLYCOL 3350 17 G: 17 POWDER, FOR SOLUTION ORAL at 13:22

## 2020-10-25 RX ADMIN — FERROUS SULFATE TAB 325 MG (65 MG ELEMENTAL FE) 325 MG: 325 (65 FE) TAB at 17:13

## 2020-10-25 RX ADMIN — AMIODARONE HYDROCHLORIDE 400 MG: 200 TABLET ORAL at 09:02

## 2020-10-25 RX ADMIN — IPRATROPIUM BROMIDE AND ALBUTEROL SULFATE 1 AMPULE: 2.5; .5 SOLUTION RESPIRATORY (INHALATION) at 20:02

## 2020-10-25 RX ADMIN — INSULIN LISPRO 1 UNITS: 100 INJECTION, SOLUTION INTRAVENOUS; SUBCUTANEOUS at 20:43

## 2020-10-25 RX ADMIN — Medication 1000 MCG: at 09:02

## 2020-10-25 RX ADMIN — Medication 500 MG: at 09:02

## 2020-10-25 RX ADMIN — HEPARIN SODIUM 5000 UNITS: 10000 INJECTION INTRAVENOUS; SUBCUTANEOUS at 13:22

## 2020-10-25 RX ADMIN — ASPIRIN 81 MG: 81 TABLET, FILM COATED ORAL at 09:02

## 2020-10-25 RX ADMIN — HYDROCODONE BITARTRATE AND ACETAMINOPHEN 2 TABLET: 5; 325 TABLET ORAL at 17:13

## 2020-10-25 RX ADMIN — FERROUS SULFATE TAB 325 MG (65 MG ELEMENTAL FE) 325 MG: 325 (65 FE) TAB at 09:02

## 2020-10-25 RX ADMIN — INSULIN LISPRO 1 UNITS: 100 INJECTION, SOLUTION INTRAVENOUS; SUBCUTANEOUS at 01:29

## 2020-10-25 RX ADMIN — BUMETANIDE 2 MG: 0.25 INJECTION INTRAMUSCULAR; INTRAVENOUS at 20:43

## 2020-10-25 RX ADMIN — HEPARIN SODIUM 5000 UNITS: 10000 INJECTION INTRAVENOUS; SUBCUTANEOUS at 04:44

## 2020-10-25 RX ADMIN — TIMOLOL MALEATE 1 DROP: 5 SOLUTION OPHTHALMIC at 20:43

## 2020-10-25 ASSESSMENT — PAIN SCALES - GENERAL
PAINLEVEL_OUTOF10: 0
PAINLEVEL_OUTOF10: 10
PAINLEVEL_OUTOF10: 0
PAINLEVEL_OUTOF10: 4
PAINLEVEL_OUTOF10: 0
PAINLEVEL_OUTOF10: 3
PAINLEVEL_OUTOF10: 0

## 2020-10-25 ASSESSMENT — PAIN DESCRIPTION - DESCRIPTORS: DESCRIPTORS: ACHING;DISCOMFORT;SORE

## 2020-10-25 ASSESSMENT — PAIN DESCRIPTION - LOCATION: LOCATION: STERNUM

## 2020-10-25 ASSESSMENT — PAIN DESCRIPTION - ORIENTATION: ORIENTATION: MID

## 2020-10-25 ASSESSMENT — PAIN DESCRIPTION - PROGRESSION
CLINICAL_PROGRESSION: NOT CHANGED
CLINICAL_PROGRESSION: NOT CHANGED

## 2020-10-25 ASSESSMENT — PAIN DESCRIPTION - PAIN TYPE: TYPE: SURGICAL PAIN

## 2020-10-25 NOTE — PROGRESS NOTES
Hospitalist Progress Note      PCP: Delaney Lovelace MD    Date of Admission: 10/10/2020    Chief Complaint: *chest pain     Hospital Course: **s/p CABG, still has chest tubes**pt became hypotensive, went into a fib   The bradycardic, had to be reintubated and started on pressors. 10/21* now extubated  On dobutamine** and amiodarone         Subjective: **fatigued       Medications:  Reviewed    Infusion Medications    DOBUTamine 2 mcg/kg/min (10/25/20 1051)     Scheduled Medications    sennosides-docusate sodium  1 tablet Oral BID    magnesium hydroxide  30 mL Oral Daily    aspirin  81 mg Oral Daily    ferrous sulfate  325 mg Oral BID WC    vitamin C  500 mg Oral BID    folic acid  1 mg Oral Daily    insulin lispro  0-6 Units Subcutaneous TID WC    insulin lispro  0-3 Units Subcutaneous Nightly    bisacodyl  10 mg Rectal Daily    polyethylene glycol  17 g Oral Daily    bumetanide  2 mg Intravenous BID    amiodarone  400 mg Oral BID    magnesium oxide  200 mg Oral Daily    heparin (porcine)  5,000 Units Subcutaneous Q8H    timolol  1 drop Left Eye BID    vitamin B-12  1,000 mcg Oral BID    ipratropium-albuterol  1 ampule Inhalation Q4H WA    latanoprost  1 drop Both Eyes Nightly     PRN Meds: acetaminophen, HYDROcodone 5 mg - acetaminophen **OR** HYDROcodone 5 mg - acetaminophen, potassium chloride, ondansetron      Intake/Output Summary (Last 24 hours) at 10/25/2020 1434  Last data filed at 10/25/2020 1408  Gross per 24 hour   Intake 1119.87 ml   Output 1677 ml   Net -557.13 ml       Exam:    /63   Pulse 65   Temp 97.3 °F (36.3 °C) (Temporal)   Resp 16   Ht 5' 11\" (1.803 m)   Wt 178 lb 8 oz (81 kg)   SpO2 99%   BMI 24.90 kg/m²     Gen: *well developed  HEENT: NC/AT, moist mucous membranes,   Neck: supple, trachea midline,  Heart:  Normal s1/s2, RRR, no murmurs, gallops, or rubs.    Lungs:  *cta  bilaterally, *  Abd: bowel sounds present, soft, nontender, nondistended,  Extrem:  No clubbing, cyanosis,  *pos* edema  Skin: no rashes or lesions  Psych: A & O x3  Neuro: grossly intact, moves all four extremities.    Capillary Refill: Brisk,< 3 seconds   Peripheral Pulses: +2 palpable, equal bilaterally                Labs:   Recent Labs     10/23/20  0430 10/24/20  0450 10/25/20  0500   WBC 14.8* 10.6 12.1*   HGB 11.3* 9.7* 9.6*   HCT 34.0* 30.1* 29.3*    144 157     Recent Labs     10/24/20  0450 10/24/20  1215 10/24/20  1700 10/25/20  0500     --  138 142   K 3.8 3.9 4.1 3.7     --  100 100   CO2 26  --  26 27   BUN 45*  --  47* 44*   CREATININE 1.6*  --  1.6* 1.6*   CALCIUM 7.7*  --  7.9* 8.2*     Recent Labs     10/23/20  1757 10/24/20  0450 10/24/20  1700   * 184* 146*   ALT 59* 60* 57*   BILITOT 1.9* 2.1* 2.0*   ALKPHOS 75 75 71     No results for input(s): INR in the last 72 hours. No results for input(s): Vijay Patton in the last 72 hours.   Recent Labs     10/23/20  1757 10/24/20  0450 10/24/20  1700   * 184* 146*   ALT 59* 60* 57*   BILITOT 1.9* 2.1* 2.0*   ALKPHOS 75 75 71     Recent Labs     10/23/20  2225 10/24/20  1215 10/25/20  0100   LACTA 0.9 1.0 1.2     No results found for: Jahaira Quiroz  No results found for: AMMONIA    Assessment:    Active Hospital Problems    Diagnosis Date Noted    Postoperative hypotension [I95.89]     Cardiac insufficiency (HCC) [I50.9]     Acute postoperative respiratory insufficiency [J95.89]     Stage 3 chronic kidney disease [N18.30]     Acute postoperative pain [G89.18]     Non-STEMI (non-ST elevated myocardial infarction) ([facility] [I21.4] 10/14/2020    Paroxysmal atrial fibrillation ([facility] [I48.0] 10/14/2020    Anemia [D64.9] 10/14/2020    History of right-sided carotid endarterectomy [Z98.890] 10/14/2020    Coronary artery disease [I25.10] 10/14/2020    Acute kidney injury superimposed on chronic kidney disease ([facility] [N17.9, N18.9] 10/14/2020    Hypotension [I95.9] 10/14/2020    Transaminitis [R74.01] 10/10/2020   s/p CABG  PAF  II dM     Plan:   on  Dobutamine    cont amiodarone   cont SSI           DVT Prophylaxis: scd  Diet: DIET CARDIAC; Carb Control: 5 carbs/meal (approximate 2000 kcals/day)  Dietary Nutrition Supplements: Diabetic Oral Supplement  Code Status: Full Code     PT/OT Eval Status: *ordered     Dispo - home**         Electronically signed by Gerri Hardin DO on 10/25/2020 at 2:34 PM [facility]

## 2020-10-25 NOTE — PROGRESS NOTES
Department of Internal Medicine  Nephrology Progress Note    Events reviewed. SUBJECTIVE:  We are following MrCeci Chery [facility] for MARIAH on CKD. He was seen with his wife sitting at his bedside. He reports no new complaints.     PHYSICAL EXAM:    VITALS:  /63   Pulse 65   Temp 97.3 °F (36.3 °C) (Temporal)   Resp 18   Ht 5' 11\" (1.803 m)   Wt 178 lb 8 oz (81 kg)   SpO2 93% Comment: walking  BMI 24.90 kg/m²   24HR INTAKE/OUTPUT:      Intake/Output Summary (Last 24 hours) at 10/25/2020 1213  Last data filed at 10/25/2020 0746  Gross per 24 hour   Intake 1671.87 ml   Output 1557 ml   Net 114.87 ml       Constitutional: alert, in no distress  HEENT: Normocephalic, atraumatic, PERRL  Respiratory:  Fine rales bilaterally, requiring 2L via nasal cannula  Cardiovascular/Edema: Heart sounds regular   Gastrointestinal:  Soft, nontender, nondistended  Neurologic:    Nonfocal  Skin:  Warm, dry  Other:  2+ pitting edema generalized  L chest tube  Nix patent and draining      Scheduled Meds:   sennosides-docusate sodium  1 tablet Oral BID    magnesium hydroxide  30 mL Oral Daily    aspirin  81 mg Oral Daily    ferrous sulfate  325 mg Oral BID WC    vitamin C  500 mg Oral BID    folic acid  1 mg Oral Daily    insulin lispro  0-6 Units Subcutaneous TID WC    insulin lispro  0-3 Units Subcutaneous Nightly    bisacodyl  10 mg Rectal Daily    polyethylene glycol  17 g Oral Daily    amiodarone  400 mg Oral BID    bumetanide  1 mg Intravenous BID    magnesium oxide  200 mg Oral Daily    heparin (porcine)  5,000 Units Subcutaneous Q8H    timolol  1 drop Left Eye BID    vitamin B-12  1,000 mcg Oral BID    ipratropium-albuterol  1 ampule Inhalation Q4H WA    latanoprost  1 drop Both Eyes Nightly     Continuous Infusions:   DOBUTamine 2 mcg/kg/min (10/25/20 1051)     PRN Meds:.acetaminophen, HYDROcodone 5 mg - acetaminophen **OR** HYDROcodone 5 mg - acetaminophen, potassium chloride, ondansetron    DATA:    CBC: Lab Results   Component Value Date    WBC 12.1 10/25/2020    RBC 2.87 10/25/2020    HGB 9.6 10/25/2020    HCT 29.3 10/25/2020    .1 10/25/2020    MCH 33.4 10/25/2020    MCHC 32.8 10/25/2020    RDW 17.2 10/25/2020     10/25/2020    MPV 11.1 10/25/2020     CMP:    Lab Results   Component Value Date     10/25/2020    K 3.7 10/25/2020    K 3.6 10/19/2020     10/25/2020    CO2 27 10/25/2020    BUN 44 10/25/2020    CREATININE 1.6 10/25/2020    GFRAA 51 10/25/2020    AGRATIO 0.9 10/10/2020    LABGLOM 42 10/25/2020    GLUCOSE 82 10/25/2020    PROT 5.6 10/24/2020    LABALBU 3.4 10/24/2020    CALCIUM 8.2 10/25/2020    BILITOT 2.0 10/24/2020    ALKPHOS 71 10/24/2020     10/24/2020    ALT 57 10/24/2020     Magnesium:    Lab Results   Component Value Date    MG 2.1 10/24/2020     Phosphorus:  No results found for: PHOS     Urine sodium: 71  Urine potassium: 51.9  Urine chloride: 68  Urine creatinine: 118  Urine albumin/creatinine: 184 mg/grams  Urine protein/creatinine: 0.5 mg/grams    proBNP: 29,713      Radiology Review:      Nuclear medicine clinic with flow and function without pharmacological intervention October 12, 2020   Findings:         The patient was injected with 10 mCi of technetium MAG3 intravenously              Split uptake on the left was 47.8  percent, time to peak was 4.5    minutes, one half clearance time was 18.9         Split uptake on the right was 52.2 percent, time to peak was 0.5    minutes, one half clearance time was 23.4 minutes         Renal curves demonstrate perfusion to be delayed. Extraction was delayed. Excretion was delayed.     Lasix was not administered         Impression:  Diminished perfusion and extraction and excretion, the findings are    compatible with bilateral renal artery stenosis versus ATN versus    chronic renal failure                CTA pulmonary with IV contrast October 10, 2020                    IMPRESSION:               CT recommendations of the    Fleischner society, follow-up CT of the chest may be obtained in 12 months. Otherwise, no future follow-up is needed. 6. Severe calcified coronary atherosclerosis. 1           CXR 10/25/2020   PICC line catheter placement.  Right IJ central line catheter removal.    Otherwise no change. BRIEF SUMMARY OF INITIAL CONSULT:    Briefly Prashanth Juarez 89, B 84 y. o. year old male with history of CKD stage III, with mild proteinuria, baseline creatinine 1.3 to 1.5 mg/dL, HTN, type II DM, hyperlipidemia, gout, prostate cancer status post resection, emphysema, seronegative inflammatory arthritis possibly psoriatic arthritis on methotrexate, who was admitted on October 10, 2020 transferred from [facility] where he initially was admitted with abdominal pain. Probably he was hypotensive with a blood pressures loss 79/50 and he was fluid resuscitated, he was transferred for a higher level of care. His creatinine level on admission was 1.9 mg/dL reason for this consultation. His medications prior to admission included lisinopril and chlorthalidone. Problems resolved:  · HAGMA, lactic acidosis and probably ketoacidosis, anion gap improved  · Hypotension,multifactroial, cardiogenic, 2/2 to MI, AF  · Hypokalemia, secondary to diuretics, potassium level improved  · Elevated LFTs, likely shock liver    IMPRESSION/RECOMMENDATIONS:      1. MARIAH on CKD, volume responsive prerenal MARIAH (hypotension, thiazide diuretic, in the setting of ACE inhibition), versus ischemic ATN? .    · Recurrent MARIAH, multifactorial s/p CABG, hemodynamically mediated prerenal MARIAH 2/2 arrhythmia and decompensated HF. Urine indices consistent with the above (Urine Na < 20). · Renal function continues to improve with adequate urine output in response to Bumex and albumin. Will continue with the same.     2. CKD stage III, with mild proteinuria, secondary to early diabetic nephropathy, baseline creatinine 1.3 to 1.5 mg/dL    3. CAD, NSTEMI with severe MVD  · S/p CABG x 3 on 10/19/2020  4. Acute hypoxic respiratory failure, s/p reintubation 10/21 and extubation 10/22  · Currently requiring 2L via nasal cannula  5. PAF, s/p LISA exclusion, on amiodarone PO  6. HFrEF 40% with stage II DD and RV with moderate to severe dysfunction post operatively  · Continues to require dobutamine. 7. Bilateral renal stenosis, by CT abdomen  8. History of prostate cancer status post surgery  9. Hypoalbuminemia/malnutrition, s/p supplementation  10. Anemia, history of B12 deficiency and iron deficiency, s/p IV iron; acute blood loss anemia 2/2 surgery.    · S/p transfusions    Plan:  · Increase  Bumex to  2 mg IV bid in view of persistent pleural effusions on chest xray and even fluid balance last 24 hours   · Continue to monitor renal function  · Maintain strict I&Os      Electronically signed by ROSANGELA Gonzales CNP on 10/25/2020 at 12:13 PM  Discussed with RN and family  Agree with above assessment and plan    Corbin Bray MD

## 2020-10-25 NOTE — PROGRESS NOTES
POD#6 Awake, alert. No complaints. Denies CP, palpitations, SOB at rest, dizziness/lightheadedness. Vitals:    10/25/20 0335 10/25/20 0600 10/25/20 0734 10/25/20 0800   BP: 124/61  139/68    Pulse: 66  67    Resp: 16  18    Temp: 97 °F (36.1 °C)  97 °F (36.1 °C)    TempSrc: Temporal  Temporal    SpO2: 96%  98% 96%   Weight:  178 lb 8 oz (81 kg)     Height:         O2: 2L/NC      Intake/Output Summary (Last 24 hours) at 10/25/2020 1101  Last data filed at 10/25/2020 0746  Gross per 24 hour   Intake 1411.87 ml   Output 1637 ml   Net -225.13 ml         UO: 775+mL/8hr   CT output: Pleural: 2mL/8hr (92mL/24hrs)      Recent Labs     10/23/20  0430 10/24/20  0450 10/25/20  0500   WBC 14.8* 10.6 12.1*   HGB 11.3* 9.7* 9.6*   HCT 34.0* 30.1* 29.3*    144 157      Recent Labs     10/24/20  0450 10/24/20  1700 10/25/20  0500   BUN 45* 47* 44*   CREATININE 1.6* 1.6* 1.6*       Telemetry: NSR      PE  Cardiac: RRR  Lungs: decreased bases  Chest incision with intact YURY DSD. Sternum stable. Prior chest tube site incisions C/D/I, no erythema with intact sutures. Chest tube x 1 and Epicardial pacing wires present and secure. Abd: Softly distended, nontender, +BS  Ext: Incisions C/D/I, approximated, no erythema, +minimal edema           A/P: POD#6    1. CAD  --Stable s/p CABG x3 (LIMA-LAD, SVG-OM, SVG-PDA), LAAL, KLS plating on 10/19/2020  --Post op KYAW reveals ~40% EF on inotropic support  --DAPT/statin  --reinforce sternal precautions  --continue epicardial pacing wires  --chest tube without significant output and without airleak. Pleural chest tube removed without difficulty. Patient tolerated well       2. Acute blood loss anemia secondary to open heart surgery  --stable      3.  PAF  --currently NSR  --continue oral amiodarone for afib prophylaxis--with plans to taper on dc  -Post op NSR, PAF with respiratory arrest, reverted back to NSR  -Again episode of RVR 10/23 morning--lasted ~20mins reverted back to SR with amio bolus, completed amiodarone infusion   --if he goes into afib again he will need anticoagulated--hold off on resuming plavix at this point      4. Cardiac insufficiency   - H/o ICM, EF 30% preop; ~40% post op on inotropic support  - Echo 10/21: EF 45-50% on inotropic support with stage 2 diastolic dysfunction; RV with moderate to severe dysfunction  - POD 1, levophed weaned off, lactic acid normalized   - POD2, epi at 2.5mcg, respiratory arrest with severe hypotension and bradycardia following ambulation with PT, pt did not lose pulse during this time. 1gm Calcium chloride IVP given, Epi gtt increased, levophed started then switched to vasopressin gtt  - POD3, vaso gtt weaned off overnight, epi gtt at 6.5mcg, started on dobutamine   -POD4, Weaned off Epi--keep dobutamine, lactic normal   --now on dobutamine at 2mcg/kg/min--slow wean--will need repeat TTE with dobutamine off to evaluate LV function and possible need for WCD on dc  --optimize HF meds when able      5. MARIAH on chronic CKD stage 3  --Baseline Scr 1.3-1.5  --Scr peaked at 2.4 on 10/22, today 1.6  --nephrology following  --currently on bumex 1mg IV BID       6. Prolonged postoperative respiratory insufficiency/ post op respiratory arrest  --2/2 surgery, h/o pulmonary emphysema  --reintubated 10/21 following respiratory arrest, ?pulmonary source vs severe orthostatic event, CTA chest 10/10 without evidence for PE, avoid repeat CTA with renal function  --10/22 Extubated  --wean oxygen to keep SpO2 greater than or equal to 92%  --continue duonebs with ezpap  --encourage C&DB, SMI  --currently on 2L O2/NC  --diurese per renal      7. DMII  --hgA1c 6.2, on home metformin (hold with MARIAH)  --continue SSI for glycemic control      8. Leukocytosis  --afebrile  --WBC slowly improving  --today 12.1 K      9. Shock liver  - 2/2 RV dysfunction  - improving, Continue to trend, supportive care   --hold off on resuming statin for now d/t transaminitis       10. Constipation  --no BM since prior to surgery  --Continue MOM and senna-s. --Increase MOM to daily until +BM. --Will give daily suppository until +BM starting   --add glycolax  --Encouraged continued increase in oral intake and activity.          11. Post operative deconditioning  --Increase activity as tolerated  --PT/OT             DVT prophylaxis:  --continue bilateral knee high KEMAR hose  --continue PCDs  --continue progressive ambulation  --continue subq heparin for dvt prophylaxis and continue knee high KEMAR hose/pcds/progressive ambulation      Dispo: home vs. Rehab pending progression in activity level         This patient's case and care plan was discussed with the attending surgeon

## 2020-10-25 NOTE — PROGRESS NOTES
[facility] [address]    Name: Augustina Booker    Age: 66 y.o. PCP: Griselda Canton, MD    Date of Admission: 10/10/2020  5:46 PM    Date of Service: 10/25/2020    Chief Complaint: Follow-up for CABG 10/19/20, post op af  Underwent CABG x3 vessels 10/19. LIMA to LAD, vein graft to obtuse marginal and PDA. He lost his blood pressure and pulse 10/21 and was intubated. LVEF around 50% with dilated hypokinetic right ventricle and moderate pulmonary hypertension. Off of pressors at this time and has been extubated. Very fatigued and does not speak while in bed. Developed rapid atrial fibrillation requiring IV amiodarone infusion which was stopped as he converted to sinus rhythm.     Hypertension, hyperlipidemia, diabetes, COPD, right CEA in the past.  Paroxysmal atrial fibrillation with elevated LFTs and renal failure recently. In sinus rhythm on telemetry but he was placed on IV amiodarone infusion prophylactically.     Interim History:  No new overnight cardiac complaints. Currently with no complaints of palpitations, dizziness, or lightheadedness. Moved to 6th floor, chest tubes out, still on dobutrex.  Walked 100, then 200ft with assist.   Telemetry personally reviewed and showed sinus rhythm      Review of Systems:   Cardiac: As per HPI  General: No fever, chills  Pulmonary: No cough, wheeze, or shortness of breath  GI: No nausea, vomiting,or abdominal pain  Neuro: No headache or confusion    Problem List:  Principal Problem:    Coronary artery disease  Active Problems:    Transaminitis    Non-STEMI (non-ST elevated myocardial infarction) (HCC)    Paroxysmal atrial fibrillation (HCC)    Anemia    History of right-sided carotid endarterectomy    Acute kidney injury superimposed on chronic kidney disease (HCC)    Hypotension    Cardiac insufficiency (HCC)    Acute postoperative respiratory insufficiency    Stage 3 chronic kidney disease    Acute postoperative pain    Postoperative hypotension  Resolved Problems:    * No resolved hospital problems. *      Past Medical History:  Past Medical History:   Diagnosis Date    Carotid artery stenosis     Cholecystitis     Choledocholithiasis     Cholelithiasis     Colon polyps     Essential hypertension 5/23/2019    Gout     Hiatal hernia     History of laparoscopic cholecystectomy     Hyperlipidemia 5/23/2019    Inflammatory polyarthropathy ([facility] [address] 5/23/2019    Malignant tumor of prostate ([facility] [address]     Osteoarthritis     Pancreatitis     Prostate CA ([facility] [address] 5/23/2019    Pulmonary emphysema (HCC)     Pulmonary nodules     Type 2 diabetes mellitus without complication, without long-term current use of insulin ([facility] [address] 5/23/2019    Ventral hernia        Allergies:   Allergies   Allergen Reactions    Methotrexate Derivatives      Caused Blisters       Current Medications:  Current Facility-Administered Medications   Medication Dose Route Frequency Provider Last Rate Last Dose    acetaminophen (TYLENOL) tablet 650 mg  650 mg Oral Q4H PRN [place] Petties, APRN - CNP   650 mg at 10/25/20 0131    HYDROcodone-acetaminophen (NORCO) 5-325 MG per tablet 1 tablet  1 tablet Oral Q4H PRN [place] Petties, APRN - CNP        Or    HYDROcodone-acetaminophen (NORCO) 5-325 MG per tablet 2 tablet  2 tablet Oral Q4H PRN Marianna Petties, APRN - CNP        sennosides-docusate sodium (SENOKOT-S) 8.6-50 MG tablet 1 tablet  1 tablet Oral BID Marianna Petties, APRN - CNP   1 tablet at 10/25/20 0902    magnesium hydroxide (MILK OF MAGNESIA) 400 MG/5ML suspension 30 mL  30 mL Oral Daily Northfield Petties, APRN - CNP   30 mL at 10/25/20 0901    aspirin EC tablet 81 mg  81 mg Oral Daily Northfield Petties, APRN - CNP   81 mg at 10/25/20 9905    ferrous sulfate (IRON 325) tablet 325 mg  325 mg Oral BID WC Marianna Petties, APRN - CNP   325 mg at 10/25/20 0902    vitamin C (ASCORBIC ACID) tablet 500 mg  500 mg Oral BID Northfield Petties, APRN - CNP   500 mg at 76/97/58 3108    folic acid (FOLVITE) tablet 1 mg  1 mg Oral Daily Tunde Saha APRN - CNP   1 mg at 10/25/20 0902    potassium chloride (KLOR-CON M) extended release tablet 20 mEq  20 mEq Oral PRN Tunde Saha, APRN - CNP   40 mEq at 10/25/20 0901    ondansetron (ZOFRAN) injection 4 mg  4 mg Intravenous Q8H PRN Tunde Saha APRN - CNP        insulin lispro (HUMALOG) injection vial 0-6 Units  0-6 Units Subcutaneous TID WC Tunde Saha APRN - CNP        insulin lispro (HUMALOG) injection vial 0-3 Units  0-3 Units Subcutaneous Nightly Tunde Saha APRN - CNP   1 Units at 10/25/20 0129    bisacodyl (DULCOLAX) suppository 10 mg  10 mg Rectal Daily Tunde Saha APRN - CNP        polyethylene glycol (GLYCOLAX) packet 17 g  17 g Oral Daily Tunde Saha APRN - CNP        amiodarone (CORDARONE) tablet 400 mg  400 mg Oral BID Tunde Saha, APRN - CNP   400 mg at 10/25/20 0902    DOBUTamine (DOBUTREX) 1000 mg in dextrose 5 % 250 mL infusion  2 mcg/kg/min Intravenous Continuous ROSANGELA Grady - CNP 2.4 mL/hr at 10/25/20 1051 2 mcg/kg/min at 10/25/20 1051    bumetanide (BUMEX) injection 1 mg  1 mg Intravenous BID Tunde Saha APRN - CNP   1 mg at 10/25/20 0901    magnesium oxide (MAG-OX) tablet 200 mg  200 mg Oral Daily Tunde Saha, APRN - CNP   200 mg at 10/24/20 0947    heparin (porcine) injection 5,000 Units  5,000 Units Subcutaneous Q8H Tunde Saha APRN - CNP   5,000 Units at 10/25/20 0444    timolol (TIMOPTIC) 0.5 % ophthalmic solution 1 drop  1 drop Left Eye BID Tunde Saha APRN - CNP   1 drop at 10/25/20 0901    vitamin B-12 (CYANOCOBALAMIN) tablet 1,000 mcg  1,000 mcg Oral BID Tunde Saha, APRN - CNP   1,000 mcg at 10/25/20 0902    ipratropium-albuterol (DUONEB) nebulizer solution 1 ampule  1 ampule Inhalation Q4H ROSANGELA Wiley - CNP   1 ampule at 10/24/20 2016    latanoprost (XALATAN) 0.005 % ophthalmic solution 1 drop  1 drop Both Eyes Nightly ROSANGELA Logan - CNP   1 drop at 10/24/20 2107      DOBUTamine 2 mcg/kg/min (10/25/20 1051)       Physical Exam:  /63   Pulse 65   Temp 97.3 °F (36.3 °C) (Temporal)   Resp 18   Ht 5' 11\" (1.803 m)   Wt 178 lb 8 oz (81 kg)   SpO2 96% Comment: 96% walking; 98% after walking  BMI 24.90 kg/m²   Weight change: 4 lb 5.4 oz (1.967 kg)  Wt Readings from Last 3 Encounters:   10/25/20 178 lb 8 oz (81 kg)   09/02/20 159 lb 12.8 oz (72.5 kg)   07/24/20 162 lb 6.4 oz (73.7 kg)     General: Awake, alert, oriented x3, no acute distress  Neck: No JVD, carotid bruits, thyromegaly, or lymphadenopathy  Cardiac: Regular rate and rhythm, normal S1 and split S2, no murmurs, no S3 or S4, no pericardial rubs. Carotid upstrokes brisk  Resp: clear bilaterally without wheezes, rhonchi, or rales; unlabored respirations, chest tube in  Abdomen: soft, nontender, nondistended, BS+; no masses, bruits, or hepatomegaly  Extremities: no cyanosis, clubbing, or edema. Distal pulses intact  Skin: Warm and dry, no rashes or lesions  Neuro: moves all extremities to command, no focal deficits noted    Intake/Output:    Intake/Output Summary (Last 24 hours) at 10/25/2020 1148  Last data filed at 10/25/2020 0746  Gross per 24 hour   Intake 1671.87 ml   Output 1637 ml   Net 34.87 ml     I/O this shift:   In: 889.9 [P.O.:360; I.V.:529.9]  Out: -     Laboratory Tests:  Last 3 CBC:  Recent Labs     10/23/20  0430 10/24/20  0450 10/25/20  0500   WBC 14.8* 10.6 12.1*   RBC 3.42* 2.95* 2.87*   HGB 11.3* 9.7* 9.6*   HCT 34.0* 30.1* 29.3*   MCV 99.4 102.0* 102.1*   MCH 33.0 32.9 33.4   MCHC 33.2 32.2 32.8   RDW 18.1* 17.5* 17.2*    144 157   MPV 11.6 11.3 11.1       Last 3 CMP:    Recent Labs     10/23/20  1757 10/24/20  0450 10/24/20  1215 10/24/20  1700 10/25/20  0500    139  --  138 142   K 3.3* 3.8 3.9 4.1 3.7   CL 99 101  --  100 100   CO2 28 26  --  26 27   BUN 48* 45*  --  47* 44*   CREATININE 1.8* 1.6*  --  1.6* 1.6* GLUCOSE 129* 122*  --  130* 82   CALCIUM 8.1* 7.7*  --  7.9* 8.2*   PROT 5.5* 5.1*  --  5.6*  --    LABALBU 3.1* 3.1*  --  3.4*  --    BILITOT 1.9* 2.1*  --  2.0*  --    ALKPHOS 75 75  --  71  --    * 184*  --  146*  --    ALT 59* 60*  --  57*  --        Last 3 Mag/Phos:  Recent Labs     10/23/20  0430 10/24/20  0450 10/24/20  1700   MG 1.7 1.9 2.1       Last 3 CK, CKMB, Troponin  No results for input(s): CKTOTAL, CKMB, TROPONINI in the last 72 hours. Last 3 BNP:  No results for input(s): BNP in the last 72 hours. Lab Results   Component Value Date    BNP 1,461 (H) 10/10/2020       Last 3 Glucose:     Recent Labs     10/24/20  0450 10/24/20  1700 10/25/20  0500   GLUCOSE 122* 130* 82       Last 3 Coags:  No results for input(s): PROTIME, INR, PTT in the last 72 hours. Lab Results   Component Value Date    PROTIME 16.2 10/19/2020    INR 1.4 10/19/2020       Last 3 Lipid Panel:  No results for input(s): LDLCALC, HDL, TRIG in the last 72 hours. Invalid input(s): CHLPL  Lab Results   Component Value Date    LDLCALC 93 07/19/2019     Lab Results   Component Value Date    HDL 44 05/01/2020    HDL 43 07/19/2019     Lab Results   Component Value Date    TRIG 125 05/01/2020    TRIG 163 07/19/2019     No components found for: CHLPL    TSH:  No results for input(s): TSH in the last 72 hours. Lab Results   Component Value Date    TSH 1.26 08/03/2020           Radiology:  XR CHEST PORTABLE   Final Result   PICC line catheter placement. Right IJ central line catheter removal.   Otherwise no change. XR CHEST PORTABLE   Final Result   1. Left lung airspace disease which could represent a combination of   atelectasis, pneumonia and/or pleural effusion. 2. Patchy right perihilar airspace disease. XR CHEST PORTABLE   Final Result   Continued bilateral infiltrates with a small right pleural effusion.    Possible consolidation in the left lower lung zone medially which raises the   question of thickening and pulmonary scarring is seen along the posterior   aspect of the right lung. 3. Small bilateral pleural effusions. 4. No pulmonary mass, lymphadenopathy or metastatic disease seen. 5. 2 mm right upper lobe nodule of doubtful clinical significance. If the   patient is at a high risk for malignancy, per the recommendations of the   Fleischner society, follow-up CT of the chest may be obtained in 12 months. Otherwise, no future follow-up is needed. 6. Severe calcified coronary atherosclerosis. 1         XR CHEST PORTABLE   Final Result   1. Abnormal chest x-ray. There is masslike opacity seen within the right   perihilar region measuring 6 cm x 4.2 cm and there is a 2nd rounded opacity   within the right hilum measuring 2.4 cm. Dedicated CT of the chest is   recommended for further evaluation. NM KIDNEY W FLOW AND FUNCTION WO PHARMACOLOGICAL INTERVENTION   Final Result   Diminished perfusion and extraction and excretion, the findings are   compatible with bilateral renal artery stenosis versus ATN versus   chronic renal failure            US RETROPERITONEAL COMPLETE   Final Result   1. 2.7 cm anechoic left renal cyst.   2. Otherwise, negative renal ultrasound. 3. Poorly distended bladder showing no gross abnormality. ASSESSMENT / PLAN:  #1. CAD post non-STEMI and CABG-chest tubes out, Wean off dobutrex. Continue aspirin. Supportive care. Ambulating as tolerated  .   #2. Paroxysmal atrial fibrillation-converted to sinus rhythm on IV amiodarone infusion. Consider oral amiodarone to maintain sinus rhythm but will defer to CT surgery service.     #3. Hypertension-controlled.     #4. Hyperlipidemia-no statin pending liver enzymes trend.      #5. Diabetes-per intensivist.     #6. Renal failure-creatinine increased to 2.4 but now decreasing and is 1.6 today. Nephrology following.   On Brian Salzaar MD, [facility] [place]    Electronically signed by Carlos Melvin MD on 10/25/2020 at 11:48 AM

## 2020-10-25 NOTE — DISCHARGE INSTR - DIET
Coconut oil, palm oil Liquid vegetable oils: corn, canola, olive, soybean and safflower oils   Limit the amount of cholesterol you eat to less than 200 milligrams per day. Cholesterol is a substance carried through the bloodstream via lipoproteins, which are known as transporters of fat. Some body functions need cholesterol to work properly, but too much cholesterol in the bloodstream can damage arteries and build up blood vessel linings (which can lead to heart attack and stroke). You should eat less than 200 milligrams cholesterol per day. People respond differently to eating cholesterol. There is no test available right now that can figure out which people will respond more to dietary cholesterol and which will respond less. For individuals with high intake of dietary cholesterol, different types of increase (none, small, moderate, large) in LDL-cholesterol levels are all possible. Food sources of cholesterol include egg yolks and organ meats such as liver, gizzards. Limit egg yolks to two to four per week and avoid organ meats like liver and gizzards to control cholesterol intake. Tips for Choosing Heart-Healthy Carbohydrates  Consume a consistent amount of carbohydrate  It is important to eat foods with carbohydrates in moderation because they impact your blood glucose level. Carbohydrates can be found in many foods such as:  Grains (breads, crackers, rice, pasta, and cereals)   Starchy Vegetables (potatoes, corn, and peas)   Beans and legumes   Milk, soy milk, and yogurt   Fruit and fruit juice   Sweets (cakes, cookies, ice cream, jam and jelly)  Your RDN will help you set a goal for how many carbohydrate servings to eat at your meals and snacks. For many adults, eating 3 to 5 servings of carbohydrate foods at each meal and 1 or 2 carbohydrate servings for each snack works well. Check your blood glucose level regularly. It can tell you if you need to adjust when you eat carbohydrates.   Choose foods rich in viscous (soluble) fiber  Viscous, or soluble, is found in the walls of plant cells. Viscous fiber is found only in plant-based foods. Eating foods with fiber helps to lower your unhealthy cholesterol and keep your blood glucose in range   Rich sources of viscous fiber include vegetables (asparagus, [place] sprouts, sweet potatoes, turnips) fruit (apricots, mangoes, oranges), legumes, and whole grains (barley, oats, and oat bran). As you increase your fiber intake gradually, also increase the amount of water you drink. This will help prevent constipation. If you have difficulty achieving this goal, ask your RDN about fiber laxatives. Choose fiber supplements made with viscous fibers such as psyllium seed husks or methylcellulose to help lower unhealthy cholesterol. Limit refined carbohydrates   There are three types of carbohydrates: starches, sugar, and fiber. Some carbohydrates occur naturally in food, like the starches in rice or corn or the sugars in fruits and milk. Refined carbohydrates--foods with high amounts of simple sugars--can raise triglyceride levels. High triglyceride levels are associated with coronary heart disease. Some examples of refined carbohydrate foods are table sugar, sweets, and beverages sweetened with added sugar. Tips for Reducing Sodium (Salt)  Although sodium is important for your body to function, too much sodium can be harmful for people with high blood pressure. As sodium and fluid buildup in your tissues and bloodstream, your blood pressure increases. High blood pressure may cause damage to other organs and increase your risk for a stroke. Even if you take a pill for blood pressure or a water pill (diuretic) to remove fluid, it is still important to have less salt in your diet. Ask your doctor and RDN what amount of sodium is right for you. Avoid processed foods. Eat more fresh foods.    Fresh fruits and vegetables are naturally low in sodium, as well as frozen vegetables and fruits that have no added juices or sauces. Fresh meats are lower in sodium than processed meats, such as cedeño, sausage, and hotdogs. Read the nutrition label or ask your  to help you find a fresh meat that is low in sodium. Eat less salt--at the table and when cooking. A single teaspoon of table salt has 2,300 mg of sodium. Leave the salt out of recipes for pasta, casseroles, and soups. Ask your RDN how to cook your favorite recipes without sodium  Be a smart . Look for food packages that say salt-free or sodium-free.  These items contain less than 5 milligrams of sodium per serving. Very low-sodium products contain less than 35 milligrams of sodium per serving. Low-sodium products contain less than 140 milligrams of sodium per serving. Beware for Unsalted or No Added Salt products. These items may still be high in sodium. Check the nutrition label. Add flavors to your food without adding sodium. Try lemon juice, lime juice, fruit juice or vinegar. Dry or fresh herbs add flavor. Try basil, bay leaf, dill, rosemary, parsley, veronika, dry mustard, nutmeg, thyme, and paprika. Pepper, red pepper flakes, and cayenne pepper can add spice t your meals without adding sodium. Hot sauce contains sodium, but if you use just a drop or two, it will not add up to much. Buy a sodium-free seasoning blend or make your own at home. Additional Lifestyle Tips  Achieve and maintain a healthy weight. Talk with your RDN or your doctor about what is a healthy weight for you. Set goals to reach and maintain that weight. To lose weight, reduce your calorie intake along with increasing your physical activity. A weight loss of 10 to 15 pounds could reduce LDL-cholesterol by 5 milligrams per deciliter. Participate in physical activity. Talk with your health care team to find out what types of physical activity are best for you.  Set a plan to get about 30 minutes of exercise

## 2020-10-25 NOTE — CONSULTS
Comprehensive Nutrition Assessment    Type and Reason for Visit:  Patient Education    Nutrition Recommendations/Plan: CHF diet ed provided in D/C instructions. Will continue to monitor/follow.      If OP nutrition education desired contact OP KAMALJIT Licea RD, LD: [phone]     Nutrition Interventions:   Food and/or Nutrient Delivery:  Continue Current Diet, Continue Oral Nutrition Supplement  Nutrition Education/Counseling:  Education initiated   Coordination of Nutrition Care:  Continued Inpatient Monitoring, Coordination of Community Care    Discharge Planning:    Recommend pursue outpatient nutrition counseling     Electronically signed by Damaso Bernal RD, LD on 10/25/20 at 11:26 AM EDT    Contact: y [phone]

## 2020-10-25 NOTE — PLAN OF CARE
Problem: Pain:  Goal: Pain level will decrease  Description: Pain level will decrease  Outcome: Met This Shift     Problem: Falls - Risk of:  Goal: Will remain free from falls  Description: Will remain free from falls  Outcome: Met This Shift     Problem: Skin Integrity:  Goal: Absence of new skin breakdown  Description: Absence of new skin breakdown  Outcome: Met This Shift     Problem: Pain:  Goal: Pain level will decrease  Description: Pain level will decrease  Outcome: Met This Shift

## 2020-10-26 LAB
ALBUMIN SERPL-MCNC: 3.3 G/DL (ref 3.5–5.2)
ALP BLD-CCNC: 81 U/L (ref 40–129)
ALT SERPL-CCNC: 40 U/L (ref 0–40)
ANION GAP SERPL CALCULATED.3IONS-SCNC: 17 MMOL/L (ref 7–16)
AST SERPL-CCNC: 76 U/L (ref 0–39)
BILIRUB SERPL-MCNC: 2 MG/DL (ref 0–1.2)
BUN BLDV-MCNC: 38 MG/DL (ref 8–23)
CALCIUM SERPL-MCNC: 8.2 MG/DL (ref 8.6–10.2)
CHLORIDE BLD-SCNC: 99 MMOL/L (ref 98–107)
CO2: 28 MMOL/L (ref 22–29)
CREAT SERPL-MCNC: 1.4 MG/DL (ref 0.7–1.2)
GFR AFRICAN AMERICAN: 59
GFR NON-AFRICAN AMERICAN: 49 ML/MIN/1.73
GLUCOSE BLD-MCNC: 131 MG/DL (ref 74–99)
HCT VFR BLD CALC: 31.1 % (ref 37–54)
HEMOGLOBIN: 10 G/DL (ref 12.5–16.5)
MCH RBC QN AUTO: 33.4 PG (ref 26–35)
MCHC RBC AUTO-ENTMCNC: 32.2 % (ref 32–34.5)
MCV RBC AUTO: 104 FL (ref 80–99.9)
METER GLUCOSE: 115 MG/DL (ref 74–99)
METER GLUCOSE: 147 MG/DL (ref 74–99)
METER GLUCOSE: 152 MG/DL (ref 74–99)
METER GLUCOSE: 153 MG/DL (ref 74–99)
PDW BLD-RTO: 17.4 FL (ref 11.5–15)
PLATELET # BLD: 159 E9/L (ref 130–450)
PMV BLD AUTO: 10.8 FL (ref 7–12)
POTASSIUM SERPL-SCNC: 3.7 MMOL/L (ref 3.5–5)
RBC # BLD: 2.99 E12/L (ref 3.8–5.8)
SODIUM BLD-SCNC: 144 MMOL/L (ref 132–146)
TOTAL PROTEIN: 5.9 G/DL (ref 6.4–8.3)
WBC # BLD: 12.5 E9/L (ref 4.5–11.5)

## 2020-10-26 PROCEDURE — 85027 COMPLETE CBC AUTOMATED: CPT

## 2020-10-26 PROCEDURE — 97530 THERAPEUTIC ACTIVITIES: CPT

## 2020-10-26 PROCEDURE — 94640 AIRWAY INHALATION TREATMENT: CPT

## 2020-10-26 PROCEDURE — 6370000000 HC RX 637 (ALT 250 FOR IP): Performed by: NURSE PRACTITIONER

## 2020-10-26 PROCEDURE — 6370000000 HC RX 637 (ALT 250 FOR IP): Performed by: THORACIC SURGERY (CARDIOTHORACIC VASCULAR SURGERY)

## 2020-10-26 PROCEDURE — 93798 PHYS/QHP OP CAR RHAB W/ECG: CPT

## 2020-10-26 PROCEDURE — 82962 GLUCOSE BLOOD TEST: CPT

## 2020-10-26 PROCEDURE — 97164 PT RE-EVAL EST PLAN CARE: CPT

## 2020-10-26 PROCEDURE — 6360000002 HC RX W HCPCS: Performed by: NURSE PRACTITIONER

## 2020-10-26 PROCEDURE — 80053 COMPREHEN METABOLIC PANEL: CPT

## 2020-10-26 PROCEDURE — 2500000003 HC RX 250 WO HCPCS: Performed by: INTERNAL MEDICINE

## 2020-10-26 PROCEDURE — 2140000000 HC CCU INTERMEDIATE R&B

## 2020-10-26 PROCEDURE — 2700000000 HC OXYGEN THERAPY PER DAY

## 2020-10-26 RX ADMIN — IPRATROPIUM BROMIDE AND ALBUTEROL SULFATE 1 AMPULE: 2.5; .5 SOLUTION RESPIRATORY (INHALATION) at 13:24

## 2020-10-26 RX ADMIN — Medication 1000 MCG: at 20:10

## 2020-10-26 RX ADMIN — BUMETANIDE 2 MG: 0.25 INJECTION INTRAMUSCULAR; INTRAVENOUS at 20:12

## 2020-10-26 RX ADMIN — IPRATROPIUM BROMIDE AND ALBUTEROL SULFATE 1 AMPULE: 2.5; .5 SOLUTION RESPIRATORY (INHALATION) at 21:28

## 2020-10-26 RX ADMIN — LATANOPROST 1 DROP: 50 SOLUTION OPHTHALMIC at 20:34

## 2020-10-26 RX ADMIN — BUMETANIDE 2 MG: 0.25 INJECTION INTRAMUSCULAR; INTRAVENOUS at 09:04

## 2020-10-26 RX ADMIN — TIMOLOL MALEATE 1 DROP: 5 SOLUTION OPHTHALMIC at 09:06

## 2020-10-26 RX ADMIN — Medication 500 MG: at 09:04

## 2020-10-26 RX ADMIN — IPRATROPIUM BROMIDE AND ALBUTEROL SULFATE 1 AMPULE: 2.5; .5 SOLUTION RESPIRATORY (INHALATION) at 17:54

## 2020-10-26 RX ADMIN — AMIODARONE HYDROCHLORIDE 400 MG: 200 TABLET ORAL at 20:10

## 2020-10-26 RX ADMIN — FERROUS SULFATE TAB 325 MG (65 MG ELEMENTAL FE) 325 MG: 325 (65 FE) TAB at 09:05

## 2020-10-26 RX ADMIN — INSULIN LISPRO 1 UNITS: 100 INJECTION, SOLUTION INTRAVENOUS; SUBCUTANEOUS at 20:14

## 2020-10-26 RX ADMIN — ASPIRIN 81 MG: 81 TABLET, FILM COATED ORAL at 09:12

## 2020-10-26 RX ADMIN — Medication 500 MG: at 20:10

## 2020-10-26 RX ADMIN — POTASSIUM CHLORIDE 40 MEQ: 1500 TABLET, EXTENDED RELEASE ORAL at 09:04

## 2020-10-26 RX ADMIN — Medication 1000 MCG: at 09:04

## 2020-10-26 RX ADMIN — TIMOLOL MALEATE 1 DROP: 5 SOLUTION OPHTHALMIC at 20:08

## 2020-10-26 RX ADMIN — INSULIN LISPRO 1 UNITS: 100 INJECTION, SOLUTION INTRAVENOUS; SUBCUTANEOUS at 16:25

## 2020-10-26 RX ADMIN — FERROUS SULFATE TAB 325 MG (65 MG ELEMENTAL FE) 325 MG: 325 (65 FE) TAB at 16:25

## 2020-10-26 RX ADMIN — AMIODARONE HYDROCHLORIDE 400 MG: 200 TABLET ORAL at 09:04

## 2020-10-26 RX ADMIN — FOLIC ACID 1 MG: 1 TABLET ORAL at 09:05

## 2020-10-26 RX ADMIN — APIXABAN 5 MG: 5 TABLET, FILM COATED ORAL at 11:26

## 2020-10-26 RX ADMIN — HEPARIN SODIUM 5000 UNITS: 10000 INJECTION INTRAVENOUS; SUBCUTANEOUS at 05:19

## 2020-10-26 RX ADMIN — APIXABAN 5 MG: 5 TABLET, FILM COATED ORAL at 20:10

## 2020-10-26 ASSESSMENT — PAIN SCALES - GENERAL
PAINLEVEL_OUTOF10: 0
PAINLEVEL_OUTOF10: 0

## 2020-10-26 ASSESSMENT — PAIN DESCRIPTION - PROGRESSION
CLINICAL_PROGRESSION: NOT CHANGED

## 2020-10-26 NOTE — PROGRESS NOTES
[facility] [address]    Name: Reji Slater    Age: 66 y.o. PCP: Po Kilgore MD    Date of Admission: 10/10/2020  5:46 PM    Date of Service: 10/26/2020    Chief Complaint: Follow-up for CABG 10/19/20, post op af  Underwent CABG x3 vessels 10/19. LIMA to LAD, vein graft to obtuse marginal and PDA. He lost his blood pressure and pulse 10/21 and was intubated. LVEF around 50% with dilated hypokinetic right ventricle and moderate pulmonary hypertension. Off of pressors at this time and has been extubated. Very fatigued and does not speak while in bed. Developed rapid atrial fibrillation requiring IV amiodarone infusion which was stopped as he converted to sinus rhythm.     Hypertension, hyperlipidemia, diabetes, COPD, right CEA in the past.  Paroxysmal atrial fibrillation with elevated LFTs and renal failure recently. In sinus rhythm on telemetry but he was placed on IV amiodarone infusion prophylactically.     Interim History:  No new overnight cardiac complaints. Currently with no complaints of palpitations, dizziness, or lightheadedness. Moved to 6th floor, chest tubes out, still on dobutrex.  Ambulated the  with assist.   Telemetry personally reviewed and showed sinus rhythm      Review of Systems:   Cardiac: As per HPI  General: No fever, chills  Pulmonary: No cough, wheeze, or shortness of breath  GI: No nausea, vomiting,or abdominal pain  Neuro: No headache or confusion    Problem List:  Principal Problem:    Coronary artery disease  Active Problems:    Transaminitis    Non-STEMI (non-ST elevated myocardial infarction) (HCC)    Paroxysmal atrial fibrillation (HCC)    Anemia    History of right-sided carotid endarterectomy    Acute kidney injury superimposed on chronic kidney disease (HCC)    Hypotension    Cardiac insufficiency (HCC)    Acute postoperative respiratory insufficiency    Stage 3 chronic kidney disease    Acute postoperative pain Postoperative hypotension  Resolved Problems:    * No resolved hospital problems. *      Past Medical History:  Past Medical History:   Diagnosis Date    Carotid artery stenosis     Cholecystitis     Choledocholithiasis     Cholelithiasis     Colon polyps     Essential hypertension 5/23/2019    Gout     Hiatal hernia     History of laparoscopic cholecystectomy     Hyperlipidemia 5/23/2019    Inflammatory polyarthropathy ([facility] 5/23/2019    Malignant tumor of prostate ([facility]     Osteoarthritis     Pancreatitis     Prostate CA ([facility] 5/23/2019    Pulmonary emphysema (HCC)     Pulmonary nodules     Type 2 diabetes mellitus without complication, without long-term current use of insulin ([facility] 5/23/2019    Ventral hernia        Allergies:   Allergies   Allergen Reactions    Methotrexate Derivatives      Caused Blisters       Current Medications:  Current Facility-Administered Medications   Medication Dose Route Frequency Provider Last Rate Last Dose    apixaban (ELIQUIS) tablet 5 mg  5 mg Oral BID Isabela Bi, DO        acetaminophen (TYLENOL) tablet 650 mg  650 mg Oral Q4H PRN [facility], APRN - CNP   650 mg at 10/25/20 0131    HYDROcodone-acetaminophen (NORCO) 5-325 MG per tablet 1 tablet  1 tablet Oral Q4H PRN [facility] APRN - CNP        Or    HYDROcodone-acetaminophen (NORCO) 5-325 MG per tablet 2 tablet  2 tablet Oral Q4H PRN [facility], APRN - CNP   2 tablet at 10/25/20 1713    aspirin EC tablet 81 mg  81 mg Oral Daily [facility], APRN - CNP   81 mg at 10/26/20 0912    ferrous sulfate (IRON 325) tablet 325 mg  325 mg Oral BID [facility], APRN - CNP   325 mg at 10/26/20 5898    vitamin C (ASCORBIC ACID) tablet 500 mg  500 mg Oral BID [facility], APRN - CNP   500 mg at 46/07/45 3900    folic acid (FOLVITE) tablet 1 mg  1 mg Oral Daily [facility], APRN - CNP   1 mg at 10/26/20 0905    potassium chloride (KLOR-CON M) extended release tablet 20 mEq  20 mEq Oral PRN Dina Devoid, APRN - CNP   40 mEq at 10/26/20 0904    ondansetron (ZOFRAN) injection 4 mg  4 mg Intravenous Q8H PRN Dina Devoid, APRN - CNP        insulin lispro (HUMALOG) injection vial 0-6 Units  0-6 Units Subcutaneous TID WC Dina Devoid, APRN - CNP   1 Units at 10/25/20 1218    insulin lispro (HUMALOG) injection vial 0-3 Units  0-3 Units Subcutaneous Nightly Dina Devoid, APRN - CNP   1 Units at 10/25/20 2043    bumetanide (BUMEX) injection 2 mg  2 mg Intravenous BID Jesus Alberto Fraser MD   2 mg at 10/26/20 0904    glucose (GLUTOSE) 40 % oral gel 15 g  15 g Oral PRN Oneil Terrazas DO        dextrose 50 % IV solution  12.5 g Intravenous PRN Gely Og DO        glucagon (rDNA) injection 1 mg  1 mg Intramuscular PRN Oneil Terrazas DO        dextrose 5 % solution  100 mL/hr Intravenous PRN Oneil Terrazas DO        amiodarone (CORDARONE) tablet 400 mg  400 mg Oral BID Dinakim Sharma, APRN - CNP   400 mg at 10/26/20 0904    DOBUTamine (DOBUTREX) 1000 mg in dextrose 5 % 250 mL infusion  0.5 mcg/kg/min Intravenous Continuous Rosemary Cabrera DO 1.4 mL/hr at 10/25/20 1945 1.2 mcg/kg/min at 10/25/20 1945    magnesium oxide (MAG-OX) tablet 200 mg  200 mg Oral Daily Dina Sharma, APRN - CNP   200 mg at 10/24/20 0947    timolol (TIMOPTIC) 0.5 % ophthalmic solution 1 drop  1 drop Left Eye BID Dina Devoid, APRN - CNP   1 drop at 10/26/20 8695    vitamin B-12 (CYANOCOBALAMIN) tablet 1,000 mcg  1,000 mcg Oral BID Dina Devoid, APRN - CNP   1,000 mcg at 10/26/20 8109    ipratropium-albuterol (DUONEB) nebulizer solution 1 ampule  1 ampule Inhalation Q4H WA Dina Devoid, APRN - CNP   1 ampule at 10/25/20 2002    latanoprost (XALATAN) 0.005 % ophthalmic solution 1 drop  1 drop Both Eyes Nightly Dina Devoid, APRN - CNP   1 drop at 10/25/20 2043      dextrose      DOBUTamine 1.2 mcg/kg/min (10/25/20 1945)       Physical Exam:  /62   Pulse 78   Temp 97 °F (36.1 °C) (Temporal)   Resp 18   Ht 5' 11\" (1.803 m)   Wt 171 lb 12.8 oz (77.9 kg)   SpO2 95%   BMI 23.96 kg/m²   Weight change: -6 lb 11.2 oz (-3.039 kg)  Wt Readings from Last 3 Encounters:   10/26/20 171 lb 12.8 oz (77.9 kg)   09/02/20 159 lb 12.8 oz (72.5 kg)   07/24/20 162 lb 6.4 oz (73.7 kg)     General: Awake, alert, oriented x3, no acute distress  Neck: No JVD, carotid bruits, thyromegaly, or lymphadenopathy  Cardiac: Regular rate and rhythm, normal S1 and split S2, no murmurs, no S3 or S4, no pericardial rubs. Carotid upstrokes brisk  Resp: clear bilaterally without wheezes, rhonchi, or rales; unlabored respirations, chest tube in  Abdomen: soft, nontender, nondistended, BS+; no masses, bruits, or hepatomegaly  Extremities: no cyanosis, clubbing, or edema. Distal pulses intact  Skin: Warm and dry, no rashes or lesions  Neuro: moves all extremities to command, no focal deficits noted    Intake/Output:    Intake/Output Summary (Last 24 hours) at 10/26/2020 1007  Last data filed at 10/26/2020 0655  Gross per 24 hour   Intake 780 ml   Output 2900 ml   Net -2120 ml     No intake/output data recorded.     Laboratory Tests:  Last 3 CBC:  Recent Labs     10/24/20  0450 10/25/20  0500 10/26/20  0530   WBC 10.6 12.1* 12.5*   RBC 2.95* 2.87* 2.99*   HGB 9.7* 9.6* 10.0*   HCT 30.1* 29.3* 31.1*   .0* 102.1* 104.0*   MCH 32.9 33.4 33.4   MCHC 32.2 32.8 32.2   RDW 17.5* 17.2* 17.4*    157 159   MPV 11.3 11.1 10.8       Last 3 CMP:    Recent Labs     10/24/20  0450  10/24/20  1700 10/25/20  0500 10/26/20  0530     --  138 142 144   K 3.8   < > 4.1 3.7 3.7     --  100 100 99   CO2 26  --  26 27 28   BUN 45*  --  47* 44* 38*   CREATININE 1.6*  --  1.6* 1.6* 1.4*   GLUCOSE 122*  --  130* 82 131*   CALCIUM 7.7*  --  7.9* 8.2* 8.2*   PROT 5.1*  --  5.6*  --  5.9*   LABALBU 3.1*  --  3.4*  --  3.3*   BILITOT 2.1*  --  2.0*  --  2.0*   ALKPHOS 75  --  71  --  81   *  --  146*  --  76*   ALT 60*  -- 57*  --  40    < > = values in this interval not displayed. Last 3 Mag/Phos:  Recent Labs     10/24/20  0450 10/24/20  1700   MG 1.9 2.1       Last 3 CK, CKMB, Troponin  No results for input(s): CKTOTAL, CKMB, TROPONINI in the last 72 hours. Last 3 BNP:  No results for input(s): BNP in the last 72 hours. Lab Results   Component Value Date    BNP 1,461 (H) 10/10/2020       Last 3 Glucose:     Recent Labs     10/24/20  1700 10/25/20  0500 10/26/20  0530   GLUCOSE 130* 82 131*       Last 3 Coags:  No results for input(s): PROTIME, INR, PTT in the last 72 hours. Lab Results   Component Value Date    PROTIME 16.2 10/19/2020    INR 1.4 10/19/2020       Last 3 Lipid Panel:  No results for input(s): LDLCALC, HDL, TRIG in the last 72 hours. Invalid input(s): CHLPL  Lab Results   Component Value Date    LDLCALC 93 07/19/2019     Lab Results   Component Value Date    HDL 44 05/01/2020    HDL 43 07/19/2019     Lab Results   Component Value Date    TRIG 125 05/01/2020    TRIG 163 07/19/2019     No components found for: CHLPL    TSH:  No results for input(s): TSH in the last 72 hours. Lab Results   Component Value Date    TSH 1.26 08/03/2020           Radiology:  XR CHEST PORTABLE   Final Result   PICC line catheter placement. Right IJ central line catheter removal.   Otherwise no change. XR CHEST PORTABLE   Final Result   1. Left lung airspace disease which could represent a combination of   atelectasis, pneumonia and/or pleural effusion. 2. Patchy right perihilar airspace disease. XR CHEST PORTABLE   Final Result   Continued bilateral infiltrates with a small right pleural effusion. Possible consolidation in the left lower lung zone medially which raises the   question of pneumonia. XR CHEST PORTABLE   Final Result   Interval placement of a gastric tube with distal portion visualized as far as   the mid esophagus. Distal tip may be further but the mediastinum obscures   the tube. nodule of doubtful clinical significance. If the   patient is at a high risk for malignancy, per the recommendations of the   [employer]leischner society, follow-up CT of the chest may be obtained in 12 months. Otherwise, no future follow-up is needed. 6. Severe calcified coronary atherosclerosis. 1         XR CHEST PORTABLE   Final Result   1. Abnormal chest x-ray. There is masslike opacity seen within the right   perihilar region measuring 6 cm x 4.2 cm and there is a 2nd rounded opacity   within the right hilum measuring 2.4 cm. Dedicated CT of the chest is   recommended for further evaluation. NM KIDNEY W FLOW AND FUNCTION WO PHARMACOLOGICAL INTERVENTION   Final Result   Diminished perfusion and extraction and excretion, the findings are   compatible with bilateral renal artery stenosis versus ATN versus   chronic renal failure            US RETROPERITONEAL COMPLETE   Final Result   1. 2.7 cm anechoic left renal cyst.   2. Otherwise, negative renal ultrasound. 3. Poorly distended bladder showing no gross abnormality. ASSESSMENT / PLAN:  #1. CAD post non-STEMI and CABG-chest tubes out, Wean off dobutrex. Continue aspirin. Supportive care. Ambulating as tolerated  .   #2. Paroxysmal atrial fibrillation-converted to sinus rhythm on IV amiodarone infusion. Has  maintained sinus rhythm .     #3. Hypertension-controlled.     #4. Hyperlipidemia-no statin pending liver enzymes trend.      #5. Diabetes-per intensivist.     #6. Renal failure-creatinine increased to 2.4 but now decreasing and is 1.4 today. Nephrology following.   On IV Bumex.             Cuate Rivera MD, [address] [place] [address] at [facility]    Electronically signed by Cuate Rivera MD on 10/26/2020 at 10:07 AM

## 2020-10-26 NOTE — PROGRESS NOTES
POD#7 Awake, alert. No complaints. Denies CP, palpitations, SOB at rest, dizziness/lightheadedness. Vitals:    10/25/20 2145 10/26/20 0215 10/26/20 0608 10/26/20 0750   BP: (!) 112/55 137/62  (!) 144/64   Pulse: 75 78  84   Resp: 18 18  18   Temp: 97 °F (36.1 °C) 97 °F (36.1 °C)  97.6 °F (36.4 °C)   TempSrc: Temporal Temporal  Temporal   SpO2: 95%   96%   Weight:   171 lb 12.8 oz (77.9 kg)    Height:         O2: 2L/NC      Intake/Output Summary (Last 24 hours) at 10/26/2020 1043  Last data filed at 10/26/2020 0845  Gross per 24 hour   Intake 960 ml   Output 2900 ml   Net -1940 ml       +BM 10/26  UO: 1400mL/8hr     Recent Labs     10/24/20  0450 10/25/20  0500 10/26/20  0530   WBC 10.6 12.1* 12.5*   HGB 9.7* 9.6* 10.0*   HCT 30.1* 29.3* 31.1*    157 159      Recent Labs     10/24/20  1700 10/25/20  0500 10/26/20  0530   BUN 47* 44* 38*   CREATININE 1.6* 1.6* 1.4*      Telemetry: NSR  (Afib this AM)        PE  Cardiac: RRR  Lungs: decreased bases  Chest incision C/D/I, approximated, no erythema. Sternum stable. Prior chest tube site incisions C/D/I, no erythema with intact sutures. Epicardial pacing wires present and secure. Abd: Soft, nondistended, nontender, +BS  Ext: Incisions C/D/I, approximated, no erythema, +minimal edema               A/P: POD#7     1. CAD  --Stable s/p CABG x3 (LIMA-LAD, SVG-OM, SVG-PDA), LAAL, KLS plating on 10/19/2020  --Post op KYAW reveals ~40% EF on inotropic support  --ASA  --reinforce sternal precautions  --continue epicardial pacing wires        2. Acute blood loss anemia secondary to open heart surgery  --stable        3.  PAF  --currently NSR  --continue oral amiodarone for afib prophylaxis--with plans to taper on dc  -Post op NSR, PAF with respiratory arrest, reverted back to NSR  -Again episode of RVR 10/23 morning--lasted ~20mins reverted back to SR with amio bolus, completed amiodarone infusion   --if he goes into afib again he will need anticoagulated--hold off on resuming plavix at this point  --in AFIB again this AM 10/26--will need OAC--start eliquis        4. Cardiac insufficiency   - H/o ICM, EF 30% preop; ~40% post op on inotropic support  - Echo 10/21: EF 45-50% on inotropic support with stage 2 diastolic dysfunction; RV with moderate to severe dysfunction  - POD 1, levophed weaned off, lactic acid normalized   - POD2, epi at 2.5mcg, respiratory arrest with severe hypotension and bradycardia following ambulation with PT, pt did not lose pulse during this time. 1gm Calcium chloride IVP given, Epi gtt increased, levophed started then switched to vasopressin gtt  - POD3, vaso gtt weaned off overnight, epi gtt at 6.5mcg, started on dobutamine   -POD4, Weaned off Epi--keep dobutamine, lactic normal   --decrease dobutamine to 0.5mcg/kg/min today with plans to stop tomorrow--slow wean  --will need repeat TTE with dobutamine off to evaluate LV function and possible need for WCD on dc  --optimize HF meds when able        5. MARIAH on chronic CKD stage 3  --Baseline Scr 1.3-1.5  --Scr peaked at 2.4 on 10/22, today 1.4  --nephrology following  --currently on bumex 2mg IV BID        6. Prolonged postoperative respiratory insufficiency/ post op respiratory arrest  --2/2 surgery, h/o pulmonary emphysema  --reintubated 10/21 following respiratory arrest, ?pulmonary source vs severe orthostatic event, CTA chest 10/10 without evidence for PE, avoid repeat CTA with renal function  --10/22 Extubated  --wean oxygen to keep SpO2 greater than or equal to 92%  --continue duonebs with ezpap  --encourage C&DB, SMI  --currently on 2L O2/NC  --diurese per renal        7. DMII  --hgA1c 6.2, on home metformin (hold with MARIAH)  --continue SSI for glycemic control        8. Leukocytosis  --afebrile  --WBC slowly improving  --today 12.5 K        9. Shock liver  - 2/2 RV dysfunction  - improving, Continue to trend, supportive care   --hold off on resuming statin for now d/t transaminitis         10. Constipation  --resolved  --stop bowel regimen  --Encouraged continued increase in oral intake and activity.          11.  Post operative deconditioning  --Increase activity as tolerated  --PT/OT              DVT prophylaxis:  --continue bilateral knee high KEMAR hose  --continue PCDs  --continue progressive ambulation  --stop subq heparin, start eliquis and continue knee high KEMAR hose/pcds/progressive ambulation        Dispo: home vs. Rehab pending progression in activity level             This patient's case and care plan was discussed with the attending surgeon

## 2020-10-26 NOTE — PROGRESS NOTES
Hospitalist Progress Note      PCP: Christal Wong MD    Date of Admission: 10/10/2020    Chief Complaint: *chest pain     Hospital Course: **s/p CABG, still has chest tubes**pt became hypotensive, went into a fib   The bradycardic, had to be reintubated and started on pressors. 10/21* now extubated  On dobutamine** and amiodarone ** edema decreasing          Subjective: ** feeling better      Medications:  Reviewed    Infusion Medications    dextrose      DOBUTamine 0.5 mcg/kg/min (10/26/20 1057)     Scheduled Medications    apixaban  5 mg Oral BID    aspirin  81 mg Oral Daily    ferrous sulfate  325 mg Oral BID WC    vitamin C  500 mg Oral BID    folic acid  1 mg Oral Daily    insulin lispro  0-6 Units Subcutaneous TID WC    insulin lispro  0-3 Units Subcutaneous Nightly    bumetanide  2 mg Intravenous BID    amiodarone  400 mg Oral BID    magnesium oxide  200 mg Oral Daily    timolol  1 drop Left Eye BID    vitamin B-12  1,000 mcg Oral BID    ipratropium-albuterol  1 ampule Inhalation Q4H WA    latanoprost  1 drop Both Eyes Nightly     PRN Meds: acetaminophen, HYDROcodone 5 mg - acetaminophen **OR** HYDROcodone 5 mg - acetaminophen, potassium chloride, ondansetron, glucose, dextrose, glucagon (rDNA), dextrose      Intake/Output Summary (Last 24 hours) at 10/26/2020 1546  Last data filed at 10/26/2020 1420  Gross per 24 hour   Intake 900 ml   Output 2700 ml   Net -1800 ml       Exam:    /60   Pulse 82   Temp 98 °F (36.7 °C) (Temporal)   Resp 18   Ht 5' 11\" (1.803 m)   Wt 171 lb 12.8 oz (77.9 kg)   SpO2 95% Comment: 95% walking;99% recovery  BMI 23.96 kg/m²       Gen: *well developed  HEENT: NC/AT, moist mucous membranes,   Neck: supple, trachea midline,  Heart:  Normal s1/s2, RRR, no murmurs, gallops, or rubs.    Lungs:  *cta  bilaterally, *  Abd: bowel sounds present, soft, nontender, nondistended,  Extrem:  No clubbing, cyanosis,  *pos* edema but decreasing   Skin: no rashes or lesions  Psych: A & O x3  Neuro: grossly intact, moves all four extremities.    Capillary Refill: Brisk,< 3 seconds   Peripheral Pulses: +2 palpable, equal bilaterally                Labs:   Recent Labs     10/24/20  0450 10/25/20  0500 10/26/20  0530   WBC 10.6 12.1* 12.5*   HGB 9.7* 9.6* 10.0*   HCT 30.1* 29.3* 31.1*    157 159     Recent Labs     10/24/20  1700 10/25/20  0500 10/26/20  0530    142 144   K 4.1 3.7 3.7    100 99   CO2 26 27 28   BUN 47* 44* 38*   CREATININE 1.6* 1.6* 1.4*   CALCIUM 7.9* 8.2* 8.2*     Recent Labs     10/24/20  0450 10/24/20  1700 10/26/20  0530   * 146* 76*   ALT 60* 57* 40   BILITOT 2.1* 2.0* 2.0*   ALKPHOS 75 71 81     No results for input(s): INR in the last 72 hours. No results for input(s): Radha Beat in the last 72 hours.   Recent Labs     10/24/20  0450 10/24/20  1700 10/26/20  0530   * 146* 76*   ALT 60* 57* 40   BILITOT 2.1* 2.0* 2.0*   ALKPHOS 75 71 81     Recent Labs     10/23/20  2225 10/24/20  1215 10/25/20  0100   LACTA 0.9 1.0 1.2     No results found for: Dawna Gottron  No results found for: AMMONIA    Assessment:    Active Hospital Problems    Diagnosis Date Noted    Postoperative hypotension [I95.89]     Cardiac insufficiency (HCC) [I50.9]     Acute postoperative respiratory insufficiency [J95.89]     Stage 3 chronic kidney disease [N18.30]     Acute postoperative pain [G89.18]     Non-STEMI (non-ST elevated myocardial infarction) ([facility] [I21.4] 10/14/2020    Paroxysmal atrial fibrillation ([facility] [I48.0] 10/14/2020    Anemia [D64.9] 10/14/2020    History of right-sided carotid endarterectomy [Z98.890] 10/14/2020    Coronary artery disease [I25.10] 10/14/2020    Acute kidney injury superimposed on chronic kidney disease ([facility] [N17.9, N18.9] 10/14/2020    Hypotension [I95.9] 10/14/2020    Transaminitis [R74.01] 10/10/2020   s/p CABG  PAF  II dM     Plan:   on  Dobutamine    cont amiodarone   cont SSI           DVT Prophylaxis: scd  Diet: DIET CARDIAC; Carb Control: 5 carbs/meal (approximate 2000 kcals/day)  Dietary Nutrition Supplements: Diabetic Oral Supplement  Code Status: Full Code     PT/OT Eval Status: *ordered     Dispo - home**       Electronically signed by Shruthi Farfan DO on 10/26/2020 at 3:46 PM [place]

## 2020-10-26 NOTE — PROGRESS NOTES
Department of Internal Medicine  Nephrology Progress Note    Events reviewed. SUBJECTIVE:  We are following Mr. [address] for MARIAH on CKD. Reports no complaints.     PHYSICAL EXAM:    VITALS:  BP (!) 144/64   Pulse 84   Temp 97.6 °F (36.4 °C) (Temporal)   Resp 18   Ht 5' 11\" (1.803 m)   Wt 171 lb 12.8 oz (77.9 kg)   SpO2 96%   BMI 23.96 kg/m²   24HR INTAKE/OUTPUT:      Intake/Output Summary (Last 24 hours) at 10/26/2020 1133  Last data filed at 10/26/2020 0845  Gross per 24 hour   Intake 960 ml   Output 2900 ml   Net -1940 ml       Constitutional: alert, in no distress  HEENT: Normocephalic, atraumatic, PERRL  Respiratory:  Fine rales bilaterally, requiring 4L via nasal cannula  Cardiovascular/Edema: Heart sounds regular   Gastrointestinal:  Soft, nontender, nondistended  Neurologic:    Nonfocal  Skin:  Warm, dry  Other:  2+ pitting edema generalized  L chest tube  Nix patent and draining  Right IJ introducer, R brachial arterial line    Scheduled Meds:   apixaban  5 mg Oral BID    aspirin  81 mg Oral Daily    ferrous sulfate  325 mg Oral BID WC    vitamin C  500 mg Oral BID    folic acid  1 mg Oral Daily    insulin lispro  0-6 Units Subcutaneous TID WC    insulin lispro  0-3 Units Subcutaneous Nightly    bumetanide  2 mg Intravenous BID    amiodarone  400 mg Oral BID    magnesium oxide  200 mg Oral Daily    timolol  1 drop Left Eye BID    vitamin B-12  1,000 mcg Oral BID    ipratropium-albuterol  1 ampule Inhalation Q4H WA    latanoprost  1 drop Both Eyes Nightly     Continuous Infusions:   dextrose      DOBUTamine 0.5 mcg/kg/min (10/26/20 1057)     PRN Meds:.acetaminophen, HYDROcodone 5 mg - acetaminophen **OR** HYDROcodone 5 mg - acetaminophen, potassium chloride, ondansetron, glucose, dextrose, glucagon (rDNA), dextrose    DATA:    CBC:   Lab Results   Component Value Date    WBC 12.5 10/26/2020    RBC 2.99 10/26/2020    HGB 10.0 10/26/2020    HCT 31.1 10/26/2020    .0 10/26/2020    MCH 33.4 10/26/2020    MCHC 32.2 10/26/2020    RDW 17.4 10/26/2020     10/26/2020    MPV 10.8 10/26/2020     CMP:    Lab Results   Component Value Date     10/26/2020    K 3.7 10/26/2020    K 3.6 10/19/2020    CL 99 10/26/2020    CO2 28 10/26/2020    BUN 38 10/26/2020    CREATININE 1.4 10/26/2020    GFRAA 59 10/26/2020    AGRATIO 0.9 10/10/2020    LABGLOM 49 10/26/2020    GLUCOSE 131 10/26/2020    PROT 5.9 10/26/2020    LABALBU 3.3 10/26/2020    CALCIUM 8.2 10/26/2020    BILITOT 2.0 10/26/2020    ALKPHOS 81 10/26/2020    AST 76 10/26/2020    ALT 40 10/26/2020     Magnesium:    Lab Results   Component Value Date    MG 2.1 10/24/2020     Phosphorus:  No results found for: PHOS     Urine sodium: 71  Urine potassium: 51.9  Urine chloride: 68  Urine creatinine: 118  Urine albumin/creatinine: 184 mg/grams  Urine protein/creatinine: 0.5 mg/grams    proBNP: 29,713      Radiology Review:      Nuclear medicine clinic with flow and function without pharmacological intervention October 12, 2020   Findings:         The patient was injected with 10 mCi of technetium MAG3 intravenously              Split uptake on the left was 47.8  percent, time to peak was 4.5    minutes, one half clearance time was 18.9         Split uptake on the right was 52.2 percent, time to peak was 0.5    minutes, one half clearance time was 23.4 minutes         Renal curves demonstrate perfusion to be delayed. Extraction was delayed. Excretion was delayed. Lasix was not administered         Impression:  Diminished perfusion and extraction and excretion, the findings are    compatible with bilateral renal artery stenosis versus ATN versus    chronic renal failure                CTA pulmonary with IV contrast October 10, 2020                    IMPRESSION:               CT Angiogram Chest and Pulmonary Arteries with contrast and high resolution 2-D and 3-D images:               1.  No evidence of pulmonary emboli by CT technique.               2. Small bilateral pleural effusions with dependent atelectasis.            Dictated ByMayank Douglas MD   DD/DT: 10/10/20 1022                  Signed Sandrine Douglas MD 10/10/20 1022                    : [facility]      CT Abdomen and pelvis  W/o contrast 10/9/2020               IMPRESSION:       1.   Mild bilateral perinephric stranding is again identified (without hydronephrosis upon       bilateral assessment).               2.  Mild urinary bladder wall thickening is identified.  Surgical absence of prostate gland.               3.   Moderate colonic diverticulosis is identified (sigmoid predominance) without acute       diverticulitis.  Nondistended sigmoid colon extends into left inguinal hernia defect.               4. Pleural parenchymal scarring is again identified in right lower lobe, slightly more pronounced       than on prior examination.               5. Significant vascular calcifications are identified (including greater than 50% degree of       stenosis bilaterally in renal arteries, duplicated bilaterally).            Dictated By: Yevgeniy Downing MD   DD/DT: 10/09/20 0813                  Signed Codey Varghese MD 10/09/20 0847                    :      CT Chest w/o contrast 10/13/2020   1. Corresponding to the masslike lesion seen in the right thorax on the    earlier chest x-ray is area of trapped fluid within the oblique fissure on    the right. 2. Pleural thickening and pulmonary scarring is seen along the posterior    aspect of the right lung. 3. Small bilateral pleural effusions. 4. No pulmonary mass, lymphadenopathy or metastatic disease seen. 5. 2 mm right upper lobe nodule of doubtful clinical significance.  If the    patient is at a high risk for malignancy, per the recommendations of the    Fleischner society, follow-up CT of the chest may be obtained in 12 months. Otherwise, no future follow-up is needed.     6. Severe calcified coronary atherosclerosis. 1           CXR 10/23/2020   1. Left lung airspace disease which could represent a combination of    atelectasis, pneumonia and/or pleural effusion. 2. Patchy right perihilar airspace disease. BRIEF SUMMARY OF INITIAL CONSULT:    Briefly Prashanth Juarez 89, Q 59 y. o. year old male with history of CKD stage III, with mild proteinuria, baseline creatinine 1.3 to 1.5 mg/dL, HTN, type II DM, hyperlipidemia, gout, prostate cancer status post resection, emphysema, seronegative inflammatory arthritis possibly psoriatic arthritis on methotrexate, who was admitted on October 10, 2020 transferred from [facility] where he initially was admitted with abdominal pain. Probably he was hypotensive with a blood pressures loss 79/50 and he was fluid resuscitated, he was transferred for a higher level of care. His creatinine level on admission was 1.9 mg/dL reason for this consultation. His medications prior to admission included lisinopril and chlorthalidone. Problems resolved:  · HAGMA, lactic acidosis and probably ketoacidosis, anion gap improved  · Hypotension,multifactroial, cardiogenic, 2/2 to MI, AF  · Hypokalemia, secondary to diuretics, potassium level improved  · Elevated LFTs, likely shock liver  · MARIAH stage I, ischemic ATN  · Hypokalemia 2/2 diuretics   · Acute hypoxic respiratory failure, s/p reintubation 10/21 and extubation 10/22    IMPRESSION/RECOMMENDATIONS:      1. Recurrent MARIAH, multifactorial s/p CABG, hemodynamically mediated prerenal MARIAH 2/2 arrhythmia and decompensated HF. Urine indices consistent with the above (Urine Na < 20). Renal function continues to improve with excellent urine output in response to Bumex. 2. CKD stage III, with mild proteinuria, secondary to early diabetic nephropathy, baseline creatinine 1.3 to 1.5 mg/dL    3.  Alkalemia, pH 3.151, with metabolic (bicabronate administration, contraction) alkalosis  4. CAD, NSTEMI with severe MVD  5. S/p CABG x 3 on 10/19/2020  6. PAF, s/p LISA exclusion, on amiodarone  7. HFrEF 40% with stage II DD and RV with moderate to severe dysfunction post operatively. Currently requiring dobutamine. Gently diuresing with Bumex 1 mg IV bid. 8. Bilateral renal stenosis, by CT abdomen  9. History of prostate cancer status post surgery  10. Anemia, history of B12 deficiency and iron deficiency, s/p IV iron; acute blood loss anemia 2/2 surgery. S/p transfusions.      Plan:    · Continue Bumex 1 mg IV bid  · Replace potassium prn  · Continue to monitor renal function    Electronically signed by Steffen Zhang MD on 10/26/2020 at 11:33 AM

## 2020-10-26 NOTE — CARE COORDINATION
SOCIAL WORK/CASEMANAGEMENT TRANSITION OF CARE [id number] Rebecca Hernandez, [address], [facility], [phone]): pt ambulated 200 and 100' yesterday and 80' today with cardiac rehab. I met with pt and wife and they want their daughter who is a rn at [address] with cardiac experience to do the [facility] instead of [facility].  [facility] said it was fine and I provided wife with the protocol. Joe/jeremy to follow.  Diane Villafana  10/26/2020

## 2020-10-26 NOTE — PROGRESS NOTES
Physical Therapy Initial Assessment     Name: Ridge Mayberry  : 1945  MRN: [id number]    Referring Provider:  ROSANGELA Bush CNP     Date of Service: 10/26/2020    Re-Evaluating PT:  Sunny Hazel, PT, DPT [id number]    Room #:  8711/9365-W  Diagnosis:  Transaminitis [R74.01]   Procedure/Surgery:  10/19/20 CABG x3   ~Code called 10/21 due to respiratory arrest and bradycardia. Pt intubated 10/21 and extubated 10/22. Precautions:  Falls, Sternal  PMHx/PSHx:  DM, HLD, Prostate CA  Equipment Needs:  TBD    SUBJECTIVE:    Pt lives with his wife in a 1 story home with 6+6 entry steps and 1+1 HR. Pt reports being Independent and using no AD for amb. OBJECTIVE:   Re-Evaluation  Date: 10/26 Treatment Short Term/ Long Term   Goals   AM-PAC 6 Clicks 79/68     Was pt agreeable to Eval/treatment? Yes     Does pt have pain? No c/o pain     Bed Mobility  Rolling: NT  Supine to sit: NT  Sit to supine: NT  Scooting: NT  Mod Independent    Transfers Sit to stand: Min A  Stand to sit: Min A  Stand pivot: Min A using no AD  Mod Independent    Ambulation    115 feet using no AD with Min A    >400 feet using no AD with Mod Independent     Stair negotiation: ascended and descended  NT  >6 steps using 1 rail for balance only with Min A   ROM BUE:  Defer to OT  BLE:  WFL     Strength BUE:  Defer to OT  RLE:  4/5  LLE:  4/5  Increase by at least 1/3 MMT grade   Balance Sitting EOB:  Supervision  Dynamic Standing:  Min A using no AD  Sitting EOB:  Independent   Dynamic Standing: Mod Independent      Pt is A & O x 4  Sensation:  Pt denies numbness and tingling to extremities  Edema:  None noted     Vitals: SaO2 was >98% on 2L O2 with all activity   SaO2 was % on 2L O2 at rest    Therapeutic Exercises:  STS 2 reps    Patient education  Pt educated on PT role, safety with mobility, transfer technique, gait training and postural reducation.   Education provided on benefits of OOB activity to prevent iatrogenic effects, sternal precautions and performing IS. Patient response to education:   Pt verbalized understanding Pt demonstrated skill Pt requires further education in this area   yes Partial  Yes      ASSESSMENT:    Comments:  Pt was cleared by RN prior to PT evaluation. Pt was received sitting up in chair and was agreeable to PT evaluation. Pt was limited by c/o fatigue due to not sleeping last night. Pt required encouragement to attempt to increase distance amb. Pt educated on benefits/need to amb increased distance stairs prior to discharge. Pt mildly anxious with mobility and required reassurance. Pt amb with decreased gait speed and wide LUPE. Vcs for effective breathing with upright posture. Pt returned to room and assisted back to recliner. Drainage noted to pt's gown and RN notified. Provided pt with clean gown. Pt was left with call button within reach and all needs met. RN and wife at beside. Treatment:  Patient practiced and was instructed in the following treatment:     Transfer training with VCs for sequencing and technique. Physical assist to complete task and correct mild LOB and unsteadiness upon standing.  Standing balance retraining with VCs for upright posture and forward gaze. Physical assist to correct unsteadiness. Standing weight shifts to R/L with physical assist.      · Gait training- pt was given verbal and tactile cues to facilitate upright posture, increased step height and normalize LUPE during ambulation as well as provided with physical assistance to complete task. · Therapeutic Exercises/Activities as noted above.  Patient education provided with focus on upright tolerance, safety, effective breathing and benefits of participating with therapy. Pt's/ family goals   1. To return home. Patient and or family understand(s) diagnosis, prognosis, and plan of care.   Yes     PLAN OF CARE:    Current Treatment Recommendations     [x] Strengthening     [] ROM [x] Balance Training   [x] Endurance Training   [x] Transfer Training   [x] Gait Training   [x] Stair Training   [] Positioning   [x] Safety and Education Training   [x] Patient/Caregiver Education   [] HEP  [] Other     Frequency of treatments: 2-5x/week x 1-2 weeks. Time in  1100  Time out  1130    Total Treatment Time  23 minutes     Evaluation Time includes thorough review of current medical information, gathering information on past medical history/social history and prior level of function, completion of standardized testing/informal observation of tasks, assessment of data and education on plan of care and goals.     CPT codes:  [] Low Complexity PT evaluation 00006  [] Moderate Complexity PT evaluation 92631  [] High Complexity PT evaluation 07353  [x] PT Re-evaluation 13602  [] Gait training 22727 - minutes  [] Manual therapy 01526 - minutes  [x] Therapeutic activities 40527 23 minutes  [] Therapeutic exercises 44387 - minutes  [] Neuromuscular reeducation 02732 - minutes     Elizabeth Monterroso, PT, DPT  License [id number]

## 2020-10-26 NOTE — PLAN OF CARE
Problem:  Activity Intolerance:  Goal: Able to perform prescribed physical activity  Description: Able to perform prescribed physical activity  10/26/2020 1110 by Cleve Bruno RN  Outcome: Ongoing  10/26/2020 0033 by Kusum Hua RN  Outcome: Met This Shift  Goal: Ability to tolerate increased activity will improve  Description: Ability to tolerate increased activity will improve  10/26/2020 0033 by Kusum Hua RN  Outcome: Met This Shift

## 2020-10-26 NOTE — PROGRESS NOTES
Occupational Therapy      Occupational Therapy attempt this date   Upon entry patient sleeping - wife present in room- stated patient had just gotten into bed and would prefer to let him get some sleep

## 2020-10-27 LAB
ALBUMIN SERPL-MCNC: 3.5 G/DL (ref 3.5–5.2)
ALP BLD-CCNC: 84 U/L (ref 40–129)
ALT SERPL-CCNC: 30 U/L (ref 0–40)
ANION GAP SERPL CALCULATED.3IONS-SCNC: 12 MMOL/L (ref 7–16)
AST SERPL-CCNC: 43 U/L (ref 0–39)
BILIRUB SERPL-MCNC: 1.8 MG/DL (ref 0–1.2)
BILIRUBIN DIRECT: 0.6 MG/DL (ref 0–0.3)
BILIRUBIN, INDIRECT: 1.2 MG/DL (ref 0–1)
BUN BLDV-MCNC: 33 MG/DL (ref 8–23)
CALCIUM SERPL-MCNC: 8.1 MG/DL (ref 8.6–10.2)
CHLORIDE BLD-SCNC: 97 MMOL/L (ref 98–107)
CO2: 35 MMOL/L (ref 22–29)
CREAT SERPL-MCNC: 1.4 MG/DL (ref 0.7–1.2)
GFR AFRICAN AMERICAN: 59
GFR NON-AFRICAN AMERICAN: 49 ML/MIN/1.73
GLUCOSE BLD-MCNC: 128 MG/DL (ref 74–99)
HCT VFR BLD CALC: 29.8 % (ref 37–54)
HEMOGLOBIN: 9.2 G/DL (ref 12.5–16.5)
MCH RBC QN AUTO: 32.3 PG (ref 26–35)
MCHC RBC AUTO-ENTMCNC: 30.9 % (ref 32–34.5)
MCV RBC AUTO: 104.6 FL (ref 80–99.9)
METER GLUCOSE: 135 MG/DL (ref 74–99)
METER GLUCOSE: 136 MG/DL (ref 74–99)
METER GLUCOSE: 142 MG/DL (ref 74–99)
METER GLUCOSE: 143 MG/DL (ref 74–99)
PDW BLD-RTO: 17.5 FL (ref 11.5–15)
PLATELET # BLD: 188 E9/L (ref 130–450)
PMV BLD AUTO: 10.4 FL (ref 7–12)
POTASSIUM SERPL-SCNC: 3.3 MMOL/L (ref 3.5–5)
RBC # BLD: 2.85 E12/L (ref 3.8–5.8)
SODIUM BLD-SCNC: 144 MMOL/L (ref 132–146)
TOTAL PROTEIN: 5.9 G/DL (ref 6.4–8.3)
WBC # BLD: 9.2 E9/L (ref 4.5–11.5)

## 2020-10-27 PROCEDURE — 2140000000 HC CCU INTERMEDIATE R&B

## 2020-10-27 PROCEDURE — 80048 BASIC METABOLIC PNL TOTAL CA: CPT

## 2020-10-27 PROCEDURE — 85027 COMPLETE CBC AUTOMATED: CPT

## 2020-10-27 PROCEDURE — 93798 PHYS/QHP OP CAR RHAB W/ECG: CPT

## 2020-10-27 PROCEDURE — 94640 AIRWAY INHALATION TREATMENT: CPT

## 2020-10-27 PROCEDURE — 2700000000 HC OXYGEN THERAPY PER DAY

## 2020-10-27 PROCEDURE — 36592 COLLECT BLOOD FROM PICC: CPT

## 2020-10-27 PROCEDURE — 6370000000 HC RX 637 (ALT 250 FOR IP): Performed by: NURSE PRACTITIONER

## 2020-10-27 PROCEDURE — 36415 COLL VENOUS BLD VENIPUNCTURE: CPT

## 2020-10-27 PROCEDURE — 2500000003 HC RX 250 WO HCPCS: Performed by: INTERNAL MEDICINE

## 2020-10-27 PROCEDURE — 2580000003 HC RX 258

## 2020-10-27 PROCEDURE — 94669 MECHANICAL CHEST WALL OSCILL: CPT

## 2020-10-27 PROCEDURE — 82962 GLUCOSE BLOOD TEST: CPT

## 2020-10-27 PROCEDURE — 80076 HEPATIC FUNCTION PANEL: CPT

## 2020-10-27 PROCEDURE — 6370000000 HC RX 637 (ALT 250 FOR IP): Performed by: THORACIC SURGERY (CARDIOTHORACIC VASCULAR SURGERY)

## 2020-10-27 RX ORDER — METOPROLOL SUCCINATE 25 MG/1
12.5 TABLET, EXTENDED RELEASE ORAL DAILY
Status: DISCONTINUED | OUTPATIENT
Start: 2020-10-27 | End: 2020-10-28 | Stop reason: HOSPADM

## 2020-10-27 RX ORDER — SODIUM CHLORIDE 0.9 % (FLUSH) 0.9 %
SYRINGE (ML) INJECTION
Status: COMPLETED
Start: 2020-10-27 | End: 2020-10-27

## 2020-10-27 RX ORDER — BUMETANIDE 1 MG/1
2 TABLET ORAL 2 TIMES DAILY
Status: DISCONTINUED | OUTPATIENT
Start: 2020-10-27 | End: 2020-10-28 | Stop reason: HOSPADM

## 2020-10-27 RX ADMIN — Medication 500 MG: at 09:07

## 2020-10-27 RX ADMIN — BUMETANIDE 2 MG: 1 TABLET ORAL at 21:21

## 2020-10-27 RX ADMIN — APIXABAN 5 MG: 5 TABLET, FILM COATED ORAL at 09:08

## 2020-10-27 RX ADMIN — METOPROLOL SUCCINATE 12.5 MG: 25 TABLET, EXTENDED RELEASE ORAL at 11:03

## 2020-10-27 RX ADMIN — IPRATROPIUM BROMIDE AND ALBUTEROL SULFATE 1 AMPULE: 2.5; .5 SOLUTION RESPIRATORY (INHALATION) at 12:43

## 2020-10-27 RX ADMIN — IPRATROPIUM BROMIDE AND ALBUTEROL SULFATE 1 AMPULE: 2.5; .5 SOLUTION RESPIRATORY (INHALATION) at 16:30

## 2020-10-27 RX ADMIN — IPRATROPIUM BROMIDE AND ALBUTEROL SULFATE 1 AMPULE: 2.5; .5 SOLUTION RESPIRATORY (INHALATION) at 21:46

## 2020-10-27 RX ADMIN — TIMOLOL MALEATE 1 DROP: 5 SOLUTION OPHTHALMIC at 09:09

## 2020-10-27 RX ADMIN — AMIODARONE HYDROCHLORIDE 400 MG: 200 TABLET ORAL at 21:21

## 2020-10-27 RX ADMIN — ASPIRIN 81 MG: 81 TABLET, FILM COATED ORAL at 09:07

## 2020-10-27 RX ADMIN — BUMETANIDE 2 MG: 0.25 INJECTION INTRAMUSCULAR; INTRAVENOUS at 09:09

## 2020-10-27 RX ADMIN — SODIUM CHLORIDE, PRESERVATIVE FREE 10 ML: 5 INJECTION INTRAVENOUS at 09:09

## 2020-10-27 RX ADMIN — TIMOLOL MALEATE 1 DROP: 5 SOLUTION OPHTHALMIC at 21:21

## 2020-10-27 RX ADMIN — AMIODARONE HYDROCHLORIDE 400 MG: 200 TABLET ORAL at 09:08

## 2020-10-27 RX ADMIN — Medication 1000 MCG: at 21:21

## 2020-10-27 RX ADMIN — POTASSIUM CHLORIDE 60 MEQ: 1500 TABLET, EXTENDED RELEASE ORAL at 09:07

## 2020-10-27 RX ADMIN — MAGNESIUM GLUCONATE 500 MG ORAL TABLET 200 MG: 500 TABLET ORAL at 09:06

## 2020-10-27 RX ADMIN — FERROUS SULFATE TAB 325 MG (65 MG ELEMENTAL FE) 325 MG: 325 (65 FE) TAB at 18:16

## 2020-10-27 RX ADMIN — APIXABAN 5 MG: 5 TABLET, FILM COATED ORAL at 21:21

## 2020-10-27 RX ADMIN — Medication 1000 MCG: at 09:07

## 2020-10-27 RX ADMIN — LATANOPROST 1 DROP: 50 SOLUTION OPHTHALMIC at 21:30

## 2020-10-27 RX ADMIN — FOLIC ACID 1 MG: 1 TABLET ORAL at 09:08

## 2020-10-27 RX ADMIN — Medication 500 MG: at 21:21

## 2020-10-27 RX ADMIN — FERROUS SULFATE TAB 325 MG (65 MG ELEMENTAL FE) 325 MG: 325 (65 FE) TAB at 09:09

## 2020-10-27 ASSESSMENT — PAIN SCALES - GENERAL
PAINLEVEL_OUTOF10: 0

## 2020-10-27 NOTE — PROGRESS NOTES
POD#8 Awake, alert. No complaints. Denies CP, palpitations, SOB at rest, dizziness/lightheadedness. Vitals:    10/27/20 0000 10/27/20 0340 10/27/20 0640 10/27/20 0715   BP: (!) 118/56 135/63  (!) 143/65   Pulse: 81 83  85   Resp: 21 20  20   Temp: 99.1 °F (37.3 °C) 98.6 °F (37 °C)  98.4 °F (36.9 °C)   TempSrc: Temporal Oral  Oral   SpO2: 96% 96%  95%   Weight:   166 lb 6.4 oz (75.5 kg)    Height:         O2: 2L/NC      Intake/Output Summary (Last 24 hours) at 10/27/2020 0751  Last data filed at 10/27/2020 0340  Gross per 24 hour   Intake 300 ml   Output 725 ml   Net -425 ml         UO: 525mL/8hr       Recent Labs     10/25/20  0500 10/26/20  0530 10/27/20  0535   WBC 12.1* 12.5* 9.2   HGB 9.6* 10.0* 9.2*   HCT 29.3* 31.1* 29.8*    159 188      Recent Labs     10/25/20  0500 10/26/20  0530 10/27/20  0535   BUN 44* 38* 33*   CREATININE 1.6* 1.4* 1.4*        Telemetry: NSR          PE  Cardiac: RRR  Lungs: decreased bases  Chest incision C/D/I, approximated, no erythema. Sternum stable. Prior chest tube site incisions C/D/I, no erythema with intact sutures. Epicardial pacing wires present and secure. Abd: Soft, nondistended, nontender, +BS  Ext: Incisions C/D/I, approximated, no erythema, +minimal edema               A/P: POD#8     1. CAD  --Stable s/p CABG x3 (LIMA-LAD, SVG-OM, SVG-PDA), LAAL, KLS plating on 10/19/2020  --Post op KYAW reveals ~40% EF on inotropic support  --ASA  --reinforce sternal precautions  --Epicardial pacing wires cut without difficulty.  Patient tolerated well        2. Acute blood loss anemia secondary to open heart surgery  --stable        3. PAF  --currently NSR  --continue oral amiodarone for afib prophylaxis--with plans to taper on dc  -Post op NSR, PAF with respiratory arrest, reverted back to NSR  -Again episode of RVR 10/23 morning--lasted ~20mins reverted back to SR with amio bolus, completed amiodarone infusion   --if he goes into afib again he will need anticoagulated--hold supportive care   --hold off on resuming statin for now d/t transaminitis         10. Constipation  --resolved  --Encouraged continued increase in oral intake and activity.          11.  Post operative deconditioning  --Increase activity as tolerated  --PT/OT              DVT prophylaxis:  --continue bilateral knee high KEMAR hose  --continue PCDs  --continue progressive ambulation  --on eliquis and continue knee high KEMAR hose/pcds/progressive ambulation         This patient's case and care plan was discussed with the attending surgeon

## 2020-10-27 NOTE — PROGRESS NOTES
Hospitalist Progress Note      PCP: Po Kilgore MD    Date of Admission: 10/10/2020    Chief Complaint: chest pain     Hospital Course: **s/p CABG, still has chest tubes**pt became hypotensive, went into a fib   The bradycardic, had to be reintubated and started on pressors. 10/21* now extubated  On dobutamine** and amiodarone ** edema decreasing            Subjective: Has no new complaints  Pain is under control  Off Dobutrex      Medications:  Reviewed    Infusion Medications    dextrose       Scheduled Medications    apixaban  5 mg Oral BID    aspirin  81 mg Oral Daily    ferrous sulfate  325 mg Oral BID WC    vitamin C  500 mg Oral BID    folic acid  1 mg Oral Daily    insulin lispro  0-6 Units Subcutaneous TID WC    insulin lispro  0-3 Units Subcutaneous Nightly    bumetanide  2 mg Intravenous BID    amiodarone  400 mg Oral BID    magnesium oxide  200 mg Oral Daily    timolol  1 drop Left Eye BID    vitamin B-12  1,000 mcg Oral BID    ipratropium-albuterol  1 ampule Inhalation Q4H WA    latanoprost  1 drop Both Eyes Nightly     PRN Meds: acetaminophen, HYDROcodone 5 mg - acetaminophen **OR** HYDROcodone 5 mg - acetaminophen, potassium chloride, ondansetron, glucose, dextrose, glucagon (rDNA), dextrose      Intake/Output Summary (Last 24 hours) at 10/27/2020 1038  Last data filed at 10/27/2020 0900  Gross per 24 hour   Intake 365.8 ml   Output 950 ml   Net -584.2 ml       Exam:    BP (!) 143/65   Pulse 85   Temp 98.4 °F (36.9 °C) (Oral)   Resp 20   Ht 5' 11\" (1.803 m)   Wt 166 lb 6.4 oz (75.5 kg)   SpO2 96%   BMI 23.21 kg/m²       Gen: *well developed  HEENT: NC/AT, moist mucous membranes,   Neck: supple, trachea midline,  Heart:  Normal s1/s2, RRR, no murmurs, gallops, or rubs.    Lungs:  *cta  bilaterally, *  Abd: bowel sounds present, soft, nontender, nondistended,  Extrem:  No clubbing, cyanosis,  *pos* edema but decreasing   Skin: no rashes or lesions  Psych: A & O x3  Neuro: grossly intact, moves all four extremities.    Capillary Refill: Brisk,< 3 seconds   Peripheral Pulses: +2 palpable, equal bilaterally                Labs:   Recent Labs     10/25/20  0500 10/26/20  0530 10/27/20  0535   WBC 12.1* 12.5* 9.2   HGB 9.6* 10.0* 9.2*   HCT 29.3* 31.1* 29.8*    159 188     Recent Labs     10/25/20  0500 10/26/20  0530 10/27/20  0535    144 144   K 3.7 3.7 3.3*    99 97*   CO2 27 28 35*   BUN 44* 38* 33*   CREATININE 1.6* 1.4* 1.4*   CALCIUM 8.2* 8.2* 8.1*     Recent Labs     10/24/20  1700 10/26/20  0530 10/27/20  0535   * 76* 43*   ALT 57* 40 30   BILIDIR  --   --  0.6*   BILITOT 2.0* 2.0* 1.8*   ALKPHOS 71 81 84     No results for input(s): INR in the last 72 hours. No results for input(s): Kody Hug in the last 72 hours.   Recent Labs     10/24/20  1700 10/26/20  0530 10/27/20  0535   * 76* 43*   ALT 57* 40 30   BILIDIR  --   --  0.6*   BILITOT 2.0* 2.0* 1.8*   ALKPHOS 71 81 84     Recent Labs     10/24/20  1215 10/25/20  0100   LACTA 1.0 1.2     No results found for: aKren Corcoran  No results found for: AMMONIA    Assessment:    Active Hospital Problems    Diagnosis Date Noted    Postoperative hypotension [I95.89]     Cardiac insufficiency (HCC) [I50.9]     Acute postoperative respiratory insufficiency [J95.89]     Stage 3 chronic kidney disease [N18.30]     Acute postoperative pain [G89.18]     Non-STEMI (non-ST elevated myocardial infarction) ([facility] [I21.4] 10/14/2020    Paroxysmal atrial fibrillation (HCC) [I48.0] 10/14/2020    Anemia [D64.9] 10/14/2020    History of right-sided carotid endarterectomy [Z98.890] 10/14/2020    Coronary artery disease [I25.10] 10/14/2020    Acute kidney injury superimposed on chronic kidney disease ([facility] [N17.9, N18.9] 10/14/2020    Hypotension [I95.9] 10/14/2020    Transaminitis [R74.01] 10/10/2020   s/p CABG  PAF  II dM     Plan:  Patient is off Dobutrex  He is stable otherwise  Maintaining

## 2020-10-27 NOTE — PROGRESS NOTES
Department of Internal Medicine  Nephrology Progress Note    Events reviewed. SUBJECTIVE:  We are following MrCeci Chery [facility] for MARIAH on CKD. He was seen with his wife sitting at bedside. He reports no complaints.     PHYSICAL EXAM:    VITALS:  /68   Pulse 87   Temp 98.2 °F (36.8 °C) (Temporal)   Resp 20   Ht 5' 11\" (1.803 m)   Wt 166 lb 6.4 oz (75.5 kg)   SpO2 92%   BMI 23.21 kg/m²   24HR INTAKE/OUTPUT:      Intake/Output Summary (Last 24 hours) at 10/27/2020 1507  Last data filed at 10/27/2020 1249  Gross per 24 hour   Intake 605.8 ml   Output 753 ml   Net -147.2 ml       Constitutional: alert, in no distress  HEENT: Normocephalic, atraumatic, PERRL  Respiratory:  Fine rales bilaterally, requiring 2L via nasal cannula  Cardiovascular/Edema: Heart sounds regular   Gastrointestinal:  Soft, nontender, nondistended  Neurologic:    Nonfocal  Skin:  Warm, dry  Other:  2+ pitting edema generalized  Nix patent and draining    Scheduled Meds:   metoprolol succinate  12.5 mg Oral Daily    apixaban  5 mg Oral BID    aspirin  81 mg Oral Daily    ferrous sulfate  325 mg Oral BID WC    vitamin C  500 mg Oral BID    folic acid  1 mg Oral Daily    insulin lispro  0-6 Units Subcutaneous TID WC    insulin lispro  0-3 Units Subcutaneous Nightly    bumetanide  2 mg Intravenous BID    amiodarone  400 mg Oral BID    magnesium oxide  200 mg Oral Daily    timolol  1 drop Left Eye BID    vitamin B-12  1,000 mcg Oral BID    ipratropium-albuterol  1 ampule Inhalation Q4H WA    latanoprost  1 drop Both Eyes Nightly     Continuous Infusions:   dextrose       PRN Meds:.perflutren lipid microspheres, acetaminophen, HYDROcodone 5 mg - acetaminophen **OR** HYDROcodone 5 mg - acetaminophen, potassium chloride, ondansetron, glucose, dextrose, glucagon (rDNA), dextrose    DATA:    CBC:   Lab Results   Component Value Date    WBC 9.2 10/27/2020    RBC 2.85 10/27/2020    HGB 9.2 10/27/2020    HCT 29.8 10/27/2020    MCV 104.6 10/27/2020    MCH 32.3 10/27/2020    MCHC 30.9 10/27/2020    RDW 17.5 10/27/2020     10/27/2020    MPV 10.4 10/27/2020     CMP:    Lab Results   Component Value Date     10/27/2020    K 3.3 10/27/2020    K 3.6 10/19/2020    CL 97 10/27/2020    CO2 35 10/27/2020    BUN 33 10/27/2020    CREATININE 1.4 10/27/2020    GFRAA 59 10/27/2020    AGRATIO 0.9 10/10/2020    LABGLOM 49 10/27/2020    GLUCOSE 128 10/27/2020    PROT 5.9 10/27/2020    LABALBU 3.5 10/27/2020    CALCIUM 8.1 10/27/2020    BILITOT 1.8 10/27/2020    ALKPHOS 84 10/27/2020    AST 43 10/27/2020    ALT 30 10/27/2020     Magnesium:    Lab Results   Component Value Date    MG 2.1 10/24/2020     Phosphorus:  No results found for: PHOS     Urine sodium: 71  Urine potassium: 51.9  Urine chloride: 68  Urine creatinine: 118  Urine albumin/creatinine: 184 mg/grams  Urine protein/creatinine: 0.5 mg/grams    proBNP: 29,713      Radiology Review:      Nuclear medicine clinic with flow and function without pharmacological intervention October 12, 2020   Findings:         The patient was injected with 10 mCi of technetium MAG3 intravenously              Split uptake on the left was 47.8  percent, time to peak was 4.5    minutes, one half clearance time was 18.9         Split uptake on the right was 52.2 percent, time to peak was 0.5    minutes, one half clearance time was 23.4 minutes         Renal curves demonstrate perfusion to be delayed. Extraction was delayed. Excretion was delayed. Lasix was not administered         Impression:  Diminished perfusion and extraction and excretion, the findings are    compatible with bilateral renal artery stenosis versus ATN versus    chronic renal failure                CTA pulmonary with IV contrast October 10, 2020                    IMPRESSION:               CT Angiogram Chest and Pulmonary Arteries with contrast and high resolution 2-D and 3-D images:               1.  No evidence of pulmonary emboli by CT technique.               2. Small bilateral pleural effusions with dependent atelectasis.            Dictated ByForrest Crawford MD   DD/DT: 10/10/20 1022                  Signed Cali Crawford MD 10/10/20 1022                    : OLVIN      CT Abdomen and pelvis  W/o contrast 10/9/2020               IMPRESSION:       1.   Mild bilateral perinephric stranding is again identified (without hydronephrosis upon       bilateral assessment).               2.  Mild urinary bladder wall thickening is identified.  Surgical absence of prostate gland.               3.   Moderate colonic diverticulosis is identified (sigmoid predominance) without acute       diverticulitis.  Nondistended sigmoid colon extends into left inguinal hernia defect.               4. Pleural parenchymal scarring is again identified in right lower lobe, slightly more pronounced       than on prior examination.               5. Significant vascular calcifications are identified (including greater than 50% degree of       stenosis bilaterally in renal arteries, duplicated bilaterally).            Dictated By: Waylon Davila MD   DD/DT: 10/09/20 0813                  Signed Sarahy Dorman MD 10/09/20 0847                    :      CT Chest w/o contrast 10/13/2020   1. Corresponding to the masslike lesion seen in the right thorax on the    earlier chest x-ray is area of trapped fluid within the oblique fissure on    the right. 2. Pleural thickening and pulmonary scarring is seen along the posterior    aspect of the right lung. 3. Small bilateral pleural effusions. 4. No pulmonary mass, lymphadenopathy or metastatic disease seen. 5. 2 mm right upper lobe nodule of doubtful clinical significance.  If the    patient is at a high risk for malignancy, per the recommendations of the    Fleischner society, follow-up CT of the chest may be obtained in 12 months. Otherwise, no future follow-up is needed.     6. Severe calcified coronary atherosclerosis. 1           CXR 10/23/2020   1. Left lung airspace disease which could represent a combination of    atelectasis, pneumonia and/or pleural effusion. 2. Patchy right perihilar airspace disease. BRIEF SUMMARY OF INITIAL CONSULT:    Briefly Prashanth Juarez 89, D 17 y. o. year old male with history of CKD stage III, with mild proteinuria, baseline creatinine 1.3 to 1.5 mg/dL, HTN, type II DM, hyperlipidemia, gout, prostate cancer status post resection, emphysema, seronegative inflammatory arthritis possibly psoriatic arthritis on methotrexate, who was admitted on October 10, 2020 transferred from [facility] where he initially was admitted with abdominal pain. Probably he was hypotensive with a blood pressures loss 79/50 and he was fluid resuscitated, he was transferred for a higher level of care. His creatinine level on admission was 1.9 mg/dL reason for this consultation. His medications prior to admission included lisinopril and chlorthalidone. Problems resolved:  · HAGMA, lactic acidosis and probably ketoacidosis, anion gap improved  · Hypotension,multifactroial, cardiogenic, 2/2 to MI, AF  · Hypokalemia, secondary to diuretics, potassium level improved  · Elevated LFTs, likely shock liver  · MARIAH stage I, ischemic ATN  · Hypokalemia 2/2 diuretics   · Acute hypoxic respiratory failure, s/p reintubation 10/21 and extubation 10/22    IMPRESSION/RECOMMENDATIONS:      1. Recurrent MARIAH, multifactorial s/p CABG, hemodynamically mediated prerenal MARIAH 2/2 arrhythmia and decompensated HF. Urine indices consistent with the above (Urine Na < 20). · Resolved. Creatinine 1.4 mg/dL with excellent urine output in response to Bumex. He continues to have large edema. Will continue with diuresis. 2. CKD stage III, with mild proteinuria, secondary to early diabetic nephropathy, baseline creatinine 1.3 to 1.5 mg/dL    3.  Hypokalemia 2/2 diuretics  4. Elevated bicarbonate level, probably contraction alkalosis  5. CAD, NSTEMI with severe MVD; S/p CABG x 3 on 10/19/2020  6. PAF, s/p LISA exclusion, on amiodarone and metoprolol  7. HFrEF 40% with stage II DD and RV with moderate to severe dysfunction post operatively  8. Bilateral renal stenosis, by CT abdomen  9. History of prostate cancer status post surgery  10. Anemia, history of B12 deficiency and iron deficiency, s/p IV iron; acute blood loss anemia 2/2 surgery. S/p transfusions.      Plan:    · Continue Bumex 2 mg bid, change to PO  · Replace potassium per PRN orders  · Continue to monitor renal function  · Discharge planning  · Follow up in our office in 2 weeks, BMP in 1 week    Electronically signed by ROSANGELA Vides CNP on 10/27/2020 at 3:07 PM

## 2020-10-27 NOTE — PROGRESS NOTES
[facility] [address]    Name: Asif Disla    Age: 66 y.o. PCP: Josette Carney MD    Date of Admission: 10/10/2020  5:46 PM    Date of Service: 10/27/2020    Chief Complaint: Follow-up for CABG 10/19/20, post op af  Underwent CABG x3 vessels 10/19. LIMA to LAD, vein graft to obtuse marginal and PDA. He lost his blood pressure and pulse 10/21 and was intubated. LVEF around 50% with dilated hypokinetic right ventricle and moderate pulmonary hypertension. Off of pressors at this time and has been extubated. Very fatigued and does not speak while in bed. Developed rapid atrial fibrillation requiring IV amiodarone infusion which was stopped as he converted to sinus rhythm.     Hypertension, hyperlipidemia, diabetes, COPD, right CEA in the past.  Paroxysmal atrial fibrillation with elevated LFTs and renal failure recently. In sinus rhythm on telemetry but he was placed on IV amiodarone infusion prophylactically.     Interim History:  No new overnight cardiac complaints. Currently with no complaints of palpitations, dizziness, or lightheadedness. Off dobutrex.  C/o difficulty swallowing the pills especially K+ Ambulated the  with assist.   Telemetry personally reviewed and showed sinus rhythm      Review of Systems:   Cardiac: As per HPI  General: No fever, chills  Pulmonary: No cough, wheeze, or shortness of breath  GI: No nausea, vomiting,or abdominal pain  Neuro: No headache or confusion    Problem List:  Principal Problem:    Coronary artery disease  Active Problems:    Transaminitis    Non-STEMI (non-ST elevated myocardial infarction) (HCC)    Paroxysmal atrial fibrillation (HCC)    Anemia    History of right-sided carotid endarterectomy    Acute kidney injury superimposed on chronic kidney disease (HCC)    Hypotension    Cardiac insufficiency (HCC)    Acute postoperative respiratory insufficiency    Stage 3 chronic kidney disease    Acute postoperative tablet 20 mEq  20 mEq Oral PRN Joseph Zee, APRN - CNP   60 mEq at 10/27/20 0907    ondansetron (ZOFRAN) injection 4 mg  4 mg Intravenous Q8H PRN Joseph Zee, APRN - CNP        insulin lispro (HUMALOG) injection vial 0-6 Units  0-6 Units Subcutaneous TID WC Joseph Zee, APRN - CNP   1 Units at 10/26/20 1625    insulin lispro (HUMALOG) injection vial 0-3 Units  0-3 Units Subcutaneous Nightly Joseph Zee, APRN - CNP   1 Units at 10/26/20 2014    bumetanide (BUMEX) injection 2 mg  2 mg Intravenous BID Raffi Fang MD   2 mg at 10/27/20 0909    glucose (GLUTOSE) 40 % oral gel 15 g  15 g Oral PRN Oneil Sanabria, DO        dextrose 50 % IV solution  12.5 g Intravenous PRN Precious Bhat DO        glucagon (rDNA) injection 1 mg  1 mg Intramuscular PRN Oneil Sanabria, DO        dextrose 5 % solution  100 mL/hr Intravenous PRN Oneil Sanabria, DO        amiodarone (CORDARONE) tablet 400 mg  400 mg Oral BID Joseph Zee, APRN - CNP   400 mg at 10/27/20 0908    magnesium oxide (MAG-OX) tablet 200 mg  200 mg Oral Daily Joseph Zee, APRN - CNP   200 mg at 10/27/20 7772    timolol (TIMOPTIC) 0.5 % ophthalmic solution 1 drop  1 drop Left Eye BID Joseph Zee APRN - CNP   1 drop at 10/27/20 4306    vitamin B-12 (CYANOCOBALAMIN) tablet 1,000 mcg  1,000 mcg Oral BID Joseph Zee, APRN - CNP   1,000 mcg at 10/27/20 4147    ipratropium-albuterol (DUONEB) nebulizer solution 1 ampule  1 ampule Inhalation Q4H WA Joseph Zee, APRN - CNP   1 ampule at 10/26/20 2128    latanoprost (XALATAN) 0.005 % ophthalmic solution 1 drop  1 drop Both Eyes Nightly Joseph Zee, APRN - CNP   1 drop at 10/26/20 2034      dextrose         Physical Exam:  BP (!) 143/65   Pulse 85   Temp 98.4 °F (36.9 °C) (Oral)   Resp 20   Ht 5' 11\" (1.803 m)   Wt 166 lb 6.4 oz (75.5 kg)   SpO2 96%   BMI 23.21 kg/m²   Weight change: -5 lb 6.4 oz (-2.449 kg)  Wt Readings from Last 3 Encounters:   10/27/20 166 lb 6.4 oz (75.5 kg)   09/02/20 159 lb 12.8 oz (72.5 kg)   07/24/20 162 lb 6.4 oz (73.7 kg)     General: Awake, alert, oriented x3, no acute distress  Neck: No JVD, carotid bruits, thyromegaly, or lymphadenopathy  Cardiac: Regular rate and rhythm, normal S1 and split S2, no murmurs, no S3 or S4, no pericardial rubs. Carotid upstrokes brisk  Resp: clear bilaterally without wheezes, rhonchi, or rales; unlabored respirations  Abdomen: soft, nontender, nondistended, BS+; no masses, bruits, or hepatomegaly  Extremities: no cyanosis, clubbing, or edema. Distal pulses intact  Skin: Warm and dry, no rashes or lesions  Neuro: moves all extremities to command, no focal deficits noted    Intake/Output:    Intake/Output Summary (Last 24 hours) at 10/27/2020 1019  Last data filed at 10/27/2020 0900  Gross per 24 hour   Intake 365.8 ml   Output 950 ml   Net -584.2 ml     I/O this shift:  In: 180 [P.O.:180]  Out: -     Laboratory Tests:  Last 3 CBC:  Recent Labs     10/25/20  0500 10/26/20  0530 10/27/20  0535   WBC 12.1* 12.5* 9.2   RBC 2.87* 2.99* 2.85*   HGB 9.6* 10.0* 9.2*   HCT 29.3* 31.1* 29.8*   .1* 104.0* 104.6*   MCH 33.4 33.4 32.3   MCHC 32.8 32.2 30.9*   RDW 17.2* 17.4* 17.5*    159 188   MPV 11.1 10.8 10.4       Last 3 CMP:    Recent Labs     10/24/20  1700 10/25/20  0500 10/26/20  0530 10/27/20  0535    142 144 144   K 4.1 3.7 3.7 3.3*    100 99 97*   CO2 26 27 28 35*   BUN 47* 44* 38* 33*   CREATININE 1.6* 1.6* 1.4* 1.4*   GLUCOSE 130* 82 131* 128*   CALCIUM 7.9* 8.2* 8.2* 8.1*   PROT 5.6*  --  5.9* 5.9*   LABALBU 3.4*  --  3.3* 3.5   BILITOT 2.0*  --  2.0* 1.8*   ALKPHOS 71  --  81 84   *  --  76* 43*   ALT 57*  --  40 30       Last 3 Mag/Phos:  Recent Labs     10/24/20  1700   MG 2.1       Last 3 CK, CKMB, Troponin  No results for input(s): CKTOTAL, CKMB, TROPONINI in the last 72 hours. Last 3 BNP:  No results for input(s): BNP in the last 72 hours.   Lab Results   Component Value includes small infiltrates from   inflammation/infection         XR CHEST PORTABLE   Final Result   Slightly improved central vascular congestion. No other interval change,   status post open heart surgery. Remaining tubes and lines stable. Basilar   opacities, likely combination atelectasis and pleural fluid. XR CHEST PORTABLE   Final Result   Mild increased right pleural fluid. Increased prominence of central pulmonary vessels, likely related to vascular   congestion. Mild patchy bibasilar atelectasis or infiltrates. XR CHEST PORTABLE   Final Result   Line and tube placements. Vascular congestion with bilateral pleural effusions and basilar atelectasis   right greater than left. US CAROTID ARTERY BILATERAL   Final Result   Atherosclerotic disease. Estimated stenosis by NASCET criteria in the proximal right carotid   artery is between 0% to 49% . Estimated stenosis by NASCET criteria in the proximal left carotid   artery is between 50% to 69%. US DUP LOWER EXTREMITY MAPPING BILAT VENOUS   Final Result   Bilateral venous mapping as described. No deep venous   thrombosis visualized. CT CHEST WO CONTRAST   Final Result   1. Corresponding to the masslike lesion seen in the right thorax on the   earlier chest x-ray is area of trapped fluid within the oblique fissure on   the right. 2. Pleural thickening and pulmonary scarring is seen along the posterior   aspect of the right lung. 3. Small bilateral pleural effusions. 4. No pulmonary mass, lymphadenopathy or metastatic disease seen. 5. 2 mm right upper lobe nodule of doubtful clinical significance. If the   patient is at a high risk for malignancy, per the recommendations of the   Fleischner society, follow-up CT of the chest may be obtained in 12 months. Otherwise, no future follow-up is needed. 6. Severe calcified coronary atherosclerosis. 1         XR CHEST PORTABLE   Final Result   1. Abnormal chest x-ray. There is masslike opacity seen within the right   perihilar region measuring 6 cm x 4.2 cm and there is a 2nd rounded opacity   within the right hilum measuring 2.4 cm. Dedicated CT of the chest is   recommended for further evaluation. NM KIDNEY W FLOW AND FUNCTION WO PHARMACOLOGICAL INTERVENTION   Final Result   Diminished perfusion and extraction and excretion, the findings are   compatible with bilateral renal artery stenosis versus ATN versus   chronic renal failure            US RETROPERITONEAL COMPLETE   Final Result   1. 2.7 cm anechoic left renal cyst.   2. Otherwise, negative renal ultrasound. 3. Poorly distended bladder showing no gross abnormality. ASSESSMENT / PLAN:  #1. CAD post non-STEMI and CABG- Continue aspirin. Supportive care. Ambulating as tolerated. Replacing K+  .   #2. Paroxysmal atrial fibrillation-converted to sinus rhythm on IV amiodarone infusion. Has  maintained sinus rhythm since off amio. .     #3. Hypertension-controlled.     #4. Hyperlipidemia-no statin pending liver enzymes trend.      #5. Diabetes-per intensivist.     #6. Renal failure-creatinine increased to 2.4 but now decreasing and is 1.4 today. Nephrology following.   On IV Bumex per renal.             Abelino Florian MD, [facility]    Electronically signed by Abelino Florian MD on 10/27/2020 at 10:19 AM

## 2020-10-27 NOTE — CARE COORDINATION
SOCIAL WORK/CASEMANAGEMENT TRANSITION OF CARE [id number], [address], [place], [phone]):  no hhc , daughter is a rn and will follow the cardiac protocol. Also, tommy rep notified of life vest order. Pt ambulated 200' . Sw/cm to follow . Bri Mcdonald  10/27/2020

## 2020-10-28 VITALS
HEIGHT: 71 IN | SYSTOLIC BLOOD PRESSURE: 125 MMHG | HEART RATE: 80 BPM | DIASTOLIC BLOOD PRESSURE: 59 MMHG | TEMPERATURE: 98.6 F | OXYGEN SATURATION: 94 % | RESPIRATION RATE: 18 BRPM | WEIGHT: 158.2 LBS | BODY MASS INDEX: 22.15 KG/M2

## 2020-10-28 LAB
ALBUMIN SERPL-MCNC: 3.6 G/DL (ref 3.5–5.2)
ALP BLD-CCNC: 81 U/L (ref 40–129)
ALT SERPL-CCNC: 26 U/L (ref 0–40)
ANION GAP SERPL CALCULATED.3IONS-SCNC: 8 MMOL/L (ref 7–16)
AST SERPL-CCNC: 32 U/L (ref 0–39)
BILIRUB SERPL-MCNC: 2 MG/DL (ref 0–1.2)
BUN BLDV-MCNC: 28 MG/DL (ref 8–23)
CALCIUM SERPL-MCNC: 8.8 MG/DL (ref 8.6–10.2)
CHLORIDE BLD-SCNC: 96 MMOL/L (ref 98–107)
CO2: 38 MMOL/L (ref 22–29)
CREAT SERPL-MCNC: 1.3 MG/DL (ref 0.7–1.2)
GFR AFRICAN AMERICAN: >60
GFR NON-AFRICAN AMERICAN: 53 ML/MIN/1.73
GLUCOSE BLD-MCNC: 131 MG/DL (ref 74–99)
HCT VFR BLD CALC: 29.8 % (ref 37–54)
HEMOGLOBIN: 9.6 G/DL (ref 12.5–16.5)
MCH RBC QN AUTO: 33.4 PG (ref 26–35)
MCHC RBC AUTO-ENTMCNC: 32.2 % (ref 32–34.5)
MCV RBC AUTO: 103.8 FL (ref 80–99.9)
METER GLUCOSE: 127 MG/DL (ref 74–99)
PDW BLD-RTO: 17.5 FL (ref 11.5–15)
PLATELET # BLD: 219 E9/L (ref 130–450)
PMV BLD AUTO: 10.5 FL (ref 7–12)
POTASSIUM SERPL-SCNC: 3.6 MMOL/L (ref 3.5–5)
RBC # BLD: 2.87 E12/L (ref 3.8–5.8)
SODIUM BLD-SCNC: 142 MMOL/L (ref 132–146)
TOTAL PROTEIN: 6.5 G/DL (ref 6.4–8.3)
WBC # BLD: 11.3 E9/L (ref 4.5–11.5)

## 2020-10-28 PROCEDURE — 6370000000 HC RX 637 (ALT 250 FOR IP): Performed by: NURSE PRACTITIONER

## 2020-10-28 PROCEDURE — 6370000000 HC RX 637 (ALT 250 FOR IP): Performed by: PHYSICIAN ASSISTANT

## 2020-10-28 PROCEDURE — 94669 MECHANICAL CHEST WALL OSCILL: CPT

## 2020-10-28 PROCEDURE — 6370000000 HC RX 637 (ALT 250 FOR IP): Performed by: THORACIC SURGERY (CARDIOTHORACIC VASCULAR SURGERY)

## 2020-10-28 PROCEDURE — 36415 COLL VENOUS BLD VENIPUNCTURE: CPT

## 2020-10-28 PROCEDURE — 93308 TTE F-UP OR LMTD: CPT

## 2020-10-28 PROCEDURE — 93798 PHYS/QHP OP CAR RHAB W/ECG: CPT

## 2020-10-28 PROCEDURE — 80053 COMPREHEN METABOLIC PANEL: CPT

## 2020-10-28 PROCEDURE — 85027 COMPLETE CBC AUTOMATED: CPT

## 2020-10-28 PROCEDURE — 82962 GLUCOSE BLOOD TEST: CPT

## 2020-10-28 PROCEDURE — 94640 AIRWAY INHALATION TREATMENT: CPT

## 2020-10-28 RX ORDER — METOPROLOL SUCCINATE 25 MG/1
12.5 TABLET, EXTENDED RELEASE ORAL DAILY
Qty: 30 TABLET | Refills: 2 | Status: SHIPPED | OUTPATIENT
Start: 2020-10-29 | End: 2020-12-29

## 2020-10-28 RX ORDER — POTASSIUM CHLORIDE 20 MEQ/1
20 TABLET, EXTENDED RELEASE ORAL 2 TIMES DAILY WITH MEALS
Qty: 60 TABLET | Refills: 3 | Status: SHIPPED | OUTPATIENT
Start: 2020-10-29 | End: 2020-11-30 | Stop reason: ALTCHOICE

## 2020-10-28 RX ORDER — AMIODARONE HYDROCHLORIDE 200 MG/1
400 TABLET ORAL SEE ADMIN INSTRUCTIONS
Qty: 56 TABLET | Refills: 0 | Status: SHIPPED | OUTPATIENT
Start: 2020-10-28 | End: 2020-11-30 | Stop reason: ALTCHOICE

## 2020-10-28 RX ORDER — SODIUM CHLORIDE 0.9 % (FLUSH) 0.9 %
SYRINGE (ML) INJECTION
Status: DISPENSED
Start: 2020-10-28 | End: 2020-10-28

## 2020-10-28 RX ORDER — POTASSIUM CHLORIDE 20 MEQ/1
20 TABLET, EXTENDED RELEASE ORAL 2 TIMES DAILY WITH MEALS
Status: DISCONTINUED | OUTPATIENT
Start: 2020-10-29 | End: 2020-10-28 | Stop reason: HOSPADM

## 2020-10-28 RX ORDER — HYDROCODONE BITARTRATE AND ACETAMINOPHEN 5; 325 MG/1; MG/1
1 TABLET ORAL EVERY 6 HOURS PRN
Qty: 28 TABLET | Refills: 0 | Status: SHIPPED | OUTPATIENT
Start: 2020-10-28 | End: 2020-11-04

## 2020-10-28 RX ORDER — BUMETANIDE 2 MG/1
2 TABLET ORAL 2 TIMES DAILY
Qty: 30 TABLET | Refills: 3 | Status: SHIPPED | OUTPATIENT
Start: 2020-10-28 | End: 2020-11-30 | Stop reason: ALTCHOICE

## 2020-10-28 RX ORDER — ASCORBIC ACID 500 MG
500 TABLET ORAL 2 TIMES DAILY
Qty: 60 TABLET | Refills: 0 | Status: SHIPPED | OUTPATIENT
Start: 2020-10-28 | End: 2020-11-30 | Stop reason: ALTCHOICE

## 2020-10-28 RX ORDER — LISINOPRIL 5 MG/1
5 TABLET ORAL DAILY
Qty: 30 TABLET | Refills: 1 | Status: SHIPPED | OUTPATIENT
Start: 2020-10-29 | End: 2020-11-30 | Stop reason: ALTCHOICE

## 2020-10-28 RX ORDER — LISINOPRIL 5 MG/1
5 TABLET ORAL DAILY
Status: DISCONTINUED | OUTPATIENT
Start: 2020-10-28 | End: 2020-10-28 | Stop reason: HOSPADM

## 2020-10-28 RX ADMIN — Medication 500 MG: at 08:49

## 2020-10-28 RX ADMIN — FOLIC ACID 1 MG: 1 TABLET ORAL at 08:49

## 2020-10-28 RX ADMIN — METOPROLOL SUCCINATE 12.5 MG: 25 TABLET, EXTENDED RELEASE ORAL at 08:49

## 2020-10-28 RX ADMIN — POTASSIUM CHLORIDE 60 MEQ: 1500 TABLET, EXTENDED RELEASE ORAL at 08:49

## 2020-10-28 RX ADMIN — ASPIRIN 81 MG: 81 TABLET, FILM COATED ORAL at 08:49

## 2020-10-28 RX ADMIN — TIMOLOL MALEATE 1 DROP: 5 SOLUTION OPHTHALMIC at 08:50

## 2020-10-28 RX ADMIN — LISINOPRIL 5 MG: 5 TABLET ORAL at 12:23

## 2020-10-28 RX ADMIN — FERROUS SULFATE TAB 325 MG (65 MG ELEMENTAL FE) 325 MG: 325 (65 FE) TAB at 08:49

## 2020-10-28 RX ADMIN — Medication 1000 MCG: at 08:49

## 2020-10-28 RX ADMIN — IPRATROPIUM BROMIDE AND ALBUTEROL SULFATE 1 AMPULE: 2.5; .5 SOLUTION RESPIRATORY (INHALATION) at 16:25

## 2020-10-28 RX ADMIN — BUMETANIDE 2 MG: 1 TABLET ORAL at 08:49

## 2020-10-28 RX ADMIN — IPRATROPIUM BROMIDE AND ALBUTEROL SULFATE 1 AMPULE: 2.5; .5 SOLUTION RESPIRATORY (INHALATION) at 10:01

## 2020-10-28 RX ADMIN — AMIODARONE HYDROCHLORIDE 400 MG: 200 TABLET ORAL at 08:49

## 2020-10-28 RX ADMIN — APIXABAN 5 MG: 5 TABLET, FILM COATED ORAL at 08:49

## 2020-10-28 RX ADMIN — MAGNESIUM GLUCONATE 500 MG ORAL TABLET 200 MG: 500 TABLET ORAL at 08:49

## 2020-10-28 ASSESSMENT — PAIN SCALES - GENERAL
PAINLEVEL_OUTOF10: 0

## 2020-10-28 NOTE — PROGRESS NOTES
[facility] [address]    Name: Niko Mcginnis    Age: 66 y.o. PCP: Lissette Santos MD    Date of Admission: 10/10/2020  5:46 PM    Date of Service: 10/28/2020    Chief Complaint: Follow-up for CABG 10/19/20, post op af  Underwent CABG x3 vessels 10/19. LIMA to LAD, vein graft to obtuse marginal and PDA. He lost his blood pressure and pulse 10/21 and was intubated. LVEF around 50% with dilated hypokinetic right ventricle and moderate pulmonary hypertension. Off of pressors at this time and has been extubated. Very fatigued and does not speak while in bed. Developed rapid atrial fibrillation requiring IV amiodarone infusion which was stopped as he converted to sinus rhythm.     Hypertension, hyperlipidemia, diabetes, COPD, right CEA in the past.  Paroxysmal atrial fibrillation with elevated LFTs and renal failure recently. In sinus rhythm on telemetry but he was placed on IV amiodarone infusion prophylactically.     Interim History:  No new overnight cardiac complaints. Currently with no complaints of palpitations, dizziness, or lightheadedness. Ambulated the  with assist 275 ft. Just finished echo EF looks about normal- was calculated>60%.    Telemetry personally reviewed and showed sinus rhythm      Review of Systems:   Cardiac: As per HPI  General: No fever, chills  Pulmonary: No cough, wheeze, or shortness of breath  GI: No nausea, vomiting,or abdominal pain  Neuro: No headache or confusion    Problem List:  Principal Problem:    Coronary artery disease  Active Problems:    Transaminitis    Non-STEMI (non-ST elevated myocardial infarction) (HCC)    Paroxysmal atrial fibrillation (HCC)    Anemia    History of right-sided carotid endarterectomy    Acute kidney injury superimposed on chronic kidney disease (HCC)    Hypotension    Cardiac insufficiency (HCC)    Acute postoperative respiratory insufficiency    Stage 3 chronic kidney disease    Acute postoperative pain    Postoperative hypotension  Resolved Problems:    * No resolved hospital problems. *      Past Medical History:  Past Medical History:   Diagnosis Date    Carotid artery stenosis     Cholecystitis     Choledocholithiasis     Cholelithiasis     Colon polyps     Essential hypertension 5/23/2019    Gout     Hiatal hernia     History of laparoscopic cholecystectomy     Hyperlipidemia 5/23/2019    Inflammatory polyarthropathy ([facility] [address] 5/23/2019    Malignant tumor of prostate ([facility]     Osteoarthritis     Pancreatitis     Prostate CA ([facility] [address] 5/23/2019    Pulmonary emphysema (HCC)     Pulmonary nodules     Type 2 diabetes mellitus without complication, without long-term current use of insulin ([facility] [address] [facility] 5/23/2019    Ventral hernia        Allergies:   Allergies   Allergen Reactions    Methotrexate Derivatives      Caused Blisters       Current Medications:  Current Facility-Administered Medications   Medication Dose Route Frequency Provider Last Rate Last Dose    sodium chloride flush 0.9 % injection             lisinopril (PRINIVIL;ZESTRIL) tablet 5 mg  5 mg Oral Daily Sumanth Angry, PA   5 mg at 10/28/20 1223    metoprolol succinate (TOPROL XL) extended release tablet 12.5 mg  12.5 mg Oral Daily Marianna Rocha APRN - CNP   12.5 mg at 10/28/20 6737    perflutren lipid microspheres (DEFINITY) injection 1.65 mg  1.5 mL Intravenous ONCE PRN Marianna Rocha APRN - PRISCILA        bumetanide (BUMEX) tablet 2 mg  2 mg Oral BID Rubens Rater, APRN - CNP   2 mg at 10/28/20 0849    apixaban (ELIQUIS) tablet 5 mg  5 mg Oral BID Maximiano Factor, DO   5 mg at 10/28/20 0849    acetaminophen (TYLENOL) tablet 650 mg  650 mg Oral Q4H PRN Marianna Rocha APRN - CNP   650 mg at 10/25/20 0131    HYDROcodone-acetaminophen (NORCO) 5-325 MG per tablet 1 tablet  1 tablet Oral Q4H PRN ROSANGELA Knight - CNP        Or    HYDROcodone-acetaminophen (NORCO) 5-325 MG per tablet 2 tablet  2 tablet Oral Q4H PRN Laura Groom, APRN - CNP   2 tablet at 10/25/20 1713    aspirin EC tablet 81 mg  81 mg Oral Daily Laura Groom, APRN - CNP   81 mg at 10/28/20 0849    ferrous sulfate (IRON 325) tablet 325 mg  325 mg Oral BID  Laura Groom, APRN - CNP   325 mg at 10/28/20 2534    vitamin C (ASCORBIC ACID) tablet 500 mg  500 mg Oral BID Laura Groom, APRN - CNP   500 mg at 57/66/31 0236    folic acid (FOLVITE) tablet 1 mg  1 mg Oral Daily Laura Groom, APRN - CNP   1 mg at 10/28/20 0849    potassium chloride (KLOR-CON M) extended release tablet 20 mEq  20 mEq Oral PRN Laura Groom, APRN - CNP   60 mEq at 10/28/20 0849    ondansetron (ZOFRAN) injection 4 mg  4 mg Intravenous Q8H PRN Laura Groom, APRN - CNP        insulin lispro (HUMALOG) injection vial 0-6 Units  0-6 Units Subcutaneous TID  Laura Groom, APRN - CNP   1 Units at 10/26/20 1625    insulin lispro (HUMALOG) injection vial 0-3 Units  0-3 Units Subcutaneous Nightly Laura Groom, APRN - CNP   1 Units at 10/26/20 2014    glucose (GLUTOSE) 40 % oral gel 15 g  15 g Oral PRN Oneil Timur Finely, DO        dextrose 50 % IV solution  12.5 g Intravenous PRN Marquise Montano, DO        glucagon (rDNA) injection 1 mg  1 mg Intramuscular PRN Oneil Timur Finely, DO        dextrose 5 % solution  100 mL/hr Intravenous PRN Oneil Timur Finely, DO        amiodarone (CORDARONE) tablet 400 mg  400 mg Oral BID Laura Groom, APRN - CNP   400 mg at 10/28/20 0849    magnesium oxide (MAG-OX) tablet 200 mg  200 mg Oral Daily Laura Groom, APRN - CNP   200 mg at 10/28/20 0849    timolol (TIMOPTIC) 0.5 % ophthalmic solution 1 drop  1 drop Left Eye BID Laura Groom, APRN - CNP   1 drop at 10/28/20 0850    vitamin B-12 (CYANOCOBALAMIN) tablet 1,000 mcg  1,000 mcg Oral BID Laura Groom, APRN - CNP   1,000 mcg at 10/28/20 0849    ipratropium-albuterol (DUONEB) nebulizer solution 1 ampule  1 ampule Inhalation Q4H ROSANGELA Haney CNP   1 ampule at 10/28/20 1001    latanoprost (XALATAN) 0.005 % ophthalmic solution 1 drop  1 drop Both Eyes Nightly ROSANGELA Almaraz CNP   1 drop at 10/27/20 2130      dextrose         Physical Exam:  BP (!) 125/59   Pulse 80   Temp 98.6 °F (37 °C) (Oral)   Resp 18   Ht 5' 11\" (1.803 m)   Wt 158 lb 3.2 oz (71.8 kg)   SpO2 94%   BMI 22.06 kg/m²   Weight change: -8 lb 3.2 oz (-3.719 kg)  Wt Readings from Last 3 Encounters:   10/28/20 158 lb 3.2 oz (71.8 kg)   09/02/20 159 lb 12.8 oz (72.5 kg)   07/24/20 162 lb 6.4 oz (73.7 kg)     General: Awake, alert, oriented x3, no acute distress  Neck: No JVD, carotid bruits, thyromegaly, or lymphadenopathy  Cardiac: Regular rate and rhythm, normal S1 and split S2, no murmurs, no S3 or S4, no pericardial rubs. Carotid upstrokes brisk  Resp: clear bilaterally without wheezes, rhonchi, or rales; unlabored respirations  Abdomen: soft, nontender, nondistended, BS+; no masses, bruits, or hepatomegaly  Extremities: no cyanosis, clubbing, or edema. Distal pulses intact  Skin: Warm and dry, no rashes or lesions  Neuro: moves all extremities to command, no focal deficits noted    Intake/Output:    Intake/Output Summary (Last 24 hours) at 10/28/2020 1252  Last data filed at 10/28/2020 1109  Gross per 24 hour   Intake 700 ml   Output 825 ml   Net -125 ml     I/O this shift:   In: 400 [P.O.:400]  Out: 200 [Urine:200]    Laboratory Tests:  Last 3 CBC:  Recent Labs     10/26/20  0530 10/27/20  0535 10/28/20  0505   WBC 12.5* 9.2 11.3   RBC 2.99* 2.85* 2.87*   HGB 10.0* 9.2* 9.6*   HCT 31.1* 29.8* 29.8*   .0* 104.6* 103.8*   MCH 33.4 32.3 33.4   MCHC 32.2 30.9* 32.2   RDW 17.4* 17.5* 17.5*    188 219   MPV 10.8 10.4 10.5       Last 3 CMP:    Recent Labs     10/26/20  0530 10/27/20  0535 10/28/20  0505    144 142   K 3.7 3.3* 3.6   CL 99 97* 96*   CO2 28 35* 38*   BUN 38* 33* 28*   CREATININE 1.4* 1.4* 1.3*   GLUCOSE 131* 128* 131*   CALCIUM 8.2* 8.1* 8.8   PROT 5.9* 5.9* 6.5 LABALBU 3.3* 3.5 3.6   BILITOT 2.0* 1.8* 2.0*   ALKPHOS 81 84 81   AST 76* 43* 32   ALT 40 30 26       Last 3 Mag/Phos:  No results for input(s): MG, PHOS in the last 72 hours. Last 3 CK, CKMB, Troponin  No results for input(s): CKTOTAL, CKMB, TROPONINI in the last 72 hours. Last 3 BNP:  No results for input(s): BNP in the last 72 hours. Lab Results   Component Value Date    BNP 1,461 (H) 10/10/2020       Last 3 Glucose:     Recent Labs     10/26/20  0530 10/27/20  0535 10/28/20  0505   GLUCOSE 131* 128* 131*       Last 3 Coags:  No results for input(s): PROTIME, INR, PTT in the last 72 hours. Lab Results   Component Value Date    PROTIME 16.2 10/19/2020    INR 1.4 10/19/2020       Last 3 Lipid Panel:  No results for input(s): LDLCALC, HDL, TRIG in the last 72 hours. Invalid input(s): CHLPL  Lab Results   Component Value Date    LDLCALC 93 07/19/2019     Lab Results   Component Value Date    HDL 44 05/01/2020    HDL 43 07/19/2019     Lab Results   Component Value Date    TRIG 125 05/01/2020    TRIG 163 07/19/2019     No components found for: CHLPL    TSH:  No results for input(s): TSH in the last 72 hours. Lab Results   Component Value Date    TSH 1.26 08/03/2020           Radiology:  XR CHEST PORTABLE   Final Result   PICC line catheter placement. Right IJ central line catheter removal.   Otherwise no change. XR CHEST PORTABLE   Final Result   1. Left lung airspace disease which could represent a combination of   atelectasis, pneumonia and/or pleural effusion. 2. Patchy right perihilar airspace disease. XR CHEST PORTABLE   Final Result   Continued bilateral infiltrates with a small right pleural effusion. Possible consolidation in the left lower lung zone medially which raises the   question of pneumonia. XR CHEST PORTABLE   Final Result   Interval placement of a gastric tube with distal portion visualized as far as   the mid esophagus.   Distal tip may be further but the mediastinum obscures   the tube. Consider follow-up with abdomen radiograph centered on the   diaphragm in attempts to visualize gastric tube distal tip      Interval placement of endotracheal tube with distal tip approximately 2.3 cm   above the fritz      Slightly decreased bilateral lower lung opacities suggestive of improvement   in atelectasis and/or edema. Differential includes small infiltrates from   inflammation/infection         XR CHEST PORTABLE   Final Result   Slightly improved central vascular congestion. No other interval change,   status post open heart surgery. Remaining tubes and lines stable. Basilar   opacities, likely combination atelectasis and pleural fluid. XR CHEST PORTABLE   Final Result   Mild increased right pleural fluid. Increased prominence of central pulmonary vessels, likely related to vascular   congestion. Mild patchy bibasilar atelectasis or infiltrates. XR CHEST PORTABLE   Final Result   Line and tube placements. Vascular congestion with bilateral pleural effusions and basilar atelectasis   right greater than left. US CAROTID ARTERY BILATERAL   Final Result   Atherosclerotic disease. Estimated stenosis by NASCET criteria in the proximal right carotid   artery is between 0% to 49% . Estimated stenosis by NASCET criteria in the proximal left carotid   artery is between 50% to 69%. US DUP LOWER EXTREMITY MAPPING BILAT VENOUS   Final Result   Bilateral venous mapping as described. No deep venous   thrombosis visualized. CT CHEST WO CONTRAST   Final Result   1. Corresponding to the masslike lesion seen in the right thorax on the   earlier chest x-ray is area of trapped fluid within the oblique fissure on   the right. 2. Pleural thickening and pulmonary scarring is seen along the posterior   aspect of the right lung. 3. Small bilateral pleural effusions.    4. No pulmonary mass, lymphadenopathy or metastatic disease seen.   5. 2 mm right upper lobe nodule of doubtful clinical significance. If the   patient is at a high risk for malignancy, per the recommendations of the   Fleischner society, follow-up CT of the chest may be obtained in 12 months. Otherwise, no future follow-up is needed. 6. Severe calcified coronary atherosclerosis. 1         XR CHEST PORTABLE   Final Result   1. Abnormal chest x-ray. There is masslike opacity seen within the right   perihilar region measuring 6 cm x 4.2 cm and there is a 2nd rounded opacity   within the right hilum measuring 2.4 cm. Dedicated CT of the chest is   recommended for further evaluation. NM KIDNEY W FLOW AND FUNCTION WO PHARMACOLOGICAL INTERVENTION   Final Result   Diminished perfusion and extraction and excretion, the findings are   compatible with bilateral renal artery stenosis versus ATN versus   chronic renal failure            US RETROPERITONEAL COMPLETE   Final Result   1. 2.7 cm anechoic left renal cyst.   2. Otherwise, negative renal ultrasound. 3. Poorly distended bladder showing no gross abnormality. Limited echo today:  EF >60%- by Simpsons using contrast.      ASSESSMENT / PLAN:  #1. CAD post non-STEMI and CABG- Continue aspirin. Supportive care. Ambulating as tolerated. LV function appears normal, won't require lifevest. Home soon per CT surgery. .   #2. Paroxysmal atrial fibrillation-converted to sinus rhythm on IV amiodarone infusion. Has  maintained sinus rhythm since off amio. .     #3. Hypertension-controlled.     #4. Hyperlipidemia-no statin pending liver enzymes trend.      #5. Diabetes-per intensivist.     #6. Renal failure-creatinine increased to 2.4 but now decreasing and is 1.3 today. Nephrology following.   On po Bumex per renal.             Shayy Amor MD, [address] [place] [address] at [facility]    Electronically signed by Shayy Amor MD on 10/28/2020 at 12:52 PM

## 2020-10-28 NOTE — PROGRESS NOTES
158 lb 3.2 oz (71.8 kg)   SpO2 94%   BMI 22.06 kg/m²     General appearance:  awake, alert, and oriented to person, place, time, and purpose; appears stated age and cooperative; no apparent distress no labored breathing +sternal scar  HEENT:  Conjunctivae/corneas clear. Neck: Supple. No jugular venous distention. Respiratory: symmetrical; clear to auscultation bilaterally; no wheezes; no rhonchi; no rales mildly diminished in bases  Cardiovascular: rhythm regular; rate controlled; no murmurs  Abdomen: Soft, nontender, nondistended  Extremities:  peripheral pulses present; no peripheral edema; no ulcers  Musculoskeletal: No clubbing, cyanosis, no bilateral lower extremity edema. Brisk capillary refill. Skin:  No rashes  on visible skin  Neurologic: awake, alert and following commands     ASSESSMENT and PLAN:  · Coronary artery disease with NSTEMI s/p CABG x3 (LIMA-LAD, SVG-OM, SVG-PDA), LAAL, KLS plating on 10/19/2020- CTS managing. Repeat echocardiogram 10/28 showing EF 60%. CHF clinic. Aspirin. Recommend restarting statin now that liver enzymes improved. Wont need lifevest due to improvement. Home soon per CT surgery. · Acute blood loss anemia secondary to open heart surgery- stable  · Paroxysmal atrial fibrillation- post op. Now NSR. On Eliquis. · Diabetes mellitus- A1c 6.2  · Transaminitis- likely shock liver, resolved. · MARIAH on CKD III- multifactorial, s/p CABG, hemodynamically mediated prerenal MARIAH 2/2 arrhythmia and decompensated HF. Nephrology following. On po bumex. · Bilateral renal stenosis  · History of prostate cancer         DISPOSITION: Continue current plan of care, possible DC soon.     Medications:  REVIEWED DAILY    Infusion Medications    dextrose       Scheduled Medications    sodium chloride flush        lisinopril  5 mg Oral Daily    [START ON 10/29/2020] potassium chloride  20 mEq Oral BID WC    metoprolol succinate  12.5 mg Oral Daily    bumetanide  2 mg Oral BID    apixaban  5 mg Oral BID    aspirin  81 mg Oral Daily    ferrous sulfate  325 mg Oral BID WC    vitamin C  500 mg Oral BID    folic acid  1 mg Oral Daily    insulin lispro  0-6 Units Subcutaneous TID WC    insulin lispro  0-3 Units Subcutaneous Nightly    amiodarone  400 mg Oral BID    magnesium oxide  200 mg Oral Daily    timolol  1 drop Left Eye BID    vitamin B-12  1,000 mcg Oral BID    ipratropium-albuterol  1 ampule Inhalation Q4H WA    latanoprost  1 drop Both Eyes Nightly     PRN Meds: perflutren lipid microspheres, acetaminophen, HYDROcodone 5 mg - acetaminophen **OR** HYDROcodone 5 mg - acetaminophen, potassium chloride, ondansetron, glucose, dextrose, glucagon (rDNA), dextrose    Labs:     Recent Labs     10/26/20  0530 10/27/20  0535 10/28/20  0505   WBC 12.5* 9.2 11.3   HGB 10.0* 9.2* 9.6*   HCT 31.1* 29.8* 29.8*    188 219       Recent Labs     10/26/20  0530 10/27/20  0535 10/28/20  0505    144 142   K 3.7 3.3* 3.6   CL 99 97* 96*   CO2 28 35* 38*   BUN 38* 33* 28*   CREATININE 1.4* 1.4* 1.3*   CALCIUM 8.2* 8.1* 8.8       Recent Labs     10/26/20  0530 10/27/20  0535 10/28/20  0505   PROT 5.9* 5.9* 6.5   ALKPHOS 81 84 81   ALT 40 30 26   AST 76* 43* 32   BILITOT 2.0* 1.8* 2.0*       No results for input(s): INR in the last 72 hours. No results for input(s): Reyne Maltese in the last 72 hours. Chronic labs:    Lab Results   Component Value Date    CHOL 144 05/01/2020    TRIG 125 05/01/2020    HDL 44 05/01/2020    LDLCALC 93 07/19/2019    TSH 1.26 08/03/2020    INR 1.4 10/19/2020    LABA1C 6.2 (H) 10/17/2020       Radiology: REVIEWED DAILY    +++++++++++++++++++++++++++++++++++++++++++++++++  [address] [facility]  +++++++++++++++++++++++++++++++++++++++++++++++++  NOTE: This report was transcribed using voice recognition software.  Every effort was made to ensure accuracy; however, inadvertent computerized transcription errors may be present.

## 2020-10-28 NOTE — PROGRESS NOTES
POD#9  Awake, alert. No complaints. Denies CP, palpitations, SOB at rest, dizziness/lightheadedness. Vitals:    10/28/20 0300 10/28/20 0556 10/28/20 0800 10/28/20 0815   BP: (!) 120/58   (!) 142/64   Pulse: 80   89   Resp: 16   18   Temp: 98.2 °F (36.8 °C)   97.7 °F (36.5 °C)   TempSrc: Oral   Temporal   SpO2: 96%  92% 93%   Weight:  158 lb 3.2 oz (71.8 kg)     Height:         O2: 2 L nc      Intake/Output Summary (Last 24 hours) at 10/28/2020 1018  Last data filed at 10/28/2020 0911  Gross per 24 hour   Intake 1060 ml   Output 625 ml   Net 435 ml         Recent Labs     10/26/20  0530 10/27/20  0535 10/28/20  0505   WBC 12.5* 9.2 11.3   HGB 10.0* 9.2* 9.6*   HCT 31.1* 29.8* 29.8*    188 219      Recent Labs     10/26/20  0530 10/27/20  0535 10/28/20  0505   BUN 38* 33* 28*   CREATININE 1.4* 1.4* 1.3*         PE  Cardiac: RRR  Lungs: decreased bases  Chest incision C/D/I, approximated, no erythema. Sternum stable. Prior chest tube site incisions C/D/I, no erythema with intact sutures. Abd: Soft, nontender, +BS  Ext: Incisions C/D/I, approximated, no erythema, + edema           A/P: POD# 9     1.  CAD  --Stable s/p CABG x3 (LIMA-LAD, SVG-OM, SVG-PDA), LAAL, KLS plating on 10/19/2020  --Post op KYAW reveals ~40% EF on inotropic support  --ASA  --reinforce sternal precautions  --tubes and wires out        2. Acute blood loss anemia secondary to open heart surgery  --stable        3. PAF  --currently NSR  --continue oral amiodarone for afib prophylaxis--with plans to taper on dc  -Post op NSR, PAF with respiratory arrest, reverted back to NSR  -Again episode of RVR 10/23 morning--lasted ~20mins reverted back to SR with amio bolus, completed amiodarone infusion   --eliquis started 10/26        4. Cardiac insufficiency   - H/o ICM, EF 30% preop; ~40% post op on inotropic support  - Echo 10/21: EF 45-50% on inotropic support with stage 2 diastolic dysfunction; RV with moderate to severe dysfunction  - POD 1, levophed weaned off, lactic acid normalized   - POD2, epi at 2.5mcg, respiratory arrest with severe hypotension and bradycardia following ambulation with PT, pt did not lose pulse during this time. 1gm Calcium chloride IVP given, Epi gtt increased, levophed started then switched to vasopressin gtt  - POD3, vaso gtt weaned off overnight, epi gtt at 6.5mcg, started on dobutamine   -POD4, Weaned off Epi--keep dobutamine, lactic normal   --decrease dobutamine to 0.5mcg/kg/min today with plans to stop tomorrow--slow wean  --repeat TTE ordered and still not lassiter, awaiting interpretation to see if he needs home life vest  --optimize HF meds when able (on bumex, toprol- will start ACE if ok with renal)  --consulted CHF clinic for outpatient follow up         5. MARIAH on chronic CKD stage 3  --Baseline Scr 1.3-1.5  --Scr peaked at 2.4 on 10/22, today 1.4  --nephrology following  --currently on bumex 2mg po BID        6. Prolonged postoperative respiratory insufficiency/ post op respiratory arrest  --2/2 surgery, h/o pulmonary emphysema  --reintubated 10/21 following respiratory arrest, ?pulmonary source vs severe orthostatic event, CTA chest 10/10 without evidence for PE, avoid repeat CTA with renal function  --10/22 Extubated  --wean oxygen to keep SpO2 greater than or equal to 92%  --continue duonebs with ezpap  --encourage C&DB, SMI  --currently on RA  --diurese per renal        7. DMII  --hgA1c 6.2, on home metformin (hold with MARIAH)  --continue SSI for glycemic control        8. Leukocytosis  --afebrile        9. Shock liver  - 2/2 RV dysfunction  - improving, Continue to trend, supportive care; tbili still up at 2  --hold off on resuming statin for now d/t transaminitis- will defer to PCP or cards to start downthe road        10. Constipation  --resolved  --Encouraged continued increase in oral intake and activity.          11.  Post operative deconditioning  --Increase activity as tolerated  --PT/OT              DVT

## 2020-10-28 NOTE — PROGRESS NOTES
· Ideal Body Weight: 172 lbs; % Ideal Body Weight 91.9 %   · BMI: 22  · BMI Categories: Normal Weight (BMI 22.0 to 24.9) age over 72       Nutrition Diagnosis:   · Increased nutrient needs related to increase demand for energy/nutrients as evidenced by wounds      Nutrition Interventions:   Food and/or Nutrient Delivery:  Continue Current Diet, Continue Oral Nutrition Supplement, Modify Oral Nutrition Supplement  Nutrition Education/Counseling:  Education completed   Coordination of Nutrition Care:  Continue to monitor while inpatient    Goals:  Patient will consume >75% of meals served       Nutrition Monitoring and Evaluation:   Behavioral-Environmental Outcomes:  Beliefs and Attitutes   Food/Nutrient Intake Outcomes:  Food and Nutrient Intake, Supplement Intake  Physical Signs/Symptoms Outcomes:  Biochemical Data, Chewing or Swallowing, GI Status, Fluid Status or Edema, Hemodynamic Status, Meal Time Behavior, Nutrition Focused Physical Findings, Skin, Weight     Discharge Planning:    Continue current diet, Continue Oral Nutrition Supplement     Electronically signed by Yuliya Rodriguez RD, LD on 10/28/20 at 11:51 AM EDT    Contact: [phone]

## 2020-10-28 NOTE — PLAN OF CARE
10/28/2020-Pt post CABG, LVD. I provided patient and his wife with the following Heart Failure educational material,  which included:        *The Heart Failure book- \"Caring for Your Heart: Living Well with Heart Failure\"      *The Heart Failure Zones       *The Sodium Content pamphlet      *\"What Foods Should You Avoid? \" information sheet. We reviewed the introduction to Heart Failure, the HF zones, signs and symptoms to report on day 1 of onset, medications, medication compliance, the importance of obtaining daily weights, following a low sodium diet, reading food labels for the sodium content, and keeping physician appointments. We discussed writing / tracking his weights on a calendar / paper because 5# in 1 week can sneak up if you are not tracking it. He can also take his charted weights to his doctor appointments to be reviewed. I advised patient he can reduce the risk for Heart Failure exacerbations by modifying / controlling the risk factors he has. We discussed self-managed care which includes the following:  to take his medications as prescribed, if he experiences any intolerable side effects to any medications to please call his cardiologist / doctor, do not just stop taking the medication; follow a cardiac heart healthy / low sodium diet; weigh himself daily, exercise regularly- per doctor recommendation and not to smoke or use an excess amount of alcohol. We discussed calling his cardiologist / doctor with a weight gain of 3# in one day or a total of 5# or more in one week. Also, if he would have a significant weight loss of 3# or more in one day to call his doctor, he may have to decrease or hold his diuretic dose. On days he feels nauseated and not eating / drinking, having emesis or diarrhea,  informed to call his cardiologist  / doctor, he may have to decrease or hold his diuretic to avoid dehydration.   I stressed the importance of informing his cardiologist the first day of onset of any of the signs and symptoms in the \"Yellow Zone\" of the HF Zones. He verbalizes understanding. Echocardiogram / EF: 60%    BNP:   Lab Results   Component Value Date    PROBNP 29,713 (H) 10/12/2020       History of:    has a past medical history of Carotid artery stenosis, Cholecystitis, Choledocholithiasis, Cholelithiasis, Colon polyps, Essential hypertension, Gout, Hiatal hernia, History of laparoscopic cholecystectomy, Hyperlipidemia, Inflammatory polyarthropathy ([address] Malignant tumor of prostate ([address] Osteoarthritis, Pancreatitis, Prostate CA ([address] Pulmonary emphysema ([address] Pulmonary nodules, Type 2 diabetes mellitus without complication, without long-term current use of insulin ([address] and Ventral hernia. has a past surgical history that includes Cardiac catheterization (10/16/2020) and Coronary artery bypass graft (N/A, 10/19/2020).     Medications:    sodium chloride flush        lisinopril  5 mg Oral Daily    [START ON 10/29/2020] potassium chloride  20 mEq Oral BID WC    metoprolol succinate  12.5 mg Oral Daily    bumetanide  2 mg Oral BID    apixaban  5 mg Oral BID    aspirin  81 mg Oral Daily    ferrous sulfate  325 mg Oral BID WC    vitamin C  500 mg Oral BID    folic acid  1 mg Oral Daily    insulin lispro  0-6 Units Subcutaneous TID WC    insulin lispro  0-3 Units Subcutaneous Nightly    amiodarone  400 mg Oral BID    magnesium oxide  200 mg Oral Daily    timolol  1 drop Left Eye BID    vitamin B-12  1,000 mcg Oral BID    ipratropium-albuterol  1 ampule Inhalation Q4H WA    latanoprost  1 drop Both Eyes Nightly      dextrose         Code Status:Prior    The patient is ordered:  Diet: DIET CARDIAC; Carb Control: 4 carb choices (60 gms)/meal  Dietary Nutrition Supplements: Diabetic Oral Supplement  Dietary Nutrition Supplements: Wound Healing Oral Supplement   Sodium/fluid restriction daily ordered (fluid restriction recommended if patient is hyponatremic and/or

## 2020-10-28 NOTE — PROGRESS NOTES
Department of Internal Medicine  Nephrology Progress Note    Events reviewed. SUBJECTIVE:  We are following MrCeci Chery Bl for MARIAH on CKD. He was seen sitting up in a chair at the bedside. He reports no complaints.     PHYSICAL EXAM:    VITALS:  BP (!) 125/59   Pulse 80   Temp 98.6 °F (37 °C) (Oral)   Resp 18   Ht 5' 11\" (1.803 m)   Wt 158 lb 3.2 oz (71.8 kg)   SpO2 94%   BMI 22.06 kg/m²   24HR INTAKE/OUTPUT:      Intake/Output Summary (Last 24 hours) at 10/28/2020 1429  Last data filed at 10/28/2020 1109  Gross per 24 hour   Intake 700 ml   Output 825 ml   Net -125 ml       Constitutional: alert, in no distress  HEENT: Normocephalic, atraumatic, PERRL  Respiratory:  CTA bilaterally, on room air  Cardiovascular/Edema: Heart sounds regular   Gastrointestinal:  Soft, nontender, nondistended  Neurologic:    Nonfocal  Skin:  Warm, dry  Other:  2+ pitting edema generalized    Scheduled Meds:   sodium chloride flush        lisinopril  5 mg Oral Daily    [START ON 10/29/2020] potassium chloride  20 mEq Oral BID WC    metoprolol succinate  12.5 mg Oral Daily    bumetanide  2 mg Oral BID    apixaban  5 mg Oral BID    aspirin  81 mg Oral Daily    ferrous sulfate  325 mg Oral BID WC    vitamin C  500 mg Oral BID    folic acid  1 mg Oral Daily    insulin lispro  0-6 Units Subcutaneous TID WC    insulin lispro  0-3 Units Subcutaneous Nightly    amiodarone  400 mg Oral BID    magnesium oxide  200 mg Oral Daily    timolol  1 drop Left Eye BID    vitamin B-12  1,000 mcg Oral BID    ipratropium-albuterol  1 ampule Inhalation Q4H WA    latanoprost  1 drop Both Eyes Nightly     Continuous Infusions:   dextrose       PRN Meds:.perflutren lipid microspheres, acetaminophen, HYDROcodone 5 mg - acetaminophen **OR** HYDROcodone 5 mg - acetaminophen, potassium chloride, ondansetron, glucose, dextrose, glucagon (rDNA), dextrose    DATA:    CBC:   Lab Results   Component Value Date    WBC 11.3 10/28/2020    RBC 2.87 10/28/2020    HGB 9.6 10/28/2020    HCT 29.8 10/28/2020    .8 10/28/2020    MCH 33.4 10/28/2020    MCHC 32.2 10/28/2020    RDW 17.5 10/28/2020     10/28/2020    MPV 10.5 10/28/2020     CMP:    Lab Results   Component Value Date     10/28/2020    K 3.6 10/28/2020    K 3.6 10/19/2020    CL 96 10/28/2020    CO2 38 10/28/2020    BUN 28 10/28/2020    CREATININE 1.3 10/28/2020    GFRAA >60 10/28/2020    AGRATIO 0.9 10/10/2020    LABGLOM 53 10/28/2020    GLUCOSE 131 10/28/2020    PROT 6.5 10/28/2020    LABALBU 3.6 10/28/2020    CALCIUM 8.8 10/28/2020    BILITOT 2.0 10/28/2020    ALKPHOS 81 10/28/2020    AST 32 10/28/2020    ALT 26 10/28/2020     Magnesium:    Lab Results   Component Value Date    MG 2.1 10/24/2020     Phosphorus:  No results found for: PHOS     Urine sodium: 71  Urine potassium: 51.9  Urine chloride: 68  Urine creatinine: 118  Urine albumin/creatinine: 184 mg/grams  Urine protein/creatinine: 0.5 mg/grams    proBNP: 29,713      Radiology Review:      Nuclear medicine clinic with flow and function without pharmacological intervention October 12, 2020   Findings:         The patient was injected with 10 mCi of technetium MAG3 intravenously              Split uptake on the left was 47.8  percent, time to peak was 4.5    minutes, one half clearance time was 18.9         Split uptake on the right was 52.2 percent, time to peak was 0.5    minutes, one half clearance time was 23.4 minutes         Renal curves demonstrate perfusion to be delayed. Extraction was delayed. Excretion was delayed.     Lasix was not administered         Impression:  Diminished perfusion and extraction and excretion, the findings are    compatible with bilateral renal artery stenosis versus ATN versus    chronic renal failure                CTA pulmonary with IV contrast October 10, 2020                    IMPRESSION:               CT Angiogram Chest and Pulmonary Arteries with contrast and high resolution 2-D and 3-D images:               1. No evidence of pulmonary emboli by CT technique.               2. Small bilateral pleural effusions with dependent atelectasis.            Dictated Hossein Lopez MD   DD/DT: 10/10/20 1022                  Signed Hossein Lopez MD 10/10/20 1022                    : [facility]      CT Abdomen and pelvis  W/o contrast 10/9/2020               IMPRESSION:       1.   Mild bilateral perinephric stranding is again identified (without hydronephrosis upon       bilateral assessment).               2.  Mild urinary bladder wall thickening is identified.  Surgical absence of prostate gland.               3.   Moderate colonic diverticulosis is identified (sigmoid predominance) without acute       diverticulitis.  Nondistended sigmoid colon extends into left inguinal hernia defect.               4. Pleural parenchymal scarring is again identified in right lower lobe, slightly more pronounced       than on prior examination.               5. Significant vascular calcifications are identified (including greater than 50% degree of       stenosis bilaterally in renal arteries, duplicated bilaterally).            Dictated By: Louie Gitelman MD   DD/DT: 10/09/20 0813                  Signed Jacob Rivas MD 10/09/20 0847                    :      CT Chest w/o contrast 10/13/2020   1. Corresponding to the masslike lesion seen in the right thorax on the    earlier chest x-ray is area of trapped fluid within the oblique fissure on    the right. 2. Pleural thickening and pulmonary scarring is seen along the posterior    aspect of the right lung. 3. Small bilateral pleural effusions. 4. No pulmonary mass, lymphadenopathy or metastatic disease seen.     5. 2 mm right upper lobe nodule of doubtful clinical significance.  If the    patient is at a high risk for malignancy, per the recommendations of the    Fleischner society, follow-up CT of the chest may be obtained in 12 months. Otherwise, no future follow-up is needed. 6. Severe calcified coronary atherosclerosis. 1           CXR 10/23/2020   1. Left lung airspace disease which could represent a combination of    atelectasis, pneumonia and/or pleural effusion. 2. Patchy right perihilar airspace disease. BRIEF SUMMARY OF INITIAL CONSULT:    Briefly Prashanth Juarez 89, Z 60 y. o. year old male with history of CKD stage III, with mild proteinuria, baseline creatinine 1.3 to 1.5 mg/dL, HTN, type II DM, hyperlipidemia, gout, prostate cancer status post resection, emphysema, seronegative inflammatory arthritis possibly psoriatic arthritis on methotrexate, who was admitted on October 10, 2020 transferred from [facility] where he initially was admitted with abdominal pain. Probably he was hypotensive with a blood pressures loss 79/50 and he was fluid resuscitated, he was transferred for a higher level of care. His creatinine level on admission was 1.9 mg/dL reason for this consultation. His medications prior to admission included lisinopril and chlorthalidone. Problems resolved:  · HAGMA, lactic acidosis and probably ketoacidosis, anion gap improved  · Hypotension,multifactroial, cardiogenic, 2/2 to MI, AF  · Hypokalemia, secondary to diuretics, potassium level improved  · Elevated LFTs, likely shock liver  · MARIAH stage I, ischemic ATN  · Hypokalemia 2/2 diuretics   · Acute hypoxic respiratory failure, s/p reintubation 10/21 and extubation 10/22    IMPRESSION/RECOMMENDATIONS:      1. Recurrent MARIAH, multifactorial s/p CABG, hemodynamically mediated prerenal MARIAH 2/2 arrhythmia and decompensated HF. Urine indices consistent with the above (Urine Na < 20). · Resolved. Creatinine 1.3 mg/dL with excellent urine output in response to Bumex. He continues to have large edema.      2. CKD stage III, with mild proteinuria, secondary to early diabetic nephropathy, baseline creatinine 1.3 to 1.5 mg/dL    3. Hypokalemia 2/2 diuretics  4. Elevated bicarbonate level, probably contraction alkalosis  5. CAD, NSTEMI with severe MVD; S/p CABG x 3 on 10/19/2020  6. PAF, s/p LISA exclusion, on amiodarone and metoprolol  7. HFrEF 40% with stage II DD and RV with moderate to severe dysfunction post operatively  8. Bilateral renal stenosis, by CT abdomen  9. History of prostate cancer status post surgery  10. Anemia, history of B12 deficiency and iron deficiency, s/p IV iron; acute blood loss anemia 2/2 surgery. S/p transfusions.      Plan:    · Continue Bumex 2 mg PO bid  · Replace potassium per PRN orders  · Start 20 mEq PO bid tomorrow  · OK for discharge from renal point of view, follow up in our office in 3-4 weeks, BMP 1 week prior to appointment    Electronically signed by ROSANGELA Gonzales CNP on 10/28/2020 at 2:29 PM

## 2020-10-29 ENCOUNTER — TELEPHONE (OUTPATIENT)
Dept: PHARMACY | Facility: CLINIC | Age: 75
End: 2020-10-29

## 2020-10-29 ENCOUNTER — CARE COORDINATION (OUTPATIENT)
Dept: CASE MANAGEMENT | Age: 75
End: 2020-10-29

## 2020-10-29 NOTE — CARE COORDINATION
[facility] Transitions Initial Follow Up Call    Call within 2 business days of discharge: Yes    Patient: Jenna Clifton Patient : 1945   MRN: [id number]  Reason for Admission: CAD, s/p CABG 10/19/20  Discharge Date: 10/28/20 RARS: Readmission Risk Score: 27      Last Discharge [facility]       Complaint Diagnosis Description Type Department Provider    10/10/20  Acute postoperative pain . .. Admission (Discharged) [facility] [address] [address], DO             Challenges to be reviewed by the provider   Additional needs identified to be addressed with provider No  none         Method of communication with provider : none    Advance Care Planning:   Does patient have an Advance Directive:  not addressed on this call. Was this a readmission? No  Patient stated reason for admission: right flank pain  Patients top risk factors for readmission: medical condition, multiple health system providers and polypharmacy    Care Transition Nurse (CTN) contacted the patient by telephone to perform post hospital discharge assessment. Verified name and  with patient as identifiers. Provided introduction to self, and explanation of the CTN role. CTN reviewed discharge instructions, medical action plan and red flags with patient who verbalized understanding. Patient given an opportunity to ask questions and does not have any further questions or concerns at this time. Were discharge instructions available to patient? Yes. Reviewed appropriate site of care based on symptoms and resources available to patient including: PCP and Specialist. The patient agrees to contact the PCP office for questions related to their healthcare. Non-face-to-face services provided:  Scheduled appointment with PCP-20  Scheduled appointment with Specialist-20 cardio surgery. Patient's wife to call for nephrology appointment.   Obtained and reviewed discharge summary and/or continuity of care documents    Care Transitions 24 Hour Call    Do you have any ongoing symptoms?:  No  Do you have a copy of your discharge instructions?:  Yes  Do you have all of your prescriptions and are they filled?:  Yes  Have you been contacted by a [facility] Pharmacist?:  No  Have you scheduled your follow up appointment?:  Yes  How are you going to get to your appointment?:  Car - family or friend to transport  Were you discharged with any Home Care or Post Acute Services:  No  Do you feel like you have everything you need to keep you well at home?:  Yes  Care Transitions Interventions  No Identified Needs       -Spoke with patient for initial care transition call post hospital discharge.  -Patient states he is \"feeling good. \"  Patient's wife  Elizabeth Michelle also on speaker phone. Patient's wife states her  was initially admitted with right flank pain, ended up having a heart attack while hospitalized and underwent CABG. -Patient and his wife agreeable to a call from Sindhu Anders to review medications. 1111F not entered. CTN confirmed with patient and his wife new medications as per AVS.  CTN also discussed with patient and his wife discharge instructions as per pages 15-17 related to CABG. Patient's wife states her  has not yet required use of pain medication(Norco)  -Patient's wife states their daughter is a ER nurse at [employer] and is a strong support.  -Patient and his wife deny any needs, questions, or concerns at this time and are agreeable to continued follow up from CTN.     Follow Up  Future Appointments   Date Time Provider Huey Maki   11/5/2020  9:00 AM Rohan Kimble  [address]   11/17/2020 12:30 PM Isabela Bragg, DO CARDIO SURG [facility]       Jaime Cordoba RN

## 2020-10-29 NOTE — TELEPHONE ENCOUNTER
Received a referral:  from Care Coordinator to review patients medications. Called patient to schedule a time to speak with a pharmacist over the telephone. Spoke to Wife and advised them of the above message. Wife verified understanding and scheduled their appointment: tomorrow at [address]   Clinical Pharmacy   Toll free: [phone], option 7

## 2020-10-29 NOTE — TELEPHONE ENCOUNTER
Benedicto Frank RN  [facility] Clinical Staff 2 hours ago (10:26 AM)      Patient and his wife are agreeable to a call to review medications.  PCP visit is scheduled for 11/5/20. Davonte stroud

## 2020-10-29 NOTE — DISCHARGE SUMMARY
Physician Discharge Summary     Patient ID:  Ronny Krause  [id number]  48 y.o.  1945    Admit date: 10/10/2020    Discharge date and time: 10/28/2020  6:22 PM     Admitting Physician: Abel Hernandez DO     Discharge Physician: Abel Hernandez DO    Admission Diagnoses: Transaminitis [R74.01]    Discharge Diagnoses: see hospital course below       POSTOPERATIVE DIAGNOSES:  Severe multivessel coronary artery disease,  ischemic cardiomyopathy.     PROCEDURE PERFORMED:  1. Coronary artery bypass grafting x3 using left internal mammary  artery to the left anterior descending artery, reverse saphenous vein  graft to the dominant obtuse marginal branch of the circumflex, reverse  saphenous vein graft to the posterior descending artery. 2.  Left atrial appendage ligation. 3.  Lower extremity endoscopic vein harvest.  4.  Rigid sternal fixation with KLS plating system. Discharged Condition: stable    Indication for Admission:   Severe multivessel coronary artery disease,  ischemic cardiomyopathy. Hospital Course: Course as above, and on 10/19/2020 he underwent CABG x3 (LIMA-LAD, SVG-OM, SVG-PDA), LAAL, EVH, and KLS plating. Postoperatively, he was transferred to the CVICU in guarded stable condition and extubated that same operative day. He was on epinephrine for hemodynamic support, which was transitioned to dobutamine prior to his transfer out of the ICU. He was also on levophed for BP support, which was weaned off on POD 1. Nephrology was following for MARIAH on CKD stage 3, which improved prior to discharge. His diuresis was managed by nephrology. On POD 2 he was reintubated following respiratory arrest with severe hypotension and bradycardia following ambulation with PT. He did not lose pulse during this time. He was then started on levophed, which was switched to vasopressin. The vasopressin was weaned off overnight and he was no longer on it on POD 3.  He experienced shock liver and transaminitis; which slowly improved, but statin was not started during his admission due to this. He was extubated on POD 3. He experienced POAF and was treated with amiodarone. He was ultimately started on eliquis for PAF, but did revert to NSR prior to discharge. He was transferred to the monitored stepdown unit on POD 5 on 2.5mcg/kg/min of dobutamine, which was gradually weaned off by POD 8. Beta blockade was initiated on POD 8. CHF clinic was consulted for outpatient establishment. Repeat TTE revealed an EF of 45-50%. His mediastinal and pleural chest tubes, and epicardial pacing wires were discontinued in the usual fashion. He was weaned off of supplemental oxygen, and met all ambulatory requirements. He was deemed ready for discharge and was discharged to home on POD 9 (10/28/2020) in stable condition. Consults: cardiology and nephrology      Discharge Exam:  Vitals          Vitals:     10/28/20 0300 10/28/20 0556 10/28/20 0800 10/28/20 0815   BP: (!) 120/58     (!) 142/64   Pulse: 80     89   Resp: 16     18   Temp: 98.2 °F (36.8 °C)     97.7 °F (36.5 °C)   TempSrc: Oral     Temporal   SpO2: 96%   92% 93%   Weight:   158 lb 3.2 oz (71.8 kg)       Height:                O2: 2 L nc       Intake/Output Summary (Last 24 hours) at 10/28/2020 1018  Last data filed at 10/28/2020 0911      Gross per 24 hour   Intake 1060 ml   Output 625 ml   Net 435 ml                 Recent Labs     10/26/20  0530 10/27/20  0535 10/28/20  0505   WBC 12.5* 9.2 11.3   HGB 10.0* 9.2* 9.6*   HCT 31.1* 29.8* 29.8*    188 219            Recent Labs     10/26/20  0530 10/27/20  0535 10/28/20  0505   BUN 38* 33* 28*   CREATININE 1.4* 1.4* 1.3*           PE  Cardiac: RRR  Lungs: decreased bases  Chest incision C/D/I, approximated, no erythema. Sternum stable. Prior chest tube site incisions C/D/I, no erythema with intact sutures.   Abd: Soft, nontender, +BS  Ext: Incisions C/D/I, approximated, no erythema, + edema        Disposition: home    Patient Instructions:    Eugenio Robert   Home Medication Instructions CAROLIN:[id number]    Printed on:10/29/20 7718   Medication Information                      amiodarone (CORDARONE) 200 MG tablet  Take 2 tablets by mouth See Admin Instructions Take 400 mg orally twice daily for seven days, then take 200 mg orally twice daily for seven days, then take 200 mg orally daily for fourteen days, then discontinue             apixaban (ELIQUIS) 5 MG TABS tablet  Take 1 tablet by mouth 2 times daily             aspirin 81 MG EC tablet  Take by mouth             blood glucose test strips (ONE TOUCH ULTRA TEST) strip               bumetanide (BUMEX) 2 MG tablet  Take 1 tablet by mouth 2 times daily             ferrous sulfate (IRON 325) 325 (65 Fe) MG tablet  Take 325 mg by mouth every other day             fluticasone (FLONASE) 50 MCG/ACT nasal spray  2 sprays by Each Nostril route daily             folic acid (FOLVITE) 1 MG tablet  Take 1 mg by mouth daily              HYDROcodone-acetaminophen (NORCO) 5-325 MG per tablet  Take 1 tablet by mouth every 6 hours as needed for Pain for up to 7 days. Intended supply: 7 days.  Take lowest dose possible to manage pain             lisinopril (PRINIVIL;ZESTRIL) 5 MG tablet  Take 1 tablet by mouth daily             LUMIGAN 0.01 % SOLN ophthalmic drops               magnesium oxide (MAG-OX) 400 (241.3 Mg) MG TABS tablet  Take 0.5 tablets by mouth daily for 7 days             metFORMIN (GLUCOPHAGE) 500 MG tablet  1 po q am, 2 po q pm Hold until pt is ready             metoprolol succinate (TOPROL XL) 25 MG extended release tablet  Take 0.5 tablets by mouth daily             Omega-3 Fatty Acids (FISH OIL) 1200 MG CAPS  Take by mouth daily             potassium chloride (KLOR-CON M) 20 MEQ extended release tablet  Take 1 tablet by mouth 2 times daily (with meals)             timolol (TIMOPTIC) 0.5 % ophthalmic solution  Place 1 drop into the left eye 2 times daily vitamin B-12 (CYANOCOBALAMIN) 1000 MCG tablet  Take 1,000 mcg by mouth 2 times daily             vitamin C (VITAMIN C) 500 MG tablet  Take 1 tablet by mouth 2 times daily             vitamin D (CHOLECALCIFEROL) 1000 UNIT TABS tablet  Take by mouth             vitamin E 100 units capsule  Take by mouth               Activity: sternal precautions  Diet: cardiac diet  Wound Care: as directed    Follow-up with   Future Appointments   Date Time Provider Huey Maki   11/5/2020  9:00 AM Griselda Canton,  [address]   11/17/2020 12:30 PM Rosemary Cabrera, DO CARDIO SURG [facility]         Smoking cessation education provided prior to discharge    Discussed with patient the benefits of participation in cardiac rehab after discharge once approved safe by the surgeon and that a referral will be made at the follow up appointment. Pt verbalized comprehension.     Signed:  Kuldip Neumann, MSN, APRN, FNP-BC, AGACNP-BC

## 2020-10-30 ENCOUNTER — SCHEDULED TELEPHONE ENCOUNTER (OUTPATIENT)
Dept: PHARMACY | Facility: CLINIC | Age: 75
End: 2020-10-30

## 2020-10-30 NOTE — TELEPHONE ENCOUNTER
take once able to swallow    aspirin 81 MG EC tablet Take 81 mg by mouth daily      vitamin E 400 UNIT capsule Take 400 Units by mouth daily      LUMIGAN 0.01 % SOLN ophthalmic drops Place 1 drop into both eyes nightly      vitamin D (CHOLECALCIFEROL) 1000 UNIT TABS tablet Take 1,000 Units by mouth daily      folic acid (FOLVITE) 1 MG tablet Take 1 mg by mouth daily      blood glucose test strips (ONE TOUCH ULTRA TEST) strip       Additional Medications (including OTC/Herbal Supplements):  · n/a    Allergies: Allergies   Allergen Reactions    Methotrexate Derivatives      Caused Blisters       Pertinent Labs/Vitals:  BP Readings from Last 3 Encounters:   10/28/20 (!) 125/59   09/02/20 124/62   07/24/20 132/62     Lab Results   Component Value Date    LABMICR 216.8 (H) 10/11/2020     Lab Results   Component Value Date    LABA1C 6.2 (H) 10/17/2020    LABA1C 6.2 (H) 10/12/2020    LABA1C 6.7 (H) 05/01/2020     Lab Results   Component Value Date    CHOL 144 05/01/2020    TRIG 125 05/01/2020    HDL 44 05/01/2020    LDLCHOLESTEROL 74 05/01/2020    LDLCALC 93 07/19/2019     ALT   Date Value Ref Range Status   10/28/2020 26 0 - 40 U/L Final     AST   Date Value Ref Range Status   10/28/2020 32 0 - 39 U/L Final     The ASCVD Risk score (Richard Samayoa et al., 2013) failed to calculate for the following reasons: The patient has a prior MI or stroke diagnosis     Lab Results   Component Value Date    CREATININE 1.3 (H) 10/28/2020       CrCl cannot be calculated (Unknown ideal weight.). Social History:   Social History     Tobacco Use    Smoking status: Never Smoker    Smokeless tobacco: Never Used   Substance Use Topics    Alcohol use: Not Currently     Immunizations: There is no immunization history on file for this patient. ASSESSMENT/PLAN:   - General Assessment:   · Adherence: no issues  · Cost: no issues  · Current pharmacy/pharmacies: [facility] in [facility]  · Drug interactions:  The following clinically intervention potential cost savings: 1  Total Interventions Accepted: 1  Time Spent (min): 60

## 2020-11-02 ENCOUNTER — TELEPHONE (OUTPATIENT)
Dept: FAMILY MEDICINE CLINIC | Age: 75
End: 2020-11-02

## 2020-11-02 RX ORDER — IPRATROPIUM BROMIDE AND ALBUTEROL SULFATE 2.5; .5 MG/3ML; MG/3ML
1 SOLUTION RESPIRATORY (INHALATION) EVERY 6 HOURS PRN
Qty: 120 ML | Refills: 0 | Status: SHIPPED | OUTPATIENT
Start: 2020-11-02

## 2020-11-02 RX ORDER — NEBULIZER ACCESSORIES
1 KIT MISCELLANEOUS DAILY PRN
Qty: 1 KIT | Refills: 0 | Status: SHIPPED
Start: 2020-11-02 | End: 2020-12-29

## 2020-11-02 NOTE — TELEPHONE ENCOUNTER
Wife calling please fax the nebulizer order to [facility] it was sent to Saurav Castaneda.  I did fax order to [facility]  Respiratory Therapy Supplies (NEBULIZER/TUBING/MOUTHPIECE) KIT [[id number]]     Order Details   Dose: 1 kit  Route: Does not apply  Frequency: DAILY PRN for SOB,atelectasis    Dispense Quantity: 1 kit  Refills: 0  Fills remaining: --             Si kit by Does not apply route daily as needed (SOB,atelectasis)            Written Date: 20

## 2020-11-02 NOTE — TELEPHONE ENCOUNTER
Patients wife calling in and states Dandre Silva was discharged on Wednesday and is still having some difficulty breathing. He was doing a breathing treatment in hospital and it was helping. Would you send in for breathing treatments? - [address] in [facility].

## 2020-11-04 ENCOUNTER — CARE COORDINATION (OUTPATIENT)
Dept: CASE MANAGEMENT | Age: 75
End: 2020-11-04

## 2020-11-04 NOTE — CARE COORDINATION
[facility] Transitions Follow Up Call    2020    Patient: Ridge Mayberry  Patient : 1945   MRN: [id number]  Reason for Admission: CAD, s/p CABG 10/19/20  Discharge Date: 10/28/20 RARS: Readmission Risk Score: 27      Needs to be reviewed by the provider   Additional needs identified to be addressed with provider No  none           Method of communication with provider : none    Care Transition Nurse (CTN) contacted the family by telephone to follow up after admission on 10/10/20 at [facility] (1-). Addressed changes since last contact: follow up appointment-appointment now schedued with nephrology(Dr Cid) for follow up on 20  Discharged needs reviewed: none  Follow up appointment completed? No-PCP appointment is tomorrow(20)    CTN reviewed discharge instructions, medical action plan and red flags with family and discussed any barriers to care and/or understanding of plan of care after discharge. Discussed appropriate site of care based on symptoms and resources available to patient including: PCP and Specialist. The family agrees to contact the PCP office for questions related to their healthcare.      Patients top risk factors for readmission: medical condition and polypharmacy  Interventions to address risk factors: Scheduled appointment with PCP-20 and Scheduled appointment with Specialist-20 cardio/surg, nephrology 20      Care Transitions Subsequent and Final Call    Subsequent and Final Calls  Do you have any ongoing symptoms?:  No  Have your medications changed?:  Yes  Patient Reports:  using Duoneb as needed  Do you have any questions related to your medications?:  No  Do you currently have any active services?:  No  Do you have any needs or concerns that I can assist you with?:  No  Identified Barriers:  None  Care Transitions Interventions  No Identified Needs  Other Interventions:          -Spoke with patient's wife Leida(on communication release of information form) for follow up care transition call.  -Flash Larsen states her  is \"doing good\" and that the use of the nebulized medication has helped greatly with her 's symptom of \"not feeling he can get a deep breath in.\" Flash Larsen states her  has also been utilizing his incentive spirometer and \"pickle\" provided at hospital discharge.  -Flash Larsen states her daughter removed \"stitches\" today at site on abdomen where \"drainage tube\"was without difficulty.    -Leida states chest/leg incisions are healing well without s/s infection.  -Leida states home rehab therapy through [facility] will begin on 11/11/20 for a couple weeks and then her  will transition to an outpatient therapy setting.  -Patient's wife denies any needs, questions, or concerns at this time and is agreeable to continued follow up from CTN.   Follow Up  Future Appointments   Date Time Provider Huey Maki   11/5/2020  9:00 AM Fernie Perez  [address] [place] [address]   11/17/2020 12:30 PM Elton Armenta DO CARDIO SURG [facility]       Magdalena Gastelum RN

## 2020-11-05 ENCOUNTER — OFFICE VISIT (OUTPATIENT)
Dept: FAMILY MEDICINE CLINIC | Age: 75
End: 2020-11-05
Payer: MEDICARE

## 2020-11-05 VITALS
DIASTOLIC BLOOD PRESSURE: 48 MMHG | TEMPERATURE: 97.3 F | HEIGHT: 71 IN | WEIGHT: 143.4 LBS | OXYGEN SATURATION: 98 % | SYSTOLIC BLOOD PRESSURE: 102 MMHG | BODY MASS INDEX: 20.08 KG/M2 | HEART RATE: 78 BPM

## 2020-11-05 PROCEDURE — 69210 REMOVE IMPACTED EAR WAX UNI: CPT | Performed by: INTERNAL MEDICINE

## 2020-11-05 PROCEDURE — 99215 OFFICE O/P EST HI 40 MIN: CPT | Performed by: INTERNAL MEDICINE

## 2020-11-05 RX ORDER — HYDROCODONE BITARTRATE AND ACETAMINOPHEN 5; 325 MG/1; MG/1
1 TABLET ORAL EVERY 6 HOURS PRN
COMMUNITY
End: 2021-01-07 | Stop reason: ALTCHOICE

## 2020-11-05 NOTE — PROGRESS NOTES
Physician Progress Note      PATIENT:               Lizzy EM #:                  [id number]  :                       1945  ADMIT DATE:       10/10/2020 5:46 PM  [facility] DATE:        10/28/2020 6:22 PM  RESPONDING  PROVIDER #:        Guanaco Amador DO          QUERY TEXT:    Pt admitted with Abdominal pain. Pt noted to have S&S of CHF. If possible,   please document in progress notes and discharge summary if you are evaluating   and/or treating any of the following: The medical record reflects the following:    Risk Factors: Cardiac insufficiency, NSTEMI    Clinical Indicators: EF 30% preop;?  40%? post op?on inotropic support, Echo 10/21:?EF 45-50% on inotropic support   with stage 2 diastolic dysfunction; RV with moderate to severe dysfunction,   Repeat echo EF 60% 10/28. Treatment: BUMEX injection 2 mg Twice daily, DOBUTREX 1000 mg in dextrose 5 %   250 mL infusion 40.1 mcg/min, LASIX injection 40 mg x 4 doses, TOPROL XL   extended release tablet 12.5 mg Daily, I&O, Daily weights, Cardiac diet, CHF   navigator consult, Cardiology consult, CABG x 3    Thank you,  Adriana Vega RN, BSN  [phone]  Options provided:  -- Acute on Chronic Systolic CHF/HFrEF  -- Acute on Chronic Diastolic CHF/HFpEF  -- Acute on Chronic Systolic and Diastolic CHF  -- Acute Systolic CHF/HFrEF  -- Acute Diastolic CHF/HFpEF  -- Acute Systolic and Diastolic CHF  -- Chronic Systolic CHF/HFrEF  -- Chronic Diastolic CHF/HFpEF  -- Chronic Systolic and Diastolic CHF  -- Other - I will add my own diagnosis  -- Disagree - Not applicable / Not valid  -- Disagree - Clinically unable to determine / Unknown  -- Refer to Clinical Documentation Reviewer    PROVIDER RESPONSE TEXT:    This patient is in acute systolic CHF/HFrEF.     Query created by: Ishan Ac on 10/29/2020 8:18 AM      Electronically signed by:  Guanaco Amador DO 2020 1:27 PM

## 2020-11-06 NOTE — PROGRESS NOTES
[facility]     20  Natacha Bernard : 1945 Sex: male  Age: 66 y.o. Chief Complaint   Patient presents with    Follow-Up from Hospital     CAD, CABG       HPI    Patient presents today for post hospital follow-up visit for multiple medical issues. He is accompanied by his wife today. He was just released from the hospital last week after undergoing coronary artery bypass grafting. He was initially admitted with abdominal pain subsequently ended up having non-ST elevation MI and subsequent surgery. Had somewhat of a complicated hospital course including a respiratory arrest requiring intubation mechanical ventilation. Also had postoperative atrial fibrillation and has converted to sinus rhythm with IV amiodarone he is anticoagulated. He does describe fatigue and some weakness. He is to undergo home physical therapy upcoming. Wife brings in blood pressures and blood sugars. Pressures look to be okay although running a little bit on the low side today. His blood sugars have been on the higher side throughout the this event. He is currently on Metformin only. I told him hopefully this is going to come down as the stress of this whole situation diminishes. His oxygen saturation is good at 98%. He does describe occasional shortness of breath. He has had bilateral pleural effusions in the hospital.  He is getting diuresed with Bumex 3 times a week. I reviewed the documentation from the hospital which was rather extensive. His weight is down 16 pounds since I saw him last.  He has had continued elevation in his inflammatory markers. I did personally speak with his hematologist/oncologist Dr. Migdalia Moreno today regarding this and his anemia. It is felt that the anemia is related to his underlying chronic kidney disease as well as his inflammatory process. She was recommending IV iron which patient has declined. He is taking oral.  He is also B12 deficient and is taking p.o.  B12 as well. Regarding his increase in inflammatory markers she was uncertain. Multiple myeloma has been ruled out and he has no musculoskeletal/rheumatologic complaints at this time. No evidence of infections. Uncertain if this may all be related to his underlying cardiac condition? ?  Patient did have some medication held post hospital including allopurinol, pravastatin because of the bout of shock liver and also Prilosec. I told him to continue for the time being until I see him next month again. Of note the abdominal pain he has been complaining of for several months has resolved postoperatively. Apparently felt to be heart related. Review of Systems     Constitutional: Negative for activity change, appetite change, chills, diaphoresis,  fever. Positive for postop fatigue and weight loss  HENT: Negative for congestion, ear pain, hearing loss, postnasal drip, rhinorrhea and sinus pain.    Respiratory: Negative for cough,  and wheezing.  Positive for intermittent shortness of breath  Cardiovascular: Negative for chest pain, palpitations and leg swelling. Recent MI and CABG  Gastrointestinal: Positive for abdominal pain-resolved-see above.  Negative for blood in stool, constipation, diarrhea, nausea and vomiting. Endocrine: Negative.    Genitourinary: Negative for difficulty urinating, dysuria, frequency, hematuria and urgency. Musculoskeletal: Negative for arthralgias back pain, gait problem and myalgias. Skin: Negative.    Allergic/Immunologic: Negative for environmental allergies   Neurological: Negative for dizziness, weakness, light-headedness, numbness and headaches. Hematological: Anemia  Psychiatric/Behavioral: Negative for sleep disturbance. The patient is not nervous/anxious.    Endocrine: Type 2 diabetes  Hematology: Anemia           REST OF PERTINENT ROS GONE OVER AND WAS NEGATIVE.      PMH:  Problem List: Essential hypertension  Osteoarthritis  Malignant tumor of prostate  Gout  Type 2 diabetes mellitus  Benign essential hypertension  Hyperlipidemia  Health Maintenance:  Colonoscopy - (8/13/2014)  Colonoscopy - (1/16/2017)  Colonoscopy Screening - (8/13/2014)  Colonoscopy Screening - (1/16/2017)  Influenza Vaccination - (11/16/2016)  Pneumonia Vaccination - (11/16/2016)  Colonoscopy - 08-dr miguel,8/14,1/17, 6/20-due 25  Psa Test - dr jiménez-we will do, 4/17,4/18, 7/19-0.1, 11/19 - 0.01  Rectal Exam - dr Mayco Pa, declined  Prostate Exam - dr Mayco Pa, declined  Ercp - 3/11  Hemmocult Cards - hemocult-neg x 3--9/11,5/13-neg x 3  EGD - 8/14,1/17  MRCP - 1/17, 7/20  Fit test - 4/19-neg  Stress test-8/20-mild ischemia  2D echo-8/20  Medical Problems:  Hypertension  Hyperlipidemia - intolerant of statins and zetia  Gout, Prostate Cancer, hx left knee arthroscopy, Type 2 Diabetes, Diverticulosis, Osteoarthritis, Colon  Polyps, ventral hernia, Hiatal Hernia, Pancreatitis  Cholelithiasis - cholecystitis/choledocholithiasis-lap pratibha  Ercp  Carotid Artery Stenosis - declines further fu, vascular now following  Pulmonary Emphysema - Noted on CT chest  Pulmonary nodules - Followed on CT-done  Abnormal vascular study - Refuses vascular consult  Laser surgery 3/26/14, Dr. Damaso Wasserman for glaucoma  Saw rheumatology in 2019 diagnosis inflammatory polyarthropathy-took self off of methotrexate and prednisone.  Refuses further evaluation. Hospitalization 10/20-non-ST elevation MI, respiratory arrest with mechanical ventilation, triple-vessel CABG with left atrial appendage closure  FH:  unknown--adopted  Father:  . (Hx)  Mother:  . (Hx)  SH:  . (Marital) retire   Personal Habits: Cigarette Use: Former Cigarette Smoker. Alcohol: , Has consumed alcohol in the past - 12 years ago                      Current Outpatient Medications:     HYDROcodone-acetaminophen (NORCO) 5-325 MG per tablet, Take 1 tablet by mouth every 6 hours as needed for Pain., Disp: , Rfl:     ipratropium-albuterol (DUONEB) 0.5-2.5 (3) MG/3ML SOLN by mouth daily, Disp: , Rfl:     aspirin 81 MG EC tablet, Take 81 mg by mouth daily , Disp: , Rfl:     blood glucose test strips (ONE TOUCH ULTRA TEST) strip, , Disp: , Rfl:     vitamin E 400 UNIT capsule, Take 400 Units by mouth daily , Disp: , Rfl:     LUMIGAN 0.01 % SOLN ophthalmic drops, Place 1 drop into both eyes nightly , Disp: , Rfl:     vitamin D (CHOLECALCIFEROL) 1000 UNIT TABS tablet, Take 1,000 Units by mouth daily , Disp: , Rfl:     folic acid (FOLVITE) 1 MG tablet, Take 1 mg by mouth daily , Disp: , Rfl: 12  Allergies   Allergen Reactions    Methotrexate Derivatives      Caused Blisters       Past Medical History:   Diagnosis Date    Carotid artery stenosis     Cholecystitis     Choledocholithiasis     Cholelithiasis     Colon polyps     Essential hypertension 5/23/2019    Gout     Hiatal hernia     History of laparoscopic cholecystectomy     Hyperlipidemia 5/23/2019    Inflammatory polyarthropathy ([address] 5/23/2019    Malignant tumor of prostate ([address]     Osteoarthritis     Pancreatitis     Prostate CA ([address] 5/23/2019    Pulmonary emphysema (HCC)     Pulmonary nodules     Type 2 diabetes mellitus without complication, without long-term current use of insulin ([address] 5/23/2019    Ventral hernia      Past Surgical History:   Procedure Laterality Date    CARDIAC CATHETERIZATION  10/16/2020    Dr. John Pizarro N/A 10/19/2020    CABG CORONARY ARTERY BYPASS, KYAW performed by Theresa Centeno DO at [facility] OR     No family history on file.   Social History     Socioeconomic History    Marital status:      Spouse name: Not on file    Number of children: Not on file    Years of education: Not on file    Highest education level: Not on file   Occupational History     Employer: RETIRED   Social Needs    Financial resource strain: Not on file    Food insecurity     Worry: Not on file     Inability: Not on file    Transportation needs     Medical: Not on file     Non-medical: Not on file   Tobacco Use    Smoking status: Never Smoker    Smokeless tobacco: Never Used   Substance and Sexual Activity    Alcohol use: Not Currently    Drug use: Never    Sexual activity: Yes     Partners: Female   Lifestyle    Physical activity     Days per week: Not on file     Minutes per session: Not on file    Stress: Not on file   Relationships    Social connections     Talks on phone: Not on file     Gets together: Not on file     Attends Rastafari service: Not on file     Active member of club or organization: Not on file     Attends meetings of clubs or organizations: Not on file     Relationship status: Not on file    Intimate partner violence     Fear of current or ex partner: Not on file     Emotionally abused: Not on file     Physically abused: Not on file     Forced sexual activity: Not on file   Other Topics Concern    Not on file   Social History Narrative    Not on file       Vitals:    11/05/20 0842   BP: (!) 102/48   Pulse: 78   Temp: 97.3 °F (36.3 °C)   TempSrc: Temporal   SpO2: 98%   Weight: 143 lb 6.4 oz (65 kg)   Height: 5' 11\" (1.803 m)       Physical Exam    Const: Appears well developed and well nourished. No signs of acute distress present. ENMT: . (External Ears) TMs not visualized secondary to cerumen external nose WNL. Moistness and  normal color of the nasal mucosae. Nasal septum. (Septum) . (Teeth) Gums appear healthy. Posterior pharynx shows no exudate, irritation or redness. Neck: Supple and symmetric. Palpation reveals no adenopathy. No masses appreciated. Thyroid exhibits no nodule or thyromegaly. No JVD. Carotids: 2+ and equal bilaterally, without  bruits. Resp: No rales, rhonchi or wheezes appreciated over the lungs bilaterally. CV: Rate is regular. Rhythm is regular. S1 is normal. S2 is normal. No gallop or rubs. No  heart murmur appreciated. Extremities: No clubbing, cyanosis or edema.   Midline CABG scar to chest-healing nicely  Abdomen: Bowel sounds are normoactive. Palpation reveals softness, with no distension,  organomegaly or tenderness. No abdominal masses palpable. No palpable hepatosplenomegaly  He does have a large diastasis rectus. Musculo: Walks with a normal gait. Upper Extremities: Full ROM bilaterally. Lower Extremities:  Full ROM bilaterally. Neurological: Grossly intact without focal deficits noted. Lymph nodes: None palpable           Assessment and Plan:  Fayetta Holstein was seen today for follow-up from hospital.    Diagnoses and all orders for this visit:    Coronary artery disease involving native heart without angina pectoris, unspecified vessel or lesion type    Type 2 diabetes mellitus without complication, without long-term current use of insulin (HCC)    Hyperlipidemia, unspecified hyperlipidemia type    Prostate CA (HCC)    Weight loss    Anemia, unspecified type    Paroxysmal atrial fibrillation (HCC)    Non-STEMI (non-ST elevated myocardial infarction) ([facility]    Bilateral impacted cerumen  -     66757 - IN REMOVE IMPACTED EAR WAX      Plan: I will see him back in 1 month and as needed. Blood work at that time to include a PSA which is due for his prostate cancer surveillance. Will probably reinitiate medications that were held. Post hospital.  Follow-up with consultants. Look over his consultant status when I see him next, he is to see his cardiovascular surgeon on the 17th and renal upcoming as well. He will be getting labs with them as well. He apparently is no longer following with hematology/oncology but in speaking to Dr. Christina Urias she be willing to see him if he is agreeable to IV iron if needed. Upcoming physical therapy. I did give him as needed nebulizers at home which seem to be helping with his breathing. I did encourage strongly regular use of incentive spirometry. Notify us with problems in the interim.   This was a complicated time-consuming visit with face-to-face time greater than 40 minutes and greater than 50% of that time spent in counseling and coordination of care going through hospital documents and speaking with other physician. Return in about 1 month (around 12/5/2020). Seen By:  Rohan Kimble MD      *Document was created using voice recognition software. Note was reviewed however may contain grammatical errors.

## 2020-11-10 ENCOUNTER — HOSPITAL ENCOUNTER (OUTPATIENT)
Dept: CARDIAC REHAB | Age: 75
Setting detail: THERAPIES SERIES
Discharge: HOME | End: 2020-11-10
Payer: MEDICARE

## 2020-11-10 ENCOUNTER — TELEPHONE (OUTPATIENT)
Dept: FAMILY MEDICINE CLINIC | Age: 75
End: 2020-11-10

## 2020-11-10 NOTE — TELEPHONE ENCOUNTER
Mason Rees with cardiac rehab out patient calling she went to visit patient post hospital heart sx(they always do a courtesy FU) his pressure today is 76/46. His pressure has been consistently getting lower since 11/05 appt. He has also lost another 10lbs since visit. He is down 25lbs since hospital stay . He is experiencing intermittent dizziness, fatigue and shortness of breath. What would you like him to do ?   Please call patient back directly

## 2020-11-10 NOTE — TELEPHONE ENCOUNTER
Received phone call from Dr Olya Vidal regarding the note on patient from the call from cardiac rehab nurse. I advised patient, per Dr Gilmer Mcginnis orders to stop the metoprolol and Lisinopril and go directly to the ED. I called and spoke with patient's spouse, his blood pressure is still extremely low at 80/60. They agreed to go directly to the ED.

## 2020-11-11 ENCOUNTER — CARE COORDINATION (OUTPATIENT)
Dept: CASE MANAGEMENT | Age: 75
End: 2020-11-11

## 2020-11-11 NOTE — CARE COORDINATION
-CTN noted in epic patient admitted to [facility] on 11/10/20.  -CTN to sign off and end episode.

## 2020-11-16 LAB
BUN BLDV-MCNC: 27 MG/DL
CALCIUM SERPL-MCNC: 8.9 MG/DL
CHLORIDE BLD-SCNC: 104 MMOL/L
CO2: 28 MMOL/L
CREAT SERPL-MCNC: 1.3 MG/DL
GFR CALCULATED: 54
GLUCOSE BLD-MCNC: 140 MG/DL
POTASSIUM SERPL-SCNC: 4.7 MMOL/L
SODIUM BLD-SCNC: 141 MMOL/L

## 2020-11-17 ENCOUNTER — OFFICE VISIT (OUTPATIENT)
Dept: CARDIOTHORACIC SURGERY | Age: 75
End: 2020-11-17

## 2020-11-17 VITALS
RESPIRATION RATE: 16 BRPM | DIASTOLIC BLOOD PRESSURE: 78 MMHG | SYSTOLIC BLOOD PRESSURE: 146 MMHG | BODY MASS INDEX: 21.22 KG/M2 | WEIGHT: 140 LBS | HEIGHT: 68 IN | HEART RATE: 93 BPM

## 2020-11-17 PROCEDURE — 99024 POSTOP FOLLOW-UP VISIT: CPT | Performed by: PHYSICIAN ASSISTANT

## 2020-11-17 ASSESSMENT — ENCOUNTER SYMPTOMS
SHORTNESS OF BREATH: 0
WHEEZING: 0
COUGH: 0

## 2020-11-17 NOTE — PROGRESS NOTES
Subjective:      Chief Complaint   Patient presents with    Post-Op Check     cabg x3       Patient ID: Tee Anaya is a 66 y.o. male who presents to office for routine 1 month follow up s/p CABG x 3 on 10/19/2020. Patient does state he was re-admitted to [facility] 2 weeks ago with \"pneumonia \" and MARIAH. He has since been [address] home and states he is doing well. He is not on any supplemental o2, he denies any SOB or cough. He denies fevers. He denies any CP or incisional problems        HPI    Review of Systems   Constitutional: Negative for chills and fever. Respiratory: Negative for cough, shortness of breath and wheezing. Cardiovascular: Negative for chest pain, palpitations and leg swelling. Neurological: Negative for syncope and light-headedness. Objective:   Physical Exam  Constitutional:       Appearance: Normal appearance. Neck:      Musculoskeletal: Normal range of motion and neck supple. Cardiovascular:      Rate and Rhythm: Normal rate and regular rhythm. Pulses: Normal pulses. Heart sounds: Normal heart sounds. Pulmonary:      Effort: Pulmonary effort is normal.      Breath sounds: Normal breath sounds. Comments: midsternal and chest tube incisions well healed without evidence of infection. Sternum stable. Abdominal:      General: Bowel sounds are normal.   Musculoskeletal: Normal range of motion. General: No swelling. Comments: Endoscopic vein harvest incisions intact without evidence of infection    Golf ball sized hematoma to right antecubital space   No redness. Non pulsatile. Semi hard. Mild TTP    Neurological:      Mental Status: He is alert and oriented to person, place, and time.          Assessment:      S/p CABG    Recent hospital admission for pneumonia and MARIAH     R UE swelling/lump      Plan:      Remove sternal precautions in 2 weeks ( after Nov 30)  Restart BB and home statin as last LFTs ok   Cardiac rehab referral  R UE to r/o pseudoaneurysm   Continue follow up with PCP, nephrology Cardiology as scheduled. Encouraged to call office with any questions, concerns. Otherwise no further follow up necessary from CTS standpoint.               Mateusz Tapia PA-C

## 2020-11-18 ENCOUNTER — TELEPHONE (OUTPATIENT)
Dept: CARDIOTHORACIC SURGERY | Age: 75
End: 2020-11-18

## 2020-11-19 ENCOUNTER — HOSPITAL ENCOUNTER (OUTPATIENT)
Dept: CARDIAC REHAB | Age: 75
Setting detail: THERAPIES SERIES
Discharge: HOME | End: 2020-11-19
Payer: MEDICARE

## 2020-11-20 ENCOUNTER — TELEPHONE (OUTPATIENT)
Dept: CARDIOTHORACIC SURGERY | Age: 75
End: 2020-11-20

## 2020-11-20 NOTE — TELEPHONE ENCOUNTER
Informed pt Dr Esau Monaco would like him to see Vascular Surgery. He is currently a pt of Dr Orville Warner.  He will call the office to schedule

## 2020-11-25 ENCOUNTER — HOSPITAL ENCOUNTER (OUTPATIENT)
Dept: CARDIAC REHAB | Age: 75
Setting detail: THERAPIES SERIES
Discharge: HOME | End: 2020-11-25
Payer: MEDICARE

## 2020-11-30 ENCOUNTER — OFFICE VISIT (OUTPATIENT)
Dept: FAMILY MEDICINE CLINIC | Age: 75
End: 2020-11-30
Payer: MEDICARE

## 2020-11-30 VITALS
HEART RATE: 95 BPM | DIASTOLIC BLOOD PRESSURE: 56 MMHG | BODY MASS INDEX: 21.89 KG/M2 | OXYGEN SATURATION: 94 % | HEIGHT: 68 IN | TEMPERATURE: 97.3 F | WEIGHT: 144.4 LBS | SYSTOLIC BLOOD PRESSURE: 126 MMHG

## 2020-11-30 PROCEDURE — 99214 OFFICE O/P EST MOD 30 MIN: CPT | Performed by: INTERNAL MEDICINE

## 2020-11-30 RX ORDER — LANCETS 33 GAUGE
EACH MISCELLANEOUS
COMMUNITY
End: 2020-11-30 | Stop reason: SDUPTHER

## 2020-11-30 RX ORDER — PRAVASTATIN SODIUM 80 MG/1
80 TABLET ORAL DAILY
COMMUNITY

## 2020-11-30 RX ORDER — ALLOPURINOL 100 MG/1
100 TABLET ORAL DAILY
Qty: 30 TABLET | Refills: 5 | Status: SHIPPED | OUTPATIENT
Start: 2020-11-30

## 2020-11-30 RX ORDER — LANCETS 33 GAUGE
1 EACH MISCELLANEOUS DAILY
Qty: 100 EACH | Refills: 2 | Status: SHIPPED | OUTPATIENT
Start: 2020-11-30

## 2020-12-01 ENCOUNTER — TELEPHONE (OUTPATIENT)
Dept: FAMILY MEDICINE CLINIC | Age: 75
End: 2020-12-01

## 2020-12-01 NOTE — PROGRESS NOTES
Osteoarthritis, Colon  Polyps, ventral hernia, Hiatal Hernia, Pancreatitis  Cholelithiasis - cholecystitis/choledocholithiasis-lap pratibha  Ercp  Carotid Artery Stenosis - declines further fu, vascular now following  Pulmonary Emphysema - Noted on CT chest  Pulmonary nodules - Followed on CT-done  Abnormal vascular study - Refuses vascular consult  Laser surgery 3/26/14, Dr. 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  Refuses further evaluation. Hospitalization 10/20-non-ST elevation MI, respiratory arrest with mechanical ventilation, triple-vessel CABG with left atrial appendage closure  FH:  unknown--adopted  Father:  . (Hx)  Mother:  . (Hx)  SH:  . (Marital) retire   Personal Habits: Cigarette Use: Former Cigarette Smoker. Alcohol: , Has consumed alcohol in the past - 12 years ago                 Current Outpatient Medications:     pravastatin (PRAVACHOL) 80 MG tablet, Take 80 mg by mouth daily, Disp: , Rfl:     blood glucose test strips (ONE TOUCH ULTRA TEST) strip, 1 each by Does not apply route daily, Disp: 100 each, Rfl: 2    OneTouch Delica Lancets 21E MISC, 1 Act by Does not apply route daily, Disp: 100 each, Rfl: 2    allopurinol (ZYLOPRIM) 100 MG tablet, Take 1 tablet by mouth daily, Disp: 30 tablet, Rfl: 5    HYDROcodone-acetaminophen (NORCO) 5-325 MG per tablet, Take 1 tablet by mouth every 6 hours as needed for Pain., Disp: , Rfl:     ipratropium-albuterol (DUONEB) 0.5-2.5 (3) MG/3ML SOLN nebulizer solution, Inhale 3 mLs into the lungs every 6 hours as needed for Shortness of Breath, Disp: 120 mL, Rfl: 0    Respiratory Therapy Supplies (NEBULIZER/TUBING/MOUTHPIECE) KIT, 1 kit by Does not apply route daily as needed (SOB,atelectasis), Disp: 1 kit, Rfl: 0    apixaban (ELIQUIS) 5 MG TABS tablet, Take 1 tablet by mouth 2 times daily (Patient taking differently: Take 2.5 mg by mouth 2 times daily ), Disp: CORONARY ARTERY BYPASS GRAFT N/A 10/19/2020    CABG CORONARY ARTERY BYPASS, KYAW performed by Adamaris Becerra DO at [address]     No family history on file. Social History     Socioeconomic History    Marital status:      Spouse name: Not on file    Number of children: Not on file    Years of education: Not on file    Highest education level: Not on file   Occupational History     Employer: RETIRED   Social Needs    Financial resource strain: Not on file    Food insecurity     Worry: Not on file     Inability: Not on file   [employer] needs     Medical: Not on file     Non-medical: Not on file   Tobacco Use    Smoking status: Never Smoker    Smokeless tobacco: Never Used   Substance and Sexual Activity    Alcohol use: Not Currently    Drug use: Never    Sexual activity: Yes     Partners: Female   Lifestyle    Physical activity     Days per week: Not on file     Minutes per session: Not on file    Stress: Not on file   Relationships    Social connections     Talks on phone: Not on file     Gets together: Not on file     Attends Episcopal service: Not on file     Active member of club or organization: Not on file     Attends meetings of clubs or organizations: Not on file     Relationship status: Not on file    Intimate partner violence     Fear of current or ex partner: Not on file     Emotionally abused: Not on file     Physically abused: Not on file     Forced sexual activity: Not on file   Other Topics Concern    Not on file   Social History Narrative    Not on file       Vitals:    11/30/20 1043   BP: (!) 126/56   Pulse: 95   Temp: 97.3 °F (36.3 °C)   TempSrc: Temporal   SpO2: 94%   Weight: 144 lb 6.4 oz (65.5 kg)   Height: 5' 8\" (1.727 m)       Physical Exam    Const: Appears well developed and well nourished. No signs of acute distress present. ENMT: . (External Ears) TMs not visualized secondary to cerumen external nose WNL. Moistness and  normal color of the nasal mucosae.  Nasal

## 2020-12-02 ENCOUNTER — HOSPITAL ENCOUNTER (OUTPATIENT)
Dept: CARDIAC REHAB | Age: 75
Setting detail: THERAPIES SERIES
Discharge: HOME | End: 2020-12-02
Payer: MEDICARE

## 2020-12-03 ENCOUNTER — HOSPITAL ENCOUNTER (OUTPATIENT)
Dept: CARDIAC REHAB | Age: 75
Setting detail: THERAPIES SERIES
Discharge: HOME | End: 2020-12-03
Payer: MEDICARE

## 2020-12-03 ENCOUNTER — TELEPHONE (OUTPATIENT)
Dept: FAMILY MEDICINE CLINIC | Age: 75
End: 2020-12-03

## 2020-12-03 LAB
A/G RATIO: 0.9 RATIO (ref 1.1–2.2)
ALBUMIN SERPL-MCNC: 3.3 G/DL (ref 3.4–4.8)
ALP BLD-CCNC: 82 U/L (ref 42–121)
ALT SERPL-CCNC: 14 U/L (ref 10–63)
ANION GAP SERPL CALCULATED.3IONS-SCNC: 8 MEQ/L (ref 3–11)
AST SERPL-CCNC: 17 U/L (ref 10–41)
BASOPHILS ABSOLUTE: 0.1 K/UL (ref 0–0.2)
BASOPHILS RELATIVE PERCENT: 1.2 % (ref 0–1.5)
BILIRUB SERPL-MCNC: 1.1 MG/DL (ref 0.3–1.5)
BUN BLDV-MCNC: 28 MG/DL (ref 6–20)
CALCIUM SERPL-MCNC: 9 MG/DL (ref 8.5–10.5)
CHLORIDE BLD-SCNC: 98 MEQ/L (ref 98–107)
CO2: 30 MEQ/L (ref 21–31)
CREAT SERPL-MCNC: 1.2 MG/DL (ref 0.6–1.3)
CREATININE + EGFR PANEL: 71 ML/MIN
DIFFERENTIAL, MANUAL: NO
EOSINOPHILS ABSOLUTE: 0.1 K/UL (ref 0–0.33)
EOSINOPHILS RELATIVE PERCENT: 1.1 % (ref 0–3)
GFR NON-AFRICAN AMERICAN: 59 ML/MIN
GLOBULIN: 3.7 G/DL (ref 1.9–3.9)
GLUCOSE BLD-MCNC: 113 MG/DL (ref 70–99)
HCT VFR BLD CALC: 34.8 % (ref 41–50)
HEMOGLOBIN: 11.3 G/DL (ref 13.5–16.5)
LYMPHOCYTES ABSOLUTE: 0.9 K/UL (ref 1.1–4.8)
LYMPHOCYTES RELATIVE PERCENT: 11.8 % (ref 24–44)
MCH RBC QN AUTO: 34.1 PG (ref 28–34)
MCHC RBC AUTO-ENTMCNC: 32.6 G/DL (ref 33–37)
MCV RBC AUTO: 104.6 FL (ref 80–100)
MONOCYTES ABSOLUTE: 0.9 K/UL (ref 0.2–0.7)
MONOCYTES RELATIVE PERCENT: 11.3 % (ref 3.4–9)
NEUTROPHILS ABSOLUTE: 5.8 K/UL (ref 1.83–8.7)
PDW BLD-RTO: 18.4 % (ref 10.9–14.3)
PLATELET # BLD: 413 K/UL (ref 150–450)
PMV BLD AUTO: 7.8 FL (ref 7.4–10.4)
POTASSIUM SERPL-SCNC: 5.1 MEQ/L (ref 3.6–5)
RBC # BLD: 3.32 M/UL (ref 4.5–5.5)
SEGMENTED NEUTROPHILS RELATIVE PERCENT: 74.6 % (ref 40–74)
SODIUM BLD-SCNC: 136 MEQ/L (ref 135–145)
TOTAL PROTEIN: 7 G/DL (ref 5.9–7.8)
WBC # BLD: 7.8 K/UL (ref 4.5–11)

## 2020-12-04 ENCOUNTER — TELEPHONE (OUTPATIENT)
Dept: FAMILY MEDICINE CLINIC | Age: 75
End: 2020-12-04

## 2020-12-04 ENCOUNTER — HOSPITAL ENCOUNTER (OUTPATIENT)
Dept: CARDIAC REHAB | Age: 75
Setting detail: THERAPIES SERIES
Discharge: HOME | End: 2020-12-04
Payer: MEDICARE

## 2020-12-04 NOTE — TELEPHONE ENCOUNTER
Wife calling Will has sinus congestion with drainage for two days. No other issues. He has had a triple bypass and is on metoprolol. What can he use otc?

## 2020-12-14 ENCOUNTER — TELEPHONE (OUTPATIENT)
Dept: FAMILY MEDICINE CLINIC | Age: 75
End: 2020-12-14

## 2020-12-14 NOTE — TELEPHONE ENCOUNTER
Pt was seen in ED on 12/11 for hypotension. Needs hosp f/u. Wife, Austyn Gilbert states that pt is doing fine and would like to know if his appt on 01/07/21 will suffice for [facility] f/u? Please advise. Also, she states that claritin is not working for pt's sinus congestion, would you advise something else?     Austyn Gilbert can be reached on her cell: [phone]

## 2020-12-16 ENCOUNTER — TELEPHONE (OUTPATIENT)
Dept: FAMILY MEDICINE CLINIC | Age: 75
End: 2020-12-16

## 2020-12-16 NOTE — TELEPHONE ENCOUNTER
Please find out what the pressures and pulse rate are running. See what he is taking for his blood pressure currently and how he is feeling.

## 2020-12-16 NOTE — TELEPHONE ENCOUNTER
Vandana Barfield calling about his elevated blood pressure this morning. It was 171/93 at 8:00am & 167/88 at 9:00am    He was in the ER on the 11th for low blood pressure and dx with mild dehydration. He has been doing fine until this morning. Please advise.

## 2020-12-16 NOTE — TELEPHONE ENCOUNTER
Patients wife returned phone call. BP and HR as follows   0800- 171/93  123  0900- 167/88  104  1130-183/93  107    Patient has been very [facility]" today.  Is taking Metoprolol 25mg- 1/2 tab daily    Also they wanted to let you know that patient will be having surgery done for aneurysm by Dr. Arun Ortiz on 12/23/20

## 2020-12-17 ENCOUNTER — OFFICE VISIT (OUTPATIENT)
Dept: FAMILY MEDICINE CLINIC | Age: 75
End: 2020-12-17
Payer: MEDICARE

## 2020-12-17 VITALS
BODY MASS INDEX: 21.07 KG/M2 | SYSTOLIC BLOOD PRESSURE: 138 MMHG | TEMPERATURE: 97.3 F | HEIGHT: 68 IN | RESPIRATION RATE: 20 BRPM | DIASTOLIC BLOOD PRESSURE: 70 MMHG | HEART RATE: 92 BPM | WEIGHT: 139 LBS | OXYGEN SATURATION: 93 %

## 2020-12-17 LAB
CHP ED QC CHECK: NORMAL
GLUCOSE BLD-MCNC: 161 MG/DL

## 2020-12-17 PROCEDURE — 99213 OFFICE O/P EST LOW 20 MIN: CPT | Performed by: NURSE PRACTITIONER

## 2020-12-17 PROCEDURE — 82962 GLUCOSE BLOOD TEST: CPT | Performed by: NURSE PRACTITIONER

## 2020-12-17 RX ORDER — METOCLOPRAMIDE 5 MG/1
5 TABLET ORAL 4 TIMES DAILY
COMMUNITY
End: 2021-01-07 | Stop reason: ALTCHOICE

## 2020-12-17 NOTE — PROGRESS NOTES
Subjective:  Chief Complaint   Patient presents with    Diabetes     shakey       HPI: The patient presents to [facility] with generalized weakness and a feeling of shakiness. The patient had CABG x3 in October, he has been undergoing cardiac rehab. He was seen in the emergency room 12/11/2020 with generalized weakness, nausea. His creatinine was slightly increased, his lactic acid was elevated, this did improve with hydration. He was discharged with Reglan. He states since that time, he has continued to have nausea, no vomiting. He states he felt \"shaky\" and thought this was related to diabetes, although his glucose today was 161. He denies any dizziness, headache, visual disturbances. He reports a general shortness of breath but that this is no different than it has been since discharge in October. Yesterday, Dr. Yaquelin Hicks did increase his metoprolol due to his wife's phone call noting elevated blood pressure and pulse. Both of these are within normal range today. ROS:  Positive and pertinent negatives as per HPI. All other systems are reviewed and negative.        Current Outpatient Medications:     metoclopramide (REGLAN) 5 MG tablet, Take 5 mg by mouth 4 times daily, Disp: , Rfl:     pravastatin (PRAVACHOL) 80 MG tablet, Take 80 mg by mouth daily, Disp: , Rfl:     blood glucose test strips (ONE TOUCH ULTRA TEST) strip, 1 each by Does not apply route daily, Disp: 100 each, Rfl: 2    OneTouch Delica Lancets 36J MISC, 1 Act by Does not apply route daily, Disp: 100 each, Rfl: 2    allopurinol (ZYLOPRIM) 100 MG tablet, Take 1 tablet by mouth daily, Disp: 30 tablet, Rfl: 5    HYDROcodone-acetaminophen (NORCO) 5-325 MG per tablet, Take 1 tablet by mouth every 6 hours as needed for Pain., Disp: , Rfl:     ipratropium-albuterol (DUONEB) 0.5-2.5 (3) MG/3ML SOLN nebulizer solution, Inhale 3 mLs into the lungs every 6 hours as needed for Shortness of Breath, Disp: 120 mL, Rfl: 0    Respiratory Therapy Supplies (NEBULIZER/TUBING/MOUTHPIECE) KIT, 1 kit by Does not apply route daily as needed (SOB,atelectasis), Disp: 1 kit, Rfl: 0    apixaban (ELIQUIS) 5 MG TABS tablet, Take 1 tablet by mouth 2 times daily (Patient taking differently: Take 2.5 mg by mouth 2 times daily ), Disp: 60 tablet, Rfl: 2    metoprolol succinate (TOPROL XL) 25 MG extended release tablet, Take 0.5 tablets by mouth daily, Disp: 30 tablet, Rfl: 2    timolol (TIMOPTIC) 0.5 % ophthalmic solution, Place 1 drop into the left eye 2 times daily, Disp: , Rfl:     ferrous sulfate (IRON 325) 325 (65 Fe) MG tablet, Take 325 mg by mouth every other day , Disp: , Rfl:     vitamin B-12 (CYANOCOBALAMIN) 1000 MCG tablet, Take 1,000 mcg by mouth 2 times daily, Disp: , Rfl:     metFORMIN (GLUCOPHAGE) 500 MG tablet, 1 po q am, 2 po q pm Hold until pt is ready, Disp: 270 tablet, Rfl: 1    fluticasone (FLONASE) 50 MCG/ACT nasal spray, 2 sprays by Each Nostril route daily, Disp: 1 Bottle, Rfl: 5    aspirin 81 MG EC tablet, Take 81 mg by mouth daily , Disp: , Rfl:     vitamin E 400 UNIT capsule, Take 400 Units by mouth daily , Disp: , Rfl:     LUMIGAN 0.01 % SOLN ophthalmic drops, Place 1 drop into both eyes nightly , Disp: , Rfl:     vitamin D (CHOLECALCIFEROL) 1000 UNIT TABS tablet, Take 1,000 Units by mouth daily , Disp: , Rfl:     folic acid (FOLVITE) 1 MG tablet, Take 1 mg by mouth daily , Disp: , Rfl: 12   Allergies   Allergen Reactions    Methotrexate Derivatives      Caused Blisters        Objective:  Vitals:    12/17/20 1422   BP: 138/70   Site: Left Upper Arm   Position: Sitting   Cuff Size: Medium Adult   Pulse: 92   Resp: 20   Temp: 97.3 °F (36.3 °C)   TempSrc: Temporal   SpO2: 93%   Weight: 139 lb (63 kg)   Height: 5' 8\" (1.727 m)        Exam:  Const: Appears chronically ill. No signs of acute distress present. Vitals reviewed per triage. Head/Face: Normocephalic, atraumatic. Facies is symmetric.   Eyes: Pupils are equal, round, and reactive to light. Extraocular movements are intact. Left eyelid with slight ptosis. ENMT: Tympanic membranes are pearly gray with good light reflex bilaterally. Nares are patent. Buccal mucosa was moist.   Posterior pharynx shows no exudate, irritation or redness. Neck: Supple and symmetric. Palpation reveals no adenopathy. Trachea midline. Resp: Lungs are clear bilaterally. CV: Rhythm is regular. S1 is normal. S2 is normal. Extremities: No edema of the lower limbs bilaterally. Abdomen: Abdomen soft, nontender to palpation. No masses or organomegaly. No rebound or guarding. Musculo: Walks with a slow gait. Patient moves extremities without pain or limitation. Muscle strength is decreased generally. Skin: Skin is warm and dry. Incision sites from CABG to chest and right lower extremity are healing well with no sign of infection. Neuro: Alert and oriented x3. Speech is articulate and fluent. No focal neurologic deficits. Psych: Mood and affect are appropriate. Markos Jeffrey was seen today for diabetes. Diagnoses and all orders for this visit:    MARIAH (acute kidney injury) ([facility] [address]    Type 2 diabetes mellitus without complication, without long-term current use of insulin ([facility])  -     POCT Glucose    Generalized weakness      I had a long discussion with the patient and his wife. His description of a shaky sensation may be due to the Reglan, however, this still does not solve his initial problem and complaint of weakness, which was present when he went to the emergency room last week. The patient needs further evaluation, and we are in agreement that this should be done through the emergency room. Wife will transport the patient.     Seen By:    ROSANGELA Rogers - CNP

## 2020-12-29 ENCOUNTER — TELEPHONE (OUTPATIENT)
Dept: FAMILY MEDICINE CLINIC | Age: 75
End: 2020-12-29

## 2020-12-29 RX ORDER — METOPROLOL SUCCINATE 25 MG/1
25 TABLET, EXTENDED RELEASE ORAL 2 TIMES DAILY
COMMUNITY
End: 2020-12-29 | Stop reason: SDUPTHER

## 2020-12-29 RX ORDER — METOPROLOL SUCCINATE 25 MG/1
TABLET, EXTENDED RELEASE ORAL
Qty: 60 TABLET | Refills: 5 | Status: CANCELLED | OUTPATIENT
Start: 2020-12-29

## 2020-12-29 RX ORDER — METOPROLOL SUCCINATE 25 MG/1
25 TABLET, EXTENDED RELEASE ORAL 2 TIMES DAILY
Qty: 60 TABLET | Refills: 1 | Status: SHIPPED
Start: 2020-12-29 | End: 2021-01-07 | Stop reason: SDUPTHER

## 2020-12-29 RX ORDER — NEBULIZER ACCESSORIES
KIT MISCELLANEOUS
Qty: 1 KIT | Refills: 0 | Status: SHIPPED
Start: 2020-12-29 | End: 2020-12-30

## 2020-12-29 NOTE — TELEPHONE ENCOUNTER
Wife, Shelby Salvador called asking for a nebulizer machine to be sent to [facility] F# [phone]. Pt had returned his machine but now needs it because of having pneumonia.

## 2020-12-29 NOTE — TELEPHONE ENCOUNTER
I did not refill the prescription. It does not match the Sig from his last appointment. Please clarify this with the wife/patient and re que  properly.

## 2020-12-29 NOTE — TELEPHONE ENCOUNTER
Verified does of Metoprolol with wife, Areli Cadena.     Last Appointment:  11/30/2020  Future Appointments   Date Time Provider Huey Maki   1/7/2021  1:30 PM Marbella Samuel  [address]

## 2020-12-30 ENCOUNTER — TELEPHONE (OUTPATIENT)
Dept: FAMILY MEDICINE CLINIC | Age: 75
End: 2020-12-30

## 2020-12-30 RX ORDER — NEBULIZER ACCESSORIES
KIT MISCELLANEOUS
Qty: 1 KIT | Refills: 0 | Status: SHIPPED | OUTPATIENT
Start: 2020-12-30

## 2020-12-30 NOTE — TELEPHONE ENCOUNTER
Isabela Gains - Wife  She went to [facility] and was able to  the Kit that you sent, but there was no order for the machine. Apparently it is not part of the kit and they need a separate order for that.

## 2021-01-07 ENCOUNTER — OFFICE VISIT (OUTPATIENT)
Dept: FAMILY MEDICINE CLINIC | Age: 76
End: 2021-01-07
Payer: MEDICARE

## 2021-01-07 VITALS
OXYGEN SATURATION: 94 % | WEIGHT: 149.4 LBS | HEART RATE: 83 BPM | BODY MASS INDEX: 22.64 KG/M2 | TEMPERATURE: 97.2 F | HEIGHT: 68 IN | SYSTOLIC BLOOD PRESSURE: 152 MMHG | DIASTOLIC BLOOD PRESSURE: 78 MMHG

## 2021-01-07 DIAGNOSIS — D64.9 ANEMIA, UNSPECIFIED TYPE: ICD-10-CM

## 2021-01-07 DIAGNOSIS — I25.10 CORONARY ARTERY DISEASE INVOLVING NATIVE HEART WITHOUT ANGINA PECTORIS, UNSPECIFIED VESSEL OR LESION TYPE: ICD-10-CM

## 2021-01-07 DIAGNOSIS — I10 ESSENTIAL HYPERTENSION: ICD-10-CM

## 2021-01-07 DIAGNOSIS — E78.5 HYPERLIPIDEMIA, UNSPECIFIED HYPERLIPIDEMIA TYPE: ICD-10-CM

## 2021-01-07 DIAGNOSIS — I48.0 PAROXYSMAL ATRIAL FIBRILLATION (HCC): ICD-10-CM

## 2021-01-07 DIAGNOSIS — E11.9 TYPE 2 DIABETES MELLITUS WITHOUT COMPLICATION, WITHOUT LONG-TERM CURRENT USE OF INSULIN (HCC): ICD-10-CM

## 2021-01-07 DIAGNOSIS — C61 PROSTATE CA (HCC): ICD-10-CM

## 2021-01-07 DIAGNOSIS — J18.9 PNEUMONIA DUE TO INFECTIOUS ORGANISM, UNSPECIFIED LATERALITY, UNSPECIFIED PART OF LUNG: Primary | ICD-10-CM

## 2021-01-07 PROCEDURE — 3288F FALL RISK ASSESSMENT DOCD: CPT | Performed by: INTERNAL MEDICINE

## 2021-01-07 PROCEDURE — G8510 SCR DEP NEG, NO PLAN REQD: HCPCS | Performed by: INTERNAL MEDICINE

## 2021-01-07 PROCEDURE — 99214 OFFICE O/P EST MOD 30 MIN: CPT | Performed by: INTERNAL MEDICINE

## 2021-01-07 RX ORDER — METOPROLOL SUCCINATE 25 MG/1
25 TABLET, EXTENDED RELEASE ORAL 2 TIMES DAILY
Qty: 180 TABLET | Refills: 1 | Status: SHIPPED | OUTPATIENT
Start: 2021-01-07

## 2021-01-07 RX ORDER — ONDANSETRON 4 MG/1
4 TABLET, FILM COATED ORAL EVERY 8 HOURS PRN
Qty: 30 TABLET | Refills: 2 | Status: SHIPPED | OUTPATIENT
Start: 2021-01-07

## 2021-01-07 RX ORDER — ONDANSETRON 4 MG/1
4 TABLET, FILM COATED ORAL EVERY 8 HOURS PRN
COMMUNITY
End: 2021-01-07 | Stop reason: SDUPTHER

## 2021-01-07 ASSESSMENT — PATIENT HEALTH QUESTIONNAIRE - PHQ9
SUM OF ALL RESPONSES TO PHQ QUESTIONS 1-9: 0
SUM OF ALL RESPONSES TO PHQ QUESTIONS 1-9: 0
2. FEELING DOWN, DEPRESSED OR HOPELESS: 0
SUM OF ALL RESPONSES TO PHQ QUESTIONS 1-9: 0

## 2021-01-08 NOTE — PROGRESS NOTES
[id number]      21  Jovany Lazar : 1945 Sex: male  Age: 66 y.o. Chief Complaint   Patient presents with    1 Month Follow-Up       HPI    Patient presents today accompanied by his wife for follow-up visit on his multiple medical issues. His health status remains very complicated. I looked through my last couple notes which are post hospital follow-up visits and this today and post ER follow-up visits x2. I reviewed the ER notes. The first visit was for weakness and found to have dehydration rehydrated and sent home. Second visit was for nausea and pneumonia which was treated with antibiotics. Patient does present today stating that he feels much better. He has put on 10 pounds since I saw him about 5 weeks ago. States his appetite is much better. Recently had pseudoaneurysm repair to his right arm by vascular. His PAF has been stable and appears to be in sinus today. He did have his Eliquis dose decreased by the [place] when he was transferred there last month. This was because of renal failure and weight loss was decreased to half dose of 2.5 twice daily. His kidney function now as of most recent and his weight are back up and I did reinstitute 5 mg twice daily. Prior to the above last hospital visit entailed non-ST elevation MI coronary artery bypass grafting. Pressures have been in a good range. Blood sugars have been in a good range. He is following with renal, vascular, cardiology, ophthalmology. Review of Systems   Constitutional: Negative for activity change, appetite change, chills, diaphoresis,  fever. Positive for postop fatigue and weight loss  HENT: Negative for congestion, ear pain, hearing loss, postnasal drip, rhinorrhea and sinus pain.    Respiratory: Negative for  wheezing.  Positive for intermittent shortness of breath and dry cough. Shortness of breath is less so. Patient states he is less winded.   Cardiovascular: Negative for chest pain, palpitations and leg swelling.  Recent MI and CABG  Gastrointestinal: Positive for abdominal pain-resolved post CABG-.  Negative for blood in stool, constipation, diarrhea, nausea and vomiting. Endocrine: Negative.    Genitourinary: Negative for difficulty urinating, dysuria, frequency, hematuria and urgency. Musculoskeletal: Negative for arthralgias back pain, gait problem and myalgias. Skin: Negative.    Allergic/Immunologic: Negative for environmental allergies   Neurological: Negative for dizziness, weakness, light-headedness, numbness and headaches. Psychiatric/Behavioral: Negative for sleep disturbance. The patient is not nervous/anxious.    Endocrine: Type 2 diabetes  Hematology: Anemia         REST OF PERTINENT ROS GONE OVER AND WAS NEGATIVE.      PMH:  Problem List: Essential hypertension  Osteoarthritis  Malignant tumor of prostate  Gout  Type 2 diabetes mellitus  Benign essential hypertension  Hyperlipidemia  Health Maintenance:  Colonoscopy - (8/13/2014)  Colonoscopy - (1/16/2017)  Colonoscopy Screening - (8/13/2014)  Colonoscopy Screening - (1/16/2017)  Influenza Vaccination - (11/16/2016)  Pneumonia Vaccination - (11/16/2016)  Colonoscopy - 08-dr miguel,8/14,1/17, 6/20-due 25  Psa Test - dr jiménez-we will do, 4/17,4/18, 7/19-0.1, 11/19 - 0.01, 1/21  Rectal Exam - dr Shahida Noble, declined  Prostate Exam - dr Shahida Noble, declined  Ercp - 3/11  Hemmocult Cards - hemocult-neg x 3--9/11,5/13-neg x 3  EGD - 8/14,1/17  MRCP - 1/17, 7/20  Fit test - 4/19-neg  Stress test-8/20-mild ischemia  2D echo-8/20  Medical Problems:  Hypertension  Hyperlipidemia - intolerant of statins and zetia  Gout, Prostate Cancer, hx left knee arthroscopy, Type 2 Diabetes, Diverticulosis, Osteoarthritis, Colon  Polyps, ventral hernia, Hiatal Hernia, Pancreatitis  Cholelithiasis - cholecystitis/choledocholithiasis-lap pratibha  Ercp  Carotid Artery Stenosis - declines further fu, vascular now following  Pulmonary Emphysema - Noted on CT chest  Pulmonary nodules - Followed on CT-done  Abnormal vascular study - Refuses vascular consult  Laser surgery 3/26/14, Dr. 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  Refuses further evaluation. Hospitalization 10/20-non-ST elevation MI, respiratory arrest with mechanical ventilation, triple-vessel CABG with left atrial appendage closure  Pseudoaneurysm repair right arm-12/20  FH:  unknown--adopted  Father:  . (Hx)  Mother:  . (Hx)  SH:  . (Marital) retire   Personal Habits: Cigarette Use: Former Cigarette Smoker. Alcohol: , Has consumed alcohol in the past - 12 years ago               Current Outpatient Medications:     ondansetron (ZOFRAN) 4 MG tablet, Take 1 tablet by mouth every 8 hours as needed for Nausea or Vomiting, Disp: 30 tablet, Rfl: 2    metoprolol succinate (TOPROL XL) 25 MG extended release tablet, Take 1 tablet by mouth 2 times daily, Disp: 180 tablet, Rfl: 1    Respiratory Therapy Supplies (NEBULIZER/TUBING/MOUTHPIECE) KIT, Use 3 times daily, Disp: 1 kit, Rfl: 0    pravastatin (PRAVACHOL) 80 MG tablet, Take 80 mg by mouth daily, Disp: , Rfl:     blood glucose test strips (ONE TOUCH ULTRA TEST) strip, 1 each by Does not apply route daily, Disp: 100 each, Rfl: 2    OneTouch Delica Lancets 21T MISC, 1 Act by Does not apply route daily, Disp: 100 each, Rfl: 2    allopurinol (ZYLOPRIM) 100 MG tablet, Take 1 tablet by mouth daily, Disp: 30 tablet, Rfl: 5    ipratropium-albuterol (DUONEB) 0.5-2.5 (3) MG/3ML SOLN nebulizer solution, Inhale 3 mLs into the lungs every 6 hours as needed for Shortness of Breath, Disp: 120 mL, Rfl: 0    apixaban (ELIQUIS) 5 MG TABS tablet, Take 1 tablet by mouth 2 times daily (Patient taking differently: Take 2.5 mg by mouth 2 times daily ), Disp: 60 tablet, Rfl: 2    timolol (TIMOPTIC) 0.5 % ophthalmic solution, Place 1 drop into the left eye 2 times daily, Disp: , file    Number of children: Not on file    Years of education: Not on file    Highest education level: Not on file   Occupational History     Employer: RETIRED   Social Needs    Financial resource strain: Not on file    Food insecurity     Worry: Not on file     Inability: Not on file    Transportation needs     Medical: Not on file     Non-medical: Not on file   Tobacco Use    Smoking status: Never Smoker    Smokeless tobacco: Never Used   Substance and Sexual Activity    Alcohol use: Not Currently    Drug use: Never    Sexual activity: Yes     Partners: Female   Lifestyle    Physical activity     Days per week: Not on file     Minutes per session: Not on file    Stress: Not on file   Relationships    Social connections     Talks on phone: Not on file     Gets together: Not on file     Attends Anglican service: Not on file     Active member of club or organization: Not on file     Attends meetings of clubs or organizations: Not on file     Relationship status: Not on file    Intimate partner violence     Fear of current or ex partner: Not on file     Emotionally abused: Not on file     Physically abused: Not on file     Forced sexual activity: Not on file   Other Topics Concern    Not on file   Social History Narrative    Not on file       Vitals:    01/07/21 1328   BP: (!) 152/78   Pulse: 83   Temp: 97.2 °F (36.2 °C)   TempSrc: Temporal   SpO2: 94%   Weight: 149 lb 6.4 oz (67.8 kg)   Height: 5' 8\" (1.727 m)       Physical Exam  Const: Appears well developed and well nourished. No signs of acute distress present. ENMT: . (External Ears) TMs not visualized secondary to cerumen external nose WNL. Moistness and  normal color of the nasal mucosae. Nasal septum. (Septum) . (Teeth) Gums appear healthy. Posterior pharynx shows no exudate, irritation or redness. Neck: Supple and symmetric. Palpation reveals no adenopathy. No masses appreciated. Thyroid exhibits no nodule or thyromegaly. No JVD. Carotids: 2+ and equal bilaterally, without  bruits. Resp: No rales, rhonchi or wheezes appreciated over the lungs bilaterally. He does have some diminished breath sounds in the bases bilaterally. Left greater than right  CV: Rate is regular. Rhythm is regular. S1 is normal. S2 is normal. No gallop or rubs. No  heart murmur appreciated. Extremities: No clubbing, cyanosis or edema.  Midline CABG scar to chest-healing nicely  Abdomen: Bowel sounds are normoactive. Palpation reveals softness, with no distension,  organomegaly or tenderness. No abdominal masses palpable. No palpable hepatosplenomegaly  He does have a large diastasis rectus. Musculo: Walks with a normal gait. Upper Extremities: Full ROM bilaterally. Lower Extremities:  Full ROM bilaterally. Neurological: Grossly intact without focal deficits noted. Lymph nodes: None palpable           Assessment and Plan:  Lynda Berumen was seen today for 1 month follow-up. Diagnoses and all orders for this visit:    Pneumonia due to infectious organism, unspecified laterality, unspecified part of lung  -     XR CHEST STANDARD (2 VW); Future  Breathing easier-finished antibiotics, will repeat chest x-ray in 2 weeks    Type 2 diabetes mellitus without complication, without long-term current use of insulin (HCC)  -     Hemoglobin A1C; Future  -     Microalbumin / Creatinine Urine Ratio; Future  -     SEDIMENTATION RATE; Future  Stable on medication    Coronary artery disease involving native heart without angina pectoris, unspecified vessel or lesion type  Stable on medication post MI and CABG    Paroxysmal atrial fibrillation (HCC)  Stable with rate control and anticoagulation    Prostate CA (HCC)  -     PSA, DIAGNOSTIC; Future  Stable repeating diagnostic PSA in 2 weeks-no longer follows with urology    Hyperlipidemia, unspecified hyperlipidemia type  -     Lipid Panel;  Future  Stable on statin medication    Essential hypertension  -     CBC Auto Differential; Future  -     Comprehensive Metabolic Panel; Future  Stable on medication    Anemia, unspecified type  -     FERRITIN; Future  -     IRON AND TIBC; Future  -     VITAMIN B12; Future  Stable-we will check labs    Other orders  -     ondansetron (ZOFRAN) 4 MG tablet; Take 1 tablet by mouth every 8 hours as needed for Nausea or Vomiting  -     metoprolol succinate (TOPROL XL) 25 MG extended release tablet; Take 1 tablet by mouth 2 times daily      Plan: I will see him back in 6 weeks and as needed. In 2 weeks he will get chest x-ray and blood work at the hospital.  Orders have been written. We will also include a PSA with his history of prostate cancer. Adjusted the dose of his Eliquis back up to 5 mg twice daily. Continue to monitor blood pressure and blood sugar closely. Continue to follow with above consultants. Fall precautions. Prescription management performed notify us of problems in the interim. Return in about 6 weeks (around 2/18/2021). Seen By:  Laura Pan MD      *Document was created using voice recognition software. Note was reviewed however may contain grammatical errors.

## 2021-01-13 ENCOUNTER — TELEPHONE (OUTPATIENT)
Dept: FAMILY MEDICINE CLINIC | Age: 76
End: 2021-01-13

## 2021-01-13 NOTE — TELEPHONE ENCOUNTER
Spoke with wife, Chase García and she is asking for a cough med (either DOROTA or script) that pt can take that is not a decongestant. She states pt took HBP Coricidin and is coughing a lot where his ribs are hurting and everything has loosened up. Please advise.

## 2021-01-19 ENCOUNTER — TELEPHONE (OUTPATIENT)
Dept: FAMILY MEDICINE CLINIC | Age: 76
End: 2021-01-19

## 2021-01-19 NOTE — TELEPHONE ENCOUNTER
Just put him in as a virtual visit next week. Keep February 22 appointment.   Should take vitamin B12 once a day not twice a day

## 2021-01-19 NOTE — TELEPHONE ENCOUNTER
Wife Oralia Pitt calling in he was released from [facility] yesterday. He went in on 01/13 for sob and dx with covid. They told him to fu with pcp in a week. However 14 day quarantine would not be up til 01/27. He has an appt 02/22. Do you need to see him sooner? Also they did not give him b12 at hospital. Should he go back on it ?

## 2021-01-27 ENCOUNTER — VIRTUAL VISIT (OUTPATIENT)
Dept: FAMILY MEDICINE CLINIC | Age: 76
End: 2021-01-27
Payer: MEDICARE

## 2021-01-27 DIAGNOSIS — R53.1 GENERALIZED WEAKNESS: ICD-10-CM

## 2021-01-27 DIAGNOSIS — E11.9 TYPE 2 DIABETES MELLITUS WITHOUT COMPLICATION, WITHOUT LONG-TERM CURRENT USE OF INSULIN (HCC): ICD-10-CM

## 2021-01-27 DIAGNOSIS — I25.10 CORONARY ARTERY DISEASE INVOLVING NATIVE HEART WITHOUT ANGINA PECTORIS, UNSPECIFIED VESSEL OR LESION TYPE: ICD-10-CM

## 2021-01-27 DIAGNOSIS — C61 PROSTATE CA (HCC): ICD-10-CM

## 2021-01-27 DIAGNOSIS — U07.1 COVID-19: Primary | ICD-10-CM

## 2021-01-27 DIAGNOSIS — I10 ESSENTIAL HYPERTENSION: ICD-10-CM

## 2021-01-27 DIAGNOSIS — J18.9 PNEUMONIA DUE TO INFECTIOUS ORGANISM, UNSPECIFIED LATERALITY, UNSPECIFIED PART OF LUNG: ICD-10-CM

## 2021-01-27 PROCEDURE — 99213 OFFICE O/P EST LOW 20 MIN: CPT | Performed by: INTERNAL MEDICINE

## 2021-01-27 NOTE — PROGRESS NOTES
TeleMedicine Video Visit    This visit was performed as a virtual video visit using a synchronous, two-way, audio-video telehealth technology platform. Patient identification was verified at the start of the visit, including the patient's telephone number and physical location. I discussed with the patient the nature of our telehealth visits, that:     1. Due to the nature of an audio- video modality, the only components of a physical exam that could be done are the elements supported by direct observation. 2. I would evaluate the patient and recommend diagnostics and treatments based on my assessment. 3. If it was felt that the patient should be evaluated in clinic or an emergency room setting, then they would be directed there. 4. Our sessions are not being recorded and that personal health information is protected. 5. Our team would provide follow up care in person if/when the patient needs it. Patient does agree to proceed with telemedicine consultation. Patient's location: home address in [facility]  Physician  location Office address tomorrow other people involved in call--wife      Time spent: Greater than Not billed by time    This visit was completed virtually using Doxy. me        [facility]     19  [address] [place] : 1945 Sex: male  Age: 66 y.o. Chief Complaint   Patient presents with    Positive For Covid-19    Other     Pneumonia       HPI  Patient encounter today via virtual video visit secondary to ongoing COVID-19 pandemic. Patient was recently hospitalized at [facility] with COVID-19 pneumonia. He is sent home with oxygen and checking pulse ox. Wife states his numbers have been in the mid 90 range as they are weaning his oxygen now down to 1-1/2 L. He states he feels well. Does have some fatigue.   I did review the documents available to me from the hospital. Wife states blood pressure over the last couple days have been running on the lower end mid 90s to 110 range. Patient asymptomatic with that. He is following with renal, vascular, cardiology, ophthalmology. Patient did see infectious disease while in the hospital.    Review of Systems   Constitutional: Negative for activity change, appetite change, chills, diaphoresis,  fever. Positive for postop/post hospital fatigue and weight loss. HENT: Negative for congestion, ear pain, hearing loss, postnasal drip, rhinorrhea and sinus pain.    Respiratory: Negative for  wheezing. Positive for mild shortness of breath and cough post Covid pneumonia   cardiovascular: Negative for chest pain, palpitations and leg swelling.  Recent MI and CABG  Gastrointestinal: Positive for abdominal pain-resolved post CABG-.  Negative for blood in stool, constipation, diarrhea, nausea and vomiting. Endocrine: Negative.    Genitourinary: Negative for difficulty urinating, dysuria, frequency, hematuria and urgency. Musculoskeletal: Negative for arthralgias back pain, gait problem and myalgias. Skin: Negative.    Allergic/Immunologic: Negative for environmental allergies   Neurological: Negative for dizziness, weakness, light-headedness, numbness and headaches. Psychiatric/Behavioral: Negative for sleep disturbance. The patient is not nervous/anxious.    Endocrine: Type 2 diabetes  Hematology: Anemia    REST OF PERTINENT ROS GONE OVER AND WAS NEGATIVE.      PMH:  Problem List: Essential hypertension  Osteoarthritis  Malignant tumor of prostate  Gout  Type 2 diabetes mellitus  Benign essential hypertension  Hyperlipidemia  Health Maintenance:  Colonoscopy - (8/13/2014)  Colonoscopy - (1/16/2017)  Colonoscopy Screening - (8/13/2014)  Colonoscopy Screening - (1/16/2017)  Influenza Vaccination - (11/16/2016)  Pneumonia Vaccination - (11/16/2016)  Colonoscopy - 08-dr miguel,8/14,1/17, 6/20-due 25 Psa Test - dr jiménez-we will do, 4/17,4/18, 7/19-0.1, 11/19 - 0.01, 1/21  Rectal Exam - dr Tamra Soto, declined  Prostate Exam - dr Tamra Soto, declined  Ercp - 3/11  Hemmocult Cards - hemocult-neg x 3--9/11,5/13-neg x 3  EGD - 8/14,1/17  MRCP - 1/17, 7/20  Fit test - 4/19-neg  Stress test-8/20-mild ischemia  2D echo-8/20  Medical Problems:  Hypertension  Hyperlipidemia - intolerant of statins and zetia  Gout, Prostate Cancer, hx left knee arthroscopy, Type 2 Diabetes, Diverticulosis, Osteoarthritis, Colon  Polyps, ventral hernia, Hiatal Hernia, Pancreatitis  Cholelithiasis - cholecystitis/choledocholithiasis-lap pratibha  Ercp  Carotid Artery Stenosis - declines further fu, vascular now following  Pulmonary Emphysema - Noted on CT chest  Pulmonary nodules - Followed on CT-done  Abnormal vascular study - Refuses vascular consult  Laser surgery 3/26/14, Dr. Champagne Staff for glaucoma  Saw rheumatology in 2019 diagnosis inflammatory polyarthropathy-took self off of methotrexate and prednisone.  Refuses further evaluation. Hospitalization 10/20-non-ST elevation MI, respiratory arrest with mechanical ventilation, triple-vessel CABG with left atrial appendage closure  Pseudoaneurysm repair right arm-12/20  covid pneumonia-1/21    -  FH:  unknown--adopted  Father:  . (Hx)  Mother:  . (Hx)  SH:  . (Marital) retire   Personal Habits: Cigarette Use: Former Cigarette Smoker. Alcohol: , Has consumed alcohol in the past - 12 years ago               Current Outpatient Medications:     ondansetron (ZOFRAN) 4 MG tablet, Take 1 tablet by mouth every 8 hours as needed for Nausea or Vomiting, Disp: 30 tablet, Rfl: 2    metoprolol succinate (TOPROL XL) 25 MG extended release tablet, Take 1 tablet by mouth 2 times daily, Disp: 180 tablet, Rfl: 1    Respiratory Therapy Supplies (NEBULIZER/TUBING/MOUTHPIECE) KIT, Use 3 times daily, Disp: 1 kit, Rfl: 0    pravastatin (PRAVACHOL) 80 MG tablet, Take 80 mg by mouth daily, Disp: , Rfl:   blood glucose test strips (ONE TOUCH ULTRA TEST) strip, 1 each by Does not apply route daily, Disp: 100 each, Rfl: 2    OneTouch Delica Lancets 73V MISC, 1 Act by Does not apply route daily, Disp: 100 each, Rfl: 2    allopurinol (ZYLOPRIM) 100 MG tablet, Take 1 tablet by mouth daily, Disp: 30 tablet, Rfl: 5    ipratropium-albuterol (DUONEB) 0.5-2.5 (3) MG/3ML SOLN nebulizer solution, Inhale 3 mLs into the lungs every 6 hours as needed for Shortness of Breath, Disp: 120 mL, Rfl: 0    apixaban (ELIQUIS) 5 MG TABS tablet, Take 1 tablet by mouth 2 times daily (Patient taking differently: Take 2.5 mg by mouth 2 times daily ), Disp: 60 tablet, Rfl: 2    timolol (TIMOPTIC) 0.5 % ophthalmic solution, Place 1 drop into the left eye 2 times daily, Disp: , Rfl:     ferrous sulfate (IRON 325) 325 (65 Fe) MG tablet, Take 325 mg by mouth every other day , Disp: , Rfl:     vitamin B-12 (CYANOCOBALAMIN) 1000 MCG tablet, Take 1,000 mcg by mouth 2 times daily, Disp: , Rfl:     metFORMIN (GLUCOPHAGE) 500 MG tablet, 1 po q am, 2 po q pm Hold until pt is ready, Disp: 270 tablet, Rfl: 1    fluticasone (FLONASE) 50 MCG/ACT nasal spray, 2 sprays by Each Nostril route daily, Disp: 1 Bottle, Rfl: 5    aspirin 81 MG EC tablet, Take 81 mg by mouth daily , Disp: , Rfl:     vitamin E 400 UNIT capsule, Take 400 Units by mouth daily , Disp: , Rfl:     LUMIGAN 0.01 % SOLN ophthalmic drops, Place 1 drop into both eyes nightly , Disp: , Rfl:     vitamin D (CHOLECALCIFEROL) 1000 UNIT TABS tablet, Take 1,000 Units by mouth daily , Disp: , Rfl:     folic acid (FOLVITE) 1 MG tablet, Take 1 mg by mouth daily , Disp: , Rfl: 12  Allergies   Allergen Reactions    Methotrexate Derivatives      Caused Blisters       Past Medical History:   Diagnosis Date    Carotid artery stenosis     Cholecystitis     Choledocholithiasis     Cholelithiasis     Colon polyps     Essential hypertension 5/23/2019    Gout     Hiatal hernia  History of laparoscopic cholecystectomy     Hyperlipidemia 5/23/2019    Inflammatory polyarthropathy ([facility] 5/23/2019    Malignant tumor of prostate ([facility] [address] [facility]     Osteoarthritis     Pancreatitis     Prostate CA ([facility] [address] 5/23/2019    Pulmonary emphysema (HCC)     Pulmonary nodules     Type 2 diabetes mellitus without complication, without long-term current use of insulin ([facility] [address] 5/23/2019    Ventral hernia      Past Surgical History:   Procedure Laterality Date    CARDIAC CATHETERIZATION  10/16/2020    Dr. Aliza Weaver N/A 10/19/2020    CABG CORONARY ARTERY BYPASS, KYAW performed by Abdi Chamberlain DO at [address] [place] [address]     No family history on file.   Social History     Socioeconomic History    Marital status:      Spouse name: Not on file    Number of children: Not on file    Years of education: Not on file    Highest education level: Not on file   Occupational History     Employer: RETIRED   Social Needs    Financial resource strain: Not on file    Food insecurity     Worry: Not on file     Inability: Not on file   [employer] needs     Medical: Not on file     Non-medical: Not on file   Tobacco Use    Smoking status: Never Smoker    Smokeless tobacco: Never Used   Substance and Sexual Activity    Alcohol use: Not Currently    Drug use: Never    Sexual activity: Yes     Partners: Female   Lifestyle    Physical activity     Days per week: Not on file     Minutes per session: Not on file    Stress: Not on file   Relationships    Social connections     Talks on phone: Not on file     Gets together: Not on file     Attends Sabianist service: Not on file     Active member of club or organization: Not on file     Attends meetings of clubs or organizations: Not on file     Relationship status: Not on file    Intimate partner violence     Fear of current or ex partner: Not on file     Emotionally abused: Not on file     Physically abused: Not on file Forced sexual activity: Not on file   Other Topics Concern    Not on file   Social History Narrative    Not on file       There were no vitals filed for this visit. Physical Exam  Constitutional:       General: He is not in acute distress. HENT:      Head: Normocephalic and atraumatic. Pulmonary:      Effort: Pulmonary effort is normal. No respiratory distress. Breath sounds: No wheezing, rhonchi or rales. Neurological:      Mental Status: He is alert and oriented to person, place, and time. Psychiatric:         Mood and Affect: Mood normal.         Behavior: Behavior normal.         Thought Content: Thought content normal.         Judgment: Judgment normal.                 Assessment and Plan:  Janis Baez was seen today for positive for covid-19 and other. Diagnoses and all orders for this visit:    COVID-19    Pneumonia due to infectious organism, unspecified laterality, unspecified part of lung    Type 2 diabetes mellitus without complication, without long-term current use of insulin ([facility] [address]    Coronary artery disease involving native heart without angina pectoris, unspecified vessel or lesion type    Essential hypertension    Prostate CA ([facility] [address]    Generalized weakness      Plan: He has an appointment on the 22nd next month which I asked him to keep. I also given him   Return for keep appt. Seen By:  Tobi Wu MD      *Document was created using voice recognition software. Note was reviewed however may contain grammatical errors.

## 2021-01-29 ENCOUNTER — TELEPHONE (OUTPATIENT)
Dept: FAMILY MEDICINE CLINIC | Age: 76
End: 2021-01-29

## 2021-01-29 DIAGNOSIS — R60.9 EDEMA, UNSPECIFIED TYPE: Primary | ICD-10-CM

## 2021-01-29 RX ORDER — FUROSEMIDE 20 MG/1
20 TABLET ORAL DAILY
Qty: 30 TABLET | Refills: 0 | Status: SHIPPED | OUTPATIENT
Start: 2021-01-29

## 2021-01-29 NOTE — TELEPHONE ENCOUNTER
Hemanthsanjeev Moctezuma - Wife      She is calling to update on his vitals. Wed evening:        BP - 102/50     Pulse Ox - 2 Litre O2   =  98                   Thursday:           Morning        BP - 116/60     Pulse Ox - 1 Litre O2  =  92          Evening        BP - 113/58     Pulse Ox - 1 Litre O2  =  93                    Friday:               No Oxygen             Morning Pulse Ox   -  91          12:30 Pulse Ox   -   95       She wants you to know that his legs are very swollen and Ice cold. She has a hard time putting on the [facility]. Please advise.

## 2021-01-29 NOTE — TELEPHONE ENCOUNTER
Start Lasix 20 mg daily. Hold if systolic less than 973.  1 week BMP and magnesium level. Try Ace wraps instead of compression stockings.

## 2021-08-13 NOTE — TELEPHONE ENCOUNTER
follow-up visit with pt and pt's daughter at bedside. Both encouraged by pt's progress.  offered continuing spiritual support.      08/13/21 1517   Encounter Summary   Services provided to: Patient and family together  (Daughter at bedside)   Referral/Consult From: Elisha   Continue Visiting   (8/123 - Follow-up; pt improving and encouraged)   Complexity of Encounter Low   Length of Encounter 15 minutes   Routine   Type Follow up Wife notified
